# Patient Record
Sex: MALE | Race: BLACK OR AFRICAN AMERICAN | Employment: OTHER | URBAN - METROPOLITAN AREA
[De-identification: names, ages, dates, MRNs, and addresses within clinical notes are randomized per-mention and may not be internally consistent; named-entity substitution may affect disease eponyms.]

---

## 2017-01-05 ENCOUNTER — HOSPITAL ENCOUNTER (EMERGENCY)
Facility: CLINIC | Age: 72
Discharge: HOME | End: 2017-01-05
Attending: EMERGENCY MEDICINE | Admitting: EMERGENCY MEDICINE
Payer: MEDICARE

## 2017-01-05 ENCOUNTER — TELEPHONE (OUTPATIENT)
Dept: FAMILY MEDICINE | Facility: CLINIC | Age: 72
End: 2017-01-05

## 2017-01-05 ENCOUNTER — APPOINTMENT (OUTPATIENT)
Dept: GENERAL RADIOLOGY | Facility: CLINIC | Age: 72
End: 2017-01-05
Attending: EMERGENCY MEDICINE
Payer: MEDICARE

## 2017-01-05 VITALS
DIASTOLIC BLOOD PRESSURE: 94 MMHG | BODY MASS INDEX: 22.72 KG/M2 | WEIGHT: 162.8 LBS | TEMPERATURE: 98 F | OXYGEN SATURATION: 98 % | SYSTOLIC BLOOD PRESSURE: 133 MMHG | RESPIRATION RATE: 16 BRPM

## 2017-01-05 DIAGNOSIS — R07.89 ATYPICAL CHEST PAIN: ICD-10-CM

## 2017-01-05 LAB
ALBUMIN SERPL-MCNC: 3.8 G/DL (ref 3.4–5)
ALP SERPL-CCNC: 140 U/L (ref 40–150)
ALT SERPL W P-5'-P-CCNC: 17 U/L (ref 0–70)
ANION GAP SERPL CALCULATED.3IONS-SCNC: 4 MMOL/L (ref 3–14)
AST SERPL W P-5'-P-CCNC: 22 U/L (ref 0–45)
BASOPHILS # BLD AUTO: 0 10E9/L (ref 0–0.2)
BASOPHILS NFR BLD AUTO: 0.6 %
BILIRUB SERPL-MCNC: 0.4 MG/DL (ref 0.2–1.3)
BUN SERPL-MCNC: 12 MG/DL (ref 7–30)
CALCIUM SERPL-MCNC: 8.8 MG/DL (ref 8.5–10.1)
CHLORIDE SERPL-SCNC: 108 MMOL/L (ref 94–109)
CO2 SERPL-SCNC: 30 MMOL/L (ref 20–32)
CREAT SERPL-MCNC: 1.15 MG/DL (ref 0.66–1.25)
DIFFERENTIAL METHOD BLD: ABNORMAL
EOSINOPHIL # BLD AUTO: 0.1 10E9/L (ref 0–0.7)
EOSINOPHIL NFR BLD AUTO: 1.5 %
ERYTHROCYTE [DISTWIDTH] IN BLOOD BY AUTOMATED COUNT: 12.1 % (ref 10–15)
GFR SERPL CREATININE-BSD FRML MDRD: 63 ML/MIN/1.7M2
GLUCOSE SERPL-MCNC: 92 MG/DL (ref 70–99)
HCT VFR BLD AUTO: 46.6 % (ref 40–53)
HGB BLD-MCNC: 16 G/DL (ref 13.3–17.7)
IMM GRANULOCYTES # BLD: 0 10E9/L (ref 0–0.4)
IMM GRANULOCYTES NFR BLD: 0 %
LIPASE SERPL-CCNC: 156 U/L (ref 73–393)
LYMPHOCYTES # BLD AUTO: 1.3 10E9/L (ref 0.8–5.3)
LYMPHOCYTES NFR BLD AUTO: 38.7 %
MCH RBC QN AUTO: 33.9 PG (ref 26.5–33)
MCHC RBC AUTO-ENTMCNC: 34.3 G/DL (ref 31.5–36.5)
MCV RBC AUTO: 99 FL (ref 78–100)
MONOCYTES # BLD AUTO: 0.4 10E9/L (ref 0–1.3)
MONOCYTES NFR BLD AUTO: 10.7 %
NEUTROPHILS # BLD AUTO: 1.6 10E9/L (ref 1.6–8.3)
NEUTROPHILS NFR BLD AUTO: 48.5 %
NRBC # BLD AUTO: 0 10*3/UL
NRBC BLD AUTO-RTO: 0 /100
PLATELET # BLD AUTO: 143 10E9/L (ref 150–450)
POTASSIUM SERPL-SCNC: 3.8 MMOL/L (ref 3.4–5.3)
PROT SERPL-MCNC: 7.4 G/DL (ref 6.8–8.8)
RBC # BLD AUTO: 4.72 10E12/L (ref 4.4–5.9)
SODIUM SERPL-SCNC: 142 MMOL/L (ref 133–144)
TROPONIN I SERPL-MCNC: NORMAL UG/L (ref 0–0.04)
WBC # BLD AUTO: 3.3 10E9/L (ref 4–11)

## 2017-01-05 PROCEDURE — 71020 XR CHEST 2 VW: CPT

## 2017-01-05 PROCEDURE — 99284 EMERGENCY DEPT VISIT MOD MDM: CPT | Mod: 25 | Performed by: EMERGENCY MEDICINE

## 2017-01-05 PROCEDURE — 80053 COMPREHEN METABOLIC PANEL: CPT | Performed by: EMERGENCY MEDICINE

## 2017-01-05 PROCEDURE — 36415 COLL VENOUS BLD VENIPUNCTURE: CPT | Performed by: EMERGENCY MEDICINE

## 2017-01-05 PROCEDURE — 84484 ASSAY OF TROPONIN QUANT: CPT | Performed by: EMERGENCY MEDICINE

## 2017-01-05 PROCEDURE — 85025 COMPLETE CBC W/AUTO DIFF WBC: CPT | Performed by: EMERGENCY MEDICINE

## 2017-01-05 PROCEDURE — 93010 ELECTROCARDIOGRAM REPORT: CPT | Mod: Z6 | Performed by: EMERGENCY MEDICINE

## 2017-01-05 PROCEDURE — 83690 ASSAY OF LIPASE: CPT | Performed by: EMERGENCY MEDICINE

## 2017-01-05 PROCEDURE — 93005 ELECTROCARDIOGRAM TRACING: CPT | Performed by: EMERGENCY MEDICINE

## 2017-01-05 PROCEDURE — 99285 EMERGENCY DEPT VISIT HI MDM: CPT | Mod: 25 | Performed by: EMERGENCY MEDICINE

## 2017-01-05 ASSESSMENT — ENCOUNTER SYMPTOMS
FEVER: 0
CHILLS: 0
NAUSEA: 0
DIFFICULTY URINATING: 0
ARTHRALGIAS: 0
ABDOMINAL PAIN: 0
DIARRHEA: 0
CONFUSION: 0
COUGH: 0
NECK STIFFNESS: 0
LIGHT-HEADEDNESS: 0
EYE REDNESS: 0
DIZZINESS: 0
VOMITING: 0
SHORTNESS OF BREATH: 0
COLOR CHANGE: 0
HEADACHES: 0

## 2017-01-05 NOTE — DISCHARGE INSTRUCTIONS
Please make an appointment to follow up with Your Primary Care Provider as soon as possible for further work-up.  If you have any worsening pain, shortness of breath, lightheadedness, or other concerns, return to the emergency department for re-evaluation.           *CHEST PAIN, UNCERTAIN CAUSE    Based on your exam today, the exact cause of your chest pain is not certain. Your condition does not seem serious at this time, and your pain does not appear to be coming from your heart. However, sometimes the signs of a serious problem take more time to appear. Therefore, watch for the warning signs listed below.  HOME CARE:  1. Rest today and avoid strenuous activity.  2. Take any prescribed medicine as directed.  FOLLOW UP with your doctor in 1-3 days.   GET PROMPT MEDICAL ATTENTION if any of the following occur:    A change in the type of pain: if it feels different, becomes more severe, lasts longer, or begins to spread into your shoulder, arm, neck, jaw or back    Shortness of breath or increased pain with breathing    Weakness, dizziness, or fainting    Cough with blood or dark colored sputum (phlegm)    Fever over 101  F (38.3  C)    Swelling, pain or redness in one leg    5636-0345 [facility], [address] [place] [address]. All rights reserved. This information is not intended as a substitute for professional medical care. Always follow your healthcare professional's instructions.

## 2017-01-05 NOTE — TELEPHONE ENCOUNTER
Pt has been having chest pain for a month or so, began as intermittent now constant. Works out six days a week: Pull-ups, 50-55 minutes of biking. Now it is worse with exertion. Describes pain as ache, left pectoral muscle radiates to left side sometimes to back.   No breathing changes  No lightheadedness, no dizziness  No sweating, not clammy  No squeezing sensation, no pressure  No nausea  No GERD symptoms    Measures BP at home, has been 135/85, 150/98    Has history of sharp pains, was diagnosed with pleurisy but this is different from that.    Smokes weed 4 nights a week  Stopped smoking 40 years ago, started smoking again in [place] four years ago now smokes 2-3 cigaretts a week    Dr. Franco spoke to patient and advised to ED to rule out angina, cardiac involvement. Pt understands and will go to ED.    TONNY PizanoN, RN  [facility]

## 2017-01-05 NOTE — ED AVS SNAPSHOT
[facility], Emergency Department    [address]    [place] [address]    Phone:  [phone]    Fax:  [phone]                                       Ivy Haynes   MRN: [id number]    Department:  [facility], Emergency Department   Date of Visit:  1/5/2017           Patient Information     Date Of Birth          1945        Your diagnoses for this visit were:     Atypical chest pain        You were seen by Carleen Pa MD.        Discharge Instructions       Please make an appointment to follow up with Your Primary Care Provider as soon as possible for further work-up.  If you have any worsening pain, shortness of breath, lightheadedness, or other concerns, return to the emergency department for re-evaluation.           *CHEST PAIN, UNCERTAIN CAUSE    Based on your exam today, the exact cause of your chest pain is not certain. Your condition does not seem serious at this time, and your pain does not appear to be coming from your heart. However, sometimes the signs of a serious problem take more time to appear. Therefore, watch for the warning signs listed below.  HOME CARE:  1. Rest today and avoid strenuous activity.  2. Take any prescribed medicine as directed.  FOLLOW UP with your doctor in 1-3 days.   GET PROMPT MEDICAL ATTENTION if any of the following occur:    A change in the type of pain: if it feels different, becomes more severe, lasts longer, or begins to spread into your shoulder, arm, neck, jaw or back    Shortness of breath or increased pain with breathing    Weakness, dizziness, or fainting    Cough with blood or dark colored sputum (phlegm)    Fever over 101  F (38.3  C)    Swelling, pain or redness in one leg    3563-4641 [address] [address]. All rights reserved. This information is not intended as a substitute for professional medical care. Always follow your healthcare professional's instructions.        24 Hour Appointment  Hotline       To make an appointment at any [facility], call [phone] ([phone]). If you don't have a family doctor or clinic, we will help you find one. [facility] clinics are conveniently located to serve the needs of you and your family.             Review of your medicines      Our records show that you are taking the medicines listed below. If these are incorrect, please call your family doctor or clinic.        Dose / Directions Last dose taken    amLODIPine 2.5 MG tablet   Commonly known as:  NORVASC   Dose:  2.5 mg   Quantity:  90 tablet        Take 1 tablet (2.5 mg) by mouth daily   Refills:  1        TAMSULOSIN HCL PO        Refills:  0                Procedures and tests performed during your visit     CBC with platelets differential    Comprehensive metabolic panel    EKG 12 lead    Lipase    POC US ECHO LIMITED    Troponin I    XR Chest 2 Views      Orders Needing Specimen Collection     None      Pending Results     No orders found from 1/4/2017 to 1/6/2017.            Pending Culture Results     No orders found from 1/4/2017 to 1/6/2017.            Thank you for choosing [facility]       Thank you for choosing [facility] for your care. Our goal is always to provide you with excellent care. Hearing back from our patients is one way we can continue to improve our services. Please take a few minutes to complete the written survey that you may receive in the mail after you visit with us. Thank you!        [employer] Information     [employer] gives you secure access to your electronic health record. If you see a primary care provider, you can also send messages to your care team and make appointments. If you have questions, please call your primary care clinic.  If you do not have a primary care provider, please call [phone] and they will assist you.        Care EveryWhere ID     This is your Care EveryWhere ID. This could be used by other organizations to access your [facility]  records  [id number]        After Visit Summary       This is your record. Keep this with you and show to your community pharmacist(s) and doctor(s) at your next visit.

## 2017-01-05 NOTE — ED AVS SNAPSHOT
[facility], Emergency Department    [address]    [facility] [address]    Phone:  [phone]    Fax:  [phone]                                       Ivy Haynes   MRN: [id number]    Department:  [facility], Emergency Department   Date of Visit:  1/5/2017           After Visit Summary Signature Page     I have received my discharge instructions, and my questions have been answered. I have discussed any challenges I see with this plan with the nurse or doctor.    ..........................................................................................................................................  Patient/Patient Representative Signature      ..........................................................................................................................................  Patient Representative Print Name and Relationship to Patient    ..................................................               ................................................  Date                                            Time    ..........................................................................................................................................  Reviewed by Signature/Title    ...................................................              ..............................................  Date                                                            Time

## 2017-01-05 NOTE — ED PROVIDER NOTES
"  History     Chief Complaint   Patient presents with     Chest Wall Pain     \"an ache\" on left side x4 weeks, was coming and going. now constant. pt states it was \"a little more intense\" the last 2 times he has worked out.      TABATHA Haynes is a 71 year old male with a history of hepatitis C, GERD, trigeminal neuralgia, and hypertension who presents for evaluation of chest pain. Patient complains of aching, left-sided chest pain that began 4-6 weeks ago. He states when he first noticed the pain it was daily, though intermittent, however, over the past two days the pain has become constant. He presented for evaluation today because he has begun feeling the chest pain while he is exercising. He is able to complete his work-out though and describes the pain as a very dull ache that doesn't interfere with what he is doing.  He denies shortness of breath or cough. No fevers or chills. No history of PE/DVT. No leg pain or swelling. Patient smokes 2-3 cigarettes per day intermittently. He denies nausea, dizziness, lightheadedness, diaphoresis associated with the pain. He has not noticed anything that triggers his chest pain, and it is not changed with positional changes. It will occur both at rest and with activity.  He denies abdominal pain, vomiting, diarrhea, or blood in his stool.     I have reviewed the Medications, Allergies, Past Medical and Surgical History, and Social History in the [facility] system.  Past Medical History   Diagnosis Date     Hepatitis C      GERD (gastroesophageal reflux disease)      Back pain      Trigeminal neuralgia      Hemorrhoid      Gastro-oesophageal reflux disease        Past Surgical History   Procedure Laterality Date     Hernia repair  12/10     Styloid process temporal bone surg  02/10     Resect tumor lower extremity  11/9/2012     Procedure: RESECT TUMOR LOWER EXTREMITY;  Excision of Tumor Left Distal Femur, Left Total Knee Replacement ;  Surgeon: Ang Arellano MD;  Location: " [facility] OR     Arthroplasty knee  11/9/2012     Procedure: ARTHROPLASTY KNEE;;  Surgeon: Ang Arellano MD;  Location: [facility] OR       Family History   Problem Relation Age of Onset     CANCER Paternal Grandfather      CANCER Son        Social History   Substance Use Topics     Smoking status: Current Every Day Smoker -- 0.25 packs/day for 22 years     Types: Cigarettes     Last Attempt to Quit: 05/24/1981     Smokeless tobacco: Never Used     Alcohol Use: Yes      Comment: 1-2 beer a week      No current facility-administered medications for this encounter.     Current Outpatient Prescriptions   Medication     amLODIPine (NORVASC) 2.5 MG tablet     TAMSULOSIN HCL PO      No Known Allergies    Review of Systems   Constitutional: Negative for fever and chills.   HENT: Negative for congestion.    Eyes: Negative for redness.   Respiratory: Negative for cough and shortness of breath.    Cardiovascular: Positive for chest pain (aching; left-sided). Negative for leg swelling.   Gastrointestinal: Negative for nausea, vomiting, abdominal pain and diarrhea.   Genitourinary: Negative for difficulty urinating.   Musculoskeletal: Negative for arthralgias and neck stiffness.   Skin: Negative for color change.   Neurological: Negative for dizziness, light-headedness and headaches.   Psychiatric/Behavioral: Negative for confusion.       Physical Exam   BP: (!) 158/100 mmHg  Heart Rate: 92  Temp: 97.5  F (36.4  C)  Resp: 16  Weight: 73.846 kg (162 lb 12.8 oz)  SpO2: 99 %  Physical Exam  General: patient is alert and oriented and in no acute distress   Head: atraumatic and normocephalic   EENT: moist mucus membranes without tonsillar erythema or exudates, pupils round and reactive   Neck: supple   Cardiovascular: regular rate and rhythm, extremities warm and well perfused, no lower extremity edema  Pulmonary: lungs clear to auscultation bilaterally, no chest wall TTP  Abdomen: soft, non-tender   Musculoskeletal: normal range of motion    Neurological: alert and oriented, moving all extremities symmetrically, gait normal   Skin: warm, dry     ED Course   Procedures       10:11 AM  The patient was seen and examined by Dr. Pa in Room 19.          EKG Interpretation:      Interpreted by Carleen Pa  Time reviewed: 1000  Symptoms at time of EKG: chest pain   Rhythm: normal sinus   Rate: normal  Axis: normal  Ectopy: none  Conduction: normal  ST Segments/ T Waves: No ST-T wave changes  Q Waves: none  Comparison to prior: Unchanged    Clinical Impression: normal EKG          Critical Care time:  none                  Labs Ordered and Resulted from Time of ED Arrival Up to the Time of Departure from the ED - No data to display    Assessments & Plan (with Medical Decision Making)   Mr. Haynes is a 71 year old male with a history of hepatitis C, GERD, trigeminal neuralgia, and hypertension who presents for evaluation of chest pain.   Given the duration of his symptoms if his chest pain were ischemic in nature I would expect to see some elevation in troponin however this is negative.  His ECG shows no ischemic changes.  At this point my suspicion for ACS is low.  Bedside cardiac ultrasound does not show any evidence of a pericardial effusion or obvious changes in global function.  Chest x-ray is negative for infiltrate, pleural effusion or lytic lesion.  His LFTs and lipase are all within normal limits.  Hemoglobin is within normal limits.  I do not suspect PE or aortic dissection based on his history and exam.  He is pain-free at this time without any intervention.  I do feel he is safe to continue his workup in an outpatient setting.  He does have a primary care provider and close follow-up and is referred to his primary care provider for further workup and stress testing.  He was given close return instructions and voiced understanding.      I have reviewed the nursing notes.    I have reviewed the findings, diagnosis, plan and need for follow  up with the patient.    Discharge Medication List as of 1/5/2017 11:37 AM          Final diagnoses:   Atypical chest pain   I, Arlette Peñaloza, am serving as a trained medical scribe to document services personally performed by Carleen Pa MD, based on the provider's statements to me.   ICarleen MD, was physically present and have reviewed and verified the accuracy of this note documented by Arlette Peñaloza.      1/5/2017   [facility], [place], EMERGENCY DEPARTMENT      Carleen Pa MD  01/05/17 1208

## 2017-01-06 LAB — INTERPRETATION ECG - MUSE: NORMAL

## 2017-01-10 ENCOUNTER — OFFICE VISIT (OUTPATIENT)
Dept: FAMILY MEDICINE | Facility: CLINIC | Age: 72
End: 2017-01-10
Payer: COMMERCIAL

## 2017-01-10 VITALS
HEART RATE: 97 BPM | HEIGHT: 71 IN | OXYGEN SATURATION: 99 % | DIASTOLIC BLOOD PRESSURE: 85 MMHG | SYSTOLIC BLOOD PRESSURE: 134 MMHG | BODY MASS INDEX: 22.79 KG/M2 | WEIGHT: 162.8 LBS

## 2017-01-10 DIAGNOSIS — I10 HYPERTENSION GOAL BP (BLOOD PRESSURE) < 140/90: ICD-10-CM

## 2017-01-10 DIAGNOSIS — R07.89 CHEST WALL PAIN: Primary | ICD-10-CM

## 2017-01-10 PROCEDURE — 99214 OFFICE O/P EST MOD 30 MIN: CPT | Performed by: FAMILY MEDICINE

## 2017-01-10 NOTE — MR AVS SNAPSHOT
"              After Visit Summary   1/10/2017    Ivy Haynes    MRN: [id number]           Patient Information     Date Of Birth          1945        Visit Information        Provider Department      1/10/2017 3:00 PM Lamine Franco MD [facility]        Today's Diagnoses     Chest wall pain    -  1        Follow-ups after your visit        Future tests that were ordered for you today     Open Future Orders        Priority Expected Expires Ordered    Exercise Stress test Routine  2/24/2017 1/10/2017            Who to contact     If you have questions or need follow up information about today's clinic visit or your schedule please contact [facility] directly at [phone].  Normal or non-critical lab and imaging results will be communicated to you by MyChart, letter or phone within 4 business days after the clinic has received the results. If you do not hear from us within 7 days, please contact the clinic through [employer] or phone. If you have a critical or abnormal lab result, we will notify you by phone as soon as possible.  Submit refill requests through [employer] or call your pharmacy and they will forward the refill request to us. Please allow 3 business days for your refill to be completed.          Additional Information About Your Visit        MyChart Information     [employer] gives you secure access to your electronic health record. If you see a primary care provider, you can also send messages to your care team and make appointments. If you have questions, please call your primary care clinic.  If you do not have a primary care provider, please call [phone] and they will assist you.        Care EveryWhere ID     This is your Care EveryWhere ID. This could be used by other organizations to access your [place] medical records  [id number]        Your Vitals Were     Pulse Height BMI (Body Mass Index) Pulse Oximetry          97 5' 11\" (1.803 m) 22.72 kg/m2 99%  "        Blood Pressure from Last 3 Encounters:   01/10/17 134/85   01/05/17 133/94   06/08/16 120/82    Weight from Last 3 Encounters:   01/10/17 162 lb 12.8 oz (73.846 kg)   01/05/17 162 lb 12.8 oz (73.846 kg)   06/08/16 156 lb 6.4 oz (70.943 kg)               Primary Care Provider Office Phone # Fax #    Lamine Jr Franco [phone]       [facility] [address] [facility] [address]  [facility] [address]        Thank you!     Thank you for choosing [facility]  [place]. Our goal is always to provide you with excellent care. Hearing back from our patients is one way we can continue to improve our services. Please take a few minutes to complete the written survey that you may receive in the mail after your visit with us. Thank you!             Your Updated Medication List - Protect others around you: Learn how to safely use, store and throw away your medicines at www.disposemymeds.org.          This list is accurate as of: 1/10/17  3:11 PM.  Always use your most recent med list.                   Brand Name Dispense Instructions for use    amLODIPine 2.5 MG tablet    NORVASC    90 tablet    Take 1 tablet (2.5 mg) by mouth daily       TAMSULOSIN HCL PO

## 2017-01-10 NOTE — PROGRESS NOTES
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  Additional history: as documented    Problem list, Medication list, Allergies, and Medical/Social/Surgical histories reviewed in [facility] and updated as appropriate.    ROS:  Constitutional, HEENT, cardiovascular, pulmonary, gi and gu systems are negative, except as otherwise noted.    OBJECTIVE:                                                    /85 mmHg  Pulse 97  Ht 5' 11\" (1.803 m)  Wt 162 lb 12.8 oz (73.846 kg)  BMI 22.72 kg/m2  SpO2 99%  Body mass index is 22.72 kg/(m^2).  GENERAL: healthy, alert and no distress  HENT: ear canals and TM's normal, nose and mouth without ulcers or lesions  NECK: no adenopathy, no asymmetry, masses, or scars and thyroid normal to palpation  RESP: lungs clear to auscultation - no rales, rhonchi or wheezes  CV: regular rate and rhythm, normal S1 S2, no S3 or S4, no murmur, click or rub, no peripheral edema and peripheral pulses strong  MS: tenderness to palpation slight to left lateral chest wall  NEURO: Normal strength and tone, mentation intact and speech normal  BACK: no CVA tenderness, no paralumbar tenderness    Diagnostic Test Results:  Results for orders placed or performed during the hospital encounter of 01/05/17   XR Chest 2 Views    Narrative    XR CHEST 2 VW 1/5/2017 11:00 AM    COMPARISON: 2/11/2016    HISTORY: Left-sided chest pain      Impression    IMPRESSION: Cardiac silhouette and pulmonary vasculature are within  normal limits. No focal airspace disease, " pleural effusion or  pneumothorax.    ZAIRA COLON   POC US ECHO LIMITED    Impression    Limited Bedside Cardiac Ultrasound, performed and interpreted by  me.   Indication: Chest Pain.  Parasternal long axis, parasternal short axis, apical 4 chamber  and subcostal views were acquired.   Image quality was satisfactory.    Findings:    Global left ventricular function appears intact.  Chambers do not appear dilated.  There is no evidence of free fluid within the pericardium.    IMPRESSION: Grossly normal left ventricular function and chamber  size.  No pericardial effusion..     CBC with platelets differential   Result Value Ref Range    WBC 3.3 (L) 4.0 - 11.0 10e9/L    RBC Count 4.72 4.4 - 5.9 10e12/L    Hemoglobin 16.0 13.3 - 17.7 g/dL    Hematocrit 46.6 40.0 - 53.0 %    MCV 99 78 - 100 fl    MCH 33.9 (H) 26.5 - 33.0 pg    MCHC 34.3 31.5 - 36.5 g/dL    RDW 12.1 10.0 - 15.0 %    Platelet Count 143 (L) 150 - 450 10e9/L    Diff Method Automated Method     % Neutrophils 48.5 %    % Lymphocytes 38.7 %    % Monocytes 10.7 %    % Eosinophils 1.5 %    % Basophils 0.6 %    % Immature Granulocytes 0.0 %    Nucleated RBCs 0 0 /100    Absolute Neutrophil 1.6 1.6 - 8.3 10e9/L    Absolute Lymphocytes 1.3 0.8 - 5.3 10e9/L    Absolute Monocytes 0.4 0.0 - 1.3 10e9/L    Absolute Eosinophils 0.1 0.0 - 0.7 10e9/L    Absolute Basophils 0.0 0.0 - 0.2 10e9/L    Abs Immature Granulocytes 0.0 0 - 0.4 10e9/L    Absolute Nucleated RBC 0.0    Comprehensive metabolic panel   Result Value Ref Range    Sodium 142 133 - 144 mmol/L    Potassium 3.8 3.4 - 5.3 mmol/L    Chloride 108 94 - 109 mmol/L    Carbon Dioxide 30 20 - 32 mmol/L    Anion Gap 4 3 - 14 mmol/L    Glucose 92 70 - 99 mg/dL    Urea Nitrogen 12 7 - 30 mg/dL    Creatinine 1.15 0.66 - 1.25 mg/dL    GFR Estimate 63 >60 mL/min/1.7m2    GFR Estimate If Black 76 >60 mL/min/1.7m2    Calcium 8.8 8.5 - 10.1 mg/dL    Bilirubin Total 0.4 0.2 - 1.3 mg/dL    Albumin 3.8 3.4 - 5.0 g/dL    Protein  Total 7.4 6.8 - 8.8 g/dL    Alkaline Phosphatase 140 40 - 150 U/L    ALT 17 0 - 70 U/L    AST 22 0 - 45 U/L   Lipase   Result Value Ref Range    Lipase 156 73 - 393 U/L   Troponin I   Result Value Ref Range    Troponin I ES  0.000 - 0.045 ug/L     <0.015  The 99th percentile for upper reference range is 0.045 ug/L.  Troponin values in   the range of 0.045 - 0.120 ug/L may be associated with risks of adverse   clinical events.     EKG 12 lead   Result Value Ref Range    Interpretation ECG Click View Image link to view waveform and result         ASSESSMENT/PLAN:                                                            1. Chest wall pain  Likely MS in nature but need to rule out angina  - Exercise Stress test; Future    2. Hypertension goal BP (blood pressure) < 140/90  Continue medications/ diet/ exercise      Regular exercise  See Patient Instructions    Lamine Franco MD  [facility]

## 2017-01-12 DIAGNOSIS — R07.89 CHEST WALL PAIN: ICD-10-CM

## 2017-01-23 ENCOUNTER — MYC REFILL (OUTPATIENT)
Dept: FAMILY MEDICINE | Facility: CLINIC | Age: 72
End: 2017-01-23

## 2017-01-23 DIAGNOSIS — I10 HYPERTENSION GOAL BP (BLOOD PRESSURE) < 140/90: ICD-10-CM

## 2017-01-23 RX ORDER — AMLODIPINE BESYLATE 2.5 MG/1
2.5 TABLET ORAL DAILY
Qty: 90 TABLET | Refills: 1 | Status: SHIPPED | OUTPATIENT
Start: 2017-01-23 | End: 2017-02-15 | Stop reason: DRUGHIGH

## 2017-01-23 NOTE — TELEPHONE ENCOUNTER
Message from MyChart:  Original authorizing provider: Lamine Franco MD    Rayraygunnerbenita Haynes would like a refill of the following medications:  amLODIPine (NORVASC) 2.5 MG tablet [Lamine Franco MD]    Preferred pharmacy: [facility] [address] [place] [address] [address]    Comment:

## 2017-01-24 ENCOUNTER — TELEPHONE (OUTPATIENT)
Dept: FAMILY MEDICINE | Facility: CLINIC | Age: 72
End: 2017-01-24

## 2017-01-24 NOTE — TELEPHONE ENCOUNTER
Telephone call returned to patient, explained MD message regarding normal stress test results. Patient requests a copy of results be mailed to him.  He denies any questions or concerns. Normal lab letter sent today.     Donya Freedman RN  [facility]

## 2017-01-24 NOTE — TELEPHONE ENCOUNTER
Reason for Call:  Request for results:    Name of test or procedure: Had a stress test taken and had not heard the results yet    Date of test of procedure: 1-12-17 at the [facility]    Location of the test or procedure:     OK to leave the result message on voice mail or with a family member?     Phone number Patient can be reached at:  Home number on file [phone] (home)    Additional comments:     Call taken on 1/24/2017 at 11:43 AM by Eva Mendoza

## 2017-01-24 NOTE — Clinical Note
[address] [address] [phone]        2017      Leyda Haynes  [address]  [facility] [address]          Dear Mr. Haynes,    The results of your recent lab tests were within normal limits. Enclosed is a copy of these results.  If you have any further questions or problems, please contact our office.    Sincerely,        Lamnie Franco MD        Results for orders placed or performed in visit on 17   Exercise Stress test    Narrative    Interpretation Summary                    [facility]  Diagnostic and Treamtent-3rd Floor  [address] [place] [address]  Name: LEYDA HAYNES  MRN: [id number]  : 1945  Study Date: 2017 11:19 AM  Age: 71 yrs  Gender: Male  Patient Location: [facility]  Reason For Study: Chest discomfort/pressure  Ordering Physician: Lamine Franco MD  Referring Physician: 1-[facility]  Performed By: Vannessa Damon     BSA: 1.9 m2  Height: 71 in  Weight: 162 lb  HR: 93  BP: 150/94 mmHg  ______________________________________________________________________________     ______________________________________________________________________________     Interpretation Summary     No ECG evidence of ischemia. Normal functional capacity. The patient did not  exhibit any symptoms during exercise.  Normal blood pressure response with stress.  Normal heart rate response to exercise.  ______________________________________________________________________________        Stress  This was a normal stress EKG with no evidence of stress-induced ischemia.  Arrhythmia induced during stress: occasional PVC's.  The patient did not exhibit any symptoms during exercise.  Normal blood pressure response with stress.  Normal heart rate response to exercise.  Stress Results      Protocol:  Vic Ramp Protocol         Maximum Predicted HR:  151 bpm      Target HR:       128  bpm% Maximum               Predicted HR:  115 %      +------------------+--------+----------+------+--------------------------+   :                  :Duration:Heart Rate:      :                          :   :       Stage      : (mm:ss):   (bpm)  :  BPCom             ment         :   +------------------+--------+----------+------+--------------------------+   : Resting (Supine) :  0:00 93          :150/94:                          :   +------------------+--------+----------+------+--------------------------+   :Resting (Standing):  0:00 96          :150/90:                          :   +------------------+--------+----------+------+--------------------------+   :     2 Minutes    :  2:00 12      2   :185/87:                          :   +------------------+--------+----------+------+--------------------------+   :     4 Minutes    :  4:00 13      6   :190/85:                          :   +------------------+--------+----------+------+--------------------------+   :     6 Minutes    :  6:00 16      2   :200/80:                          :   +------------------+--------+----------+------+--------------------------+   :     8 Minutes    :  8:00 16      6   :210/90:                          :   +------------------+--------+----------+------+--------------------------+   :    10 Minutes    :  10:00 1     70   :209/85:                          :   +------------------+--------+----------+------+--------------------------+   :       Peak       :  11:35 1     73   :   / No   symptoms with exercise.:   +------------------+--------+----------+------+--------------------------+                    Stress Duration:  11:35 mm:ss *                Maximum Stress HR:   173 bpm *ME          TS: 12     ______________________________________________________________________________  MMode/2D Measurements & Calculations           Time Measurements           Doppler Measurements & Calculations        QLAB 2DQ/CMQ               ______________________________________________________________________________        Report approved by: Anali Stafford 01/12/2017 11:41 AM

## 2017-02-14 ENCOUNTER — TELEPHONE (OUTPATIENT)
Dept: FAMILY MEDICINE | Facility: CLINIC | Age: 72
End: 2017-02-14

## 2017-02-14 NOTE — TELEPHONE ENCOUNTER
Spoke with pt regarding symptoms, he has had more nasal symptoms the past couple days, runny nose, having to blow nose a lot, also stated he was more light headed, denies fever, has increased fluids and is getting adequate nutrition. Denies SOB, denies cough. Wife has been very ill and is also being seen tomorrow for possible influenza. He has not had his flu vaccine this season    Pt requested an appt for tomorrow to be seen, per Eva pt was added to schedule for Dr. Rocha.    Joanne Rojas RN

## 2017-02-14 NOTE — TELEPHONE ENCOUNTER
Reason for call: Other   Patient called regarding (reason for call): appointment  Additional comments: The patient's wife, Catherine Haynes, is seeing Shannen on 2/15 at 1130 for possible flu, and he thinks he is coming down with the same thing she has. He was wondering if he can be seen by Shannen at the same time her wife is seen (They usually see Dr. Franco but he will be out of the clinic for the next 2 days). Informed that nothing could be double booked without the provider's approval, and would get a message in to her to see if that would be ok.     Phone Number Pt can be reached at: Home number on file [phone] (home)  Best Time: Any  Can we leave a detailed message on this number? YES

## 2017-02-15 ENCOUNTER — OFFICE VISIT (OUTPATIENT)
Dept: FAMILY MEDICINE | Facility: CLINIC | Age: 72
End: 2017-02-15
Payer: COMMERCIAL

## 2017-02-15 DIAGNOSIS — R07.89 ATYPICAL CHEST PAIN: ICD-10-CM

## 2017-02-15 DIAGNOSIS — J06.9 VIRAL UPPER RESPIRATORY TRACT INFECTION: Primary | ICD-10-CM

## 2017-02-15 DIAGNOSIS — I10 HYPERTENSION GOAL BP (BLOOD PRESSURE) < 140/90: ICD-10-CM

## 2017-02-15 PROCEDURE — 99214 OFFICE O/P EST MOD 30 MIN: CPT | Performed by: FAMILY MEDICINE

## 2017-02-15 RX ORDER — AMLODIPINE BESYLATE 5 MG/1
5 TABLET ORAL DAILY
Qty: 90 TABLET | Refills: 3 | Status: SHIPPED | OUTPATIENT
Start: 2017-02-15 | End: 2017-07-11

## 2017-02-15 NOTE — NURSING NOTE
"Chief Complaint   Patient presents with     URI       Initial There were no vitals taken for this visit. Estimated body mass index is 22.71 kg/(m^2) as calculated from the following:    Height as of 1/10/17: 5' 11\" (1.803 m).    Weight as of 1/10/17: 162 lb 12.8 oz (73.8 kg).  Medication Reconciliation: complete    "

## 2017-02-15 NOTE — PATIENT INSTRUCTIONS
-Please try drinking a lot of fluid and rest to see if that helps prevent getting sick.  -Start on 5 mg of amlodipine daily, and please MyChart me some of your blood pressures. Please let me know if you have any problems with this change.

## 2017-02-15 NOTE — MR AVS SNAPSHOT
After Visit Summary   2/15/2017    Ivy Haynes    MRN: [id number]           Patient Information     Date Of Birth          1945        Visit Information        Provider Department      2/15/2017 11:45 AM Ruben Rocha MD [facility]        Today's Diagnoses     Viral upper respiratory tract infection    -  1    Hypertension goal BP (blood pressure) < 140/90        Atypical chest pain          Care Instructions    -Please try drinking a lot of fluid and rest to see if that helps prevent getting sick.  -Start on 5 mg of amlodipine daily, and please MyChart me some of your blood pressures. Please let me know if you have any problems with this change.        Follow-ups after your visit        Follow-up notes from your care team     Return in about 7 months (around 9/15/2017) for flu shot.      Who to contact     If you have questions or need follow up information about today's clinic visit or your schedule please contact [facility] directly at [phone].  Normal or non-critical lab and imaging results will be communicated to you by Basyshart, letter or phone within 4 business days after the clinic has received the results. If you do not hear from us within 7 days, please contact the clinic through [facility] or phone. If you have a critical or abnormal lab result, we will notify you by phone as soon as possible.  Submit refill requests through [facility] or call your pharmacy and they will forward the refill request to us. Please allow 3 business days for your refill to be completed.          Additional Information About Your Visit        MyChart Information     [facility] gives you secure access to your electronic health record. If you see a primary care provider, you can also send messages to your care team and make appointments. If you have questions, please call your primary care clinic.  If you do not have a primary care provider, please call [phone] and they  will assist you.        Care EveryWhere ID     This is your Care EveryWhere ID. This could be used by other organizations to access your [facility] medical records  [id number]         Blood Pressure from Last 3 Encounters:   01/10/17 134/85   01/05/17 (!) 133/94   06/08/16 120/82    Weight from Last 3 Encounters:   01/10/17 162 lb 12.8 oz (73.8 kg)   01/05/17 162 lb 12.8 oz (73.8 kg)   06/08/16 156 lb 6.4 oz (70.9 kg)              Today, you had the following     No orders found for display         Today's Medication Changes          These changes are accurate as of: 2/15/17 11:59 PM.  If you have any questions, ask your nurse or doctor.               These medicines have changed or have updated prescriptions.        Dose/Directions    amLODIPine 5 MG tablet   Commonly known as:  NORVASC   This may have changed:    - medication strength  - how much to take   Used for:  Hypertension goal BP (blood pressure) < 140/90   Changed by:  Ruben Rocha MD        Dose:  5 mg   Take 1 tablet (5 mg) by mouth daily   Quantity:  90 tablet   Refills:  3            Where to get your medicines      These medications were sent to [facility] [address] [place] [address] AT [address] [address]    Hours:  24-hours Phone:  [phone]     amLODIPine 5 MG tablet                Primary Care Provider Office Phone # Fax #    Lamine Franco [phone]       [facility] [address] [facility] [address]  [facility] [address]        Thank you!     Thank you for choosing [facility]  for your care. Our goal is always to provide you with excellent care. Hearing back from our patients is one way we can continue to improve our services. Please take a few minutes to complete the written survey that you may receive in the mail after your visit with us. Thank you!             Your Updated Medication List - Protect others  around you: Learn how to safely use, store and throw away your medicines at www.disposemymeds.org.          This list is accurate as of: 2/15/17 11:59 PM.  Always use your most recent med list.                   Brand Name Dispense Instructions for use    amLODIPine 5 MG tablet    NORVASC    90 tablet    Take 1 tablet (5 mg) by mouth daily       TAMSULOSIN HCL PO

## 2017-03-05 ENCOUNTER — MYC MEDICAL ADVICE (OUTPATIENT)
Dept: FAMILY MEDICINE | Facility: CLINIC | Age: 72
End: 2017-03-05

## 2017-03-05 DIAGNOSIS — R07.89 ATYPICAL CHEST PAIN: Primary | ICD-10-CM

## 2017-03-06 ENCOUNTER — PRE VISIT (OUTPATIENT)
Dept: CARDIOLOGY | Facility: CLINIC | Age: 72
End: 2017-03-06

## 2017-03-06 NOTE — TELEPHONE ENCOUNTER
Dr. Franco -- pt requesting referral to cardiology for chest pain. Please review/complete and sign if you agree with the plan. Thank you.    Cass Mclean, TONNYN, RN  [facility]

## 2017-03-08 ENCOUNTER — TELEPHONE (OUTPATIENT)
Dept: CARDIOLOGY | Facility: CLINIC | Age: 72
End: 2017-03-08

## 2017-03-08 ENCOUNTER — OFFICE VISIT (OUTPATIENT)
Dept: CARDIOLOGY | Facility: CLINIC | Age: 72
End: 2017-03-08
Attending: FAMILY MEDICINE
Payer: COMMERCIAL

## 2017-03-08 VITALS
WEIGHT: 160 LBS | SYSTOLIC BLOOD PRESSURE: 140 MMHG | BODY MASS INDEX: 22.4 KG/M2 | DIASTOLIC BLOOD PRESSURE: 88 MMHG | HEIGHT: 71 IN | HEART RATE: 74 BPM

## 2017-03-08 DIAGNOSIS — R07.2 PRECORDIAL PAIN: ICD-10-CM

## 2017-03-08 DIAGNOSIS — I10 HYPERTENSION GOAL BP (BLOOD PRESSURE) < 140/90: Primary | ICD-10-CM

## 2017-03-08 DIAGNOSIS — R07.9 CHEST PAIN: Primary | ICD-10-CM

## 2017-03-08 DIAGNOSIS — I10 HYPERTENSION GOAL BP (BLOOD PRESSURE) < 140/90: ICD-10-CM

## 2017-03-08 LAB
ALT SERPL W P-5'-P-CCNC: <5 U/L (ref 5–30)
CHOLEST SERPL-MCNC: 147 MG/DL
HDLC SERPL-MCNC: 52 MG/DL
LDLC SERPL CALC-MCNC: 81 MG/DL
NONHDLC SERPL-MCNC: 95 MG/DL
TRIGL SERPL-MCNC: 70 MG/DL

## 2017-03-08 PROCEDURE — 36415 COLL VENOUS BLD VENIPUNCTURE: CPT | Performed by: INTERNAL MEDICINE

## 2017-03-08 PROCEDURE — 99204 OFFICE O/P NEW MOD 45 MIN: CPT | Performed by: INTERNAL MEDICINE

## 2017-03-08 PROCEDURE — 80061 LIPID PANEL: CPT | Performed by: INTERNAL MEDICINE

## 2017-03-08 PROCEDURE — 84460 ALANINE AMINO (ALT) (SGPT): CPT | Performed by: INTERNAL MEDICINE

## 2017-03-08 RX ORDER — SODIUM CHLORIDE 9 MG/ML
INJECTION, SOLUTION INTRAVENOUS CONTINUOUS
Status: CANCELLED | OUTPATIENT
Start: 2017-03-08

## 2017-03-08 RX ORDER — POTASSIUM CHLORIDE 1500 MG/1
20 TABLET, EXTENDED RELEASE ORAL
Status: CANCELLED | OUTPATIENT
Start: 2017-03-08

## 2017-03-08 RX ORDER — ASPIRIN 81 MG/1
81 TABLET, CHEWABLE ORAL DAILY
Qty: 36 TABLET | Status: ON HOLD | COMMUNITY
Start: 2017-03-08 | End: 2017-03-10

## 2017-03-08 RX ORDER — LIDOCAINE 40 MG/G
CREAM TOPICAL
Status: CANCELLED | OUTPATIENT
Start: 2017-03-08

## 2017-03-08 RX ORDER — ASPIRIN 81 MG/1
81 TABLET ORAL DAILY
Status: CANCELLED | OUTPATIENT
Start: 2017-03-08

## 2017-03-08 NOTE — TELEPHONE ENCOUNTER
----- Message from Dusty Sauceda MD sent at 3/8/2017 11:08 AM CST -----  Regarding: lipid profile  Hi Team    This very nice man is scheduled for an angiogram in the near future. Although his lipid profile is not bad, the ACC risk calculator puts a risk of 18.9% of heart attack or stroke over 10 years and present guidelines would advise in addition to a Mediterranean diet, avoiding tobacco and regular exercise,  beginning a moderate intensity statin drug ( eg atorvastatin 20 mg daily or simvastatin 40 mg daily). He may be reluctant to start the medication and I can discuss with him if needed at the time or after his angiogram or we can start it now if he prefers. Please call him with results today.      Thanks

## 2017-03-08 NOTE — PROGRESS NOTES
HISTORY OF PRESENT ILLNESS:  Ivy Haynes, a 71-year-old man, was evaluated in consultation at your request for chest pain.      Mr. Haynse indicates that since age 12-13 he has been troubled by a left-sided sharp chest pain.  This chest pain has occurred intermittently all throughout his life and is worse during times of respiratory infection.  He was told at one point by a physician that they felt he might have pleurisy.  The patient has never had a CT scan but has lived with this particular left-sided chest pain for over 50 years. There have been no fevers, rash or joint pain in conjunction with episodes of this chronic chest pain.     Since last summer, he has been troubled by a different type of chest discomfort.  This second type of chest discomfort occurs during activity, especially when he first starts to exercise.  Although the symptoms have not awakened him from sleep, they have occurred in a more frequent fashion over the last several months.      The patient presented to the [facility] Emergency Department on 01/05/2017 with chest pain.  At that time he underwent normal  ECG, limited transthoracic echocardiogram,  troponin level, and chest x-ray screening tests.  The patient was discharged and then seen by Dr. Franco on 01/10/2017, who  ordered a stress echocardiogram performed on 01/12/2017. The stress echocardiogram was normal.     Despite the patient's negative stress test, however, he continues to be troubled by chest discomfort during activity.  He is concerned about the possibility of coronary artery disease as a cause for his symptoms.      PAST MEDICAL HISTORY:   1.  Cigarette smoking about 3 cigarettes per day since his mid 60s.   2.  Hypertension, currently on amlodipine.   3.  Previous history of hepatitis C.   4.  Gastroesophageal reflux disease.   5.  History of left knee replacement.      There is no history of previous myocardial infarction, diabetes mellitus or family history of  premature coronary disease.      SOCIAL HISTORY:  The patient is .  He has 3 children.  He is a retired .  He likes to play the [employer].  He is very physically active and exercises about an hour a day.  He enjoys doing pull-ups.      ALLERGIES:  None known.      HABITS:  The patient uses alcohol very rarely.  He rarely uses marijuana.      PHYSICAL EXAMINATION:   GENERAL:  Exam today demonstrates a very pleasant, cooperative and intelligent, lean 71-year-old man who appears younger than stated age.   VITAL SIGNS:  His blood pressure is 140/88, heart rate 74.  His height is 1.8 meters, his weight is 72.6 kg.   LUNGS:  Clear to percussion and auscultation.   CARDIOVASCULAR:  Normal S1 with a normal S2, no S3.  There is no murmur, rub or click.  His pulses are full and symmetrical in the carotid, radial, brachial, femoral, popliteal, dorsalis pedis and post-tibials.   ABDOMEN:  Bowel sounds are present in all 4 quadrants.  Liver percusses to 7 cm, was not palpable.  Aorta is not tender or enlarged.   EXTREMITIES:  There is no swelling, cyanosis or clubbing.   NEUROLOGIC:  Cranial nerves II through XII are intact.  Strength equal and symmetrical.  He displays normal insight and judgment.      LABORATORY STUDIES:  I reviewed the patient's stress echo and agree that shows no evidence of ischemia with good exercise tolerance.  His ECG on 01/05/2017 shows a normal sinus rhythm with normal axis, normal intervals and was within normal limits. Chest x-ray shows normal heart with clear lung fields.      ASSESSMENT:  This 71-year-old man with history of hypertension and tobacco use has a history of exercise-associated chest discomfort, new since last summer.  The patient finds the discomfort now occurs during his usual  exercise programs and is concerned about the possibility of coronary artery disease.  His exercise stress test shows good exercise tolerance with no objective signs of ischemia.       The options  at this point would be continued observation ofnmedical therapy versus proceeding with diagnostic left heart catheterization, left ventriculogram, coronary angiography and possible revascularization.  After discussion of the alternatives, the patient wishes to proceed to invasive evaluation.  I explained to the patient I am uncertain as to the cause of his initial chest pain syndrome which has been present for over 50 years, however, this particular discomfort is unlikely to benefit from further testing or intervention.     RECOMMENDATIONS:   1.  Immediate cessation of tobacco use.   2.  Mediterranean style diet.   3.  Continue exercise program.   4.  Will check a lipid profile today.   5.  Diagnostic left heart catheterization, left ventriculogram, coronary angiography and possible revascularization.      I reviewed the risks of death, myocardial infarction, stroke, hematoma, possible need for urgent bypass surgery in event of failed intervention ,use of a fractional flow reserve in clinical testing, the need for long-term dual antiplatelet therapy should a stent be placed, the possibility of peripheral vascular complications.  He voiced understanding and wishes to proceed.  We will plan to use a radial approach.      cc:   [place] [address]         HANNAH ARTEAGA MD             D: 2017 09:08   T: 2017 13:04   MT: michel      Name:     LEYDA CRABTREE   MRN:      [id number]        Account:      [id number]   :      1945           Service Date: 2017      Document: [id number]

## 2017-03-08 NOTE — PROGRESS NOTES
HPI and Plan:   See dictation    No orders of the defined types were placed in this encounter.      Orders Placed This Encounter   Medications     aspirin 81 MG chewable tablet     Sig: Take 1 tablet (81 mg) by mouth daily     Dispense:  36 tablet       There are no discontinued medications.      Encounter Diagnoses   Name Primary?     Hypertension goal BP (blood pressure) < 140/90 Yes     Precordial pain        CURRENT MEDICATIONS:  Current Outpatient Prescriptions   Medication Sig Dispense Refill     aspirin 81 MG chewable tablet Take 1 tablet (81 mg) by mouth daily 36 tablet      amLODIPine (NORVASC) 5 MG tablet Take 1 tablet (5 mg) by mouth daily 90 tablet 3     TAMSULOSIN HCL PO Take 0.4 mg by mouth daily          ALLERGIES   No Known Allergies    PAST MEDICAL HISTORY:  Past Medical History   Diagnosis Date     Back pain      Chest pain      Gastro-oesophageal reflux disease      GERD (gastroesophageal reflux disease)      Hemorrhoid      Hepatitis C      Trigeminal neuralgia        PAST SURGICAL HISTORY:  Past Surgical History   Procedure Laterality Date     Hernia repair  12/10     Styloid process temporal bone surg  02/10     Resect tumor lower extremity  11/9/2012     Procedure: RESECT TUMOR LOWER EXTREMITY;  Excision of Tumor Left Distal Femur, Left Total Knee Replacement ;  Surgeon: Ang Arellano MD;  Location: [facility] OR     Arthroplasty knee  11/9/2012     Procedure: ARTHROPLASTY KNEE;;  Surgeon: Ang Arellano MD;  Location: [facility] OR       FAMILY HISTORY:  Family History   Problem Relation Age of Onset     Hypertension Mother      CEREBROVASCULAR DISEASE Father 69     CANCER Paternal Grandfather      CANCER Son        SOCIAL HISTORY:  Social History     Social History     Marital status:      Spouse name: N/A     Number of children: N/A     Years of education: N/A     Social History Main Topics     Smoking status: Current Every Day Smoker     Packs/day: 0.25     Types: Cigarettes     Last attempt  "to quit: 5/24/1981     Smokeless tobacco: Never Used      Comment: quit 1979 until 2013. started after trip to [place]     Alcohol use Yes      Comment: 1-2 beer a week      Drug use: Yes      Comment: Every other month-\"just a puff of marijuana\"     Sexual activity: Yes     Partners: Female     Other Topics Concern     Parent/Sibling W/ Cabg, Mi Or Angioplasty Before 65f 55m? No     Social History Narrative       Review of Systems:  Skin:  Negative       Eyes:  Positive for glasses    ENT:  Negative      Respiratory:  Negative       Cardiovascular:    Positive for;chest pain;lightheadedness (left side off and on for months)    Gastroenterology: Positive for reflux (under control per patient)    Genitourinary:  not assessed      Musculoskeletal:  Negative      Neurologic:  Positive for headaches (improved with increase of Amlodipine)    Psychiatric:  Negative      Heme/Lymph/Imm:  Negative      Endocrine:  Negative        Physical Exam:  Vitals: /88  Pulse 74  Ht 1.803 m (5' 11\")  Wt 72.6 kg (160 lb)  BMI 22.32 kg/m2    Constitutional:  cooperative, alert and oriented, well developed, well nourished, in no acute distress        Skin:  warm and dry to the touch, no apparent skin lesions or masses noted        Head:  normocephalic, no masses or lesions        Eyes:  pupils equal and round, conjunctivae and lids unremarkable, sclera white, no xanthalasma, EOMS intact, no nystagmus        ENT:  no pallor or cyanosis, dentition good        Neck:  carotid pulses are full and equal bilaterally, JVP normal, no carotid bruit, no thyromegaly        Chest:  normal breath sounds, clear to auscultation, normal A-P diameter, normal symmetry, normal respiratory excursion, no use of accessory muscles          Cardiac: regular rhythm, normal S1/S2, no S3 or S4, apical impulse not displaced, no murmurs, gallops or rubs                  Abdomen:  abdomen soft, non-tender, BS normoactive, no mass, no HSM, no bruits        Vascular: " pulses full and equal, no bruits auscultated                                        Extremities and Back:  no deformities, clubbing, cyanosis, erythema observed              Neurological:  affect appropriate, oriented to time, person and place              CC  Ruben Rocha MD  [facility]  [address] [facility] [address]  [place] [address]

## 2017-03-08 NOTE — TELEPHONE ENCOUNTER
Orders placed for left heart cath on 3/10. Pt scheduled for 11:00 and is to check in at 9:00. I called pt and reviewed angiogram prep and flp results with him from today, as well as 's recommendation that he start a moderate intensity statin drug. Pt instructed not to eat or drink anything after midnight, except for his am medications and aspirin (pt will take 325 mg as he was not previously on aspirin). He has no known allergy to dye. He has ride to drive him and stay with him for 24 hours post procedure. Pt said he is feeling reluctant to starting a statin and would prefer to wait to see what his cath results show and discuss with PAM at f/u OV on 3/17. Sergo SCHMIDT

## 2017-03-08 NOTE — MR AVS SNAPSHOT
After Visit Summary   3/8/2017    Ivy Haynes    MRN: [id number]           Patient Information     Date Of Birth          1945        Visit Information        Provider Department      3/8/2017 8:30 AM Dusty Sauceda MD [facility] HEART [facility]        Today's Diagnoses     Hypertension goal BP (blood pressure) < 140/90    -  1    Precordial pain           Follow-ups after your visit        Future tests that were ordered for you today     Open Future Orders        Priority Expected Expires Ordered    Outpatient Coronary Angiography Adult Order Routine  5/27/2017 3/8/2017            Who to contact     If you have questions or need follow up information about today's clinic visit or your schedule please contact [facility] directly at [phone].  Normal or non-critical lab and imaging results will be communicated to you by MyChart, letter or phone within 4 business days after the clinic has received the results. If you do not hear from us within 7 days, please contact the clinic through [employer] or phone. If you have a critical or abnormal lab result, we will notify you by phone as soon as possible.  Submit refill requests through [employer] or call your pharmacy and they will forward the refill request to us. Please allow 3 business days for your refill to be completed.          Additional Information About Your Visit        MyChart Information     [employer] gives you secure access to your electronic health record. If you see a primary care provider, you can also send messages to your care team and make appointments. If you have questions, please call your primary care clinic.  If you do not have a primary care provider, please call [phone] and they will assist you.        Care EveryWhere ID     This is your Care EveryWhere ID. This could be used by other organizations to access your [facility] medical records  [id number]    "     Your Vitals Were     Pulse Height BMI (Body Mass Index)             74 1.803 m (5' 11\") 22.32 kg/m2          Blood Pressure from Last 3 Encounters:   03/08/17 140/88   01/10/17 134/85   01/05/17 (!) 133/94    Weight from Last 3 Encounters:   03/08/17 72.6 kg (160 lb)   01/10/17 73.8 kg (162 lb 12.8 oz)   01/05/17 73.8 kg (162 lb 12.8 oz)                 Today's Medication Changes          These changes are accurate as of: 3/8/17  8:51 AM.  If you have any questions, ask your nurse or doctor.               Start taking these medicines.        Dose/Directions    aspirin 81 MG chewable tablet   Used for:  Precordial pain, Hypertension goal BP (blood pressure) < 140/90   Started by:  Dusty Sauceda MD        Dose:  81 mg   Take 1 tablet (81 mg) by mouth daily   Quantity:  36 tablet   Refills:  0            Where to get your medicines      Some of these will need a paper prescription and others can be bought over the counter.  Ask your nurse if you have questions.     You don't need a prescription for these medications     aspirin 81 MG chewable tablet                Primary Care Provider Office Phone # Fax #    Lamine Jr Franco [phone]       [facility] [address] [address]        Thank you!     Thank you for choosing [facility] AT [place]  for your care. Our goal is always to provide you with excellent care. Hearing back from our patients is one way we can continue to improve our services. Please take a few minutes to complete the written survey that you may receive in the mail after your visit with us. Thank you!             Your Updated Medication List - Protect others around you: Learn how to safely use, store and throw away your medicines at www.disposemymeds.org.          This list is accurate as of: 3/8/17  8:51 AM.  Always use your most recent med list.                   Brand Name Dispense Instructions for " use    amLODIPine 5 MG tablet    NORVASC    90 tablet    Take 1 tablet (5 mg) by mouth daily       aspirin 81 MG chewable tablet     36 tablet    Take 1 tablet (81 mg) by mouth daily       TAMSULOSIN HCL PO      Take 0.4 mg by mouth daily

## 2017-03-08 NOTE — LETTER
3/8/2017    Ruben Rocha MD  [facility]  [address]  [place] [address]    RE: Ivy Haynes       Dear Colleague,    I had the pleasure of seeing Ivy Haynes in the [facility].    Ivy Haynes, a 71-year-old man, was evaluated in consultation at your request for chest pain.      Mr. Haynes indicates that since age 12-13 he has been troubled by a left-sided sharp chest pain.  This chest pain has occurred intermittently all throughout his life and is worse during times of respiratory infection.  He was told at one point by a physician that they felt he might have pleurisy.  The patient has never had a CT scan but has lived with this particular left-sided chest pain for over 50 years. There have been no fevers, rash or joint pain in conjunction with episodes of this chronic chest pain.     Since last summer, he has been troubled by a different type of chest discomfort.  This second type of chest discomfort occurs during activity, especially when he first starts to exercise.  Although the symptoms have not awakened him from sleep, they have occurred in a more frequent fashion over the last several months.      The patient presented to the [facility] Emergency Department on 01/05/2017 with chest pain.  At that time he underwent normal  ECG, limited transthoracic echocardiogram,  troponin level, and chest x-ray screening tests.  The patient was discharged and then seen by Dr. Franco on 01/10/2017, who  ordered a stress echocardiogram performed on 01/12/2017. The stress echocardiogram was normal.     Despite the patient's negative stress test, however, he continues to be troubled by chest discomfort during activity.  He is concerned about the possibility of coronary artery disease as a cause for his symptoms.      PAST MEDICAL HISTORY:   1.  Cigarette smoking about 3 cigarettes per day since his mid 60s.   2.  Hypertension, currently on amlodipine.   3.   Previous history of hepatitis C.   4.  Gastroesophageal reflux disease.   5.  History of left knee replacement.      There is no history of previous myocardial infarction, diabetes mellitus or family history of premature coronary disease.      SOCIAL HISTORY:  The patient is .  He has 3 children.  He is a retired .  He likes to play the [employer].  He is very physically active and exercises about an hour a day.  He enjoys doing pull-ups.      ALLERGIES:  None known.      HABITS:  The patient uses alcohol very rarely.  He rarely uses marijuana.      PHYSICAL EXAMINATION:   GENERAL:  Exam today demonstrates a very pleasant, cooperative and intelligent, lean 71-year-old man who appears younger than stated age.   VITAL SIGNS:  His blood pressure is 140/88, heart rate 74.  His height is 1.8 meters, his weight is 72.6 kg.   LUNGS:  Clear to percussion and auscultation.   CARDIOVASCULAR:  Normal S1 with a normal S2, no S3.  There is no murmur, rub or click.  His pulses are full and symmetrical in the carotid, radial, brachial, femoral, popliteal, dorsalis pedis and post-tibials.   ABDOMEN:  Bowel sounds are present in all 4 quadrants.  Liver percusses to 7 cm, was not palpable.  Aorta is not tender or enlarged.   EXTREMITIES:  There is no swelling, cyanosis or clubbing.   NEUROLOGIC:  Cranial nerves II through XII are intact.  Strength equal and symmetrical.  He displays normal insight and judgment.      LABORATORY STUDIES:  I reviewed the patient's stress echo and agree that shows no evidence of ischemia with good exercise tolerance.  His ECG on 01/05/2017 shows a normal sinus rhythm with normal axis, normal intervals and was within normal limits. Chest x-ray shows normal heart with clear lung fields.      Outpatient Encounter Prescriptions as of 3/8/2017   Medication Sig Dispense Refill     aspirin 81 MG chewable tablet Take 1 tablet (81 mg) by mouth daily 36 tablet      amLODIPine (NORVASC) 5 MG tablet  Take 1 tablet (5 mg) by mouth daily 90 tablet 3     TAMSULOSIN HCL PO Take 0.4 mg by mouth daily        No facility-administered encounter medications on file as of 3/8/2017.      ASSESSMENT:  This 71-year-old man with history of hypertension and tobacco use has a history of exercise-associated chest discomfort, new since last summer.  The patient finds the discomfort now occurs during his usual  exercise programs and is concerned about the possibility of coronary artery disease.  His exercise stress test shows good exercise tolerance with no objective signs of ischemia.       The options at this point would be continued observation ofnmedical therapy versus proceeding with diagnostic left heart catheterization, left ventriculogram, coronary angiography and possible revascularization.  After discussion of the alternatives, the patient wishes to proceed to invasive evaluation.  I explained to the patient I am uncertain as to the cause of his initial chest pain syndrome which has been present for over 50 years, however, this particular discomfort is unlikely to benefit from further testing or intervention.       RECOMMENDATIONS:   1.  Immediate cessation of tobacco use.   2.  Mediterranean style diet.   3.  Continue exercise program.   4.  Will check a lipid profile today.   5.  Diagnostic left heart catheterization, left ventriculogram, coronary angiography and possible revascularization.      I reviewed the risks of death, myocardial infarction, stroke, hematoma, possible need for urgent bypass surgery in event of failed intervention ,use of a fractional flow reserve in clinical testing, the need for long-term dual antiplatelet therapy should a stent be placed, the possibility of peripheral vascular complications.  He voiced understanding and wishes to proceed.  We will plan to use a radial approach.      Again, thank you for allowing me to participate in the care of your patient.      Sincerely,    Dusty Aviles  MD Sohail     [facility]    cc:   Lamine Franco MD  [facility]   [address]  [facility] [address]

## 2017-03-10 ENCOUNTER — APPOINTMENT (OUTPATIENT)
Dept: CARDIOLOGY | Facility: CLINIC | Age: 72
End: 2017-03-10
Attending: INTERNAL MEDICINE
Payer: MEDICARE

## 2017-03-10 ENCOUNTER — HOSPITAL ENCOUNTER (OUTPATIENT)
Facility: CLINIC | Age: 72
Discharge: HOME | End: 2017-03-10
Attending: INTERNAL MEDICINE | Admitting: INTERNAL MEDICINE
Payer: MEDICARE

## 2017-03-10 VITALS
TEMPERATURE: 98.2 F | HEART RATE: 80 BPM | OXYGEN SATURATION: 96 % | HEIGHT: 71 IN | BODY MASS INDEX: 22.2 KG/M2 | SYSTOLIC BLOOD PRESSURE: 114 MMHG | WEIGHT: 158.6 LBS | DIASTOLIC BLOOD PRESSURE: 79 MMHG | RESPIRATION RATE: 18 BRPM

## 2017-03-10 DIAGNOSIS — I10 HYPERTENSION GOAL BP (BLOOD PRESSURE) < 140/90: ICD-10-CM

## 2017-03-10 DIAGNOSIS — R07.2 PRECORDIAL PAIN: ICD-10-CM

## 2017-03-10 LAB
ANION GAP SERPL CALCULATED.3IONS-SCNC: 7 MMOL/L (ref 3–14)
APTT PPP: 29 SEC (ref 22–37)
BUN SERPL-MCNC: 14 MG/DL (ref 7–30)
CALCIUM SERPL-MCNC: 8.8 MG/DL (ref 8.5–10.1)
CHLORIDE SERPL-SCNC: 107 MMOL/L (ref 94–109)
CO2 SERPL-SCNC: 29 MMOL/L (ref 20–32)
CREAT SERPL-MCNC: 1.1 MG/DL (ref 0.66–1.25)
ERYTHROCYTE [DISTWIDTH] IN BLOOD BY AUTOMATED COUNT: 12.3 % (ref 10–15)
GFR SERPL CREATININE-BSD FRML MDRD: 66 ML/MIN/1.7M2
GLUCOSE SERPL-MCNC: 86 MG/DL (ref 70–99)
HCT VFR BLD AUTO: 45.5 % (ref 40–53)
HGB BLD-MCNC: 15.9 G/DL (ref 13.3–17.7)
INR PPP: 1.04 (ref 0.86–1.14)
MCH RBC QN AUTO: 34.1 PG (ref 26.5–33)
MCHC RBC AUTO-ENTMCNC: 34.9 G/DL (ref 31.5–36.5)
MCV RBC AUTO: 98 FL (ref 78–100)
PLATELET # BLD AUTO: 152 10E9/L (ref 150–450)
POTASSIUM SERPL-SCNC: 3.8 MMOL/L (ref 3.4–5.3)
RBC # BLD AUTO: 4.66 10E12/L (ref 4.4–5.9)
SODIUM SERPL-SCNC: 143 MMOL/L (ref 133–144)
WBC # BLD AUTO: 3.7 10E9/L (ref 4–11)

## 2017-03-10 PROCEDURE — B2111ZZ FLUOROSCOPY OF MULTIPLE CORONARY ARTERIES USING LOW OSMOLAR CONTRAST: ICD-10-PCS | Performed by: INTERNAL MEDICINE

## 2017-03-10 PROCEDURE — 93458 L HRT ARTERY/VENTRICLE ANGIO: CPT

## 2017-03-10 PROCEDURE — 25000128 H RX IP 250 OP 636: Performed by: INTERNAL MEDICINE

## 2017-03-10 PROCEDURE — 27211089 ZZH KIT ACIST INJECTOR CR3

## 2017-03-10 PROCEDURE — 27210787 ZZH MANIFOLD CR2

## 2017-03-10 PROCEDURE — 85610 PROTHROMBIN TIME: CPT | Performed by: INTERNAL MEDICINE

## 2017-03-10 PROCEDURE — 27210795 ZZH PAD DEFIB QUICK CR4

## 2017-03-10 PROCEDURE — 99153 MOD SED SAME PHYS/QHP EA: CPT | Mod: GC | Performed by: INTERNAL MEDICINE

## 2017-03-10 PROCEDURE — 4A023N7 MEASUREMENT OF CARDIAC SAMPLING AND PRESSURE, LEFT HEART, PERCUTANEOUS APPROACH: ICD-10-PCS | Performed by: INTERNAL MEDICINE

## 2017-03-10 PROCEDURE — 80048 BASIC METABOLIC PNL TOTAL CA: CPT | Performed by: INTERNAL MEDICINE

## 2017-03-10 PROCEDURE — C1887 CATHETER, GUIDING: HCPCS

## 2017-03-10 PROCEDURE — 85730 THROMBOPLASTIN TIME PARTIAL: CPT | Performed by: INTERNAL MEDICINE

## 2017-03-10 PROCEDURE — 27210946 ZZH KIT HC TOTES DISP CR8

## 2017-03-10 PROCEDURE — 27210856 ZZH ACCESS HEART CATH CR2

## 2017-03-10 PROCEDURE — 99153 MOD SED SAME PHYS/QHP EA: CPT

## 2017-03-10 PROCEDURE — 27210914 ZZH SHEATH CR8

## 2017-03-10 PROCEDURE — 93005 ELECTROCARDIOGRAM TRACING: CPT

## 2017-03-10 PROCEDURE — 40000852 ZZH STATISTIC HEART CATH LAB OR EP LAB

## 2017-03-10 PROCEDURE — 99152 MOD SED SAME PHYS/QHP 5/>YRS: CPT | Mod: GC | Performed by: INTERNAL MEDICINE

## 2017-03-10 PROCEDURE — B2151ZZ FLUOROSCOPY OF LEFT HEART USING LOW OSMOLAR CONTRAST: ICD-10-PCS | Performed by: INTERNAL MEDICINE

## 2017-03-10 PROCEDURE — 99152 MOD SED SAME PHYS/QHP 5/>YRS: CPT

## 2017-03-10 PROCEDURE — 40000065 ZZH STATISTIC EKG NON-CHARGEABLE

## 2017-03-10 PROCEDURE — 93458 L HRT ARTERY/VENTRICLE ANGIO: CPT | Mod: 26 | Performed by: INTERNAL MEDICINE

## 2017-03-10 PROCEDURE — 25000125 ZZHC RX 250: Performed by: INTERNAL MEDICINE

## 2017-03-10 PROCEDURE — 85027 COMPLETE CBC AUTOMATED: CPT | Performed by: INTERNAL MEDICINE

## 2017-03-10 PROCEDURE — 27210759 ZZH DEVICE INFLATION CR6

## 2017-03-10 PROCEDURE — 93010 ELECTROCARDIOGRAM REPORT: CPT | Performed by: INTERNAL MEDICINE

## 2017-03-10 RX ORDER — POTASSIUM CHLORIDE 29.8 MG/ML
20 INJECTION INTRAVENOUS
Status: DISCONTINUED | OUTPATIENT
Start: 2017-03-10 | End: 2017-03-10 | Stop reason: HOSPADM

## 2017-03-10 RX ORDER — ASPIRIN 81 MG/1
81 TABLET ORAL DAILY
Status: DISCONTINUED | OUTPATIENT
Start: 2017-03-10 | End: 2017-03-10 | Stop reason: HOSPADM

## 2017-03-10 RX ORDER — SODIUM CHLORIDE 9 MG/ML
INJECTION, SOLUTION INTRAVENOUS CONTINUOUS
Status: DISCONTINUED | OUTPATIENT
Start: 2017-03-10 | End: 2017-03-10 | Stop reason: HOSPADM

## 2017-03-10 RX ORDER — PHENYLEPHRINE HCL IN 0.9% NACL 1 MG/10 ML
20-100 SYRINGE (ML) INTRAVENOUS
Status: DISCONTINUED | OUTPATIENT
Start: 2017-03-10 | End: 2017-03-10 | Stop reason: HOSPADM

## 2017-03-10 RX ORDER — VERAPAMIL HYDROCHLORIDE 2.5 MG/ML
1-2.5 INJECTION, SOLUTION INTRAVENOUS
Status: COMPLETED | OUTPATIENT
Start: 2017-03-10 | End: 2017-03-10

## 2017-03-10 RX ORDER — ENALAPRILAT 1.25 MG/ML
1.25-2.5 INJECTION INTRAVENOUS
Status: DISCONTINUED | OUTPATIENT
Start: 2017-03-10 | End: 2017-03-10 | Stop reason: HOSPADM

## 2017-03-10 RX ORDER — ADENOSINE 3 MG/ML
12-12000 INJECTION, SOLUTION INTRAVENOUS
Status: DISCONTINUED | OUTPATIENT
Start: 2017-03-10 | End: 2017-03-10 | Stop reason: HOSPADM

## 2017-03-10 RX ORDER — METHYLPREDNISOLONE SODIUM SUCCINATE 125 MG/2ML
125 INJECTION, POWDER, LYOPHILIZED, FOR SOLUTION INTRAMUSCULAR; INTRAVENOUS
Status: DISCONTINUED | OUTPATIENT
Start: 2017-03-10 | End: 2017-03-10 | Stop reason: HOSPADM

## 2017-03-10 RX ORDER — ACETAMINOPHEN 325 MG/1
325-650 TABLET ORAL EVERY 4 HOURS PRN
Status: DISCONTINUED | OUTPATIENT
Start: 2017-03-10 | End: 2017-03-10 | Stop reason: HOSPADM

## 2017-03-10 RX ORDER — POTASSIUM CHLORIDE 7.45 MG/ML
10 INJECTION INTRAVENOUS
Status: DISCONTINUED | OUTPATIENT
Start: 2017-03-10 | End: 2017-03-10 | Stop reason: HOSPADM

## 2017-03-10 RX ORDER — EPTIFIBATIDE 2 MG/ML
180 INJECTION, SOLUTION INTRAVENOUS EVERY 10 MIN PRN
Status: DISCONTINUED | OUTPATIENT
Start: 2017-03-10 | End: 2017-03-10 | Stop reason: HOSPADM

## 2017-03-10 RX ORDER — NITROGLYCERIN 5 MG/ML
100-200 VIAL (ML) INTRAVENOUS
Status: DISCONTINUED | OUTPATIENT
Start: 2017-03-10 | End: 2017-03-10 | Stop reason: HOSPADM

## 2017-03-10 RX ORDER — NITROGLYCERIN 20 MG/100ML
.07-2 INJECTION INTRAVENOUS CONTINUOUS PRN
Status: DISCONTINUED | OUTPATIENT
Start: 2017-03-10 | End: 2017-03-10 | Stop reason: HOSPADM

## 2017-03-10 RX ORDER — FENTANYL CITRATE 50 UG/ML
25-50 INJECTION, SOLUTION INTRAMUSCULAR; INTRAVENOUS
Status: DISCONTINUED | OUTPATIENT
Start: 2017-03-10 | End: 2017-03-10 | Stop reason: HOSPADM

## 2017-03-10 RX ORDER — IOPAMIDOL 755 MG/ML
87 INJECTION, SOLUTION INTRAVASCULAR ONCE
Status: COMPLETED | OUTPATIENT
Start: 2017-03-10 | End: 2017-03-10

## 2017-03-10 RX ORDER — NITROGLYCERIN 5 MG/ML
100-500 VIAL (ML) INTRAVENOUS
Status: COMPLETED | OUTPATIENT
Start: 2017-03-10 | End: 2017-03-10

## 2017-03-10 RX ORDER — LORAZEPAM 2 MG/ML
.5-2 INJECTION INTRAMUSCULAR EVERY 4 HOURS PRN
Status: DISCONTINUED | OUTPATIENT
Start: 2017-03-10 | End: 2017-03-10 | Stop reason: HOSPADM

## 2017-03-10 RX ORDER — FLUMAZENIL 0.1 MG/ML
0.2 INJECTION, SOLUTION INTRAVENOUS
Status: DISCONTINUED | OUTPATIENT
Start: 2017-03-10 | End: 2017-03-10 | Stop reason: HOSPADM

## 2017-03-10 RX ORDER — HYDRALAZINE HYDROCHLORIDE 20 MG/ML
10-20 INJECTION INTRAMUSCULAR; INTRAVENOUS
Status: DISCONTINUED | OUTPATIENT
Start: 2017-03-10 | End: 2017-03-10 | Stop reason: HOSPADM

## 2017-03-10 RX ORDER — FUROSEMIDE 10 MG/ML
20-100 INJECTION INTRAMUSCULAR; INTRAVENOUS
Status: DISCONTINUED | OUTPATIENT
Start: 2017-03-10 | End: 2017-03-10 | Stop reason: HOSPADM

## 2017-03-10 RX ORDER — BUPIVACAINE HYDROCHLORIDE 2.5 MG/ML
1-10 INJECTION, SOLUTION EPIDURAL; INFILTRATION; INTRACAUDAL
Status: DISCONTINUED | OUTPATIENT
Start: 2017-03-10 | End: 2017-03-10 | Stop reason: HOSPADM

## 2017-03-10 RX ORDER — PROTAMINE SULFATE 10 MG/ML
1-5 INJECTION, SOLUTION INTRAVENOUS
Status: DISCONTINUED | OUTPATIENT
Start: 2017-03-10 | End: 2017-03-10 | Stop reason: HOSPADM

## 2017-03-10 RX ORDER — HYDROCODONE BITARTRATE AND ACETAMINOPHEN 5; 325 MG/1; MG/1
1-2 TABLET ORAL EVERY 4 HOURS PRN
Status: DISCONTINUED | OUTPATIENT
Start: 2017-03-10 | End: 2017-03-10 | Stop reason: HOSPADM

## 2017-03-10 RX ORDER — LIDOCAINE 40 MG/G
CREAM TOPICAL
Status: DISCONTINUED | OUTPATIENT
Start: 2017-03-10 | End: 2017-03-10 | Stop reason: HOSPADM

## 2017-03-10 RX ORDER — ONDANSETRON 2 MG/ML
4 INJECTION INTRAMUSCULAR; INTRAVENOUS EVERY 4 HOURS PRN
Status: DISCONTINUED | OUTPATIENT
Start: 2017-03-10 | End: 2017-03-10 | Stop reason: HOSPADM

## 2017-03-10 RX ORDER — ASPIRIN 81 MG/1
81-324 TABLET, CHEWABLE ORAL
Status: DISCONTINUED | OUTPATIENT
Start: 2017-03-10 | End: 2017-03-10 | Stop reason: HOSPADM

## 2017-03-10 RX ORDER — NALOXONE HYDROCHLORIDE 0.4 MG/ML
0.4 INJECTION, SOLUTION INTRAMUSCULAR; INTRAVENOUS; SUBCUTANEOUS EVERY 5 MIN PRN
Status: DISCONTINUED | OUTPATIENT
Start: 2017-03-10 | End: 2017-03-10 | Stop reason: HOSPADM

## 2017-03-10 RX ORDER — CLOPIDOGREL BISULFATE 75 MG/1
300-600 TABLET ORAL
Status: DISCONTINUED | OUTPATIENT
Start: 2017-03-10 | End: 2017-03-10 | Stop reason: HOSPADM

## 2017-03-10 RX ORDER — NALOXONE HYDROCHLORIDE 0.4 MG/ML
.1-.4 INJECTION, SOLUTION INTRAMUSCULAR; INTRAVENOUS; SUBCUTANEOUS
Status: DISCONTINUED | OUTPATIENT
Start: 2017-03-10 | End: 2017-03-10 | Stop reason: HOSPADM

## 2017-03-10 RX ORDER — SODIUM NITROPRUSSIDE 25 MG/ML
100-200 INJECTION INTRAVENOUS
Status: DISCONTINUED | OUTPATIENT
Start: 2017-03-10 | End: 2017-03-10 | Stop reason: HOSPADM

## 2017-03-10 RX ORDER — MORPHINE SULFATE 2 MG/ML
1-2 INJECTION, SOLUTION INTRAMUSCULAR; INTRAVENOUS EVERY 5 MIN PRN
Status: DISCONTINUED | OUTPATIENT
Start: 2017-03-10 | End: 2017-03-10 | Stop reason: HOSPADM

## 2017-03-10 RX ORDER — DOPAMINE HYDROCHLORIDE 160 MG/100ML
2-20 INJECTION, SOLUTION INTRAVENOUS CONTINUOUS PRN
Status: DISCONTINUED | OUTPATIENT
Start: 2017-03-10 | End: 2017-03-10 | Stop reason: HOSPADM

## 2017-03-10 RX ORDER — DOBUTAMINE HYDROCHLORIDE 200 MG/100ML
2-20 INJECTION INTRAVENOUS CONTINUOUS PRN
Status: DISCONTINUED | OUTPATIENT
Start: 2017-03-10 | End: 2017-03-10 | Stop reason: HOSPADM

## 2017-03-10 RX ORDER — CLOPIDOGREL BISULFATE 75 MG/1
75 TABLET ORAL
Status: DISCONTINUED | OUTPATIENT
Start: 2017-03-10 | End: 2017-03-10 | Stop reason: HOSPADM

## 2017-03-10 RX ORDER — PROTAMINE SULFATE 10 MG/ML
25-100 INJECTION, SOLUTION INTRAVENOUS EVERY 5 MIN PRN
Status: DISCONTINUED | OUTPATIENT
Start: 2017-03-10 | End: 2017-03-10 | Stop reason: HOSPADM

## 2017-03-10 RX ORDER — HEPARIN SODIUM 1000 [USP'U]/ML
1000-10000 INJECTION, SOLUTION INTRAVENOUS; SUBCUTANEOUS EVERY 5 MIN PRN
Status: DISCONTINUED | OUTPATIENT
Start: 2017-03-10 | End: 2017-03-10 | Stop reason: HOSPADM

## 2017-03-10 RX ORDER — POTASSIUM CHLORIDE 1500 MG/1
20 TABLET, EXTENDED RELEASE ORAL
Status: DISCONTINUED | OUTPATIENT
Start: 2017-03-10 | End: 2017-03-10 | Stop reason: HOSPADM

## 2017-03-10 RX ORDER — NIFEDIPINE 10 MG/1
10 CAPSULE ORAL
Status: DISCONTINUED | OUTPATIENT
Start: 2017-03-10 | End: 2017-03-10 | Stop reason: HOSPADM

## 2017-03-10 RX ORDER — PRASUGREL 10 MG/1
10-60 TABLET, FILM COATED ORAL
Status: DISCONTINUED | OUTPATIENT
Start: 2017-03-10 | End: 2017-03-10 | Stop reason: HOSPADM

## 2017-03-10 RX ORDER — EPTIFIBATIDE 2 MG/ML
2 INJECTION, SOLUTION INTRAVENOUS CONTINUOUS PRN
Status: DISCONTINUED | OUTPATIENT
Start: 2017-03-10 | End: 2017-03-10 | Stop reason: HOSPADM

## 2017-03-10 RX ORDER — NALOXONE HYDROCHLORIDE 0.4 MG/ML
.2-.4 INJECTION, SOLUTION INTRAMUSCULAR; INTRAVENOUS; SUBCUTANEOUS
Status: DISCONTINUED | OUTPATIENT
Start: 2017-03-10 | End: 2017-03-10 | Stop reason: HOSPADM

## 2017-03-10 RX ORDER — LIDOCAINE HYDROCHLORIDE 10 MG/ML
1-10 INJECTION, SOLUTION EPIDURAL; INFILTRATION; INTRACAUDAL; PERINEURAL
Status: COMPLETED | OUTPATIENT
Start: 2017-03-10 | End: 2017-03-10

## 2017-03-10 RX ORDER — LIDOCAINE HYDROCHLORIDE 10 MG/ML
30 INJECTION, SOLUTION EPIDURAL; INFILTRATION; INTRACAUDAL; PERINEURAL
Status: DISCONTINUED | OUTPATIENT
Start: 2017-03-10 | End: 2017-03-10 | Stop reason: HOSPADM

## 2017-03-10 RX ORDER — NICARDIPINE HYDROCHLORIDE 2.5 MG/ML
100 INJECTION INTRAVENOUS
Status: DISCONTINUED | OUTPATIENT
Start: 2017-03-10 | End: 2017-03-10 | Stop reason: HOSPADM

## 2017-03-10 RX ORDER — PROMETHAZINE HYDROCHLORIDE 25 MG/ML
6.25-25 INJECTION, SOLUTION INTRAMUSCULAR; INTRAVENOUS EVERY 4 HOURS PRN
Status: DISCONTINUED | OUTPATIENT
Start: 2017-03-10 | End: 2017-03-10 | Stop reason: HOSPADM

## 2017-03-10 RX ORDER — DIPHENHYDRAMINE HYDROCHLORIDE 50 MG/ML
25-50 INJECTION INTRAMUSCULAR; INTRAVENOUS
Status: DISCONTINUED | OUTPATIENT
Start: 2017-03-10 | End: 2017-03-10 | Stop reason: HOSPADM

## 2017-03-10 RX ORDER — DEXTROSE MONOHYDRATE 25 G/50ML
12.5-5 INJECTION, SOLUTION INTRAVENOUS EVERY 30 MIN PRN
Status: DISCONTINUED | OUTPATIENT
Start: 2017-03-10 | End: 2017-03-10 | Stop reason: HOSPADM

## 2017-03-10 RX ORDER — NITROGLYCERIN 0.4 MG/1
0.4 TABLET SUBLINGUAL EVERY 5 MIN PRN
Status: DISCONTINUED | OUTPATIENT
Start: 2017-03-10 | End: 2017-03-10 | Stop reason: HOSPADM

## 2017-03-10 RX ORDER — ASPIRIN 325 MG
325 TABLET ORAL
Status: DISCONTINUED | OUTPATIENT
Start: 2017-03-10 | End: 2017-03-10 | Stop reason: HOSPADM

## 2017-03-10 RX ADMIN — IOPAMIDOL 87 ML: 755 INJECTION, SOLUTION INTRAVASCULAR at 15:30

## 2017-03-10 RX ADMIN — VERAPAMIL HYDROCHLORIDE 2.5 MG: 2.5 INJECTION, SOLUTION INTRAVENOUS at 15:03

## 2017-03-10 RX ADMIN — FENTANYL CITRATE 50 MCG: 50 INJECTION, SOLUTION INTRAMUSCULAR; INTRAVENOUS at 14:59

## 2017-03-10 RX ADMIN — NITROGLYCERIN 200 MCG: 5 INJECTION, SOLUTION INTRAVENOUS at 15:03

## 2017-03-10 RX ADMIN — MIDAZOLAM HYDROCHLORIDE 1 MG: 1 INJECTION, SOLUTION INTRAMUSCULAR; INTRAVENOUS at 15:05

## 2017-03-10 RX ADMIN — LIDOCAINE HYDROCHLORIDE 20 MG: 10 INJECTION, SOLUTION EPIDURAL; INFILTRATION; INTRACAUDAL; PERINEURAL at 15:00

## 2017-03-10 RX ADMIN — FENTANYL CITRATE 50 MCG: 50 INJECTION, SOLUTION INTRAMUSCULAR; INTRAVENOUS at 14:50

## 2017-03-10 RX ADMIN — MIDAZOLAM HYDROCHLORIDE 1 MG: 1 INJECTION, SOLUTION INTRAMUSCULAR; INTRAVENOUS at 14:50

## 2017-03-10 RX ADMIN — Medication 5000 UNITS: at 15:03

## 2017-03-10 RX ADMIN — SODIUM CHLORIDE: 9 INJECTION, SOLUTION INTRAVENOUS at 10:11

## 2017-03-10 RX ADMIN — MIDAZOLAM HYDROCHLORIDE 1 MG: 1 INJECTION, SOLUTION INTRAMUSCULAR; INTRAVENOUS at 15:00

## 2017-03-10 NOTE — DISCHARGE INSTRUCTIONS
Cardiac Angiogram Discharge Instructions - Radial    After you go home:      Have an adult stay with you until tomorrow.    Drink extra fluids for 2 days.    You may resume your normal diet.    No smoking       For 24 hours - due to the sedation you received:    Relax and take it easy.    Do NOT make any important or legal decisions.    Do NOT drive or operate machines at home or at work.    Do NOT drink alcohol.    Care of Wrist Puncture Site:      For the first 24 hrs - check the puncture site every 1-2 hours while awake.    It is normal to have soreness at the puncture site and mild tingling in your hand for up to 3 days.    Remove the bandaid after 24 hours. If there is minor oozing, apply another bandaid and remove it after 12 hours.    You may shower tomorrow.  Do NOT take a bath, or use a hot tub or pool for at least 3 days. Do NOT scrub the site. Do not use lotion or powder near the puncture site.           Activity:        For 2 days:     do not use your hand or arm to support your weight (such as rising from a chair)     do not bend your wrist (such as lifting a garage door).    do not lift more than 5 pounds or exercise your arm (such as tennis, golf or bowling).    Do NOT do any heavy activity such as exercise, lifting, or straining.     Bleeding:      If you start bleeding from the site in your wrist, sit down and press firmly on/above the site for 10 minutes.     Once bleeding stops, keep arm still for 2 hours.     Call [facility] as soon as you can.       Call 911 right away if you have heavy bleeding or bleeding that does not stop.      Medicines:      If you are taking antiplatelet medications such as Plavix, Brilinta or Effient, do not stop taking it until you talk to your cardiologist.        If you are on Metformin (Glucophage), do not restart it until you have blood tests (within 2 to 3 days after discharge).  After you have your blood drawn, you may restart the Metformin.    Take your  medications, including blood thinners, unless your provider tells you not to.  If you take Coumadin (Warfarin), have your INR checked by your provider in  3-5 days. Call your clinic to schedule this.    If you have stopped any other medicines, check with your provider about when to restart them.    Follow Up Appointments:      Follow up with [facility] Nurse Practitioner at [facility] of patient preference in 7-10 days.    Call the clinic if:      You have a large or growing hard lump around the site.    The site is red, swollen, hot or tender.    Blood or fluid is draining from the site.    You have chills or a fever greater than 101 F (38 C).    Your arm turns feels numb, cool or changes color.    You have hives, a rash or unusual itching.    Any questions or concerns.          [facility] [place]:    [phone] [facility] (7 days a week)

## 2017-03-10 NOTE — IP AVS SNAPSHOT
MRN:[id number]                      After Visit Summary   3/10/2017    Ivy Haynes    MRN: [id number]           Visit Information        Department      3/10/2017  9:12 AM [facility]          Review of your medicines      UNREVIEWED medicines. Ask your doctor about these medicines        Dose / Directions    amLODIPine 5 MG tablet   Commonly known as:  NORVASC   Used for:  Hypertension goal BP (blood pressure) < 140/90        Dose:  5 mg   Take 1 tablet (5 mg) by mouth daily   Quantity:  90 tablet   Refills:  3       ASPIRIN PO        Dose:  325 mg   Take 325 mg by mouth once   Refills:  0       TAMSULOSIN HCL PO        Dose:  0.4 mg   Take 0.4 mg by mouth daily   Refills:  0                Protect others around you: Learn how to safely use, store and throw away your medicines at www.disposemymeds.org.         Follow-ups after your visit        Your next 10 appointments already scheduled     Mar 17, 2017 10:10 AM CDT   Return Visit with TAMERA Alvarenga CNP   [facility]YS[facility] HEART [facility] ([facility])    [address] [place] [id number] [phone]               Care Instructions        After Care Instructions     Discharge Instructions - IF on Metformin (Glucophage or Glucovance) or Metformin containing medications       IF on Metformin (Glucophage or Glucovance) or Metformin containing medications , schedule a Basic Metabolic Panel at [facility] Heart or Primary Clinic in 48 - 72 hours post procedure and PRIOR TO resuming the Metformin or Metformin containing medications.  Hold Metformin (Glucophage or Glucovance) or Metformin containing medications until after the Basic Metabolic Panel on the 2nd or 3rd day following the procedure.  May resume after blood draw is complete.                  Further instructions from your care team       Cardiac Angiogram Discharge Instructions - Radial    After you go home:      Have an adult  stay with you until tomorrow.    Drink extra fluids for 2 days.    You may resume your normal diet.    No smoking       For 24 hours - due to the sedation you received:    Relax and take it easy.    Do NOT make any important or legal decisions.    Do NOT drive or operate machines at home or at work.    Do NOT drink alcohol.    Care of Wrist Puncture Site:      For the first 24 hrs - check the puncture site every 1-2 hours while awake.    It is normal to have soreness at the puncture site and mild tingling in your hand for up to 3 days.    Remove the bandaid after 24 hours. If there is minor oozing, apply another bandaid and remove it after 12 hours.    You may shower tomorrow.  Do NOT take a bath, or use a hot tub or pool for at least 3 days. Do NOT scrub the site. Do not use lotion or powder near the puncture site.           Activity:        For 2 days:     do not use your hand or arm to support your weight (such as rising from a chair)     do not bend your wrist (such as lifting a garage door).    do not lift more than 5 pounds or exercise your arm (such as tennis, golf or bowling).    Do NOT do any heavy activity such as exercise, lifting, or straining.     Bleeding:      If you start bleeding from the site in your wrist, sit down and press firmly on/above the site for 10 minutes.     Once bleeding stops, keep arm still for 2 hours.     Call [facility] as soon as you can.       Call 911 right away if you have heavy bleeding or bleeding that does not stop.      Medicines:      If you are taking antiplatelet medications such as Plavix, Brilinta or Effient, do not stop taking it until you talk to your cardiologist.        If you are on Metformin (Glucophage), do not restart it until you have blood tests (within 2 to 3 days after discharge).  After you have your blood drawn, you may restart the Metformin.    Take your medications, including blood thinners, unless your provider tells you not to.  If you take Coumadin  "(Warfarin), have your INR checked by your provider in  3-5 days. Call your clinic to schedule this.    If you have stopped any other medicines, check with your provider about when to restart them.    Follow Up Appointments:      Follow up with [facility] Nurse Practitioner at [facility] Clinic of patient preference in 7-10 days.    Call the clinic if:      You have a large or growing hard lump around the site.    The site is red, swollen, hot or tender.    Blood or fluid is draining from the site.    You have chills or a fever greater than 101 F (38 C).    Your arm turns feels numb, cool or changes color.    You have hives, a rash or unusual itching.    Any questions or concerns.          [facility] at [place]:    [phone] [facility] (7 days a week)             Additional Information About Your Visit        MyChart Information     [email or web address] gives you secure access to your electronic health record. If you see a primary care provider, you can also send messages to your care team and make appointments. If you have questions, please call your primary care clinic.  If you do not have a primary care provider, please call [phone] and they will assist you.        Care EveryWhere ID     This is your Care EveryWhere ID. This could be used by other organizations to access your [facility] medical records  [id number]        Your Vitals Were     Blood Pressure Pulse Temperature Respirations Height Weight    112/81 80 98.2  F (36.8  C) (Oral) 18 1.803 m (5' 11\") 71.9 kg (158 lb 9.6 oz)    Pulse Oximetry BMI (Body Mass Index)                97% 22.12 kg/m2           Primary Care Provider Office Phone # Fax #    Lamine Franco [phone]      Thank you!     Thank you for choosing [facility] for your care. Our goal is always to provide you with excellent care. Hearing back from our patients is one way we can continue to improve our services. Please take a few minutes to complete " the written survey that you may receive in the mail after you visit with us. Thank you!             Medication List: This is a list of all your medications and when to take them. Check marks below indicate your daily home schedule. Keep this list as a reference.      Medications           Morning Afternoon Evening Bedtime As Needed    amLODIPine 5 MG tablet   Commonly known as:  NORVASC   Take 1 tablet (5 mg) by mouth daily                                ASPIRIN PO   Take 325 mg by mouth once                                TAMSULOSIN HCL PO   Take 0.4 mg by mouth daily

## 2017-03-10 NOTE — PROGRESS NOTES
Care Suites Arrival    Reason for Visit: coronary angiogram  Arrival interventions:none    Pt resting in bed/recyliner, denies additional needs at this time, call light in reach.  Waiting for procedure.

## 2017-03-10 NOTE — IP AVS SNAPSHOT
[address] [place] [address]    Phone:  [phone]                                       After Visit Summary   3/10/2017    Ivy Haynes    MRN: [id number]           After Visit Summary Signature Page     I have received my discharge instructions, and my questions have been answered. I have discussed any challenges I see with this plan with the nurse or doctor.    ..........................................................................................................................................  Patient/Patient Representative Signature      ..........................................................................................................................................  Patient Representative Print Name and Relationship to Patient    ..................................................               ................................................  Date                                            Time    ..........................................................................................................................................  Reviewed by Signature/Title    ...................................................              ..............................................  Date                                                            Time

## 2017-03-10 NOTE — PROGRESS NOTES
Pre procedure plan of care reviewed with pt and family prior to sedation.  All questions answered and pt appear to accept and understand.  Awaiting MD visit.

## 2017-03-10 NOTE — PROGRESS NOTES
Returned from cath lab ~1525, post angiogram non intervention. TR band on left radial, cms good no pain. VSS, taking po. Dr. Bridges in to talk to pt and family.   1730- VSS, denies pain, very slight bleed from TR band, waited 15 min then continue to release air. No further leaking. CMS good. Pt reviewed d/c instructions, states understanding, no questions.  1810- Left radial site CDI, no bruising or bleeding, Declined sling. Feels he will remember to not use his left wrist. Will d/c ~1820.

## 2017-03-11 LAB — INTERPRETATION ECG - MUSE: NORMAL

## 2017-03-17 ENCOUNTER — OFFICE VISIT (OUTPATIENT)
Dept: CARDIOLOGY | Facility: CLINIC | Age: 72
End: 2017-03-17
Payer: COMMERCIAL

## 2017-03-17 VITALS
HEIGHT: 71 IN | HEART RATE: 88 BPM | BODY MASS INDEX: 22.26 KG/M2 | WEIGHT: 159 LBS | DIASTOLIC BLOOD PRESSURE: 74 MMHG | SYSTOLIC BLOOD PRESSURE: 124 MMHG

## 2017-03-17 DIAGNOSIS — I10 HYPERTENSION GOAL BP (BLOOD PRESSURE) < 140/90: Primary | ICD-10-CM

## 2017-03-17 DIAGNOSIS — R07.9 CHEST PAIN, UNSPECIFIED TYPE: ICD-10-CM

## 2017-03-17 PROCEDURE — 99213 OFFICE O/P EST LOW 20 MIN: CPT | Performed by: NURSE PRACTITIONER

## 2017-03-17 NOTE — MR AVS SNAPSHOT
After Visit Summary   3/17/2017    Ivy Haynes    MRN: [id number]           Patient Information     Date Of Birth          1945        Visit Information        Provider Department      3/17/2017 10:10 AM Nayely Adams APRN CNP [facility]        Today's Diagnoses     Hypertension goal BP (blood pressure) < 140/90    -  1    Chest pain, unspecified type          Care Instructions    Follow up in 1 year with Dr. Sauceda        Follow-ups after your visit        Additional Services     Follow-Up with Cardiologist                 Future tests that were ordered for you today     Open Future Orders        Priority Expected Expires Ordered    Follow-Up with Cardiologist Routine 3/17/2018 7/30/2018 3/17/2017            Who to contact     If you have questions or need follow up information about today's clinic visit or your schedule please contact [facility] directly at [phone].  Normal or non-critical lab and imaging results will be communicated to you by [employer], letter or phone within 4 business days after the clinic has received the results. If you do not hear from us within 7 days, please contact the clinic through [employer] or phone. If you have a critical or abnormal lab result, we will notify you by phone as soon as possible.  Submit refill requests through "[employer] or call your pharmacy and they will forward the refill request to us. Please allow 3 business days for your refill to be completed.          Additional Information About Your Visit        MyChart Information     "[employer] gives you secure access to your electronic health record. If you see a primary care provider, you can also send messages to your care team and make appointments. If you have questions, please call your primary care clinic.  If you do not have a primary care provider, please call [phone] and they will assist you.        Care  "EveryWhere ID     This is your Care EveryWhere ID. This could be used by other organizations to access your [facility] medical records  [id number]        Your Vitals Were     Pulse Height BMI (Body Mass Index)             88 1.803 m (5' 11\") 22.18 kg/m2          Blood Pressure from Last 3 Encounters:   03/17/17 124/74   03/10/17 114/79   03/08/17 140/88    Weight from Last 3 Encounters:   03/17/17 72.1 kg (159 lb)   03/10/17 71.9 kg (158 lb 9.6 oz)   03/08/17 72.6 kg (160 lb)                 Today's Medication Changes          These changes are accurate as of: 3/17/17 10:18 AM.  If you have any questions, ask your nurse or doctor.               Stop taking these medicines if you haven't already. Please contact your care team if you have questions.     ASPIRIN PO                    Primary Care Provider Office Phone # Fax #    Lamine Jr Franco [phone]       [place] [address] [address] [address]        Thank you!     Thank you for choosing [facility]  for your care. Our goal is always to provide you with excellent care. Hearing back from our patients is one way we can continue to improve our services. Please take a few minutes to complete the written survey that you may receive in the mail after your visit with us. Thank you!             Your Updated Medication List - Protect others around you: Learn how to safely use, store and throw away your medicines at www.disposemymeds.org.          This list is accurate as of: 3/17/17 10:18 AM.  Always use your most recent med list.                   Brand Name Dispense Instructions for use    amLODIPine 5 MG tablet    NORVASC    90 tablet    Take 1 tablet (5 mg) by mouth daily       aspirin 81 MG tablet      Take by mouth daily       TAMSULOSIN HCL PO      Take 0.4 mg by mouth daily         "

## 2017-03-17 NOTE — PROGRESS NOTES
Cardiology Clinic Progress Note  Ivy Haynes MRN# [id number]   YOB: 1945 Age: 71 year old     Reason for visit: Follow up coronary angiogram           Assessment and Plan:     1. Chest pain, noncardiac    Continue regular exercise and hypertension management    Smoking cessation    Recommend moderate intensity statin due to ACC risk calculator of risk of MI or CVA of 18.9%    Follow up in 1 year or prn         History of Presenting Illness:    Ivy Haynes is a very pleasant 71 year old patient of Dr. Sauceda who presents today post a coronary angiogram.  He was seen by Dr. Sauceda consultation for chest pain at the beginning of March.  He has a past medical history significant for GERD, history of hepatitis C, hypertension and tobacco use.    The patient has been troubled by sharp left-sided chest pain since the age of 14 or 15. The chest pain occurred intermittently off of his life and would worsen during times of respiratory infection.  He was referred to cardiology as he was having a different type of chest discomfort starting last summer.  The pain would come on when he first started exercising.  In January 2017 he presented to the emergency department with chest pain.  He underwent a normal EKG and transthoracic echocardiogram.  His chest x-ray and his troponin level were normal.  He underwent an outpatient stress echocardiogram that was normal.      Given his cardiac risk factors of cigarette smoking and hypertension and change in quality of his chest pain, a coronary angiogram was completed.  There was no angiographic evidence of obstructive coronary disease.  His LVEDP was 16 mmHg and his LVEF was estimated at 60%.  There is no evidence of mitral regurg and no evidence of aortic stenosis.    Dr. Sauceda recommend Mediterranean diet, avoiding tobacco and regular exercise.  He also recommended a moderate intensity statin drug given ACC risk calculator of 18.9% of MI or CVA. At this time, he  "declines.           This note was completed in part using Dragon voice recognition software. Although reviewed after completion, some word and grammatical errors may occur       Review of Systems:   Review of Systems:  Skin:  Negative     Eyes:  Positive for glasses  ENT:  Negative    Respiratory:  Negative    Cardiovascular:    Positive for;chest pain;lightheadedness (left side off and on for months)  Gastroenterology: Positive for reflux (under control per patient)  Genitourinary:  not assessed    Musculoskeletal:  Negative    Neurologic:  Positive for headaches (improved with increase of Amlodipine)  Psychiatric:  Negative    Heme/Lymph/Imm:  Negative    Endocrine:  Negative                Physical Exam:     Vitals: /74  Pulse 88  Ht 1.803 m (5' 11\")  Wt 72.1 kg (159 lb)  BMI 22.18 kg/m2  Constitutional:  cooperative, alert and oriented, well developed, well nourished, in no acute distress        Skin:  warm and dry to the touch, no apparent skin lesions or masses noted        Head:  normocephalic, no masses or lesions        Eyes:  pupils equal and round        ENT:  no pallor or cyanosis, dentition good        Neck:           Chest:  clear to auscultation;normal symmetry        Cardiac: regular rhythm, normal S1/S2, no S3 or S4, apical impulse not displaced, no murmurs, gallops or rubs                  Abdomen:  abdomen soft        Vascular:                                   Left radial access site clean, dry and intact without bruit or hematoma    Extremities and Back:  no edema        Neurological:  affect appropriate, oriented to time, person and place                 Medications:     Current Outpatient Prescriptions   Medication Sig Dispense Refill     aspirin 81 MG tablet Take by mouth daily       amLODIPine (NORVASC) 5 MG tablet Take 1 tablet (5 mg) by mouth daily 90 tablet 3     TAMSULOSIN HCL PO Take 0.4 mg by mouth daily              Family History   Problem Relation Age of Onset     Hypertension " "Mother      CEREBROVASCULAR DISEASE Father 69     CANCER Paternal Grandfather      CANCER Son        Social History     Social History     Marital status:      Spouse name: N/A     Number of children: N/A     Years of education: N/A     Occupational History     Not on file.     Social History Main Topics     Smoking status: Current Every Day Smoker     Packs/day: 0.25     Types: Cigarettes     Last attempt to quit: 5/24/1981     Smokeless tobacco: Never Used      Comment: quit 1979 until 2013. started after trip to [place]     Alcohol use Yes      Comment: 1-2 beer a week      Drug use: Yes      Comment: Every other month-\"just a puff of marijuana\"     Sexual activity: Yes     Partners: Female     Other Topics Concern     Parent/Sibling W/ Cabg, Mi Or Angioplasty Before 65f 55m? No     Social History Narrative            Past Medical History:     Past Medical History   Diagnosis Date     Back pain      Chest pain      Gastro-oesophageal reflux disease      GERD (gastroesophageal reflux disease)      Hemorrhoid      Hepatitis C      Trigeminal neuralgia               Past Surgical History:     Past Surgical History   Procedure Laterality Date     Hernia repair  12/10     Styloid process temporal bone surg  02/10     Resect tumor lower extremity  11/9/2012     Procedure: RESECT TUMOR LOWER EXTREMITY;  Excision of Tumor Left Distal Femur, Left Total Knee Replacement ;  Surgeon: Ang Arellano MD;  Location: [facility]     Arthroplasty knee  11/9/2012     Procedure: ARTHROPLASTY KNEE;;  Surgeon: Ang Arellano MD;  Location: [facility] OR              Allergies:   Review of patient's allergies indicates no known allergies.       Data:   All laboratory data reviewed:    LAST CHOLESTEROL:  Lab Results   Component Value Date    CHOL 147 03/08/2017     Lab Results   Component Value Date    HDL 52 03/08/2017     Lab Results   Component Value Date    LDL 81 03/08/2017     Lab Results   Component Value Date    TRIG 70 03/08/2017 "     Lab Results   Component Value Date    CHOLHDLRATIO 2.4 11/10/2014       LAST BMP:  Lab Results   Component Value Date     03/10/2017      Lab Results   Component Value Date    POTASSIUM 3.8 03/10/2017     Lab Results   Component Value Date    CHLORIDE 107 03/10/2017     Lab Results   Component Value Date    LORRI 8.8 03/10/2017     Lab Results   Component Value Date    CO2 29 03/10/2017     Lab Results   Component Value Date    BUN 14 03/10/2017     Lab Results   Component Value Date    CR 1.10 03/10/2017     Lab Results   Component Value Date    GLC 86 03/10/2017       LAST CBC:  Lab Results   Component Value Date    WBC 3.7 03/10/2017     Lab Results   Component Value Date    RBC 4.66 03/10/2017     Lab Results   Component Value Date    HGB 15.9 03/10/2017     Lab Results   Component Value Date    HCT 45.5 03/10/2017     Lab Results   Component Value Date    MCV 98 03/10/2017     Lab Results   Component Value Date    MCH 34.1 03/10/2017     Lab Results   Component Value Date    MCHC 34.9 03/10/2017     Lab Results   Component Value Date    RDW 12.3 03/10/2017     Lab Results   Component Value Date     03/10/2017         CALIN BLANCHARD, TAMERA, CNP

## 2017-03-17 NOTE — LETTER
3/17/2017    Lamine Franco MD  [facility]   [address]  [facility] [address]    RE: Ivy Haynes       Dear Colleague,    I had the pleasure of seeing Ivy Haynes in the [facility].    Cardiology Clinic Progress Note  Ivy Haynes MRN# [id number]   YOB: 1945 Age: 71 year old     Reason for visit: Follow up coronary angiogram           Assessment and Plan:     1. Chest pain, noncardiac    Continue regular exercise and hypertension management    Smoking cessation    Recommend moderate intensity statin due to ACC risk calculator of risk of MI or CVA of 18.9%    Follow up in 1 year or prn         History of Presenting Illness:    Ivy Haynes is a very pleasant 71 year old patient of Dr. Sauceda who presents today post a coronary angiogram.  He was seen by Dr. Sauceda consultation for chest pain at the beginning of March.  He has a past medical history significant for GERD, history of hepatitis C, hypertension and tobacco use.    The patient has been troubled by sharp left-sided chest pain since the age of 14 or 15. The chest pain occurred intermittently off of his life and would worsen during times of respiratory infection.  He was referred to cardiology as he was having a different type of chest discomfort starting last summer.  The pain would come on when he first started exercising.  In January 2017 he presented to the emergency department with chest pain.  He underwent a normal EKG and transthoracic echocardiogram.  His chest x-ray and his troponin level were normal.  He underwent an outpatient stress echocardiogram that was normal.      Given his cardiac risk factors of cigarette smoking and hypertension and change in quality of his chest pain, a coronary angiogram was completed.  There was no angiographic evidence of obstructive coronary disease.  His LVEDP was 16 mmHg and his LVEF was estimated at 60%.  There is no evidence of mitral  "regurg and no evidence of aortic stenosis.    Dr. Sauceda recommend Mediterranean diet, avoiding tobacco and regular exercise.  He also recommended a moderate intensity statin drug given ACC risk calculator of 18.9% of MI or CVA. At this time, he declines.           This note was completed in part using Dragon voice recognition software. Although reviewed after completion, some word and grammatical errors may occur       Review of Systems:   Review of Systems:  Skin:  Negative     Eyes:  Positive for glasses  ENT:  Negative    Respiratory:  Negative    Cardiovascular:    Positive for;chest pain;lightheadedness (left side off and on for months)  Gastroenterology: Positive for reflux (under control per patient)  Genitourinary:  not assessed    Musculoskeletal:  Negative    Neurologic:  Positive for headaches (improved with increase of Amlodipine)  Psychiatric:  Negative    Heme/Lymph/Imm:  Negative    Endocrine:  Negative                Physical Exam:     Vitals: /74  Pulse 88  Ht 1.803 m (5' 11\")  Wt 72.1 kg (159 lb)  BMI 22.18 kg/m2  Constitutional:  cooperative, alert and oriented, well developed, well nourished, in no acute distress        Skin:  warm and dry to the touch, no apparent skin lesions or masses noted        Head:  normocephalic, no masses or lesions        Eyes:  pupils equal and round        ENT:  no pallor or cyanosis, dentition good        Neck:           Chest:  clear to auscultation;normal symmetry        Cardiac: regular rhythm, normal S1/S2, no S3 or S4, apical impulse not displaced, no murmurs, gallops or rubs                  Abdomen:  abdomen soft        Vascular:                                   Left radial access site clean, dry and intact without bruit or hematoma    Extremities and Back:  no edema        Neurological:  affect appropriate, oriented to time, person and place                 Medications:     Current Outpatient Prescriptions   Medication Sig Dispense Refill     " "aspirin 81 MG tablet Take by mouth daily       amLODIPine (NORVASC) 5 MG tablet Take 1 tablet (5 mg) by mouth daily 90 tablet 3     TAMSULOSIN HCL PO Take 0.4 mg by mouth daily              Family History   Problem Relation Age of Onset     Hypertension Mother      CEREBROVASCULAR DISEASE Father 69     CANCER Paternal Grandfather      CANCER Son        Social History     Social History     Marital status:      Spouse name: N/A     Number of children: N/A     Years of education: N/A     Occupational History     Not on file.     Social History Main Topics     Smoking status: Current Every Day Smoker     Packs/day: 0.25     Types: Cigarettes     Last attempt to quit: 5/24/1981     Smokeless tobacco: Never Used      Comment: quit 1979 until 2013. started after trip to [place]     Alcohol use Yes      Comment: 1-2 beer a week      Drug use: Yes      Comment: Every other month-\"just a puff of marijuana\"     Sexual activity: Yes     Partners: Female     Other Topics Concern     Parent/Sibling W/ Cabg, Mi Or Angioplasty Before 65f 55m? No     Social History Narrative            Past Medical History:     Past Medical History   Diagnosis Date     Back pain      Chest pain      Gastro-oesophageal reflux disease      GERD (gastroesophageal reflux disease)      Hemorrhoid      Hepatitis C      Trigeminal neuralgia               Past Surgical History:     Past Surgical History   Procedure Laterality Date     Hernia repair  12/10     Styloid process temporal bone surg  02/10     Resect tumor lower extremity  11/9/2012     Procedure: RESECT TUMOR LOWER EXTREMITY;  Excision of Tumor Left Distal Femur, Left Total Knee Replacement ;  Surgeon: Ang Arellano MD;  [facility]     Arthroplasty knee  11/9/2012     Procedure: ARTHROPLASTY KNEE;;  Surgeon: Ang Arellano MD;  [facility]              Allergies:   Review of patient's allergies indicates no known allergies.       Data:   All laboratory data reviewed:    LAST " CHOLESTEROL:  Lab Results   Component Value Date    CHOL 147 03/08/2017     Lab Results   Component Value Date    HDL 52 03/08/2017     Lab Results   Component Value Date    LDL 81 03/08/2017     Lab Results   Component Value Date    TRIG 70 03/08/2017     Lab Results   Component Value Date    CHOLHDLRATIO 2.4 11/10/2014       LAST BMP:  Lab Results   Component Value Date     03/10/2017      Lab Results   Component Value Date    POTASSIUM 3.8 03/10/2017     Lab Results   Component Value Date    CHLORIDE 107 03/10/2017     Lab Results   Component Value Date    LORRI 8.8 03/10/2017     Lab Results   Component Value Date    CO2 29 03/10/2017     Lab Results   Component Value Date    BUN 14 03/10/2017     Lab Results   Component Value Date    CR 1.10 03/10/2017     Lab Results   Component Value Date    GLC 86 03/10/2017       LAST CBC:  Lab Results   Component Value Date    WBC 3.7 03/10/2017     Lab Results   Component Value Date    RBC 4.66 03/10/2017     Lab Results   Component Value Date    HGB 15.9 03/10/2017     Lab Results   Component Value Date    HCT 45.5 03/10/2017     Lab Results   Component Value Date    MCV 98 03/10/2017     Lab Results   Component Value Date    MCH 34.1 03/10/2017     Lab Results   Component Value Date    MCHC 34.9 03/10/2017     Lab Results   Component Value Date    RDW 12.3 03/10/2017     Lab Results   Component Value Date     03/10/2017     Thank you for allowing me to participate in the care of your patient.    Sincerely,     TAMERA PHILLIPS [facility]

## 2017-07-11 ENCOUNTER — OFFICE VISIT (OUTPATIENT)
Dept: FAMILY MEDICINE | Facility: CLINIC | Age: 72
End: 2017-07-11
Payer: COMMERCIAL

## 2017-07-11 VITALS
HEIGHT: 71 IN | DIASTOLIC BLOOD PRESSURE: 74 MMHG | SYSTOLIC BLOOD PRESSURE: 122 MMHG | HEART RATE: 105 BPM | WEIGHT: 153 LBS | BODY MASS INDEX: 21.42 KG/M2 | OXYGEN SATURATION: 98 % | TEMPERATURE: 98.4 F

## 2017-07-11 DIAGNOSIS — R35.0 URINARY FREQUENCY: ICD-10-CM

## 2017-07-11 DIAGNOSIS — H53.9 VISUAL DISTURBANCE: ICD-10-CM

## 2017-07-11 DIAGNOSIS — I10 HYPERTENSION GOAL BP (BLOOD PRESSURE) < 140/90: Primary | ICD-10-CM

## 2017-07-11 PROCEDURE — 99214 OFFICE O/P EST MOD 30 MIN: CPT | Performed by: FAMILY MEDICINE

## 2017-07-11 RX ORDER — AMLODIPINE BESYLATE 2.5 MG/1
2.5 TABLET ORAL DAILY
Qty: 90 TABLET | Refills: 3 | Status: SHIPPED | OUTPATIENT
Start: 2017-07-11 | End: 2018-09-17

## 2017-07-11 NOTE — PROGRESS NOTES
"  SUBJECTIVE:                                                    Ivy Haynes is a 72 year old male who presents to clinic today for the following health issues:      Hypertension Follow-up      Outpatient blood pressures are being checked at home.  Results are 120/78as of this morning, but an average of 125/80.    Low Salt Diet: no added salt      Amount of exercise or physical activity: 6-7 days/week for an average of 45-60 minutes    Problems taking medications regularly: YES taking the amlodipine differently     Medication side effects: dizziness and light headedness possibly from the tamsulosin and amlodipine      He cut baCK norvasc to 2.5 mg and has felt better since then         Diet: gluten-free/reduced      Encounter Diagnoses   Name Primary?     Hypertension goal BP (blood pressure) < 140/90 Yes     Visual disturbance, concernded about cataracts/ is some literature showing a higher rate of cataracts with flomax      Urinary frequency better with flomax but wants to stop it         Problem list and histories reviewed & adjusted, as indicated.  Additional history: as documented    Labs reviewed in EPIC    Reviewed and updated as needed this visit by clinical staff       Reviewed and updated as needed this visit by Provider         ROS:  Constitutional, HEENT, cardiovascular, pulmonary, gi and gu systems are negative, except as otherwise noted.    OBJECTIVE:     /74  Pulse 105  Temp 98.4  F (36.9  C) (Oral)  Ht 5' 11\" (1.803 m)  Wt 153 lb (69.4 kg)  SpO2 98%  BMI 21.34 kg/m2  Body mass index is 21.34 kg/(m^2).  GENERAL: healthy, alert and no distress  NECK: no adenopathy, no asymmetry, masses, or scars and thyroid normal to palpation  RESP: lungs clear to auscultation - no rales, rhonchi or wheezes  CV: regular rate and rhythm, normal S1 S2, no S3 or S4, no murmur, click or rub, no peripheral edema and peripheral pulses strong  ABDOMEN: soft, nontender, no hepatosplenomegaly, no masses and bowel " sounds normal  NEURO: Normal strength and tone, mentation intact and speech normal  PSYCH: mentation appears normal, affect normal/bright        ASSESSMENT/PLAN:             1. Hypertension goal BP (blood pressure) < 140/90  Well controlled on low dose  - amLODIPine (NORVASC) 2.5 MG tablet; Take 1 tablet (2.5 mg) by mouth daily  Dispense: 90 tablet; Refill: 3  continue  2. Visual disturbance  Follow up with consultant as planned tomorrow ( Opth).     3. Urinary frequency  Stop flomax and call urology in 2-3 weeks      Regular exercise  See Patient Instructions    Lamine Franco MD  [facility] [place]

## 2017-07-11 NOTE — MR AVS SNAPSHOT
"              After Visit Summary   7/11/2017    Ivy Haynes    MRN: [id number]           Patient Information     Date Of Birth          1945        Visit Information        Provider Department      7/11/2017 11:30 AM Lamine Franco MD [facility]        Today's Diagnoses     Hypertension goal BP (blood pressure) < 140/90    -  1    Visual disturbance        Urinary frequency           Follow-ups after your visit        Who to contact     If you have questions or need follow up information about today's clinic visit or your schedule please contact [facility] directly at [phone].  Normal or non-critical lab and imaging results will be communicated to you by MyChart, letter or phone within 4 business days after the clinic has received the results. If you do not hear from us within 7 days, please contact the clinic through EngTechNowt or phone. If you have a critical or abnormal lab result, we will notify you by phone as soon as possible.  Submit refill requests through Flare Code or call your pharmacy and they will forward the refill request to us. Please allow 3 business days for your refill to be completed.          Additional Information About Your Visit        MyChart Information     Flare Code gives you secure access to your electronic health record. If you see a primary care provider, you can also send messages to your care team and make appointments. If you have questions, please call your primary care clinic.  If you do not have a primary care provider, please call [phone] and they will assist you.        Care EveryWhere ID     This is your Care EveryWhere ID. This could be used by other organizations to access your [place] medical records  [id number]        Your Vitals Were     Pulse Temperature Height Pulse Oximetry BMI (Body Mass Index)       105 98.4  F (36.9  C) (Oral) 5' 11\" (1.803 m) 98% 21.34 kg/m2        Blood Pressure from Last 3 Encounters:   07/11/17 " 122/74   03/17/17 124/74   03/10/17 114/79    Weight from Last 3 Encounters:   07/11/17 153 lb (69.4 kg)   03/17/17 159 lb (72.1 kg)   03/10/17 158 lb 9.6 oz (71.9 kg)              Today, you had the following     No orders found for display         Today's Medication Changes          These changes are accurate as of: 7/11/17  1:10 PM.  If you have any questions, ask your nurse or doctor.               These medicines have changed or have updated prescriptions.        Dose/Directions    amLODIPine 2.5 MG tablet   Commonly known as:  NORVASC   This may have changed:    - medication strength  - how much to take   Used for:  Hypertension goal BP (blood pressure) < 140/90   Changed by:  Lamine Franco MD        Dose:  2.5 mg   Take 1 tablet (2.5 mg) by mouth daily   Quantity:  90 tablet   Refills:  3            Where to get your medicines      These medications were sent to [facility] [address] [place] [id number]    Hours:  24-hours Phone:  [phone]     amLODIPine 2.5 MG tablet                Primary Care Provider Office Phone # Fax #    Lamine Jr Franco [phone]       [facility] [address]  [facility] [address]        Equal Access to Services     REGINALDO PALACIOS AH: Hadii aad ku hadasho Soomaali, waaxda luqadaha, qaybta kaalmada adeegyada, areli drake haytyson costa ah. So [facility] [phone].    ATENCIÓN: Si habla español, tiene a camargo disposición servicios gratuitos de asistencia lingüística. Llame al [phone].    We comply with applicable federal civil rights laws and [place] laws. We do not discriminate on the basis of race, color, national origin, age, disability sex, sexual orientation or gender identity.            Thank you!     Thank you for choosing [facility]  for your care. Our goal is always to provide you with  excellent care. Hearing back from our patients is one way we can continue to improve our services. Please take a few minutes to complete the written survey that you may receive in the mail after your visit with us. Thank you!             Your Updated Medication List - Protect others around you: Learn how to safely use, store and throw away your medicines at www.disposemymeds.org.          This list is accurate as of: 7/11/17  1:10 PM.  Always use your most recent med list.                   Brand Name Dispense Instructions for use Diagnosis    amLODIPine 2.5 MG tablet    NORVASC    90 tablet    Take 1 tablet (2.5 mg) by mouth daily    Hypertension goal BP (blood pressure) < 140/90       aspirin 81 MG tablet      Take by mouth daily        TAMSULOSIN HCL PO      Take 0.4 mg by mouth daily

## 2017-07-29 ENCOUNTER — OFFICE VISIT (OUTPATIENT)
Dept: URGENT CARE | Facility: URGENT CARE | Age: 72
End: 2017-07-29
Payer: COMMERCIAL

## 2017-07-29 VITALS
HEART RATE: 73 BPM | OXYGEN SATURATION: 99 % | HEIGHT: 72 IN | DIASTOLIC BLOOD PRESSURE: 80 MMHG | BODY MASS INDEX: 20.45 KG/M2 | WEIGHT: 151 LBS | SYSTOLIC BLOOD PRESSURE: 146 MMHG | TEMPERATURE: 99.1 F

## 2017-07-29 DIAGNOSIS — J20.9 ACUTE BRONCHITIS WITH SYMPTOMS GREATER THAN 10 DAYS: Primary | ICD-10-CM

## 2017-07-29 PROCEDURE — 99213 OFFICE O/P EST LOW 20 MIN: CPT | Performed by: FAMILY MEDICINE

## 2017-07-29 RX ORDER — AZITHROMYCIN 250 MG/1
TABLET, FILM COATED ORAL
Qty: 6 TABLET | Refills: 0 | Status: SHIPPED | OUTPATIENT
Start: 2017-07-29 | End: 2018-05-29

## 2017-07-29 NOTE — MR AVS SNAPSHOT
"              After Visit Summary   7/29/2017    Ivy Haynes    MRN: [id number]           Patient Information     Date Of Birth          1945        Visit Information        Provider Department      7/29/2017 8:30 PM Barbara Foster MD [facility]        Today's Diagnoses     Acute bronchitis with symptoms greater than 10 days    -  1       Follow-ups after your visit        Who to contact     If you have questions or need follow up information about today's clinic visit or your schedule please contact [facility] directly at [phone].  Normal or non-critical lab and imaging results will be communicated to you by MyChart, letter or phone within 4 business days after the clinic has received the results. If you do not hear from us within 7 days, please contact the clinic through AppHerohart or phone. If you have a critical or abnormal lab result, we will notify you by phone as soon as possible.  Submit refill requests through GTI or call your pharmacy and they will forward the refill request to us. Please allow 3 business days for your refill to be completed.          Additional Information About Your Visit        MyChart Information     GTI gives you secure access to your electronic health record. If you see a primary care provider, you can also send messages to your care team and make appointments. If you have questions, please call your primary care clinic.  If you do not have a primary care provider, please call [phone] and they will assist you.        Care EveryWhere ID     This is your Care EveryWhere ID. This could be used by other organizations to access your [place] medical records  [id number]        Your Vitals Were     Pulse Temperature Height Pulse Oximetry BMI (Body Mass Index)       73 99.1  F (37.3  C) (Oral) 5' 11.5\" (1.816 m) 99% 20.77 kg/m2        Blood Pressure from Last 3 Encounters:   07/29/17 146/80   07/11/17 " 122/74   03/17/17 124/74    Weight from Last 3 Encounters:   07/29/17 151 lb (68.5 kg)   07/11/17 153 lb (69.4 kg)   03/17/17 159 lb (72.1 kg)              Today, you had the following     No orders found for display         Today's Medication Changes          These changes are accurate as of: 7/29/17  8:52 PM.  If you have any questions, ask your nurse or doctor.               Start taking these medicines.        Dose/Directions    azithromycin 250 MG tablet   Commonly known as:  ZITHROMAX   Used for:  Acute bronchitis with symptoms greater than 10 days   Started by:  Barbara Foster MD        Two tablets first day, then one tablet daily for four days.   Quantity:  6 tablet   Refills:  0            Where to get your medicines      These medications were sent to [facility] [address] [place] [address] AT [facility] & [address] [place] [phone]    Hours:  24-hours Phone:  [phone]     azithromycin 250 MG tablet                Primary Care Provider Office Phone # Fax #    Lamine Jr Franco [phone]       [facility] [address] [facility] [address]  [facility] [address]        Equal Access to Services     REGINALDO PALACIOS AH: Hadevan millero Sobetty, waaxda luqadaha, qaybta kaalmada adeegyada, areli rose. So [facility] [phone].    ATENCIÓN: Si habla español, tiene a camargo disposición servicios gratuitos de asistencia lingüística. Llame al [phone].    We comply with applicable federal civil rights laws and [place] laws. We do not discriminate on the basis of race, color, national origin, age, disability sex, sexual orientation or gender identity.            Thank you!     Thank you for choosing [facility]  for your care. Our goal is always to provide you with excellent care. Hearing back from our patients is one way we can continue to improve our  services. Please take a few minutes to complete the written survey that you may receive in the mail after your visit with us. Thank you!             Your Updated Medication List - Protect others around you: Learn how to safely use, store and throw away your medicines at www.disposemymeds.org.          This list is accurate as of: 7/29/17  8:52 PM.  Always use your most recent med list.                   Brand Name Dispense Instructions for use Diagnosis    amLODIPine 2.5 MG tablet    NORVASC    90 tablet    Take 1 tablet (2.5 mg) by mouth daily    Hypertension goal BP (blood pressure) < 140/90       aspirin 81 MG tablet      Take by mouth daily        azithromycin 250 MG tablet    ZITHROMAX    6 tablet    Two tablets first day, then one tablet daily for four days.    Acute bronchitis with symptoms greater than 10 days       TAMSULOSIN HCL PO      Take 0.4 mg by mouth daily

## 2017-07-30 NOTE — NURSING NOTE
"Chief Complaint   Patient presents with     Urgent Care     URI     2 weeks ago started to have dry cough; a week ago started to have clear sputum and nasal sniffles; started to feel really tired and light headed.  Did have pneumonia a couple years ago, feels current symptoms are quite similar.  Voice more hoarse.  Wife did have strep throat 2 1/2 weeks ago.       Initial /80  Pulse 73  Temp 99.1  F (37.3  C) (Oral)  Ht 5' 11.5\" (1.816 m)  Wt 151 lb (68.5 kg)  SpO2 99%  BMI 20.77 kg/m2 Estimated body mass index is 20.77 kg/(m^2) as calculated from the following:    Height as of this encounter: 5' 11.5\" (1.816 m).    Weight as of this encounter: 151 lb (68.5 kg)..  BP completed using cuff size: demetri Melgoza RN  "

## 2017-07-30 NOTE — PROGRESS NOTES
"History of Present Illness:  Patient is a 72 year old male who presents with increased cough and cold symptoms over past 2 weeks.  Started as a dry cough two weeks ago- then became more productive and felt as if it was resolving.  Now- cough continues.  Increased fatigue.  No fever.        ROS:  General: Denies any fever or chills  HEENT: Denies eye pain, ear pain, throat pain or runny nose  NECK: Denies neck pain  LUNGS: Denies SOB  CV: Denies chest pain  Abdomen: Denies abdominal pain, denies change in stool  SKIN: Denies rash.  NEURO: Denies dizziness  MUSCULOSKELETAL: Denies any ROM issues, no mylagias, no pain  PSYCH: Denies mood change    Objective:  /80  Ht 5' 11.5\" (1.816 m)  Wt 151 lb (68.5 kg)  BMI 20.77 kg/m2    General:  Patient is alert.  In NAD.  HEENT: NC/AT, PERRL, EOMI, TMs clear, external canals patent without cerumen, nasal mucosa moist, no rhinorrhea, no sinus tenderness on palpation, oropharynx moist without exudate or erythema  NECK: supple, no LAD  LUNGS: CTA bilaterally, no rhonchi, wheezes or rales, harsh congested cough  CV: RRR, no murmurs, rubs or gallops  ABDOMEN: Soft, NT/ND, +BS  BACK: No flank tenderness  SKIN: No rashes  NEURO: CN II-XII grossly intact  MUSCULOSKELETAL: No deficits noted.  Normal strength and ROM in BUEs and BLEs,  PSYCH: Normal mood and affect    Assessment:  Acute bronchitis with symptoms > 10 days    Plan:  Prescribed Zpak.  Increase fluids.  FU if no change or worsening symptoms prn.    All questions were answered.  Patient voices understanding of the plan at time of discharge.  "

## 2017-08-16 ENCOUNTER — TRANSFERRED RECORDS (OUTPATIENT)
Dept: HEALTH INFORMATION MANAGEMENT | Facility: CLINIC | Age: 72
End: 2017-08-16

## 2018-02-01 ENCOUNTER — OFFICE VISIT (OUTPATIENT)
Dept: FAMILY MEDICINE | Facility: CLINIC | Age: 73
End: 2018-02-01
Payer: COMMERCIAL

## 2018-02-01 VITALS
BODY MASS INDEX: 21.04 KG/M2 | OXYGEN SATURATION: 98 % | SYSTOLIC BLOOD PRESSURE: 130 MMHG | DIASTOLIC BLOOD PRESSURE: 82 MMHG | WEIGHT: 153 LBS | TEMPERATURE: 97.6 F | HEART RATE: 92 BPM

## 2018-02-01 DIAGNOSIS — R20.2 NUMBNESS AND TINGLING OF RIGHT LOWER EXTREMITY: Primary | ICD-10-CM

## 2018-02-01 DIAGNOSIS — R20.0 NUMBNESS AND TINGLING OF RIGHT LOWER EXTREMITY: Primary | ICD-10-CM

## 2018-02-01 DIAGNOSIS — Z78.9 VEGETARIAN DIET: ICD-10-CM

## 2018-02-01 LAB
FERRITIN SERPL-MCNC: 66 NG/ML (ref 26–388)
HGB BLD-MCNC: 15.7 G/DL (ref 13.3–17.7)
VIT B12 SERPL-MCNC: 391 PG/ML (ref 193–986)

## 2018-02-01 PROCEDURE — 36415 COLL VENOUS BLD VENIPUNCTURE: CPT | Performed by: PHYSICIAN ASSISTANT

## 2018-02-01 PROCEDURE — 99214 OFFICE O/P EST MOD 30 MIN: CPT | Performed by: PHYSICIAN ASSISTANT

## 2018-02-01 PROCEDURE — 82728 ASSAY OF FERRITIN: CPT | Performed by: PHYSICIAN ASSISTANT

## 2018-02-01 PROCEDURE — 85018 HEMOGLOBIN: CPT | Performed by: PHYSICIAN ASSISTANT

## 2018-02-01 PROCEDURE — 82607 VITAMIN B-12: CPT | Performed by: PHYSICIAN ASSISTANT

## 2018-02-01 NOTE — MR AVS SNAPSHOT
After Visit Summary   2/1/2018    Ivy Haynes    MRN: [id number]           Patient Information     Date Of Birth          1945        Visit Information        Provider Department      2/1/2018 8:40 AM Rayna Oliver PA-C [facility]        Today's Diagnoses     Numbness and tingling of right lower extremity    -  1    Vegetarian diet          Care Instructions    Get labs done  If all normal and symptoms don't go away in a week or 2, follow up with neurology  Return to clinic for any new or worsening symptoms or go to ER Urgent care in off hours            Follow-ups after your visit        Additional Services     NEUROLOGY ADULT REFERRAL       Your provider has referred you for the following:   Consult at [facility] [facility] [phone]   [email or web address]  [facility] [phone]   [email or web address]  [facility]: [facility] [phone]   [email or web address]  General Neurology    Please be aware that coverage of these services is subject to the terms and limitations of your health insurance plan.  Call member services at your health plan with any benefit or coverage questions.      Please bring the following with you to your appointment:    (1) Any X-Rays, CTs or MRIs which have been performed.  Contact the facility where they were done to arrange for  prior to your scheduled appointment.    (2) List of current medications  (3) This referral request   (4) Any documents/labs given to you for this referral                  Who to contact     If you have questions or need follow up information about today's clinic visit or your schedule please contact [facility] directly at [phone].  Normal or non-critical lab and imaging results will be communicated to you by  MyChart, letter or phone within 4 business days after the clinic has received the results. If you do not hear from us within 7 days, please contact the clinic through Enswerst or phone. If you have a critical or abnormal lab result, we will notify you by phone as soon as possible.  Submit refill requests through [email or web address] or call your pharmacy and they will forward the refill request to us. Please allow 3 business days for your refill to be completed.          Additional Information About Your Visit        [employer] Information     [email or web address] gives you secure access to your electronic health record. If you see a primary care provider, you can also send messages to your care team and make appointments. If you have questions, please call your primary care clinic.  If you do not have a primary care provider, please call [phone] and they will assist you.        Care EveryWhere ID     This is your Care EveryWhere ID. This could be used by other organizations to access your [place] medical records  [id number]        Your Vitals Were     Pulse Temperature Pulse Oximetry BMI (Body Mass Index)          92 97.6  F (36.4  C) (Oral) 98% 21.04 kg/m2         Blood Pressure from Last 3 Encounters:   02/01/18 130/82   07/29/17 146/80   07/11/17 122/74    Weight from Last 3 Encounters:   02/01/18 153 lb (69.4 kg)   07/29/17 151 lb (68.5 kg)   07/11/17 153 lb (69.4 kg)              We Performed the Following     Ferritin     Hemoglobin     NEUROLOGY ADULT REFERRAL     Vitamin B12        Primary Care Provider Office Phone # Fax #    Lamine Jr Franco [phone]       [address] [address]        Equal Access to Services     [facility]: Hadii allie Keenan, benito darling, areli garza. So [facility] [phone].    ATENCIÓN: Si habla español, tiene a camargo disposición servicios gratuitos de asistencia lingüística. Llame al  [phone].    We comply with applicable federal civil rights laws and [place] laws. We do not discriminate on the basis of race, color, national origin, age, disability, sex, sexual orientation, or gender identity.            Thank you!     Thank you for choosing [facility]  for your care. Our goal is always to provide you with excellent care. Hearing back from our patients is one way we can continue to improve our services. Please take a few minutes to complete the written survey that you may receive in the mail after your visit with us. Thank you!             Your Updated Medication List - Protect others around you: Learn how to safely use, store and throw away your medicines at www.disposemymeds.org.          This list is accurate as of 2/1/18  9:04 AM.  Always use your most recent med list.                   Brand Name Dispense Instructions for use Diagnosis    amLODIPine 2.5 MG tablet    NORVASC    90 tablet    Take 1 tablet (2.5 mg) by mouth daily    Hypertension goal BP (blood pressure) < 140/90       aspirin 81 MG tablet      Take by mouth daily        azithromycin 250 MG tablet    ZITHROMAX    6 tablet    Two tablets first day, then one tablet daily for four days.    Acute bronchitis with symptoms greater than 10 days       TAMSULOSIN HCL PO      Take 0.4 mg by mouth daily

## 2018-02-01 NOTE — PATIENT INSTRUCTIONS
Get labs done  If all normal and symptoms don't go away in a week or 2, follow up with neurology  Return to clinic for any new or worsening symptoms or go to ER Urgent care in off hours

## 2018-02-01 NOTE — PROGRESS NOTES
SUBJECTIVE:   Ivy Haynes is a 72 year old male who presents to clinic today for the following health issues:    Joint Pain    Onset: a few days. Started 2 Tuesday morning    Description:   Location: left leg  Character: Felt like a cold breeze was hitting the side of his right leg. Same with his foot    Intensity:     Progression of Symptoms: same    Accompanying Signs & Symptoms:  Other symptoms: none    History:   Previous similar pain: no       Precipitating factors:   Trauma or overuse: unknown    Alleviating factors:  Improved by: nothing    Therapies Tried and outcome:     Works out every day, rides stationary bike.   Energy level is where it has always been     Experiences a cool sensation on right lateral lower leg  He's very active, works out on exercise bicycle.   No calf swelling or redness  He says the lateral part of the leg may be a little sore to the touch  No new routines  No knee or hip pain  No weakness      He works as a gold smith right now        Problem list and histories reviewed & adjusted, as indicated.  Additional history: as documented    ROS:  C: NEGATIVE for fever, chills, change in weight  I: NEGATIVE for worrisome rashes, moles or lesions  E: NEGATIVE for vision changes or irritation  E/M: NEGATIVE for ear, mouth and throat problems  R: NEGATIVE for significant cough or SOB  CV: NEGATIVE for chest pain/chest pressure, claudication, dyspnea on exertion, irregular heart beat, lower extremity edema, orthopnea, palpitations, paroxysmal nocturnal dyspnea, phlebitis, syncope or near-syncope, tachycardia and varicose veins  GI: NEGATIVE for nausea, abdominal pain, heartburn, or change in bowel habits  : NEGATIVE for frequency, dysuria, or hematuria  MUSCULOSKELETAL:NEGATIVE for joint stiffness , joint swelling , joint warmth  and muscle cramps,nocturnal  NEURO: NEGATIVE for behavior changes, dysesthesias, dysphagia, involuntary movements, gait disturbance, loss of consciousness, memory  problems, paralysis , radicular pain  and tremor   E: NEGATIVE for temperature intolerance, skin/hair changes  H: NEGATIVE for bleeding problems  P: NEGATIVE for changes in mood or affect        Patient Active Problem List   Diagnosis     Hepatitis C     GERD (gastroesophageal reflux disease)     CARDIOVASCULAR SCREENING; LDL GOAL LESS THAN 160     Enchondroma of femur     Left knee DJD     Osteoarthritis, knee     S/P total knee replacement     Advanced directives, counseling/discussion     Hypertension goal BP (blood pressure) < 140/90     Chest pain     Past Surgical History:   Procedure Laterality Date     ARTHROPLASTY KNEE  11/9/2012    Procedure: ARTHROPLASTY KNEE;;  Surgeon: Ang Arellano MD;  Location: [facility] OR     HERNIA REPAIR  12/10     RESECT TUMOR LOWER EXTREMITY  11/9/2012    Procedure: RESECT TUMOR LOWER EXTREMITY;  Excision of Tumor Left Distal Femur, Left Total Knee Replacement ;  Surgeon: Ang Arellano MD;  Location: [facility] OR     styloid process temporal bone surg  02/10       Social History   Substance Use Topics     Smoking status: Current Every Day Smoker     Packs/day: 0.10     Types: Cigarettes     Last attempt to quit: 5/24/1981     Smokeless tobacco: Never Used      Comment: quit 1979 until 2013. started after trip to [place]     Alcohol use Yes      Comment: 1-2 beer a week      Family History   Problem Relation Age of Onset     Hypertension Mother      CEREBROVASCULAR DISEASE Father 69     CANCER Paternal Grandfather      CANCER Son            Labs reviewed in EPIC  BP Readings from Last 3 Encounters:   02/01/18 130/82   07/29/17 146/80   07/11/17 122/74    Wt Readings from Last 3 Encounters:   02/01/18 153 lb (69.4 kg)   07/29/17 151 lb (68.5 kg)   07/11/17 153 lb (69.4 kg)                    OBJECTIVE:                                                    /82  Pulse 92  Temp 97.6  F (36.4  C) (Oral)  Wt 153 lb (69.4 kg)  SpO2 98%  BMI 21.04 kg/m2 Body mass index is 21.04 kg/(m^2).  "  GENERAL: alert and oriented, in mild distress  EYES: PERRLA, no injection  HEENT: atraumatic, normocephalic. Nostrils patent, oropharynx hydrated without posterior exudates, edema or erythema.  NECK: normal ROM without spinal tap tenderness  HEART: Normal sinus rhythm, no murmurs rubs or gallops  LUNGS:CTAB  ABDOMEN: Soft non-tender non-distended with normoactive bowel sounds. Negative McBurney's Negative Hopson's  EXTREMITIES: Warm well perfused with good ROM and strength at hip, knee, ankles and toes. Normal pulses; deep tendon reflexes are normal. negative straight leg raise on both. No edema or calf tenderness. 2+ pulses bilaterally  BACK: symmetric, no curvature. No spinal or CVA tenderness or paralumbar tenderness. Full pelvic ROM and strength.   Neuro: Gait normal. Reflexes normal and symmetric. Sensation grossly normal  SKIN: Warm and dry without lesions       ASSESSMENT/PLAN:                                                        ICD-10-CM    1. Numbness and tingling of right lower extremity R20.0 NEUROLOGY ADULT REFERRAL    R20.2 Vitamin B12     Ferritin     Hemoglobin   2. Vegetarian diet Z78.9 Vitamin B12     Ferritin     Hemoglobin     Unclear etiology of abnormal sensation. He is vegetarian so we'll check iron and B12. See below. Return to clinic for any new or worsening symptoms or go to ER Urgent care in off hours    Patient Instructions   Get labs done  If all normal and symptoms don't go away in a week or 2, follow up with neurology  Return to clinic for any new or worsening symptoms or go to ER Urgent care in off hours          Estimated body mass index is 21.04 kg/(m^2) as calculated from the following:    Height as of 7/29/17: 5' 11.5\" (1.816 m).    Weight as of this encounter: 153 lb (69.4 kg).   Weight management plan: See PCP    Rayna Oliver  [facility]      "

## 2018-03-19 NOTE — PROGRESS NOTES
"Chief Complaint   Patient presents with     Hypertension     medication reaction       Initial /74  Pulse 105  Temp 98.4  F (36.9  C) (Oral)  Ht 5' 11\" (1.803 m)  Wt 153 lb (69.4 kg)  SpO2 98%  BMI 21.34 kg/m2 Estimated body mass index is 21.34 kg/(m^2) as calculated from the following:    Height as of this encounter: 5' 11\" (1.803 m).    Weight as of this encounter: 153 lb (69.4 kg).  Medication Reconciliation: complete     Radha Weiss MA      " Detail Level: Zone Detail Level: Generalized

## 2018-04-10 ENCOUNTER — HOSPITAL ENCOUNTER (OUTPATIENT)
Dept: GENERAL RADIOLOGY | Facility: CLINIC | Age: 73
Discharge: HOME | End: 2018-04-10
Attending: NURSE PRACTITIONER | Admitting: NURSE PRACTITIONER
Payer: MEDICARE

## 2018-04-10 ENCOUNTER — OFFICE VISIT (OUTPATIENT)
Dept: FAMILY MEDICINE | Facility: CLINIC | Age: 73
End: 2018-04-10
Payer: COMMERCIAL

## 2018-04-10 VITALS
TEMPERATURE: 98.7 F | SYSTOLIC BLOOD PRESSURE: 136 MMHG | BODY MASS INDEX: 21.21 KG/M2 | DIASTOLIC BLOOD PRESSURE: 86 MMHG | WEIGHT: 154.2 LBS | HEART RATE: 114 BPM | OXYGEN SATURATION: 99 %

## 2018-04-10 DIAGNOSIS — J06.9 VIRAL URI WITH COUGH: ICD-10-CM

## 2018-04-10 DIAGNOSIS — Z72.0 TOBACCO ABUSE: ICD-10-CM

## 2018-04-10 DIAGNOSIS — R06.2 WHEEZING: ICD-10-CM

## 2018-04-10 DIAGNOSIS — R68.83 CHILLS: ICD-10-CM

## 2018-04-10 DIAGNOSIS — R06.2 WHEEZING: Primary | ICD-10-CM

## 2018-04-10 PROCEDURE — 71046 X-RAY EXAM CHEST 2 VIEWS: CPT

## 2018-04-10 PROCEDURE — 99213 OFFICE O/P EST LOW 20 MIN: CPT | Performed by: NURSE PRACTITIONER

## 2018-04-10 NOTE — NURSING NOTE
"Chief Complaint   Patient presents with     Cough       Initial /86  Pulse 114  Temp 98.7  F (37.1  C) (Oral)  Wt 154 lb 3.2 oz (69.9 kg)  SpO2 99%  BMI 21.21 kg/m2 Estimated body mass index is 21.21 kg/(m^2) as calculated from the following:    Height as of 7/29/17: 5' 11.5\" (1.816 m).    Weight as of this encounter: 154 lb 3.2 oz (69.9 kg).  Medication Reconciliation: complete     Prince Bueno MA      "

## 2018-04-10 NOTE — MR AVS SNAPSHOT
After Visit Summary   4/10/2018    Ivy Haynes    MRN: [id number]           Patient Information     Date Of Birth          1945        Visit Information        Provider Department      4/10/2018 1:40 PM Fabiana Robertson APRN CNP [facility]        Today's Diagnoses     Wheezing    -  1    Chills        Tobacco abuse        Viral URI with cough           Follow-ups after your visit        Who to contact     If you have questions or need follow up information about today's clinic visit or your schedule please contact [facility] [place] at [phone].  Normal or non-critical lab and imaging results will be communicated to you by Neterohart, letter or phone within 4 business days after the clinic has received the results. If you do not hear from us within 7 days, please contact the clinic through Neterohart or phone. If you have a critical or abnormal lab result, we will notify you by phone as soon as possible.  Submit refill requests through TonZof or call your pharmacy and they will forward the refill request to us. Please allow 3 business days for your refill to be completed.          Additional Information About Your Visit        MyChart Information     TonZof gives you secure access to your electronic health record. If you see a primary care provider, you can also send messages to your care team and make appointments. If you have questions, please call your primary care clinic.  If you do not have a primary care provider, please call [phone] and they will assist you.        Care EveryWhere ID     This is your Care EveryWhere ID. This could be used by other organizations to access your [place] medical records  [id number]        Your Vitals Were     Pulse Temperature Pulse Oximetry BMI (Body Mass Index)          114 98.7  F (37.1  C) (Oral) 99% 21.21 kg/m2         Blood Pressure from Last 3 Encounters:   04/10/18 136/86   02/01/18 130/82   07/29/17 146/80     Weight from Last 3 Encounters:   04/10/18 154 lb 3.2 oz (69.9 kg)   02/01/18 153 lb (69.4 kg)   07/29/17 151 lb (68.5 kg)               Primary Care Provider Office Phone # Fax #    Lamine Jr Franco [phone]       [address] [facility] [address]  [facility] [address]        Equal Access to Services     REGINALDO PALACIOS : Hadii aad ku hadasho Soomaali, waaxda luqadaha, qaybta kaalmada adeegyada, waxay idiin hayaan adeeg kharash la'tyson . So [facility] [place] [phone].    ATENCIÓN: Si habla espdavid, tiene a camargo disposición servicios gratuitos de asistencia lingüística. Cristobal al [phone].    We comply with applicable federal civil rights laws and [place] laws. We do not discriminate on the basis of race, color, national origin, age, disability, sex, sexual orientation, or gender identity.            Thank you!     Thank you for [place] [facility] [place]. Our goal is always to provide you with excellent care. Hearing back from our patients is one way we can continue to improve our services. Please take a few minutes to complete the written survey that you may receive in the mail after your visit with us. Thank you!             Your Updated Medication List - Protect others around you: Learn how to safely use, store and throw away your medicines at www.disposemymeds.org.          This list is accurate as of 4/10/18 11:59 PM.  Always use your most recent med list.                   Brand Name Dispense Instructions for use Diagnosis    amLODIPine 2.5 MG tablet    NORVASC    90 tablet    Take 1 tablet (2.5 mg) by mouth daily    Hypertension goal BP (blood pressure) < 140/90       aspirin 81 MG tablet      Take by mouth daily        azithromycin 250 MG tablet    ZITHROMAX    6 tablet    Two tablets first day, then one tablet daily for four days.    Acute bronchitis with symptoms greater than 10 days       TAMSULOSIN HCL PO      Take 0.4 mg by mouth daily

## 2018-04-10 NOTE — PROGRESS NOTES
SUBJECTIVE:   CC: Ivy Haynes is a 73 year old male with a history of HTN, chest pain and Hepatitis C who presents to clinic today for the following health issues: Respiratory illness.      HPI:  Had 10 days of upper respiratory illness.  Felt was getting better on Sunday, then got worse again.  Has had sweats.  Cough, clear mucous.  Has had congestion, wheezing, myalgia, and headache.  Has taken ibuprofen.  No weight loss, palpitations, No hemoptysis, chest pain, palpitations or edema.  Smokes three cigarettes/day.  No known sick contacts.    Acute Illness   Acute illness concerns: pneumonia   Onset: about 8 days     Fever: no     Chills/Sweats: no     Headache (location?): no    Sinus Pressure:YES    Conjunctivitis:  no    Ear Pain: no    Rhinorrhea: YES    Congestion: YES    Sore Throat: no      Cough: YES    Wheeze: YES    Decreased Appetite: no    Nausea: no    Vomiting: no    Diarrhea:  no    Dysuria/Freq.: no    Fatigue/Achiness: YES    Sick/Strep Exposure: no     Therapies Tried and outcome: ibuprofen and not going away.         Problem list and histories reviewed & adjusted, as indicated.  Additional history: as documented    Patient Active Problem List   Diagnosis     Hepatitis C     GERD (gastroesophageal reflux disease)     CARDIOVASCULAR SCREENING; LDL GOAL LESS THAN 160     Enchondroma of femur     Left knee DJD     Osteoarthritis, knee     S/P total knee replacement     Advanced directives, counseling/discussion     Hypertension goal BP (blood pressure) < 140/90     Chest pain     Past Surgical History:   Procedure Laterality Date     ARTHROPLASTY KNEE  11/9/2012    Procedure: ARTHROPLASTY KNEE;;  Surgeon: Ang Arellano MD;  Location: [facility] OR     HERNIA REPAIR  12/10     RESECT TUMOR LOWER EXTREMITY  11/9/2012    Procedure: RESECT TUMOR LOWER EXTREMITY;  Excision of Tumor Left Distal Femur, Left Total Knee Replacement ;  Surgeon: Ang Arellano MD;  Location: [facility] OR     styloid process temporal  bone surg  02/10       Social History   Substance Use Topics     Smoking status: Current Every Day Smoker     Packs/day: 0.10     Types: Cigarettes     Last attempt to quit: 5/24/1981     Smokeless tobacco: Never Used      Comment: quit 1979 until 2013. started after trip to [place]     Alcohol use Yes      Comment: 1-2 beer a week      Family History   Problem Relation Age of Onset     Hypertension Mother      CEREBROVASCULAR DISEASE Father 69     CANCER Paternal Grandfather      CANCER Son          Current Outpatient Prescriptions   Medication Sig Dispense Refill     amLODIPine (NORVASC) 2.5 MG tablet Take 1 tablet (2.5 mg) by mouth daily 90 tablet 3     aspirin 81 MG tablet Take by mouth daily       azithromycin (ZITHROMAX) 250 MG tablet Two tablets first day, then one tablet daily for four days. (Patient not taking: Reported on 4/10/2018) 6 tablet 0     TAMSULOSIN HCL PO Take 0.4 mg by mouth daily        Allergies   Allergen Reactions     Sulfa Drugs      BP Readings from Last 3 Encounters:   04/10/18 136/86   02/01/18 130/82   07/29/17 146/80    Wt Readings from Last 3 Encounters:   04/10/18 154 lb 3.2 oz (69.9 kg)   02/01/18 153 lb (69.4 kg)   07/29/17 151 lb (68.5 kg)                    Reviewed and updated as needed this visit by clinical staff       Reviewed and updated as needed this visit by Provider         ROS:  Constitutional: See HPI  HEENT: See HPI  Cardiovascular: See HPI  Respiratory: See HPI    OBJECTIVE:     /86  Pulse 114  Temp 98.7  F (37.1  C) (Oral)  Wt 154 lb 3.2 oz (69.9 kg)  SpO2 99%  BMI 21.21 kg/m2  Body mass index is 21.21 kg/(m^2).  GENERAL: healthy, alert and no distress  HENT: ear canals and R TM sclerotic, nose and mouth without ulcers or lesions  NECK: [place]oddy submandibular nodes, no asymmetry, masses, or scars and thyroid normal to palpation  RESP: Normal respiratory effort.  Bilateral wheezes in lower lobes.    CV: regular rate and rhythm, normal S1 S2, no S3 or S4, no  murmur, click or rub, no peripheral edema.  ABDOMEN: soft, nontender, no hepatosplenomegaly, no masses and bowel sounds normal  PSYCH: mentation appears normal, affect normal/bright    Diagnostic Test Results:  CXR - Exam: XR CHEST 2 VW, 4/10/2018 2:43 PM     Indication: ; Wheezing; Chills; Tobacco abuse     Comparison: Chest radiograph dated 1/5/2017.     Findings:   PA and lateral views of the chest. Lungs are hyperinflated  bilaterally. No focal airspace opacities. The costophrenic angles are  clear. No pneumothorax. Visualized portion of the upper abdomen is  unremarkable. No acute osseous pathology.         Impression:   1. No acute airspace disease.  2. Bilateral pulmonary hyperinflation suggestive of chronic lung  disease such as COPD.     I have personally reviewed the examination and initial interpretation  and I agree with the findings.     SHADI MANRIQUEZ MD    ASSESSMENT/PLAN:       ICD-10-CM    1. Wheezing R06.2 XR Chest 2 Views   2. Chills R68.83 XR Chest 2 Views   3. Tobacco abuse Z72.0 XR Chest 2 Views   4. Viral URI with cough J06.9     B97.89    Discussed viral versus bacterial illnesses along with typical course of progression, and no signs or symptoms of bacterial infection at this point.   Symptom management: gargle salt water, honey in tea or warm water, plenty of rest and extra fluids. Reviewed signs of dehydration and signs of worsening illness.  Instructed patient to return in 1 week if not improving, or sooner if worsening.      Strongly recommended smoking cessation    Kathie Cortez, Student NP on 4/17/2018 at 8:43 AM    I was present with the NP Student during the history and exam.  I discussed the case with the NP Student and agree with the findings as documented in the assessment and plan.     TAMERA Cortez [facility]

## 2018-04-13 ENCOUNTER — TELEPHONE (OUTPATIENT)
Dept: FAMILY MEDICINE | Facility: CLINIC | Age: 73
End: 2018-04-13

## 2018-04-13 DIAGNOSIS — J20.9 ACUTE BRONCHITIS, UNSPECIFIED ORGANISM: Primary | ICD-10-CM

## 2018-04-13 RX ORDER — ALBUTEROL SULFATE 90 UG/1
2 AEROSOL, METERED RESPIRATORY (INHALATION) EVERY 6 HOURS PRN
Qty: 1 INHALER | Refills: 0 | Status: SHIPPED | OUTPATIENT
Start: 2018-04-13 | End: 2018-05-07

## 2018-04-13 NOTE — TELEPHONE ENCOUNTER
Called patient, informed of provider message and patient states his understanding. Offered to assist in scheduling an appointment for next week, patient declines at this time. He will call back on Monday to schedule if symptoms do not improve or become worse.     TONNY LoveN RN  [facility]

## 2018-04-13 NOTE — TELEPHONE ENCOUNTER
Watch the fever and if gets any higher or lasts longer than 3 days he should come back for a recheck as that can be a sign of illness turning bacterial.     Albuterol ordered, q 4 hours prn and continue all the other OTC medications we discussed in clinic, saline nasal spray or washes, steam, push fluids, rest etc.     Thanks  Fabiana PHILIP CNP

## 2018-04-13 NOTE — TELEPHONE ENCOUNTER
Reason for call:  Other   Patient called regarding (reason for call): call back and prescription  Additional comments: Pt was seen on 4/10 and was diagnosed with a cold/URI. He stated that he is getting much worse, the cough is a lot worse and he has a lot of congestion/mucus. He wants to know if something was supposed to have been prescribed and was just forgotten, or if something can be prescribed now. Pharmacy is queued.    Phone number to reach patient:  Home number on file [phone] (home)    Best Time:  Any    Can we leave a detailed message on this number?  YES

## 2018-04-25 ENCOUNTER — TELEPHONE (OUTPATIENT)
Dept: OTOLARYNGOLOGY | Facility: CLINIC | Age: 73
End: 2018-04-25

## 2018-05-07 DIAGNOSIS — J20.9 ACUTE BRONCHITIS, UNSPECIFIED ORGANISM: ICD-10-CM

## 2018-05-07 RX ORDER — ALBUTEROL SULFATE 90 UG/1
2 AEROSOL, METERED RESPIRATORY (INHALATION) EVERY 6 HOURS PRN
Qty: 1 INHALER | Refills: 0 | Status: SHIPPED | OUTPATIENT
Start: 2018-05-07 | End: 2018-05-29

## 2018-05-07 NOTE — TELEPHONE ENCOUNTER
"Requested Prescriptions   Pending Prescriptions Disp Refills     albuterol (PROAIR HFA/PROVENTIL HFA/VENTOLIN HFA) 108 (90 Base) MCG/ACT Inhaler 1 Inhaler 0    Last Written Prescription Date:  04/13/2018  Last Fill Quantity: 1,  # refills: 0   Last office visit: 4/10/2018 with prescribing provider:  04/10/2018   Future Office Visit:   Sig: Inhale 2 puffs into the lungs every 6 hours as needed for shortness of breath / dyspnea or wheezing    Asthma Maintenance Inhalers - Anticholinergics Passed    5/7/2018 10:19 AM       Passed - Patient is age 12 years or older       Passed - Recent (12 mo) or future (30 days) visit within the authorizing provider's specialty    Patient had office visit in the last 12 months or has a visit in the next 30 days with authorizing provider or within the authorizing provider's specialty.  See \"Patient Info\" tab in inbasket, or \"Choose Columns\" in Meds & Orders section of the refill encounter.            "

## 2018-05-07 NOTE — TELEPHONE ENCOUNTER
Routing refill request to provider for review/approval because:  Diagnosis not on the [facility] refill protocol  Acute bronchitis, unspecified organism [J20.9]  - Primary

## 2018-05-22 ENCOUNTER — PRE VISIT (OUTPATIENT)
Dept: OTOLARYNGOLOGY | Facility: CLINIC | Age: 73
End: 2018-05-22

## 2018-05-22 ENCOUNTER — RADIANT APPOINTMENT (OUTPATIENT)
Dept: CT IMAGING | Facility: CLINIC | Age: 73
End: 2018-05-22
Attending: OTOLARYNGOLOGY
Payer: COMMERCIAL

## 2018-05-22 ENCOUNTER — OFFICE VISIT (OUTPATIENT)
Dept: OTOLARYNGOLOGY | Facility: CLINIC | Age: 73
End: 2018-05-22
Payer: COMMERCIAL

## 2018-05-22 VITALS — HEIGHT: 71 IN | WEIGHT: 156.2 LBS | BODY MASS INDEX: 21.87 KG/M2

## 2018-05-22 DIAGNOSIS — M79.645 THUMB PAIN, LEFT: Primary | ICD-10-CM

## 2018-05-22 DIAGNOSIS — M54.2 NECK PAIN: Primary | ICD-10-CM

## 2018-05-22 RX ORDER — AMLODIPINE BESYLATE 5 MG/1
TABLET ORAL
Refills: 3 | COMMUNITY
Start: 2017-08-18 | End: 2018-05-29

## 2018-05-22 ASSESSMENT — PAIN SCALES - GENERAL: PAINLEVEL: MODERATE PAIN (4)

## 2018-05-22 NOTE — PATIENT INSTRUCTIONS
Please have CT scan and then follow up in clinic with Dr Torres to review the results.  Goyo Bailey RN  [phone]

## 2018-05-22 NOTE — LETTER
5/22/2018       RE: Ivy Haynes  [address]  [facility] [address]     Dear Colleague,    Thank you for referring your patient, Ivy Haynes, to the [facility] at [facility]. Please see a copy of my visit note below.    Dear Dr. Dueñas [facility]:    I had the pleasure of meeting Ivy Haynes in consultation today at the [facility] at your request.     History of Present Illness:               MEDICATIONS:     Current Outpatient Prescriptions   Medication Sig Dispense Refill     albuterol (PROAIR HFA/PROVENTIL HFA/VENTOLIN HFA) 108 (90 Base) MCG/ACT Inhaler Inhale 2 puffs into the lungs every 6 hours as needed for shortness of breath / dyspnea or wheezing 1 Inhaler 0     amLODIPine (NORVASC) 2.5 MG tablet Take 1 tablet (2.5 mg) by mouth daily 90 tablet 3     amLODIPine (NORVASC) 5 MG tablet   3     aspirin 81 MG tablet Take by mouth daily       azithromycin (ZITHROMAX) 250 MG tablet Two tablets first day, then one tablet daily for four days. (Patient not taking: Reported on 4/10/2018) 6 tablet 0     TAMSULOSIN HCL PO Take 0.4 mg by mouth daily          ALLERGIES:    Allergies   Allergen Reactions     Sulfa Drugs        HABITS/SOCIAL HISTORY: no smoking minimal etoh     PAST MEDICAL HISTORY:   Past Medical History:   Diagnosis Date     Back pain      Chest pain      Gastro-oesophageal reflux disease      GERD (gastroesophageal reflux disease)      Hemorrhoid      Hepatitis C      Hypertension      Trigeminal neuralgia         FAMILY HISTORY:    Family History   Problem Relation Age of Onset     Hypertension Mother      CEREBROVASCULAR DISEASE Father 69     CANCER Paternal Grandfather      CANCER Son        REVIEW OF SYSTEMS:  12 point ROS was negative other than the symptoms noted above in the HPI.    PHYSICAL EXAMINATION:       PHYSICAL EXAMINATION:    Constitutional:  The patient was unaccompanied, well-groomed,  and in no acute distress.    Skin:  Warm and pink.    Neurologic:  Alert and oriented x 3.  CN's III-XII within normal limits.  Voice normal.   Psychiatric:  The patient's affect was calm, cooperative, and appropriate.    Respiratory:  Breathing comfortably without stridor or exertion of accessory muscles.    Eyes: Pupils were equal and reactive.  Extraocular movement intact.    Head:  Normocephalic and atraumatic.  No lesions or scars.    Ears:  Pinnae and tragus non-tender.  EAC's and TM's were clear.     Nose:  Sinuses were non-tender.  Anterior rhinoscopy revealed midline septum and absence of purulence or polyps.    OC/OP:  Normal tongue, floor of mouth, buccal mucosa, and palate.  No lesions or masses on inspection or palpation.  No abnormal lymph tissue in the oropharynx.  The pterygoid region is non-tender.    HP/LX:  Mirror exam of the larynx reveals a sharp epiglottis and mobile arytenoids.  No pooling seen.  The cords themselves appear clear and mobile.   Neck:  Supple with normal laryngeal and tracheal landmarks.  The parotid beds were without masses.  No palpable thyroid.  Lymphatic:  There is no palpable lymphadenopathy in the neck. HISTORY OF PRESENT ILLNESS:  Ivy is here for a first visit to our clinic today.  Many years ago, he had right-sided neck pain that was ameliorated because of a styloid process resection by Dr. Orion Mccormack.  He has similar pain in his right neck.  He is not a smoker.  He is a little bit worried about this being something similar to the pain that he had before. This was 10 or 12 years ago that he had previous pain.  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  He is concerned about this chronic pain in his neck because usually he is in pretty good health.  He has no other current complaints at the present time today.      PROCEDURE:  After verbal consent from the patient today, I went ahead and did a right-sided nasal endoscopy on him and examined his throat  after topical anesthesia.  The nasal cavity, nasopharynx, oral cavity, oropharynx, hypopharynx are all clear.  There are no masses, lesions or any other abnormalities noted.  He has got slight generosity of the lingual tonsil on the right side, but no other abnormalities are noted.      ASSESSMENT:  Patient with a history of some right neck pain.      PLAN:   I think it would be reasonable to evaluate this with a maxillofacial CT with contrast to see if there is anything else in the neck on that side that would be causing problems.  He certainly does not have any pain or anything else of this nature in the submandibular gland or anything like that.  He can reproduce the pain when he presses externally, almost as if he has a little bit of a trigger point on the outside of his neck.  We will see what the evaluation is and bring him back to clinic after the CT scan is performed.      FO/ms          IMPRESSION AND PLAN:     Thank you very much for the opportunity to participate in the care of your patient.        Again, thank you for allowing me to participate in the care of your patient.      Sincerely,    Connor Torres MD

## 2018-05-22 NOTE — PROGRESS NOTES
Dear Dr. Dueñas [facility]:    I had the pleasure of meeting Rayrayuq Rayrayuq in consultation today at the [facility] at your request.     History of Present Illness:               MEDICATIONS:     Current Outpatient Prescriptions   Medication Sig Dispense Refill     albuterol (PROAIR HFA/PROVENTIL HFA/VENTOLIN HFA) 108 (90 Base) MCG/ACT Inhaler Inhale 2 puffs into the lungs every 6 hours as needed for shortness of breath / dyspnea or wheezing 1 Inhaler 0     amLODIPine (NORVASC) 2.5 MG tablet Take 1 tablet (2.5 mg) by mouth daily 90 tablet 3     amLODIPine (NORVASC) 5 MG tablet   3     aspirin 81 MG tablet Take by mouth daily       azithromycin (ZITHROMAX) 250 MG tablet Two tablets first day, then one tablet daily for four days. (Patient not taking: Reported on 4/10/2018) 6 tablet 0     TAMSULOSIN HCL PO Take 0.4 mg by mouth daily          ALLERGIES:    Allergies   Allergen Reactions     Sulfa Drugs        HABITS/SOCIAL HISTORY: no smoking minimal etoh     PAST MEDICAL HISTORY:   Past Medical History:   Diagnosis Date     Back pain      Chest pain      Gastro-oesophageal reflux disease      GERD (gastroesophageal reflux disease)      Hemorrhoid      Hepatitis C      Hypertension      Trigeminal neuralgia         FAMILY HISTORY:    Family History   Problem Relation Age of Onset     Hypertension Mother      CEREBROVASCULAR DISEASE Father 69     CANCER Paternal Grandfather      CANCER Son        REVIEW OF SYSTEMS:  12 point ROS was negative other than the symptoms noted above in the HPI.    PHYSICAL EXAMINATION:       PHYSICAL EXAMINATION:    Constitutional:  The patient was unaccompanied, well-groomed, and in no acute distress.    Skin:  Warm and pink.    Neurologic:  Alert and oriented x 3.  CN's III-XII within normal limits.  Voice normal.   Psychiatric:  The patient's affect was calm, cooperative, and appropriate.    Respiratory:  Breathing comfortably without stridor or  exertion of accessory muscles.    Eyes: Pupils were equal and reactive.  Extraocular movement intact.    Head:  Normocephalic and atraumatic.  No lesions or scars.    Ears:  Pinnae and tragus non-tender.  EAC's and TM's were clear.     Nose:  Sinuses were non-tender.  Anterior rhinoscopy revealed midline septum and absence of purulence or polyps.    OC/OP:  Normal tongue, floor of mouth, buccal mucosa, and palate.  No lesions or masses on inspection or palpation.  No abnormal lymph tissue in the oropharynx.  The pterygoid region is non-tender.    HP/LX:  Mirror exam of the larynx reveals a sharp epiglottis and mobile arytenoids.  No pooling seen.  The cords themselves appear clear and mobile.   Neck:  Supple with normal laryngeal and tracheal landmarks.  The parotid beds were without masses.  No palpable thyroid.  Lymphatic:  There is no palpable lymphadenopathy in the neck. HISTORY OF PRESENT ILLNESS:  Ivy is here for a first visit to our clinic today.  Many years ago, he had right-sided neck pain that was ameliorated because of a styloid process resection by Dr. Orion Mccormack.  He has similar pain in his right neck.  He is not a smoker.  He is a little bit worried about this being something similar to the pain that he had before. This was 10 or 12 years ago that he had previous pain.  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  He is concerned about this chronic pain in his neck because usually he is in pretty good health.  He has no other current complaints at the present time today.      PROCEDURE:  After verbal consent from the patient today, I went ahead and did a right-sided nasal endoscopy on him and examined his throat after topical anesthesia.  The nasal cavity, nasopharynx, oral cavity, oropharynx, hypopharynx are all clear.  There are no masses, lesions or any other abnormalities noted.  He has got slight generosity of the lingual tonsil on the right side, but no other abnormalities are  noted.      ASSESSMENT:  Patient with a history of some right neck pain.      PLAN:   I think it would be reasonable to evaluate this with a maxillofacial CT with contrast to see if there is anything else in the neck on that side that would be causing problems.  He certainly does not have any pain or anything else of this nature in the submandibular gland or anything like that.  He can reproduce the pain when he presses externally, almost as if he has a little bit of a trigger point on the outside of his neck.  We will see what the evaluation is and bring him back to clinic after the CT scan is performed.      FO/ms          IMPRESSION AND PLAN:     Thank you very much for the opportunity to participate in the care of your patient.      Connor Torres M.D.  Otolaryngology- Head & Neck Surgery  [phone]

## 2018-05-22 NOTE — MR AVS SNAPSHOT
After Visit Summary   5/22/2018    Ivy Haynes    MRN: [id number]           Patient Information     Date Of Birth          1945        Visit Information        Provider Department      5/22/2018 2:45 PM Connor Torres MD [facility]        Today's Diagnoses     Neck pain    -  1      Care Instructions    Please have CT scan and then follow up in clinic with Dr Torres to review the results.  Goyo Bailey ,RN  [phone]              Follow-ups after your visit        Your next 10 appointments already scheduled     May 22, 2018  8:20 PM CDT   CT MAXILLOFACIAL W/O CONTRAST with UCCT1   [facility] ([facility])    [address]  1st Floor  [facility] [place] [address] [phone]           Please bring any scans or X-rays taken at other hospitals, if similar tests were done. Also bring a list of your medicines, including vitamins, minerals and over-the-counter drugs. It is safest to leave personal items at home.  Be sure to tell your doctor:   If you have any allergies.   If there s any chance you are pregnant.   If you are breastfeeding.  You do not need to do anything special to prepare for this exam.  Please wear loose clothing, such as a sweat suit or jogging clothes. Avoid snaps, zippers and other metal. We may ask you to undress and put on a hospital gown.            May 24, 2018  1:45 PM CDT   XR HAND LEFT G/E 3 VIEWS with [id number]   [facility] ([facility])    [address]  4th Floor  [place] [address] [phone]           Please bring a list of your current medicines to your exam. (Include vitamins, minerals and over-thecounter medicines.) Leave your valuables at home.  Tell your doctor if there is a chance you may be pregnant.  You do not need to do anything special for this exam.            May 24, 2018  2:00 PM CDT   (Arrive by 1:45 PM)   New Patient  "Visit with Regine Ochoa MD   [facility] ([facility])    [address]  5th [facility] [address] [phone]              Future tests that were ordered for you today     Open Future Orders        Priority Expected Expires Ordered    X-ray lt Hand G/E 3 vws* Routine 5/22/2018 5/22/2019 5/22/2018            Who to contact     Please call your clinic at [phone] to:    Ask questions about your health    Make or cancel appointments    Discuss your medicines    Learn about your test results    Speak to your doctor            Additional Information About Your Visit        [employer] Information     [employer] gives you secure access to your electronic health record. If you see a primary care provider, you can also send messages to your care team and make appointments. If you have questions, please call your primary care clinic.  If you do not have a primary care provider, please call [phone] and they will assist you.      [employer] is an electronic gateway that provides easy, online access to your medical records. With [employer], you can request a clinic appointment, read your test results, renew a prescription or communicate with your care team.     To access your existing account, please contact your [facility] or call [phone] for assistance.        Care EveryWhere ID     This is your Care EveryWhere ID. This could be used by other organizations to access your [facility] medical records  [id number]        Your Vitals Were     Height BMI (Body Mass Index)                1.803 m (5' 11\") 21.79 kg/m2           Blood Pressure from Last 3 Encounters:   04/10/18 136/86   02/01/18 130/82   07/29/17 146/80    Weight from Last 3 Encounters:   05/22/18 70.9 kg (156 lb 3.2 oz)   04/10/18 69.9 kg (154 lb 3.2 oz)   02/01/18 69.4 kg (153 lb)              We Performed the Following     CT Maxillofacial w/o contrast        " Primary Care Provider Office Phone # Fax #    Lamine Jr Franco [phone]       [address]  [facility] [address]        Equal Access to Services     REGINALDO PALACIOS : Hadevan allie zamudio angelao Soshelleyali, waaxda luqadaha, qaybta kaalmada ademohsenda, areli leigh laTiantyson rose. So [facility] [phone].    ATENCIÓN: Si habla español, tiene a camargo disposición servicios gratuitos de asistencia lingüística. Llame al [phone].    We comply with applicable federal civil rights laws and [place] laws. We do not discriminate on the basis of race, color, national origin, age, disability, sex, sexual orientation, or gender identity.            Thank you!     Thank you for choosing [facility]  for your care. Our goal is always to provide you with excellent care. Hearing back from our patients is one way we can continue to improve our services. Please take a few minutes to complete the written survey that you may receive in the mail after your visit with us. Thank you!             Your Updated Medication List - Protect others around you: Learn how to safely use, store and throw away your medicines at www.disposemymeds.org.          This list is accurate as of 5/22/18  4:24 PM.  Always use your most recent med list.                   Brand Name Dispense Instructions for use Diagnosis    albuterol 108 (90 Base) MCG/ACT Inhaler    PROAIR HFA/PROVENTIL HFA/VENTOLIN HFA    1 Inhaler    Inhale 2 puffs into the lungs every 6 hours as needed for shortness of breath / dyspnea or wheezing    Acute bronchitis, unspecified organism       * amLODIPine 2.5 MG tablet    NORVASC    90 tablet    Take 1 tablet (2.5 mg) by mouth daily    Hypertension goal BP (blood pressure) < 140/90       * amLODIPine 5 MG tablet    NORVASC          aspirin 81 MG tablet      Take by mouth daily        azithromycin 250 MG tablet    ZITHROMAX    6 tablet    Two tablets first day, then one tablet daily for  four days.    Acute bronchitis with symptoms greater than 10 days       TAMSULOSIN HCL PO      Take 0.4 mg by mouth daily        * Notice:  This list has 2 medication(s) that are the same as other medications prescribed for you. Read the directions carefully, and ask your doctor or other care provider to review them with you.

## 2018-05-22 NOTE — NURSING NOTE
Chief Complaint   Patient presents with     Consult     Styloid process issue, facial pain     Clovis Mccollum, EMT

## 2018-05-24 ENCOUNTER — PRE VISIT (OUTPATIENT)
Dept: ORTHOPEDICS | Facility: CLINIC | Age: 73
End: 2018-05-24

## 2018-05-24 ENCOUNTER — RADIANT APPOINTMENT (OUTPATIENT)
Dept: GENERAL RADIOLOGY | Facility: CLINIC | Age: 73
End: 2018-05-24
Payer: COMMERCIAL

## 2018-05-24 ENCOUNTER — OFFICE VISIT (OUTPATIENT)
Dept: ORTHOPEDICS | Facility: CLINIC | Age: 73
End: 2018-05-24
Payer: COMMERCIAL

## 2018-05-24 VITALS — WEIGHT: 156 LBS | RESPIRATION RATE: 16 BRPM | HEIGHT: 71 IN | BODY MASS INDEX: 21.84 KG/M2

## 2018-05-24 DIAGNOSIS — M18.0 ARTHRITIS OF CARPOMETACARPAL (CMC) JOINTS OF BOTH THUMBS: Primary | ICD-10-CM

## 2018-05-24 DIAGNOSIS — M79.641 RIGHT HAND PAIN: ICD-10-CM

## 2018-05-24 DIAGNOSIS — M79.645 THUMB PAIN, LEFT: ICD-10-CM

## 2018-05-24 NOTE — PROGRESS NOTES
Subjective:   Ivy Haynes is a RHD 73 year old male who is c/o bilateral thumb pain. The pt reports pain for over a year that is progressively worsening. The left thumb is worse.   Opening up jars  [place] , classical guitar  30 pullups non-stop, but the thumb doesn't help the , only hanging by 4 fingers.  Some neck pain, stinger on right posterior neck.  No problems sleeping.  No numbness in the morning  Reaching out and the left thumb brings him to the knees, holds thumb out to alleviate the pain.    Seen by hand berta about 7-8 years ago.    Background:   Date of injury: None  Duration of symptoms: 1 year  Mechanism of Injury: Chronic; Unknown   Intensity:  10/10 at worst; fleeting pain  Aggravating factors: Overhead movement of shoulder, grabbing objects  Relieving Factors: Nothing. No treatment tried.   Prior Evaluation: None    PAST MEDICAL, SOCIAL, SURGICAL AND FAMILY HISTORY: He  has a past medical history of Back pain; Chest pain; Gastro-oesophageal reflux disease; GERD (gastroesophageal reflux disease); Hemorrhoid; Hepatitis C; Hypertension; and Trigeminal neuralgia. He also has no past medical history of PONV (postoperative nausea and vomiting).  He  has a past surgical history that includes hernia repair (12/10); styloid process temporal bone surg (02/10); Resect tumor lower extremity (11/9/2012); Arthroplasty knee (11/9/2012); Tonsillectomy (1954); and ENT surgery (2009).  His family history includes CANCER in his paternal grandfather and son; CEREBROVASCULAR DISEASE (age of onset: 69) in his father; Hypertension in his mother.  He reports that he quit smoking about 37 years ago. His smoking use included Cigarettes. He started smoking about 7 years ago. He has a 0.40 pack-year smoking history. He has never used smokeless tobacco. He reports that he drinks alcohol. He reports that he uses illicit drugs.    ALLERGIES: He is allergic to sulfa drugs.    CURRENT MEDICATIONS: He has a  "current medication list which includes the following prescription(s): albuterol, amlodipine, amlodipine, aspirin, tamsulosin hcl, and azithromycin.     REVIEW OF SYSTEMS: 10 point review of systems is negative except as noted above.     Exam:   Resp 16  Ht 5' 11\" (1.803 m)  Wt 156 lb (70.8 kg)  BMI 21.76 kg/m2           CONSTITUTIONAL: healthy, alert, no distress and cooperative  HEAD: Normocephalic. No masses, lesions, tenderness or abnormalities  SKIN: no suspicious lesions or rashes  GAIT: normal  NEUROLOGIC: Non-focal  PSYCHIATRIC: affect normal/bright and mentation appears normal.    MUSCULOSKELETAL: bilateral thumb pain  Inspection:Thumb:  normal  Tender: Thumb:   CMC  Non-tender: no significant pain  Range of Motion Thumb:   opposition   Decreased- painful as going to 5th finger, flexion MCP decreased, extension MCP   full  Strength: full strength  Special tests: radial n, median n, ulnar n intact             Assessment/Plan:   Pt is a 72 yo AA male with PMHx of hep C, GERD presenting with bilateral thumb pain  1. 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 Correlate clinically for possible inflammatory  arthritis including psoriasis as well as reactive arthritis.  X-Ray of the Left Hand/Fingers: 3-view,   As above.  "

## 2018-05-24 NOTE — MR AVS SNAPSHOT
After Visit Summary   5/24/2018    Ivy Haynes    MRN: [id number]           Patient Information     Date Of Birth          1945        Visit Information        Provider Department      5/24/2018 2:00 PM Regine Ochoa MD [facility]        Today's Diagnoses     Arthritis of carpometacarpal (CMC) joints of both thumbs    -  1       Follow-ups after your visit        Additional Services     HAND THERAPY       Hand Therapy Referral bialteral painful thumbs, CMC                  Your next 10 appointments already scheduled     Jun 01, 2018  4:00 PM CDT   (Arrive by 3:45 PM)   RICHARD Hand with Dorinda Dahl OT    [facility] ([facility])    [address] [id number] [phone]            Jun 12, 2018  3:00 PM CDT   RICHARD Hand with Dorinda Dahl OT    [facility] ([facility])    [address] [id number] [phone]            Jun 21, 2018  3:00 PM CDT   RICHARD Hand with Dorinda Dahl OT    [facility] ([facility])    [address] [place] [id number] [phone]              Who to contact     Please call your clinic at [phone] to:    Ask questions about your health    Make or cancel appointments    Discuss your medicines    Learn about your test results    Speak to your doctor            Additional Information About Your Visit        [employer] Information     [employer] gives you secure access to your electronic health record. If you see a primary care provider, you can also send messages to your care team and make appointments. If you have questions, please call your primary care clinic.  If you do not have a primary care provider, please call [phone] and they will assist you.      [employer] is an electronic gateway that provides easy, online access to your medical  "records. With [employer], you can request a clinic appointment, read your test results, renew a prescription or communicate with your care team.     To access your existing account, please contact your [facility] or call [phone] for assistance.        Care EveryWhere ID     This is your Care EveryWhere ID. This could be used by other organizations to access your [facility] medical records  [id number]        Your Vitals Were     Respirations Height BMI (Body Mass Index)             16 1.803 m (5' 11\") 21.76 kg/m2          Blood Pressure from Last 3 Encounters:   04/10/18 136/86   02/01/18 130/82   07/29/17 146/80    Weight from Last 3 Encounters:   05/24/18 70.8 kg (156 lb)   05/22/18 70.9 kg (156 lb 3.2 oz)   04/10/18 69.9 kg (154 lb 3.2 oz)              We Performed the Following     HAND THERAPY        Primary Care Provider Office Phone # Fax #    Lamine Jr Franco [phone]       [address] [address]        Equal Access to Services     [facility]: Hadii allie ku hadasho Sobetty, waaxda luqadaha, qaybta kaalmada adeemiyajanak, areli costa . So [facility] [phone].    ATENCIÓN: Si habla español, tiene a camargo disposición servicios gratuitos de asistencia lingüística. [facility] [phone].    We comply with applicable federal civil rights laws and [place] laws. We do not discriminate on the basis of race, color, national origin, age, disability, sex, sexual orientation, or gender identity.            Thank you!     Thank you for choosing [facility]  for your care. Our goal is always to provide you with excellent care. Hearing back from our patients is one way we can continue to improve our services. Please take a few minutes to complete the written survey that you may receive in the mail after your visit with us. Thank you!             Your Updated Medication List - Protect others around you: " Learn how to safely use, store and throw away your medicines at www.disposemymeds.org.          This list is accurate as of 5/24/18  3:21 PM.  Always use your most recent med list.                   Brand Name Dispense Instructions for use Diagnosis    albuterol 108 (90 Base) MCG/ACT Inhaler    PROAIR HFA/PROVENTIL HFA/VENTOLIN HFA    1 Inhaler    Inhale 2 puffs into the lungs every 6 hours as needed for shortness of breath / dyspnea or wheezing    Acute bronchitis, unspecified organism       * amLODIPine 2.5 MG tablet    NORVASC    90 tablet    Take 1 tablet (2.5 mg) by mouth daily    Hypertension goal BP (blood pressure) < 140/90       * amLODIPine 5 MG tablet    NORVASC          aspirin 81 MG tablet      Take by mouth daily        azithromycin 250 MG tablet    ZITHROMAX    6 tablet    Two tablets first day, then one tablet daily for four days.    Acute bronchitis with symptoms greater than 10 days       TAMSULOSIN HCL PO      Take 0.4 mg by mouth daily        * Notice:  This list has 2 medication(s) that are the same as other medications prescribed for you. Read the directions carefully, and ask your doctor or other care provider to review them with you.

## 2018-05-24 NOTE — LETTER
5/24/2018      RE: Ivy Haynes  [address]  [facility] [address]        Subjective:   Ivy Haynes is a RHD 73 year old male who is c/o bilateral thumb pain. The pt reports pain for over a year that is progressively worsening. The left thumb is worse.   Opening up jars  [place] , classical guitar  30 pullups non-stop, but the thumb doesn't help the , only hanging by 4 fingers.  Some neck pain, stinger on right posterior neck.  No problems sleeping.  No numbness in the morning  Reaching out and the left thumb brings him to the knees, holds thumb out to alleviate the pain.    Seen by hand berta about 7-8 years ago.    Background:   Date of injury: None  Duration of symptoms: 1 year  Mechanism of Injury: Chronic; Unknown   Intensity:  10/10 at worst; fleeting pain  Aggravating factors: Overhead movement of shoulder, grabbing objects  Relieving Factors: Nothing. No treatment tried.   Prior Evaluation: None    PAST MEDICAL, SOCIAL, SURGICAL AND FAMILY HISTORY: He  has a past medical history of Back pain; Chest pain; Gastro-oesophageal reflux disease; GERD (gastroesophageal reflux disease); Hemorrhoid; Hepatitis C; Hypertension; and Trigeminal neuralgia. He also has no past medical history of PONV (postoperative nausea and vomiting).  He  has a past surgical history that includes hernia repair (12/10); styloid process temporal bone surg (02/10); Resect tumor lower extremity (11/9/2012); Arthroplasty knee (11/9/2012); Tonsillectomy (1954); and ENT surgery (2009).  His family history includes CANCER in his paternal grandfather and son; CEREBROVASCULAR DISEASE (age of onset: 69) in his father; Hypertension in his mother.  He reports that he quit smoking about 37 years ago. His smoking use included Cigarettes. He started smoking about 7 years ago. He has a 0.40 pack-year smoking history. He has never used smokeless tobacco. He reports that he drinks alcohol. He reports that he uses illicit  "drugs.    ALLERGIES: He is allergic to sulfa drugs.    CURRENT MEDICATIONS: He has a current medication list which includes the following prescription(s): albuterol, amlodipine, amlodipine, aspirin, tamsulosin hcl, and azithromycin.     REVIEW OF SYSTEMS: 10 point review of systems is negative except as noted above.     Exam:   Resp 16  Ht 5' 11\" (1.803 m)  Wt 156 lb (70.8 kg)  BMI 21.76 kg/m2           CONSTITUTIONAL: healthy, alert, no distress and cooperative  HEAD: Normocephalic. No masses, lesions, tenderness or abnormalities  SKIN: no suspicious lesions or rashes  GAIT: normal  NEUROLOGIC: Non-focal  PSYCHIATRIC: affect normal/bright and mentation appears normal.    MUSCULOSKELETAL: bilateral thumb pain  Inspection:Thumb:  normal  Tender: Thumb:   CMC  Non-tender: no significant pain  Range of Motion Thumb:   opposition   Decreased- painful as going to 5th finger, flexion MCP decreased, extension MCP   full  Strength: full strength  Special tests: radial n, median n, ulnar n intact             Assessment/Plan:   Pt is a 72 yo AA male with PMHx of hep C, GERD presenting with bilateral thumb pain  1. 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 Correlate clinically for possible inflammatory  arthritis including psoriasis as well as reactive arthritis.  X-Ray of the Left Hand/Fingers: 3-view,   As above.    Regine Ochoa MD    "

## 2018-05-29 ENCOUNTER — OFFICE VISIT (OUTPATIENT)
Dept: FAMILY MEDICINE | Facility: CLINIC | Age: 73
End: 2018-05-29
Payer: COMMERCIAL

## 2018-05-29 VITALS
SYSTOLIC BLOOD PRESSURE: 124 MMHG | HEART RATE: 110 BPM | BODY MASS INDEX: 21.48 KG/M2 | OXYGEN SATURATION: 98 % | DIASTOLIC BLOOD PRESSURE: 78 MMHG | TEMPERATURE: 98.2 F | WEIGHT: 154 LBS

## 2018-05-29 DIAGNOSIS — L73.9 FOLLICULITIS: Primary | ICD-10-CM

## 2018-05-29 PROCEDURE — 87077 CULTURE AEROBIC IDENTIFY: CPT | Performed by: FAMILY MEDICINE

## 2018-05-29 PROCEDURE — 99213 OFFICE O/P EST LOW 20 MIN: CPT | Performed by: FAMILY MEDICINE

## 2018-05-29 PROCEDURE — 87070 CULTURE OTHR SPECIMN AEROBIC: CPT | Performed by: FAMILY MEDICINE

## 2018-05-29 RX ORDER — DOXYCYCLINE 100 MG/1
100 CAPSULE ORAL 2 TIMES DAILY
Qty: 28 CAPSULE | Refills: 1 | Status: SHIPPED | OUTPATIENT
Start: 2018-05-29 | End: 2018-11-09

## 2018-05-29 NOTE — MR AVS SNAPSHOT
After Visit Summary   5/29/2018    Ivy Haynes    MRN: [id number]           Patient Information     Date Of Birth          1945        Visit Information        Provider Department      5/29/2018 10:15 AM Lamine Franco MD [facility] [place]        Today's Diagnoses     Folliculitis    -  1       Follow-ups after your visit        Your next 10 appointments already scheduled     Jun 01, 2018  4:00 PM CDT   (Arrive by 3:45 PM)   RICHARD Hand with Dorinda Dahl OT    [facility] ([facility])    [address] [place] [id number] [phone]            Jun 12, 2018  3:00 PM CDT   RICHARD Hand with Dorinda Dahl OT    [facility] ([facility])    [address] [place] [id number] [phone]            Jun 21, 2018  3:00 PM CDT   RICHARD Hand with Dorinda Dahl OT    [facility] ([facility])    [address] [place] [id number] [phone]              Who to contact     If you have questions or need follow up information about today's clinic visit or your schedule please contact [facility] directly at [phone].  Normal or non-critical lab and imaging results will be communicated to you by MyChart, letter or phone within 4 business days after the clinic has received the results. If you do not hear from us within 7 days, please contact the clinic through MyChart or phone. If you have a critical or abnormal lab result, we will notify you by phone as soon as possible.  Submit refill requests through [employer] or call your pharmacy and they will forward the refill request to us. Please allow 3 business days for your refill to be completed.          Additional Information About Your Visit        CMEhart Information     [employer] gives you secure access to your electronic health record. If  you see a primary care provider, you can also send messages to your care team and make appointments. If you have questions, please call your primary care clinic.  If you do not have a primary care provider, please call [phone] and they will assist you.        Care EveryWhere ID     This is your Care EveryWhere ID. This could be used by other organizations to access your [place] medical records  [id number]        Your Vitals Were     Pulse Temperature Pulse Oximetry BMI (Body Mass Index)          110 98.2  F (36.8  C) (Oral) 98% 21.48 kg/m2         Blood Pressure from Last 3 Encounters:   05/29/18 124/78   04/10/18 136/86   02/01/18 130/82    Weight from Last 3 Encounters:   05/29/18 154 lb (69.9 kg)   05/24/18 156 lb (70.8 kg)   05/22/18 156 lb 3.2 oz (70.9 kg)              We Performed the Following     Wound Culture Aerobic Bacterial          Today's Medication Changes          These changes are accurate as of 5/29/18 10:53 AM.  If you have any questions, ask your nurse or doctor.               Start taking these medicines.        Dose/Directions    doxycycline 100 MG capsule   Commonly known as:  VIBRAMYCIN   Used for:  Folliculitis   Started by:  Lamine Franco MD        Dose:  100 mg   Take 1 capsule (100 mg) by mouth 2 times daily   Quantity:  28 capsule   Refills:  1            Where to get your medicines      These medications were sent to [facility] [address] [place] [address] AT [facility]  [address] [place] [id number]    Hours:  24-hours Phone:  [phone]     doxycycline 100 MG capsule                Primary Care Provider Office Phone # Fax #    Lamine Franco [phone] [address] [address]        Equal Access to Services     REGINALDO PALACIOS : Beto Keenan, wacee luqalethea, qaybta kaalmejia ha, areli rose. So [facility]  [phone].    ATENCIÓN: Si amarjitla ching, tiene a camargo disposición servicios gratuitos de asistencia lingüística. Cristobal torres [phone].    We comply with applicable federal civil rights laws and [place] laws. We do not discriminate on the basis of race, color, national origin, age, disability, sex, sexual orientation, or gender identity.            Thank you!     Thank you for choosing [facility]  for your [place]. Our goal is always to provide you with excellent care. Hearing back from our patients is one way we can continue to improve our services. Please take a few minutes to complete the written survey that you may receive in the mail after your visit with us. Thank you!             Your Updated Medication List - Protect others around you: Learn how to safely use, store and throw away your medicines at www.disposemymeds.org.          This list is accurate as of 5/29/18 10:53 AM.  Always use your most recent med list.                   Brand Name Dispense Instructions for use Diagnosis    amLODIPine 2.5 MG tablet    NORVASC    90 tablet    Take 1 tablet (2.5 mg) by mouth daily    Hypertension goal BP (blood pressure) < 140/90       doxycycline 100 MG capsule    VIBRAMYCIN    28 capsule    Take 1 capsule (100 mg) by mouth 2 times daily    Folliculitis

## 2018-05-29 NOTE — PROGRESS NOTES
SUBJECTIVE:   Ivy Haynes is a 73 year old male who presents to clinic today for the following health issues:    Concern - Scalp Infection  Onset: about 1 month now    Description:   Made an appointment with a dermatology but appt is way out. Having zits in scalp and would have about 2-3 at a time. Painful and would have spouse pop them out. About 2-3 years ago and had the same issue with zits on nose.    Intensity: severe, when scratching head and hit one of the zits.    Progression of Symptoms:  worsening and same    Accompanying Signs & Symptoms:  none    Previous history of similar problem:   Yes, had it on nose before and saw a dermatologist. Was prescribed minocycline and every once in a while will feel a zit coming on the nose.     Precipitating factors:   Worsened by: by scratching it or popping it.    Alleviating factors:  Improved by: nothing   No trauma or traction on hair  Therapies Tried and outcome: none        Problem list and histories reviewed & adjusted, as indicated.  Additional history: as documented    Labs reviewed in EPIC    Reviewed and updated as needed this visit by clinical staff       Reviewed and updated as needed this visit by Provider         ROS:  Constitutional, HEENT, cardiovascular, pulmonary, gi and gu systems are negative, except as otherwise noted.    OBJECTIVE:     /78  Pulse 110  Temp 98.2  F (36.8  C) (Oral)  Wt 154 lb (69.9 kg)  SpO2 98%  BMI 21.48 kg/m2  Body mass index is 21.48 kg/(m^2).  GENERAL: healthy, alert and no distress  NECK: bilateral posterior cervical adenopathy, no asymmetry, masses, or scars and thyroid normal to palpation  SKIN: scattered pustule - scalp  wounf culture sent  Diagnostic Test Results:  none     ASSESSMENT/PLAN:             1. Folliculitis  Will use  - doxycycline (VIBRAMYCIN) 100 MG capsule; Take 1 capsule (100 mg) by mouth 2 times daily  Dispense: 28 capsule; Refill: 1  - Wound Culture Aerobic Bacterial    Follow up by phone in  10 days if not improving.     Lamine Franco MD  [facility]

## 2018-05-31 LAB
BACTERIA SPEC CULT: ABNORMAL
BACTERIA SPEC CULT: ABNORMAL
Lab: ABNORMAL
SPECIMEN SOURCE: ABNORMAL

## 2018-06-01 ENCOUNTER — THERAPY VISIT (OUTPATIENT)
Dept: OCCUPATIONAL THERAPY | Facility: CLINIC | Age: 73
End: 2018-06-01
Payer: MEDICARE

## 2018-06-01 DIAGNOSIS — M79.645 BILATERAL THUMB PAIN: Primary | ICD-10-CM

## 2018-06-01 DIAGNOSIS — M79.644 BILATERAL THUMB PAIN: Primary | ICD-10-CM

## 2018-06-01 DIAGNOSIS — M18.0 PRIMARY OSTEOARTHRITIS OF BOTH FIRST CARPOMETACARPAL JOINTS: ICD-10-CM

## 2018-06-01 PROCEDURE — 97110 THERAPEUTIC EXERCISES: CPT | Mod: GO | Performed by: OCCUPATIONAL THERAPIST

## 2018-06-01 PROCEDURE — 97760 ORTHOTIC MGMT&TRAING 1ST ENC: CPT | Mod: GO | Performed by: OCCUPATIONAL THERAPIST

## 2018-06-01 PROCEDURE — 97165 OT EVAL LOW COMPLEX 30 MIN: CPT | Mod: GO | Performed by: OCCUPATIONAL THERAPIST

## 2018-06-01 PROCEDURE — G8985 CARRY GOAL STATUS: HCPCS | Mod: GO | Performed by: OCCUPATIONAL THERAPIST

## 2018-06-01 PROCEDURE — G8984 CARRY CURRENT STATUS: HCPCS | Mod: GO | Performed by: OCCUPATIONAL THERAPIST

## 2018-06-01 NOTE — PROGRESS NOTES
Hand Therapy Initial Evaluation    Current Date:  6/1/2018    Diagnosis: B thumb pain  DOI: 05/24/18 (MD orders, has been going on for 3 years)   Procedure:  none    Precautions: NA    Subjective:  Ivy Haynes is a 73 year old right hand dominant male.    Patient reports symptoms of pain of the bilateral thumbs L>R which occurred due to not sure, getting worse over time. Since onset symptoms are Gradually getting worse.  Special tests:  x-ray.  Previous treatment: none.    General health as reported by patient is excellent.  Pertinent medical history includes:Hepatitis, High Blood Pressure, Osteoarthritis, Smoking  Medical allergies:sulfa drugs.  Surgical history: orthopedic: L TKA.  Medication history: High Blood Pressure.    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[employer]  Prior functional level:  no limitations  Barriers include:none  Mobility: No difficulty  Transportation: drives  Leisure activities/hobbies: working out    Functional Outcome Measure:  Upper Extremity Functional Index Score:  SCORE:   Column Totals: /80: 71   (A lower score indicates greater disability.)    Objective:  Pain Level Report  VAS(0-10) 6/1/2018   At Rest: 0/10   With Use: R: 3/10  L:  10/10     Report of Pain:  Location:  B thumb; thenar eminences, CMC  Pain Quality:  Sharp  Frequency: intermittent    Pain is worst:  daytime  Exacerbated by:  reaching  Relieved by:  rest  Progression:  Getting worse  ROM:  Pain Report:  - none    + mild    ++ moderate    +++ severe   Thumb 6/1/2018   AROM(PROM) right   MP -13/54+    IP  0/71+   RAbd  52   PAbd  45     ROM  Pain Report:  - none    + mild    ++ moderate    +++ severe   Thumb   6/1/2018   AROM  (PROM) left   MP -10/62   IP +/64   RAB 44   PABD 43     Thumb Observation/Appearance:  Key: + = present/ - = not observed    6/1/2018   Shoulder deformity present over CMC R: +  L: +   Volar  subluxation present R: +  L: +   Edema over the CMC joint R: +  L: +   Noted collapse of MP into hyperextension during pinch R: -  L: -   Tenderness at CMC R: +  L: +       Provocative Tests:  Pain Report:  - none    + mild    ++ moderate    +++ severe     6/1/2018   CMC Grind test R: +  L: +   CMC Joint line/pain R: +  L: +   CMC AP joint laxity R: +  L:  +   Finkelstein's R: -  L: 4/10   Crepitus present R: +  L: +      Strength:   (Measured in pounds)  Pain Report:  - none    + mild    ++ moderate    +++ severe    6/1/2018    Trials right left   1  2  3 80++  63++  83++ 52+++  32+++  33+++   Average: 75 39     Lat Pinch 6/1/2018    Trials right left   1  2  3 19++ 20++   Average: 19 20     3 Pt Pinch 6/1/2018    Trials right left   1  2  3 12+   14+++   Average: 12 14     Assessment:  Patient presents with symptoms consistent with diagnosis of CMC thumb arthritis, with conservative intervention.    Patient s limitations or Problem List includes: Pain, Decreased ROM/motion, weakness, decreased stability of the CMC joint, decreased  and pinch strength of the thumb which interferes with patients ability to perform  Self Care Tasks (dressing, eating, bathing, hygiene/toileting), Work Tasks, Sleep Patterns, Recreational Activities, Household Chores and Driving  as compared to previous level of function.    Rehab Potential: Good- Return to full activity, some limitations.    Patient will benefit from skilled Occupational Therapy to increased ROM, flexibility, pinch strength, and stability of the thumb and decrease pain to return to previous activity level and resume normal daily tasks and to reach their rehab potential.    Barriers to Learning:  No barrier    Communication Issues: Patient appears to be able to clearly communicate and understand verbal and written communication and follow directions correctly.    Chart Review: Simple history review with patient    Identified Performance Deficits:  bathing/showering, toileting, dressing, feeding, hygiene and grooming, driving and community mobility, health management and maintenance, home establishment and management, meal preparation and cleanup, work and leisure activities    Assessment of Occupational Performance:  5 or more Performance Deficits    Clinical Decision Making (Complexity): Low complexity    Treatment Explanations:  The following has been discussed with the patient,  Rx ordered/plan of care  Anticipated outcomes  Possible risks and side effects    Plan:  Frequency:  1 X week, once daily  Duration:  for 4 weeks    Treatment Plan:  Modalities:  Paraffin  Therapeutic Exercise: AROM, Isometrics, and Stabilization exercises of the Thumb CMC, including active and resisted abduction, 1st DI strengthening  Manual Techniques: Joint Mobilization or reseating of the trapezium, self MFR to thumb adductor with clip  Orthosis fabrication:  Hand based Thumb Spica, Custom neoprene support   Education: Anatomy of CMC, joint protection principles, adaptive equipment as needed    Discharge Plan:  Achieve all LTG  [place] in home treatment program.  Reach maximal therapeutic benefit.    Home Program:  Warmth  1st web release with clip  Thumb Stabilization Program with hard  C  and index abd  Hand based Thumb Spica orthosis night/sleeping and per symptoms during the day   Incorporate joint protection into daily functional activities  Adaptive equipment as needed.    Next Visit:  CMC stability  MFR  Jt. Mobs  Orthosis modifications

## 2018-06-01 NOTE — MR AVS SNAPSHOT
"              After Visit Summary   6/1/2018    Ivy Haynes    MRN: [id number]           Patient Information     Date Of Birth          1945        Visit Information        Provider Department      6/1/2018 4:00 PM Dorinda Dahl OT [facility]        Today's Diagnoses     Bilateral thumb pain    -  1    Primary osteoarthritis of both first carpometacarpal joints           Follow-ups after your visit        Your next 10 appointments already scheduled     Jun 12, 2018  3:00 PM CDT   RICHARD Hand with CORIE [facility] ([facility])    [address] [place] [id number] [phone]            Jun 21, 2018  3:00 PM CDT   RICHARD Hand with CORIE Young [facility] ([facility])    [address] [place] [id number] [phone]              Who to contact     If you have questions or need follow up information about today's clinic visit or your schedule please contact [facility] directly at [phone].  Normal or non-critical lab and imaging results will be communicated to you by MyChart, letter or phone within 4 business days after the clinic has received the results. If you do not hear from us within 7 days, please contact the clinic through [employer] or phone. If you have a critical or abnormal lab result, we will notify you by phone as soon as possible.  Submit refill requests through [employer] or call your pharmacy and they will forward the refill request to us. Please allow 3 business days for your refill to be completed.          Additional Information About Your Visit        MyChart Information     [employer] lets you send messages to your doctor, view your test results, renew your prescriptions, schedule appointments and more. To sign up, go to [email or web address] . Click on \"Log in\" on the left side of the screen, which will take you to " "the Welcome page. Then click on \"Sign up Now\" on the right side of the page.     You will be asked to enter the access code listed below, as well as some personal information. Please follow the directions to create your username and password.     Your access code is: [email or web address]  Expires: 2018  4:55 PM     Your access code will  in 90 days. If you need help or a new code, please call your [place] clinic or [phone].        Care EveryWhere ID     This is your Care EveryWhere ID. This could be used by other organizations to access your [place] medical records  [id number]         Blood Pressure from Last 3 Encounters:   18 124/78   04/10/18 136/86   18 130/82    Weight from Last 3 Encounters:   18 69.9 kg (154 lb)   18 70.8 kg (156 lb)   18 70.9 kg (156 lb 3.2 oz)              We Performed the Following     HC OT EVAL, LOW COMPLEXITY     ORTHOTIC MGMT AND TRAINING, EACH 15 MIN     THERAPEUTIC EXERCISES        Primary Care Provider Office Phone # Fax #    Lamine Jr Franco [phone]       [address] [place] [address]        Equal Access to Services     REGINALDO PALACIOS AH: Hadii allie zamudio hadasho Soomaali, waaxda luqadaha, qaybta kaalmada adeegyada, areli drake haytyson costa . So [facility] [phone].    ATENCIÓN: Si habla español, tiene a camargo disposición servicios gratuitos de asistencia lingüística. Llame al [phone].    We comply with applicable federal civil rights laws and [place] laws. We do not discriminate on the basis of race, color, national origin, age, disability, sex, sexual orientation, or gender identity.            Thank you!     Thank you for choosing [facility]  for your care. Our goal is always to provide you with excellent care. Hearing back from our patients is one way we can continue to improve our services. Please take a few minutes to complete the written survey that you may receive in " the mail after your visit with us. Thank you!             Your Updated Medication List - Protect others around you: Learn how to safely use, store and throw away your medicines at www.disposemymeds.org.          This list is accurate as of 6/1/18  4:55 PM.  Always use your most recent med list.                   Brand Name Dispense Instructions for use Diagnosis    amLODIPine 2.5 MG tablet    NORVASC    90 tablet    Take 1 tablet (2.5 mg) by mouth daily    Hypertension goal BP (blood pressure) < 140/90       doxycycline 100 MG capsule    VIBRAMYCIN    28 capsule    Take 1 capsule (100 mg) by mouth 2 times daily    Folliculitis

## 2018-06-01 NOTE — LETTER
DEPARTMENT OF HEALTH AND HUMAN SERVICES  CENTERS FOR MEDICARE & MEDICAID SERVICES    PLAN/UPDATED PLAN OF PROGRESS FOR OUTPATIENT REHABILITATION    PATIENTS NAME:  Ivy Haynes   : 1945  PROVIDER NUMBER:    [id number]  [id number]  PROVIDER NAME: [facility]  MEDICAL RECORD NUMBER: [id number]   START OF CARE DATE:  SOC Date: 18   TYPE:  PT  PRIMARY/TREATMENT DIAGNOSIS: (Pertinent Medical Diagnosis)  Bilateral thumb pain  Primary osteoarthritis of both first carpometacarpal joints  VISITS FROM START OF CARE:  Rxs Used: 1     Hand Therapy Initial Evaluation  Current Date:  2018  Diagnosis: B thumb pain  DOI: 18 (MD orders, has been going on for 3 years)   Procedure:  none  Precautions: NA    Subjective:  Ivy Haynes is a 73 year old right hand dominant male.  Patient reports symptoms of pain of the bilateral thumbs L>R which occurred due to not sure, getting worse over time. Since onset symptoms are Gradually getting worse.  Special tests:  x-ray.  Previous treatment: none.    General health as reported by patient is excellent.  Pertinent medical history includes:Hepatitis, High Blood Pressure, Osteoarthritis, Smoking  Medical allergies:sulfa drugs.  Surgical history: orthopedic: L TKA.  Medication history: High Blood Pressure.    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[employer]  Prior functional level:  no limitations  Barriers include:none  Mobility: No difficulty  Transportation: drives  Leisure activities/hobbies: working out    Functional Outcome Measure:  Upper Extremity Functional Index Score:  SCORE:   Column Totals: /80: 71   (A lower score indicates greater disability.)            Rayrayuq, Faruq   Objective:  Pain Level Report  VAS(0-10) 2018   At Rest: 0/10   With Use: R: 3/10  L:  10/10     Report of Pain:  Location:  B thumb; thenar eminences, CMC  Pain Quality:   Sharp  Frequency: intermittent    Pain is worst:  daytime  Exacerbated by:  reaching  Relieved by:  rest  Progression:  Getting worse  ROM:  Pain Report:  - none    + mild    ++ moderate    +++ severe   Thumb 6/1/2018   AROM(PROM) right   MP -13/54+    IP  0/71+   RAbd  52   PAbd  45     ROM  Pain Report:  - none    + mild    ++ moderate    +++ severe   Thumb   6/1/2018   AROM  (PROM) left   MP -10/62   IP +/64   RAB 44   PABD 43     Thumb Observation/Appearance:  Key: + = present/ - = not observed    6/1/2018   Shoulder deformity present over CMC R: +  L: +   Volar subluxation present R: +  L: +   Edema over the CMC joint R: +  L: +   Noted collapse of MP into hyperextension during pinch R: -  L: -   Tenderness at CMC R: +  L: +     Faruq, Faruq     Provocative Tests:  Pain Report:  - none    + mild    ++ moderate    +++ severe     6/1/2018   CMC Grind test R: +  L: +   CMC Joint line/pain R: +  L: +   CMC AP joint laxity R: +  L:  +   Finkelstein's R: -  L: 4/10   Crepitus present R: +  L: +      Strength:   (Measured in pounds)  Pain Report:  - none    + mild    ++ moderate    +++ severe    6/1/2018    Trials right left   1  2  3 80++  63++  83++ 52+++  32+++  33+++   Average: 75 39     Lat Pinch 6/1/2018    Trials right left   1  2  3 19++ 20++   Average: 19 20     3 Pt Pinch 6/1/2018    Trials right left   1  2  3 12+   14+++   Average: 12 14     Assessment:  Patient presents with symptoms consistent with diagnosis of CMC thumb arthritis, with conservative intervention.  Patient s limitations or Problem List includes: Pain, Decreased ROM/motion, weakness, decreased stability of the CMC joint, decreased  and pinch strength of the thumb which interferes with patients ability to perform  Self Care Tasks (dressing, eating, bathing, hygiene/toileting), Work Tasks, Sleep Patterns, Recreational Activities, Household Chores and Driving  as compared to previous level of function.    Rehab Potential: Good- Return  to full activity, some limitations.        Faruq, Faruq     Patient will benefit from skilled Occupational Therapy to increased ROM, flexibility, pinch strength, and stability of the thumb and decrease pain to return to previous activity level and resume normal daily tasks and to reach their rehab potential.    Barriers to Learning:  No barrier    Communication Issues: Patient appears to be able to clearly communicate and understand verbal and written communication and follow directions correctly.  Chart Review: Simple history review with patient  Identified Performance Deficits: bathing/showering, toileting, dressing, feeding, hygiene and grooming, driving and community mobility, health management and maintenance, home establishment and management, meal preparation and cleanup, work and leisure activities    Assessment of Occupational Performance:  5 or more Performance Deficits  Clinical Decision Making (Complexity): Low complexity    Treatment Explanations:  The following has been discussed with the patient,  Rx ordered/plan of care  Anticipated outcomes  Possible risks and side effects    Plan:  Frequency:  1 X week, once daily  Duration:  for 4 weeks    Treatment Plan:  Modalities:  Paraffin  Therapeutic Exercise: AROM, Isometrics, and Stabilization exercises of the Thumb CMC, including active and resisted abduction, 1st DI strengthening  Manual Techniques: Joint Mobilization or reseating of the trapezium, self MFR to thumb adductor with clip  Orthosis fabrication:  Hand based Thumb Spica, Custom neoprene support   Education: Anatomy of CMC, joint protection principles, adaptive equipment as needed    Discharge Plan:  Achieve all LTG  [place] in home treatment program.  Reach maximal therapeutic benefit.    Home Program:  W[employer]  1st web release with clip  Thumb Stabilization Program with hard  C  and index abd  Hand based Thumb Spica orthosis night/sleeping and per symptoms during the day   Incorporate  "joint protection into daily functional activities  Adaptive equipment as needed.    Next Visit:  [facility] stability  MFR  Jt. Mobs  Orthosis modifications    Faruq, Faruq     Caregiver Signature/Credentials _____________________________ Date ________          Dorinda Dahl, OTD, OTR/L CHT      I have reviewed and certified the need for these services and plan of treatment while under my care.        PHYSICIAN'S SIGNATURE:   _____________________________________ Date___________     Regine Ochoa MD    Certification period:  Beginning of Cert date period: 06/01/18 to  End of Cert period date: 08/29/18     Functional Level Progress Report: Please see attached \"Goal Flow sheet for Functional level.\"    ____X____ Continue Services or       ________ DC Services                Service dates: From  SOC Date: 06/01/18 date to present                         "

## 2018-06-12 ENCOUNTER — THERAPY VISIT (OUTPATIENT)
Dept: OCCUPATIONAL THERAPY | Facility: CLINIC | Age: 73
End: 2018-06-12
Payer: MEDICARE

## 2018-06-12 DIAGNOSIS — M18.0 PRIMARY OSTEOARTHRITIS OF BOTH FIRST CARPOMETACARPAL JOINTS: ICD-10-CM

## 2018-06-12 DIAGNOSIS — M79.645 BILATERAL THUMB PAIN: ICD-10-CM

## 2018-06-12 DIAGNOSIS — M79.644 BILATERAL THUMB PAIN: ICD-10-CM

## 2018-06-12 PROCEDURE — 97110 THERAPEUTIC EXERCISES: CPT | Mod: GO | Performed by: OCCUPATIONAL THERAPIST

## 2018-06-12 PROCEDURE — 97140 MANUAL THERAPY 1/> REGIONS: CPT | Mod: GO | Performed by: OCCUPATIONAL THERAPIST

## 2018-06-25 ENCOUNTER — MYC MEDICAL ADVICE (OUTPATIENT)
Dept: OTOLARYNGOLOGY | Facility: CLINIC | Age: 73
End: 2018-06-25

## 2018-06-26 ENCOUNTER — OFFICE VISIT (OUTPATIENT)
Dept: FAMILY MEDICINE | Facility: CLINIC | Age: 73
End: 2018-06-26
Payer: COMMERCIAL

## 2018-06-26 VITALS
OXYGEN SATURATION: 98 % | SYSTOLIC BLOOD PRESSURE: 132 MMHG | HEART RATE: 102 BPM | DIASTOLIC BLOOD PRESSURE: 88 MMHG | BODY MASS INDEX: 21.48 KG/M2 | TEMPERATURE: 98.1 F | WEIGHT: 154 LBS

## 2018-06-26 DIAGNOSIS — L73.9 FOLLICULITIS: ICD-10-CM

## 2018-06-26 DIAGNOSIS — M54.2 CERVICAL PAIN: Primary | ICD-10-CM

## 2018-06-26 PROCEDURE — 99214 OFFICE O/P EST MOD 30 MIN: CPT | Performed by: FAMILY MEDICINE

## 2018-06-26 NOTE — MR AVS SNAPSHOT
After Visit Summary   6/26/2018    Ivy Haynes    MRN: [id number]           Patient Information     Date Of Birth          1945        Visit Information        Provider Department      6/26/2018 1:30 PM Lamine Franco MD [facility] [place]        Today's Diagnoses     Cervical pain    -  1       Follow-ups after your visit        Additional Services     NEUROSURGERY REFERRAL       Your provider has referred you to: [facility]: Neurosurgery Clinic [facility] [phone]   [email or web address]    Please be aware that coverage of these services is subject to the terms and limitations of your health insurance plan.  Call member services at your health plan with any benefit or coverage questions.      Please bring the following with you to your appointment:    (1) Any X-Rays, CTs or MRIs which have been performed.  Contact the facility where they were done to arrange for  prior to your scheduled appointment.   (2) List of current medications  (3) This referral request   (4) Any documents/labs given to you for this referral                  Your next 10 appointments already scheduled     Jul 02, 2018  2:00 PM CDT   RICHARD Hand with Dorinda Dahl OT   [facility] ([facility])    [address] [place] [id number] [phone]              Future tests that were ordered for you today     Open Future Orders        Priority Expected Expires Ordered    MR Cervical Spine w/o & w Contrast Routine  6/26/2019 6/26/2018            Who to contact     If you have questions or need follow up information about today's clinic visit or your schedule please contact [facility] [place] directly at [phone].  Normal or non-critical lab and imaging results will be communicated to you by MyChart, letter or phone within 4 business days after the clinic has received the results. If you do not  hear from us within 7 days, please contact the clinic through [employer]ient or phone. If you have a critical or abnormal lab result, we will notify you by phone as soon as possible.  Submit refill requests through [employer]ient or call your pharmacy and they will forward the refill request to us. Please allow 3 business days for your refill to be completed.          Additional Information About Your Visit        ClasesDhart Information     [employer]ient gives you secure access to your electronic health record. If you see a primary care provider, you can also send messages to your care team and make appointments. If you have questions, please call your primary care clinic.  If you do not have a primary care provider, please call [phone] and they will assist you.        Care EveryWhere ID     This is your Care EveryWhere ID. This could be used by other organizations to access your [facility] medical records  [id number]        Your Vitals Were     Pulse Temperature Pulse Oximetry BMI (Body Mass Index)          102 98.1  F (36.7  C) (Oral) 98% 21.48 kg/m2         Blood Pressure from Last 3 Encounters:   06/26/18 132/88   05/29/18 124/78   04/10/18 136/86    Weight from Last 3 Encounters:   06/26/18 154 lb (69.9 kg)   05/29/18 154 lb (69.9 kg)   05/24/18 156 lb (70.8 kg)              We Performed the Following     NEUROSURGERY REFERRAL        Primary Care Provider Office Phone # Fax #    Lamine Jr Franco [phone]       [address] [facility] [address]  [facility] [address]        Equal Access to Services     REGINALDO PALACIOS AH: Hadii aad ku hadasho Soomaali, waaxda luqadaha, qaybta kaalmada adeegyada, areli rose. So [facility] [phone].    ATENCIÓN: Si habla español, tiene a camargo disposición servicios gratuitos de asistencia lingüística. Llame al [phone].    We comply with applicable federal civil rights laws and [place] laws. We do not discriminate on the basis of race, color, national  origin, age, disability, sex, sexual orientation, or gender identity.            Thank you!     Thank you for choosing [facility] [place]  for your care. Our goal is always to provide you with excellent care. Hearing back from our patients is one way we can continue to improve our services. Please take a few minutes to complete the written survey that you may receive in the mail after your visit with us. Thank you!             Your Updated Medication List - Protect others around you: Learn how to safely use, store and throw away your medicines at www.disposemymeds.org.          This list is accurate as of 6/26/18  1:30 PM.  Always use your most recent med list.                   Brand Name Dispense Instructions for use Diagnosis    amLODIPine 2.5 MG tablet    NORVASC    90 tablet    Take 1 tablet (2.5 mg) by mouth daily    Hypertension goal BP (blood pressure) < 140/90       doxycycline 100 MG capsule    VIBRAMYCIN    28 capsule    Take 1 capsule (100 mg) by mouth 2 times daily    Folliculitis

## 2018-06-26 NOTE — PROGRESS NOTES
SUBJECTIVE:   Ivy Haynes is a 73 year old male who presents to clinic today for the following health issues:    Neck Pain  Onset: Years     Description:   Location: back of neck both sides. General soreness, headaches, stinging/burning pain from movement   Radiation: none    Intensity: severe    Progression of Symptoms:  worsening    Accompanying Signs & Symptoms:  Burning, prickly sensation (paresthesias) in arm(s): no   Numbness in arm(s): no   Weakness in arm(s):  no   Fever: no   Headache: YES  Nausea and/or vomiting: no     History:   Trauma: no   Previous neck pain: YES- many years but never this bad   Previous surgery or injections: YES- on styloid process  Previous Imaging (MRI,X ray): YES    Precipitating factors:   Does movement increase the pain:  YES    Alleviating factors:  None    Therapies Tried and outcome:  Ibuprofen     Having ongoing scalp folliculitis, not better with abx    Problem list and histories reviewed & adjusted, as indicated.  Additional history: as documented    Labs reviewed in EPIC    Reviewed and updated as needed this visit by clinical staff       Reviewed and updated as needed this visit by Provider         ROS:  Constitutional, HEENT, cardiovascular, pulmonary, gi and gu systems are negative, except as otherwise noted.    OBJECTIVE:     /88 (BP Location: Left arm, Patient Position: Sitting, Cuff Size: Adult Regular)  Pulse 102  Temp 98.1  F (36.7  C) (Oral)  Wt 154 lb (69.9 kg)  SpO2 98%  BMI 21.48 kg/m2  Body mass index is 21.48 kg/(m^2).  GENERAL: alert, fit and fatigued  NECK: no adenopathy, no asymmetry, masses, or scars and thyroid normal to palpation  Neck exam: tenderness over trapezial muscles, reduced painful C-spine range of motion, normal neurological exam of arms; normal DTR's, motor, sensory exam.  RESP: lungs clear to auscultation - no rales, rhonchi or wheezes  CV: regular rate and rhythm, normal S1 S2, no S3 or S4, no murmur, click or rub, no  peripheral edema and peripheral pulses strong  ABDOMEN: soft, nontender, no hepatosplenomegaly, no masses and bowel sounds normal  SKIN: hair qualitybrittle and scatteresd pustules    Diagnostic Test Results:  Results for orders placed or performed in visit on 05/29/18   Wound Culture Aerobic Bacterial   Result Value Ref Range    Specimen Description Scalp Wound     Special Requests Specimen collected in eSwab transport (white cap)     Culture Micro (A)      Moderate growth  Coagulase negative Staphylococcus  Susceptibility testing not routinely done      Culture Micro Plus  Light growth  Normal skin pauly          ASSESSMENT/PLAN:             1. Cervical pain    Cervical spondylosis with moderate spinal canal stenosis at C3-4  and neural foraminal stenosis, severe on the left and moderate on the  right. Consider correlation with cervical spine MRI.  - MR Cervical Spine w/o & w Contrast; Future  - NEUROSURGERY REFERRAL    2. Folliculitis  Not resolving  Follow up with consultant(derm) as planned.       See Patient Instructions    Lamine Franco MD  [facility] [place]

## 2018-06-29 ASSESSMENT — ENCOUNTER SYMPTOMS: NECK PAIN: 1

## 2018-07-02 ENCOUNTER — THERAPY VISIT (OUTPATIENT)
Dept: OCCUPATIONAL THERAPY | Facility: CLINIC | Age: 73
End: 2018-07-02
Payer: MEDICARE

## 2018-07-02 DIAGNOSIS — M79.645 BILATERAL THUMB PAIN: ICD-10-CM

## 2018-07-02 DIAGNOSIS — M79.644 BILATERAL THUMB PAIN: ICD-10-CM

## 2018-07-02 DIAGNOSIS — M18.0 PRIMARY OSTEOARTHRITIS OF BOTH FIRST CARPOMETACARPAL JOINTS: ICD-10-CM

## 2018-07-02 PROCEDURE — 97140 MANUAL THERAPY 1/> REGIONS: CPT | Mod: GO | Performed by: OCCUPATIONAL THERAPIST

## 2018-07-02 PROCEDURE — 97110 THERAPEUTIC EXERCISES: CPT | Mod: GO | Performed by: OCCUPATIONAL THERAPIST

## 2018-07-02 NOTE — MR AVS SNAPSHOT
After Visit Summary   7/2/2018    Ivy Haynes    MRN: [id number]           Patient Information     Date Of Birth          1945        Visit Information        Provider Department      7/2/2018 2:00 PM Dorinda Dahl OT [facility] Hand Therapy        Today's Diagnoses     Bilateral thumb pain        Primary osteoarthritis of both first carpometacarpal joints           Follow-ups after your visit        Your next 10 appointments already scheduled     Jul 03, 2018 12:15 PM CDT   MR CERVICAL SPINE W/O & W CONTRAST with [facility] ([facility])    [address] [place] [id number] [phone]           Take your medicines as usual, unless your doctor tells you not to. Bring a list of your current medicines to your exam (including vitamins, minerals and over-the-counter drugs).  You may or may not receive intravenous (IV) contrast for this exam pending the discretion of the Radiologist.  You do not need to do anything special to prepare.  The MRI machine uses a strong magnet. Please wear clothes without metal (snaps, zippers). A sweatsuit works well, or we may give you a hospital gown.  Please remove any body piercings and hair extensions before you arrive. You will also remove watches, jewelry, hairpins, wallets, dentures, partial dental plates and hearing aids. You may wear contact lenses, and you may be able to wear your rings. We have a safe place to keep your personal items, but it is safer to leave them at home.  **IMPORTANT** THE INSTRUCTIONS BELOW ARE ONLY FOR THOSE PATIENTS WHO HAVE BEEN PRESCRIBED SEDATION OR GENERAL ANESTHESIA DURING THEIR MRI PROCEDURE:  IF YOUR DOCTOR PRESCRIBED ORAL SEDATION (take medicine to help you relax during your exam):   You must get the medicine from your doctor (oral medication) before you arrive. Bring the medicine to the exam. Do not take it at home. You ll be told when to take it  upon arriving for your exam.   Arrive one hour early. Bring someone who can take you home after the test. Your medicine will make you sleepy. After the exam, you may not drive, take a bus or take a taxi by yourself.  IF YOUR DOCTOR PRESCRIBED IV SEDATION:   Arrive one hour early. Bring someone who can take you home after the test. Your medicine will make you sleepy. After the exam, you may not drive, take a bus or take a taxi by yourself.   No eating 6 hours before your exam. You may have clear liquids up until 4 hours before your exam. (Clear liquids include water, clear tea, black coffee and fruit juice without pulp.)  IF YOUR DOCTOR PRESCRIBED ANESTHESIA (be asleep for your exam):   Arrive 1 1/2 hours early. Bring someone who can take you home after the test. You may not drive, take a bus or take a taxi by yourself.   No eating 8 hours before your exam. You may have clear liquids up until 4 hours before your exam. (Clear liquids include water, clear tea, black coffee and fruit juice without pulp.)   You will spend four to five hours in the recovery room.  Please call the Imaging Department at your exam site with any questions.            Jul 10, 2018 11:00 AM CDT   (Arrive by 10:45 AM)   New Patient Visit with Norma Siddiqi PA-C   [facility] Neurosurgery ([facility])    [address] [place] [address] [phone]              Who to contact     If you have questions or need follow up information about today's clinic visit or your schedule please contact [facility] directly at [phone].  Normal or non-critical lab and imaging results will be communicated to you by MyChart, letter or phone within 4 business days after the clinic has received the results. If you do not hear from us within 7 days, please contact the clinic through MyChart or phone. If you have a critical or abnormal lab result, we will notify you by phone as soon as  possible.  Submit refill requests through [employer] or call your pharmacy and they will forward the refill request to us. Please allow 3 business days for your refill to be completed.          Additional Information About Your Visit        Direct Sittershart Information     The [employer] gives you secure access to your electronic health record. If you see a primary care provider, you can also send messages to your care team and make appointments. If you have questions, please call your primary care clinic.  If you do not have a primary care provider, please call [phone] and they will assist you.        Care EveryWhere ID     This is your Care EveryWhere ID. This could be used by other organizations to access your [place] medical records  [id number]         Blood Pressure from Last 3 Encounters:   06/26/18 132/88   05/29/18 124/78   04/10/18 136/86    Weight from Last 3 Encounters:   06/26/18 69.9 kg (154 lb)   05/29/18 69.9 kg (154 lb)   05/24/18 70.8 kg (156 lb)              We Performed the Following     MANUAL THER TECH,1+REGIONS,EA 15 MIN     THERAPEUTIC EXERCISES        Primary Care Provider Office Phone # Fax #    Lamine Jr Franco [phone]       [address] [facility] [address]  [facility] [address]        Equal Access to Services     REGINALDO PALACIOS : Hadii aad ku hadasho Soomaali, waaxda luqadaha, qaybta kaalmada adeegyada, waxay idiin hayciron isai leigh laelsa rose. So [facility] [phone].    ATENCIÓN: Si habla español, tiene a camargo disposición servicios gratMouth Partyos de asistencia lingüística. Llame al [phone].    We comply with applicable federal civil rights laws and [place] laws. We do not discriminate on the basis of race, color, national origin, age, disability, sex, sexual orientation, or gender identity.            Thank you!     Thank you for choosing  [facility]  for your care. Our goal is always to provide you with excellent care. Hearing back from our patients is one way we can  continue to improve our services. Please take a few minutes to complete the written survey that you may receive in the mail after your visit with us. Thank you!             Your Updated Medication List - Protect others around you: Learn how to safely use, store and throw away your medicines at www.disposemymeds.org.          This list is accurate as of 7/2/18  2:58 PM.  Always use your most recent med list.                   Brand Name Dispense Instructions for use Diagnosis    amLODIPine 2.5 MG tablet    NORVASC    90 tablet    Take 1 tablet (2.5 mg) by mouth daily    Hypertension goal BP (blood pressure) < 140/90       doxycycline 100 MG capsule    VIBRAMYCIN    28 capsule    Take 1 capsule (100 mg) by mouth 2 times daily    Folliculitis

## 2018-07-02 NOTE — PROGRESS NOTES
"Hand Therapy Progress Note    Current Date:  7/2/2018    Diagnosis: B thumb pain  DOI: 05/24/18 (MD orders, has been going on for 3 years)   Procedure:  none    Precautions: NA    Reporting period is 6/1/18 to 7/2/2018    Subjective:   Subjective changes noted by patient:  \"They are doing better!\"  Functional changes noted by patient:  Improvement in Self Care Tasks (dressing, eating, bathing, hygiene/toileting), Work Tasks, Sleep Patterns, Recreational Activities, Household Chores and Driving   Patient has noted adverse reaction to:  None    Functional Outcome Measure:  Upper Extremity Functional Index Score:  SCORE:   Column Totals: /80: 73   (A lower score indicates greater disability.)    Objective:  Pain Level Report  VAS(0-10) 6/1/2018 7/2/18   At Rest: 0/10    With Use: R: 3/10  L:  10/10   7/10     Report of Pain:  Location:  B thumb; thenar eminences, CMC  Pain Quality:  Sharp  Frequency: intermittent    Pain is worst:  daytime  Exacerbated by:  reaching  Relieved by:  rest  Progression:  improving  ROM:  Pain Report:  - none    + mild    ++ moderate    +++ severe   Thumb 6/1/2018 7/2/18   AROM(PROM) right    MP -13/54+  -13/56   IP  0/71+ +/80   RAbd  52 55   PAbd  45 55     ROM  Pain Report:  - none    + mild    ++ moderate    +++ severe   Thumb   6/1/2018 7/2/18   AROM  (PROM) left    MP -10/62 -5/66   IP +/64 +/75   RAB 44 56   PABD 43 51     Thumb Observation/Appearance:  Key: + = present/ - = not observed    6/1/2018   Shoulder deformity present over CMC R: +  L: +   Volar subluxation present R: +  L: +   Edema over the CMC joint R: +  L: +   Noted collapse of MP into hyperextension during pinch R: -  L: -   Tenderness at CMC R: +  L: +       Provocative Tests:  Pain Report:  - none    + mild    ++ moderate    +++ severe     6/1/2018 7/2/18   CMC Grind test R: +  L: +    CMC Joint line/pain R: +  L: +    CMC AP joint laxity R: +  L:  +    Finkelstein's R: -  L: 4/10   -   Crepitus present R: +  L: +  "      Strength:   (Measured in pounds)  Pain Report:  - none    + mild    ++ moderate    +++ severe    6/1/2018 7/2/18    Trials right left R L   1  2  3 80++  63++  83++ 52+++  32+++  33+++ 56++  79++  72++ 53++  44++  46++   Average: 75 39 69 48     Lat Pinch 6/1/2018 7/2/18    Trials right left R L   1  2  3 19++ 20++ 21++ 21+   Average: 19 20 21 21     3 Pt Pinch 6/1/2018 7/2/18    Trials right left R L   1  2  3 12+   14+++ 12 15   Average: 12 14 12 15     Please refer to the daily flowsheet for treatment provided today.     Assessment:  Response to therapy has been improvement to:  ROM of Thumb:  All Planes  Pain:  frequency is less    Overall Assessment:  Patient's symptoms are resolving.  STG/LTG:  STGoals have been reviewed and progress or achievement has occurred;  see goal sheet for details and updates.    Plan:  Frequency/Duration:  DC to I HEP.  Appropriateness of Rx I have re-evaluated this patient and find that the nature, scope, duration and intensity of the therapy is appropriate for the medical condition of the patient.  Recommendations for Continued Therapy  DC to I HEP.    Home Program:  Warmth  1st web release with clip  Thumb Stabilization Program with hard  C  and index abd  Hand based Thumb Spica orthosis night/sleeping and per symptoms during the day   Incorporate joint protection into daily functional activities  Adaptive equipment as needed.

## 2018-07-03 ENCOUNTER — RADIANT APPOINTMENT (OUTPATIENT)
Dept: MRI IMAGING | Facility: CLINIC | Age: 73
End: 2018-07-03
Attending: FAMILY MEDICINE
Payer: COMMERCIAL

## 2018-07-03 DIAGNOSIS — M54.2 CERVICAL PAIN: ICD-10-CM

## 2018-07-09 PROBLEM — M18.0 PRIMARY OSTEOARTHRITIS OF BOTH FIRST CARPOMETACARPAL JOINTS: Status: RESOLVED | Noted: 2018-06-01 | Resolved: 2018-07-09

## 2018-07-09 PROBLEM — M79.645 BILATERAL THUMB PAIN: Status: RESOLVED | Noted: 2018-06-01 | Resolved: 2018-07-09

## 2018-07-09 PROBLEM — M79.644 BILATERAL THUMB PAIN: Status: RESOLVED | Noted: 2018-06-01 | Resolved: 2018-07-09

## 2018-07-10 ENCOUNTER — TELEPHONE (OUTPATIENT)
Dept: ANESTHESIOLOGY | Facility: CLINIC | Age: 73
End: 2018-07-10

## 2018-07-10 ENCOUNTER — OFFICE VISIT (OUTPATIENT)
Dept: NEUROSURGERY | Facility: CLINIC | Age: 73
End: 2018-07-10
Payer: COMMERCIAL

## 2018-07-10 VITALS
HEIGHT: 71 IN | HEART RATE: 78 BPM | DIASTOLIC BLOOD PRESSURE: 85 MMHG | SYSTOLIC BLOOD PRESSURE: 137 MMHG | BODY MASS INDEX: 21.77 KG/M2 | WEIGHT: 155.5 LBS

## 2018-07-10 DIAGNOSIS — M54.2 CERVICALGIA: Primary | ICD-10-CM

## 2018-07-10 DIAGNOSIS — M47.22 CERVICAL RADICULOPATHY DUE TO DEGENERATIVE JOINT DISEASE OF SPINE: ICD-10-CM

## 2018-07-10 RX ORDER — KETOCONAZOLE 20 MG/ML
SHAMPOO TOPICAL DAILY
COMMUNITY
Start: 2018-06-28 | End: 2019-07-02

## 2018-07-10 RX ORDER — DOXYCYCLINE 100 MG/1
100 CAPSULE ORAL 2 TIMES DAILY
COMMUNITY
Start: 2018-06-28 | End: 2018-07-10

## 2018-07-10 RX ORDER — CLINDAMYCIN PHOSPHATE 10 MG/G
GEL TOPICAL 2 TIMES DAILY
COMMUNITY
Start: 2018-06-28 | End: 2020-09-25

## 2018-07-10 ASSESSMENT — PAIN SCALES - GENERAL: PAINLEVEL: MODERATE PAIN (4)

## 2018-07-10 NOTE — PROGRESS NOTES
"  Neurosurgery Clinic Consult  Date of Visit: 7/10/2018    Referring Provider: Lamine Franco      Dear Dr. Franco,    We were happy to see Ivy Haynes, a pleasant 73 year old year old male for left greater than right neck pain, left posterior headache pain.    HPI:   This nice gentleman has had some neck pain intermittently for years, and about 10 years ago had some cervical spine films done revealing some cervical spondylosis.  He had some treatment done at that time, things improved and he did well.  Then about 2 months ago he started noticing that occasionally if he turned his head to the left in a very particular manner he would get a \"shot\"of pain into the left back of his head radiating up into the posterior skull over his ear (approximate distribution of the occipital nerve) this has been persistent for the past 2 months, and staying about the same.  It has become frequent enough and concerning enough that he mentioned it to you.  He also notes that he has a tight achiness in the posterior right occipital-cervical junction.  All of this is associated with generalized headaches.  He denies numbness or tingling anywhere no weakness in the body no bowel or bladder urgency and no other symptoms no loss of coordination balance no change in vision hearing sent or speech.  You obtained an MRI of the cervical spine found a fair amount of spondylosis and sent him for a surgical opinion.    A treatment history and current symptom list is included below.  Treatment history:  SURGERIES: No  PT: No  TRACTION: No  ORAL STEROIDS: No  CHIROPRACTIC: No   INJECTIONS: No  PERTINENT MEDS:     Ibuprofen is helpful  NICOTINE: smoker 3 cigarettes per day  PAIN CONTRACT: No    Current Symptom List:   [facility] -  Patient Entered Questionnaire/Answers 6/29/2018   What number best describes your pain right now:  0 = No pain  to  10 = Worst pain imaginable 3   How would you describe the pain? burning, sharp   Which of the following " improve or reduce your pain?  relaxation   What number best describes your average pain for the past week:  0 = No pain  to  10 = Worst pain imaginable 5   What number best describes your LOWEST pain in past 24 hours:  0 = No pain  to  10 = Worst pain imaginable 0   What number best describes your WORST pain in past 24 hours:  0 = No pain  to  10 = Worst pain imaginable 8   When is your pain worst? AM   What non-medicine treatments have you already had for your pain? none   Have you tried treating your pain with medication?  No   Are you currently taking medications for your pain? No     *  PAIN: Location: Upper left neck, radiating sharply to the left back of the head above the ear.  Appears sometimes 3-4 times per day and associated with turning his head to the left.  Some days has no pain at all.  Also has pain occipitocervical junction on the right, not associated with headache pain, more dull and achy and constant.    *  NUMBNESS: Location: No.    *  WEAKNESS: No  *  BOWEL/BLADDER FUNCTION:  Intact.    *  OTHER SYMPTOMS: No difficulties with coordination, balance, vision, hearing, scent, or speech.      Current Outpatient Prescriptions:      amLODIPine (NORVASC) 2.5 MG tablet, Take 1 tablet (2.5 mg) by mouth daily, Disp: 90 tablet, Rfl: 3     clindamycin (CLINDAMAX) 1 % topical gel, 2 times daily, Disp: , Rfl:      doxycycline (VIBRAMYCIN) 100 MG capsule, Take 1 capsule (100 mg) by mouth 2 times daily, Disp: 28 capsule, Rfl: 1     ketoconazole (NIZORAL) 2 % shampoo, daily, Disp: , Rfl:     Allergies   Allergen Reactions     Sulfa Drugs        Past Medical History:   Diagnosis Date     Back pain      Chest pain      Gastro-oesophageal reflux disease      GERD (gastroesophageal reflux disease)      Hemorrhoid      Hepatitis C      Hypertension      Trigeminal neuralgia        Past Surgical History:   Procedure Laterality Date     ARTHROPLASTY KNEE  11/9/2012    Procedure: ARTHROPLASTY KNEE;;  Surgeon: Adan  "Ang STORY MD;  Location: [facility] OR     ENT SURGERY  2009    Styloid process broken off     HERNIA REPAIR  12/10     RESECT TUMOR LOWER EXTREMITY  11/9/2012    Procedure: RESECT TUMOR LOWER EXTREMITY;  Excision of Tumor Left Distal Femur, Left Total Knee Replacement ;  Surgeon: Ang Arellano MD;  Location: [facility] OR     styloid process temporal bone surg  02/10     TONSILLECTOMY  1954       Family History   Problem Relation Age of Onset     Hypertension Mother      Cerebrovascular Disease Father 69     Cancer Paternal Grandfather      Cancer Son        Social History     Social History     Marital status:      Spouse name: N/A     Number of children: N/A     Years of education: N/A     Occupational History     Not on file.     Social History Main Topics     Smoking status: Former Smoker     Packs/day: 0.10     Years: 4.00     Types: Cigarettes     Start date: 1/1/2011     Quit date: 5/24/1981     Smokeless tobacco: Never Used      Comment: quit 1979 until 2013. started after trip to [place]     Alcohol use Yes      Comment: 1-2 beer a week      Drug use: Yes      Comment: Every other month-\"just a puff of marijuana\"     Sexual activity: Yes     Partners: Female     Birth control/ protection: None     Other Topics Concern     Parent/Sibling W/ Cabg, Mi Or Angioplasty Before 65f 55m? No     Social History Narrative     Problem list and 13 point review of systems: is reviewed in Epic and is negative with the exception of those symptoms associated with HPI and PMH.      OBJECTIVE:   /85  Pulse 78  Ht 1.791 m (5' 10.5\")  Wt 70.5 kg (155 lb 8 oz)  BMI 22 kg/m2    Imaging:  These are the pertinent radiologist's findings from:  MRI cervical W/WO 7/3/18 @ [facility].   The cervical vertebrae are normally aligned. Multilevel disc  desiccation and loss of intervertebral disc height, most pronounced at  C3-4 and C4-5. Degenerative changes in the opposing endplates of C4-5  including a mild amount of edema. No abnormal signal " in the cervical  spinal cord.  The findings on a level by level basis are as follows:     C2-3: Bilateral facet hypertrophy. No spinal canal or neural foraminal  stenosis.     C3-4:  Disc osteophyte complex with bilateral uncinate hypertrophy.  Bilateral facet hypertrophy. Moderate right and severe left neural  foraminal stenosis. Moderate spinal canal stenosis with cord  compression but no cord signal abnormality.     C4-5:  Circumferential disc osteophyte complex with right greater than  left uncinate hypertrophy. Bilateral facet hypertrophy. Moderate right  and mild left neural foraminal narrowing. Mild spinal canal stenosis.     C5-6:  Circumferential disc osteophyte complex and bilateral uncinate  hypertrophy. Bilateral facet hypertrophy. Mild bilateral neural  foraminal narrowing. Mild spinal canal narrowing.     C6-7:  Circumferential disc osteophyte complex with bilateral uncinate  hypertrophy. Moderate right and mild left neural foraminal narrowing.  Mild spinal canal narrowing.     C7-T1:  Posterior disc bulge. Left greater than right facet  hypertrophy. No spinal canal or neural foraminal stenosis  See the full report in EPIC/Media tab.  I personally reviewed the images with the patient.      Exam:  Pertinent positives:  Intact  *   Well developed, well nourished male found seated comfortably in exam room chair.  Is able to sit and rise independently.     *  Mental Status  A&O X3.  Bright, alert, affable, interactive. Language fluid, fund of knowledge intact. Good historian.  Mood and affect congruent and WNL.  *  Cranial Nerves  II: Able to read printed forms, VF full to gross confrontation.  III: IV, VI:  PERRLA, EOMI, No nystagmus, no ptosis.  V: Sensation intact in bilateral V1, V2, and V3. Jaw clench symmetric.    VII:  Intact to voice bilaterally.  IX:  Pushes tongue against bilateral cheeks.  X:  Palate elevates, uvula midline, phonation intact.  XI: Elevates shoulders, head turn intact.  XII:  Tongue midline. No fasciculations.  *  Cervical Spine:  DTR's at Biceps, Triceps, and Brachioradialis 2/4 and symmetric.  Sensation intact.    No Hoover's. No Phalen's.  No Tinel's. No fasciculations.   Muscle bulk and tone WNL.  Upper Extremity Strength.              RIGHT                LEFT     Deltoid              5/5                   5/5       Biceps              5/5                   5/5        Triceps              5/5                   5/5       Wrist Extensor              5/5                   5/5                     5/5                    5/5       Interossei              5/5                   5/5       EPL              5/5                    5/5       Pinch              5/5                  5/5          *  Lumbar Spine:    DTR's Patellar and Achilles 1/4 and symmetric.   No fasciculations.  Muscle bulk and tone WNL.  No Clonus.  Sensation intact.    Lower Extremity Strength                   RIGHT                   LEFT     Iliopsoas                    5/5                      5/5       Quad                    5/5                        5/5       Hamstring                      5/5                        5/5         Gastrocs                    5/5                        5/5       Tib. Anterior                     5/5                        5/5       EHL                      5/5                        5/5       Babinski                 Down                                      Down                   *  Structural Exam  Inspection of the spine reveals no scoliosis, exaggerated kyphosis or lordosis. Head is balanced over the pelvis in the coronal and sagittal plane.    Cervical spine ROM full. No spasming. No tenderness to palpation.  No occipital tenderness.  No Spurling's or L'Hermitte's.   Lumbar ROM full.  Narrow gauge gait.      ASSESSMENT/PLAN:  1. Cervicalgia      Multiple levels of cervical spondylosis, however I believe his left sided upper neck pain and posterior head pain is probably from the  left C3-4 level.  It is in an occipital nerve distribution, but I cannot reproduce pain with palpation of the occipital nerve today, and his pain appears with motion, essentially Spurling sign.   So I believe it is the left C3-4 level causing his trouble.  I am ordering a left C3-4 transforaminal DENI for both diagnosis and treatment.  I will see him after that is completed to evaluate the results.  Physical therapy and cervical traction unit may be useful for him as well.  But I had like to get the injection done first.    I answered all of his questions, which indicated good understanding of the situation.  He is willing to move forward with the recommendations. I supplied him with business cards and telephone numbers for ease of contact.   It's been a pleasure participating in the care of this nice patient. We thank you for your confidence in the [facility], Department of Neurosurgery.      Best Regards,    Norma Siddiqi PA-C  [facility]  Department of Neurosurgery  Phone: [phone]  Fax: [phone]        Total time: 60 minutes with more than 30 minutes spent in direct face to face contact reviewing films, providing education, counseling, non-operative therapies,and indications for surgery as well as further follow up.    This note was generated using voice recognition software. While edited for content some inaccurate phrasing may be found.

## 2018-07-10 NOTE — MR AVS SNAPSHOT
After Visit Summary   7/10/2018    Ivy Haynes    MRN: [id number]           Patient Information     Date Of Birth          1945        Visit Information        Provider Department      7/10/2018 11:00 AM Norma Siddiqi PA-C [facility] Neurosurgery        Today's Diagnoses     Cervicalgia    -  1    Cervical radiculopathy due to degenerative joint disease of spine           Follow-ups after your visit        Additional Services     [facility]       Your provider has referred you to: [facility] for Comprehensive Pain Management, located on the 4th Floor of the [facility]. Please call [phone] to make an appointment.     Clinic is located at:   [facility] [place] [address]      Please complete the following questions:    Procedure/Referral: Procedure Only -  Procedure or injection only - please call the [facility] at [phone] to schedule: Epidural Steroid (transforaminal approach): Cervical  Left C3/4    What is your diagnosis for the patient's pain?  Left C3/4 cervicalgia and headache     What are your specific questions for the pain specialist? NA    Are there any red flags that may impact the assessment or management of the patient? no  REGARDING OPIOID MEDICATIONS:  The discussion of opioids management, appropriateness of therapy, and dosing will be discussed in patients being seen for evaluation.  The pain management clinics are not long-term prescribing clinics, with transition of prescribing of medications ultimately going back to the referring provider/PCP.  If prescribing is taken over at the pain clinic, it is in actively involved patients whom are appropriate for opioids, urine drug screening is completed, and long-term prescribing plan has been determined.  Therefore, we will not be automatically taking over prescribing at the patient's first visit.  Is this agreeable to you? sure  agrees.    Please be aware that coverage of these services is subject to the terms and limitations of your health insurance plan.  Call member services at your health plan with any benefit or coverage questions.      Please bring the following with you to your appointment or have sent to the [facility]:    (1) Any X-Rays, CTs or MRIs which have been performed that are not in Epic.  Contact the facility where they were done to arrange for  prior to your scheduled appointment.  Any new CT, MRI or other procedures ordered by your specialist must be performed at a [facility] facility or coordinated by your clinic's referral office.    (2) List of current medications   (3) This referral request   (4) Any documents/labs given to you for this referral                  Who to contact     Please call your clinic at [phone] to:    Ask questions about your health    Make or cancel appointments    Discuss your medicines    Learn about your test results    Speak to your doctor            Additional Information About Your Visit        [employer] With Information     [employer] gives you secure access to your electronic health record. If you see a primary care provider, you can also send messages to your care team and make appointments. If you have questions, please call your primary care clinic.  If you do not have a primary care provider, please call [phone] and they will assist you.      [employer] is an electronic gateway that provides easy, online access to your medical records. With [employer], you can request a clinic appointment, read your test results, renew a prescription or communicate with your care team.     To access your existing account, please contact your [facility] or call [phone] for assistance.        Care EveryWhere ID     This is your Care EveryWhere ID. This could be used by other organizations to access your [facility] medical records  [id number]        Your Vitals Were   "   Pulse Height BMI (Body Mass Index)             78 1.791 m (5' 10.5\") 22 kg/m2          Blood Pressure from Last 3 Encounters:   07/10/18 137/85   06/26/18 132/88   05/29/18 124/78    Weight from Last 3 Encounters:   07/10/18 70.5 kg (155 lb 8 oz)   06/26/18 69.9 kg (154 lb)   05/29/18 69.9 kg (154 lb)              We Performed the Following     [facility]        Primary Care Provider Office Phone # Fax #    Lamine rJ Franco [phone]       [address]  [facility] [address]        Equal Access to Services     REGINALDO PALACIOS : Hadii allie millero Ree, wabrooklynda luqadaha, qaybta kaalmada adeemiyada, areli costa . So [facility] [phone].    ATENCIÓN: Si habla español, tiene a camargo disposición servicios gratuitos de asistencia lingüística. LlKing[facility] [phone].    We comply with applicable federal civil rights laws and [place] laws. We do not discriminate on the basis of race, color, national origin, age, disability, sex, sexual orientation, or gender identity.            Thank you!     Thank you for choosing [facility]  for your care. Our goal is always to provide you with excellent care. Hearing back from our patients is one way we can continue to improve our services. Please take a few minutes to complete the written survey that you may receive in the mail after your visit with us. Thank you!             Your Updated Medication List - Protect others around you: Learn how to safely use, store and throw away your medicines at www.disposemymeds.org.          This list is accurate as of 7/10/18  1:06 PM.  Always use your most recent med list.                   Brand Name Dispense Instructions for use Diagnosis    amLODIPine 2.5 MG tablet    NORVASC    90 tablet    Take 1 tablet (2.5 mg) by mouth daily    Hypertension goal BP (blood pressure) < 140/90       clindamycin 1 % topical gel    CLINDAMAX     2 times daily "        doxycycline 100 MG capsule    VIBRAMYCIN    28 capsule    Take 1 capsule (100 mg) by mouth 2 times daily    Folliculitis       ketoconazole 2 % shampoo    NIZORAL     daily

## 2018-07-10 NOTE — TELEPHONE ENCOUNTER
Received voicemail message from Rosalina in Neurosurgery clinic requesting a call be placed to [facility] to schedule a procedure. He has been referred by Dr. Siddiqi.

## 2018-07-10 NOTE — LETTER
"7/10/2018       RE: Ivy Haynes  [address]  [facility] [address]     Dear Colleague,    Thank you for referring your patient, Ivy Haynes, to the [facility] NEUROSURGERY at [facility]. Please see a copy of my visit note below.      Neurosurgery Clinic Consult  Date of Visit: 7/10/2018    Referring Provider: Lamine Franco      Dear Dr. Franco,    We were happy to see Ivy Haynes, a pleasant 73 year old year old male for left greater than right neck pain, left posterior headache pain.    HPI:   This nice gentleman has had some neck pain intermittently for years, and about 10 years ago had some cervical spine films done revealing some cervical spondylosis.  He had some treatment done at that time, things improved and he did well.  Then about 2 months ago he started noticing that occasionally if he turned his head to the left in a very particular manner he would get a \"shot\"of pain into the left back of his head radiating up into the posterior skull over his ear (approximate distribution of the occipital nerve) this has been persistent for the past 2 months, and staying about the same.  It has become frequent enough and concerning enough that he mentioned it to you.  He also notes that he has a tight achiness in the posterior right occipital-cervical junction.  All of this is associated with generalized headaches.  He denies numbness or tingling anywhere no weakness in the body no bowel or bladder urgency and no other symptoms no loss of coordination balance no change in vision hearing sent or speech.  You obtained an MRI of the cervical spine found a fair amount of spondylosis and sent him for a surgical opinion.    A treatment history and current symptom list is included below.  Treatment history:  SURGERIES: No  PT: No  TRACTION: No  ORAL STEROIDS: No  CHIROPRACTIC: No   INJECTIONS: No  PERTINENT MEDS:     Ibuprofen is helpful  NICOTINE: smoker 3 cigarettes per " day  PAIN CONTRACT: No    Current Symptom List:   [facility] Pain -  Patient Entered Questionnaire/Answers 6/29/2018   What number best describes your pain right now:  0 = No pain  to  10 = Worst pain imaginable 3   How would you describe the pain? burning, sharp   Which of the following improve or reduce your pain?  relaxation   What number best describes your average pain for the past week:  0 = No pain  to  10 = Worst pain imaginable 5   What number best describes your LOWEST pain in past 24 hours:  0 = No pain  to  10 = Worst pain imaginable 0   What number best describes your WORST pain in past 24 hours:  0 = No pain  to  10 = Worst pain imaginable 8   When is your pain worst? AM   What non-medicine treatments have you already had for your pain? none   Have you tried treating your pain with medication?  No   Are you currently taking medications for your pain? No     *  PAIN: Location: Upper left neck, radiating sharply to the left back of the head above the ear.  Appears sometimes 3-4 times per day and associated with turning his head to the left.  Some days has no pain at all.  Also has pain occipitocervical junction on the right, not associated with headache pain, more dull and achy and constant.    *  NUMBNESS: Location: No.    *  WEAKNESS: No  *  BOWEL/BLADDER FUNCTION:  Intact.    *  OTHER SYMPTOMS: No difficulties with coordination, balance, vision, hearing, scent, or speech.      Current Outpatient Prescriptions:      amLODIPine (NORVASC) 2.5 MG tablet, Take 1 tablet (2.5 mg) by mouth daily, Disp: 90 tablet, Rfl: 3     clindamycin (CLINDAMAX) 1 % topical gel, 2 times daily, Disp: , Rfl:      doxycycline (VIBRAMYCIN) 100 MG capsule, Take 1 capsule (100 mg) by mouth 2 times daily, Disp: 28 capsule, Rfl: 1     ketoconazole (NIZORAL) 2 % shampoo, daily, Disp: , Rfl:     Allergies   Allergen Reactions     Sulfa Drugs        Past Medical History:   Diagnosis Date     Back pain      Chest pain      Gastro-oesophageal  "reflux disease      GERD (gastroesophageal reflux disease)      Hemorrhoid      Hepatitis C      Hypertension      Trigeminal neuralgia        Past Surgical History:   Procedure Laterality Date     ARTHROPLASTY KNEE  11/9/2012    Procedure: ARTHROPLASTY KNEE;;  Surgeon: Ang Arellano MD;  Location: [facility] OR     ENT SURGERY  2009    Styloid process broken off     HERNIA REPAIR  12/10     RESECT TUMOR LOWER EXTREMITY  11/9/2012    Procedure: RESECT TUMOR LOWER EXTREMITY;  Excision of Tumor Left Distal Femur, Left Total Knee Replacement ;  Surgeon: Ang Arellano MD;  Location: [facility] OR     styloid process temporal bone surg  02/10     TONSILLECTOMY  1954       Family History   Problem Relation Age of Onset     Hypertension Mother      Cerebrovascular Disease Father 69     Cancer Paternal Grandfather      Cancer Son        Social History     Social History     Marital status:      Spouse name: N/A     Number of children: N/A     Years of education: N/A     Occupational History     Not on file.     Social History Main Topics     Smoking status: Former Smoker     Packs/day: 0.10     Years: 4.00     Types: Cigarettes     Start date: 1/1/2011     Quit date: 5/24/1981     Smokeless tobacco: Never Used      Comment: quit 1979 until 2013. started after trip to [place]     Alcohol use Yes      Comment: 1-2 beer a week      Drug use: Yes      Comment: Every other month-\"just a puff of marijuana\"     Sexual activity: Yes     Partners: Female     Birth control/ protection: None     Other Topics Concern     Parent/Sibling W/ Cabg, Mi Or Angioplasty Before 65f 55m? No     Social History Narrative     Problem list and 13 point review of systems: is reviewed in Epic and is negative with the exception of those symptoms associated with HPI and PMH.      OBJECTIVE:   /85  Pulse 78  Ht 1.791 m (5' 10.5\")  Wt 70.5 kg (155 lb 8 oz)  BMI 22 kg/m2    Imaging:  These are the pertinent radiologist's findings from:  MRI cervical " W/WO 7/3/18 @ [facility].   The cervical vertebrae are normally aligned. Multilevel disc  desiccation and loss of intervertebral disc height, most pronounced at  C3-4 and C4-5. Degenerative changes in the opposing endplates of C4-5  including a mild amount of edema. No abnormal signal in the cervical  spinal cord.  The findings on a level by level basis are as follows:     C2-3: Bilateral facet hypertrophy. No spinal canal or neural foraminal  stenosis.     C3-4:  Disc osteophyte complex with bilateral uncinate hypertrophy.  Bilateral facet hypertrophy. Moderate right and severe left neural  foraminal stenosis. Moderate spinal canal stenosis with cord  compression but no cord signal abnormality.     C4-5:  Circumferential disc osteophyte complex with right greater than  left uncinate hypertrophy. Bilateral facet hypertrophy. Moderate right  and mild left neural foraminal narrowing. Mild spinal canal stenosis.     C5-6:  Circumferential disc osteophyte complex and bilateral uncinate  hypertrophy. Bilateral facet hypertrophy. Mild bilateral neural  foraminal narrowing. Mild spinal canal narrowing.     C6-7:  Circumferential disc osteophyte complex with bilateral uncinate  hypertrophy. Moderate right and mild left neural foraminal narrowing.  Mild spinal canal narrowing.     C7-T1:  Posterior disc bulge. Left greater than right facet  hypertrophy. No spinal canal or neural foraminal stenosis  See the full report in EPIC/Media tab.  I personally reviewed the images with the patient.      Exam:  Pertinent positives:  Intact  *   Well developed, well nourished male found seated comfortably in exam room chair.  Is able to sit and rise independently.     *  Mental Status  A&O X3.  Bright, alert, affable, interactive. Language fluid, fund of knowledge intact. Good historian.  Mood and affect congruent and WNL.  *  Cranial Nerves  II: Able to read printed forms, VF full to gross confrontation.  III: IV, VI:  PERRLA, EOMI, No  nystagmus, no ptosis.  V: Sensation intact in bilateral V1, V2, and V3. Jaw clench symmetric.    VII:  Intact to voice bilaterally.  IX:  Pushes tongue against bilateral cheeks.  X:  Palate elevates, uvula midline, phonation intact.  XI: Elevates shoulders, head turn intact.  XII: Tongue midline. No fasciculations.  *  Cervical Spine:  DTR's at Biceps, Triceps, and Brachioradialis 2/4 and symmetric.  Sensation intact.    No Hoover's. No Phalen's.  No Tinel's. No fasciculations.   Muscle bulk and tone WNL.  Upper Extremity Strength.              RIGHT                LEFT     Deltoid              5/5                   5/5       Biceps              5/5                   5/5        Triceps              5/5                   5/5       Wrist Extensor              5/5                   5/5                     5/5                    5/5       Interossei              5/5                   5/5       EPL              5/5                    5/5       Pinch              5/5                  5/5          *  Lumbar Spine:    DTR's Patellar and Achilles 1/4 and symmetric.   No fasciculations.  Muscle bulk and tone WNL.  No Clonus.  Sensation intact.    Lower Extremity Strength                   RIGHT                   LEFT     Iliopsoas                    5/5                      5/5       Quad                    5/5                        5/5       Hamstring                      5/5                        5/5         Gastrocs                    5/5                        5/5       Tib. Anterior                     5/5                        5/5       EHL                      5/5                        5/5       Babinski                 Down                                      Down                   *  Structural Exam  Inspection of the spine reveals no scoliosis, exaggerated kyphosis or lordosis. Head is balanced over the pelvis in the coronal and sagittal plane.    Cervical spine ROM full. No spasming. No tenderness to  palpation.  No occipital tenderness.  No Spurling's or L'Hermitte's.   Lumbar ROM full.  Narrow gauge gait.      ASSESSMENT/PLAN:  1. Cervicalgia      Multiple levels of cervical spondylosis, however I believe his left sided upper neck pain and posterior head pain is probably from the left C3-4 level.  It is in an occipital nerve distribution, but I cannot reproduce pain with palpation of the occipital nerve today, and his pain appears with motion, essentially Spurling sign.   So I believe it is the left C3-4 level causing his trouble.  I am ordering a left C3-4 transforaminal DENI for both diagnosis and treatment.  I will see him after that is completed to evaluate the results.  Physical therapy and cervical traction unit may be useful for him as well.  But I had like to get the injection done first.    I answered all of his questions, which indicated good understanding of the situation.  He is willing to move forward with the recommendations. I supplied him with business cards and telephone numbers for ease of contact.   It's been a pleasure participating in the care of this nice patient. We thank you for your confidence in the [facility], Department of Neurosurgery.      Best Regards,    oNrma Siddiqi PA-C  [facility]  Department of Neurosurgery  Phone: [phone]  Fax: [phone]        Total time: 60 minutes with more than 30 minutes spent in direct face to face contact reviewing films, providing education, counseling, non-operative therapies,and indications for surgery as well as further follow up.    This note was generated using voice recognition software. While edited for content some inaccurate phrasing may be found.

## 2018-07-12 NOTE — TELEPHONE ENCOUNTER
Ivy has been scheduled for 7/20/2018.     He is aware that he needs to have a  and that he will receive a call from one of our nurses to explain the procedure in more detail.

## 2018-07-13 ENCOUNTER — CARE COORDINATION (OUTPATIENT)
Dept: ANESTHESIOLOGY | Facility: CLINIC | Age: 73
End: 2018-07-13

## 2018-07-13 ENCOUNTER — HOSPITAL ENCOUNTER (OUTPATIENT)
Facility: AMBULATORY SURGERY CENTER | Age: 73
End: 2018-07-13
Attending: ANESTHESIOLOGY
Payer: COMMERCIAL

## 2018-07-13 DIAGNOSIS — M47.22 CERVICAL RADICULOPATHY DUE TO DEGENERATIVE JOINT DISEASE OF SPINE: Primary | ICD-10-CM

## 2018-07-20 NOTE — TELEPHONE ENCOUNTER
Pt advised to check my chart for message with pre-procedure instructions.  Pt verbalized understanding.     Georgie Craven, RN, BSN

## 2018-07-23 ENCOUNTER — TELEPHONE (OUTPATIENT)
Dept: ANESTHESIOLOGY | Facility: CLINIC | Age: 73
End: 2018-07-23

## 2018-07-23 NOTE — TELEPHONE ENCOUNTER
Patient called to cancel his procedure with Dr Thibodeaux, just doesn't feel comfortable with doing the procedure, feels that it isn't that bad to have done.

## 2018-09-17 DIAGNOSIS — I10 HYPERTENSION GOAL BP (BLOOD PRESSURE) < 140/90: ICD-10-CM

## 2018-09-17 NOTE — TELEPHONE ENCOUNTER
"Requested Prescriptions   Pending Prescriptions Disp Refills     amLODIPine (NORVASC) 2.5 MG tablet [Pharmacy Med Name: AMLODIPINE BESYLATE 2.5MG TABLETS] 90 tablet 0    Last Written Prescription Date:  07/11/2017  Last Fill Quantity: 90,  # refills: 3   Last office visit: 6/26/2018 with prescribing provider:  06/26/2018   Future Office Visit:   Sig: TAKE 1 TABLET(2.5 MG) BY MOUTH DAILY    Calcium Channel Blockers Protocol  Failed    9/17/2018 12:56 PM       Failed - Normal serum creatinine on file in past 12 months    Recent Labs   Lab Test  03/10/17   0948   CR  1.10            Passed - Blood pressure under 140/90 in past 12 months    BP Readings from Last 3 Encounters:   07/10/18 137/85   06/26/18 132/88   05/29/18 124/78                Passed - Recent (12 mo) or future (30 days) visit within the authorizing provider's specialty    Patient had office visit in the last 12 months or has a visit in the next 30 days with authorizing provider or within the authorizing provider's specialty.  See \"Patient Info\" tab in inbasket, or \"Choose Columns\" in Meds & Orders section of the refill encounter.           Passed - Patient is age 18 or older        "

## 2018-09-18 RX ORDER — AMLODIPINE BESYLATE 2.5 MG/1
TABLET ORAL
Qty: 90 TABLET | Refills: 0 | Status: SHIPPED | OUTPATIENT
Start: 2018-09-18 | End: 2018-12-16

## 2018-09-18 NOTE — TELEPHONE ENCOUNTER
OK   Orders Placed This Encounter     **Comprehensive metabolic panel FUTURE anytime     Standing Status:   Future     Standing Expiration Date:   9/18/2019     amLODIPine (NORVASC) 2.5 MG tablet     Sig: TAKE 1 TABLET(2.5 MG) BY MOUTH DAILY     Dispense:  90 tablet     Refill:  0

## 2018-09-18 NOTE — TELEPHONE ENCOUNTER
Spoke with pt, he scheduled lab only appointment    Joanne Rojas RN   [facility]

## 2018-09-18 NOTE — TELEPHONE ENCOUNTER
Routing refill request to provider for review/approval because:  Labs not current:  CR    Lab cued     Joanne Rojas RN   [facility]

## 2018-09-19 DIAGNOSIS — I10 HYPERTENSION GOAL BP (BLOOD PRESSURE) < 140/90: ICD-10-CM

## 2018-09-19 PROCEDURE — 36415 COLL VENOUS BLD VENIPUNCTURE: CPT | Performed by: FAMILY MEDICINE

## 2018-09-19 PROCEDURE — 80053 COMPREHEN METABOLIC PANEL: CPT | Performed by: FAMILY MEDICINE

## 2018-09-20 LAB
ALBUMIN SERPL-MCNC: 3.9 G/DL (ref 3.4–5)
ALP SERPL-CCNC: 137 U/L (ref 40–150)
ALT SERPL W P-5'-P-CCNC: 20 U/L (ref 0–70)
ANION GAP SERPL CALCULATED.3IONS-SCNC: 8 MMOL/L (ref 3–14)
AST SERPL W P-5'-P-CCNC: 22 U/L (ref 0–45)
BILIRUB SERPL-MCNC: 0.7 MG/DL (ref 0.2–1.3)
BUN SERPL-MCNC: 8 MG/DL (ref 7–30)
CALCIUM SERPL-MCNC: 9.1 MG/DL (ref 8.5–10.1)
CHLORIDE SERPL-SCNC: 107 MMOL/L (ref 94–109)
CO2 SERPL-SCNC: 25 MMOL/L (ref 20–32)
CREAT SERPL-MCNC: 1.08 MG/DL (ref 0.66–1.25)
GFR SERPL CREATININE-BSD FRML MDRD: 67 ML/MIN/1.7M2
GLUCOSE SERPL-MCNC: 108 MG/DL (ref 70–99)
POTASSIUM SERPL-SCNC: 4 MMOL/L (ref 3.4–5.3)
PROT SERPL-MCNC: 7.2 G/DL (ref 6.8–8.8)
SODIUM SERPL-SCNC: 140 MMOL/L (ref 133–144)

## 2018-10-31 ENCOUNTER — TELEPHONE (OUTPATIENT)
Dept: CARDIOLOGY | Facility: CLINIC | Age: 73
End: 2018-10-31

## 2018-10-31 NOTE — TELEPHONE ENCOUNTER
Received VM from pt stating he has been seen by Dr. Sauceda for chest pain in the past and has had similar pain over the past couple months. Pt stated he is wondering if he should schedule an appointment. Called back and spoke with pt. Pt reports he has had pain on the left side of his chest, wrapping around his side to his pectoral muscle and nipple area, which has been ongoing for at least 2 years. Pt stated the pain is similar to what he experienced when he saw Dr. Sauceda in March 2017. Pt stated in past 6 weeks pain has been occurring more frequently, sometimes with exertion but not necessarily. Pt stated he exercises 6x/week, getting his HR to the 140s and 150s. Pt stated sometimes he feels the pain if he takes a deep breath, but he is not sure if the pain is in his lung or in his muscle. Pt had an angiogram in March 2017 that showed no evidence of coronary artery disease. Advised pt that when he was last seen it was felt his pain was non-cardiac in nature and there was no CAD seen on his angiogram, which is reassuring. Pt stated when he was a child he was told he had pleurisy and he had a CXR in April that said he had hyperinflated lungs, and he is worried that his pain may be related to this. Advised pt that he should make an appointment with his PCP to discuss possible causes of pain and to review if his PCP would recommend any follow up for the CXR results. Offered to make an appointment with Dr. Sauceda if pt desired, and pt stated he will call his PCP and contact our office if his PCP thinks he should be seen by cardiology again.

## 2018-11-08 NOTE — PROGRESS NOTES
SUBJECTIVE:   Ivy Haynes is a 73 year old male who presents to clinic today for the following health issues:    CHEST PAIN     Onset: a couple years     Description:   Location:  left side  Character: achey- worse with exercise   Radiation: Around left side of chest   Duration: Can last all day      Intensity: mild    Progression of Symptoms:  worsening    Accompanying Signs & Symptoms:  Shortness of breath: no  Sweating: no  Nausea/vomiting: no  Lightheadedness: no  Palpitations: no  Fever/Chills: no  Cough: no  Heartburn: YES    History:   Family history of heart disease no  Tobacco use: no    Precipitating factors:   Worse with exertion: YES  Worse with deep breaths :  no  Related to food: no    Alleviating factors:  Nothing   Has history of left chest wall trauma/ infection     Therapies Tried and outcome: Nothing tried       Hypertension Follow-up      Outpatient blood pressures are being checked at home.  Results are borderline high     garcia snot want more medications    Had a normal angiogram last year.    Low Salt Diet: no added salt      Problem list and histories reviewed & adjusted, as indicated.  Additional history: as documented    Labs reviewed in EPIC    Reviewed and updated as needed this visit by clinical staff       Reviewed and updated as needed this visit by Provider         ROS:  Constitutional, HEENT, cardiovascular, pulmonary, gi and gu systems are negative, except as otherwise noted.    OBJECTIVE:     /89  Pulse 100  Temp 98.3  F (36.8  C) (Oral)  Wt 163 lb 11.2 oz (74.3 kg)  SpO2 99%  BMI 23.16 kg/m2  Body mass index is 23.16 kg/(m^2).  GENERAL: healthy, alert and no distress  HENT: ear canals and TM's normal, nose and mouth without ulcers or lesions  NECK: no adenopathy, no asymmetry, masses, or scars and thyroid normal to palpation  RESP: lungs clear to auscultation - no rales, rhonchi or wheezes  CV: regular rate and rhythm, normal S1 S2, no S3 or S4, no murmur, click or rub,  no peripheral edema and peripheral pulses strong  ABDOMEN: soft, nontender, no hepatosplenomegaly, no masses and bowel sounds normal  MS: no gross musculoskeletal defects noted, no edema  SKIN: no suspicious lesions or rashes  NEURO: Normal strength and tone, mentation intact and speech normal    Diagnostic Test Results:  EKG - negative    ASSESSMENT/PLAN:             1. Chest wall pain  Likely pleuritic  Try advil prn  - EKG 12-lead complete w/read - Clinics  - Troponin I  - XR Chest 2 Views; Future    2. Need for prophylactic vaccination against Streptococcus pneumoniae (pneumococcus)  done    3. Need for pneumococcal vaccination  Done   Follow up in 1 year.   - Pneumococcal vaccine 13 valent PCV13 IM (Prevnar) [06605]  - ADMIN MEDICARE: Pneumococcal Vaccine ()    Regular exercise  See Patient Instructions    Lamine Franco MD  [facility]

## 2018-11-09 ENCOUNTER — HOSPITAL ENCOUNTER (OUTPATIENT)
Dept: GENERAL RADIOLOGY | Facility: CLINIC | Age: 73
Discharge: HOME | End: 2018-11-09
Attending: FAMILY MEDICINE | Admitting: FAMILY MEDICINE
Payer: MEDICARE

## 2018-11-09 ENCOUNTER — OFFICE VISIT (OUTPATIENT)
Dept: FAMILY MEDICINE | Facility: CLINIC | Age: 73
End: 2018-11-09
Payer: COMMERCIAL

## 2018-11-09 VITALS
WEIGHT: 163.7 LBS | TEMPERATURE: 98.3 F | HEART RATE: 100 BPM | SYSTOLIC BLOOD PRESSURE: 138 MMHG | DIASTOLIC BLOOD PRESSURE: 89 MMHG | BODY MASS INDEX: 23.16 KG/M2 | OXYGEN SATURATION: 99 %

## 2018-11-09 DIAGNOSIS — Z23 NEED FOR PNEUMOCOCCAL VACCINATION: ICD-10-CM

## 2018-11-09 DIAGNOSIS — R07.89 CHEST WALL PAIN: Primary | ICD-10-CM

## 2018-11-09 DIAGNOSIS — R07.89 CHEST WALL PAIN: ICD-10-CM

## 2018-11-09 DIAGNOSIS — Z23 NEED FOR PROPHYLACTIC VACCINATION AGAINST STREPTOCOCCUS PNEUMONIAE (PNEUMOCOCCUS): ICD-10-CM

## 2018-11-09 LAB — TROPONIN I SERPL-MCNC: <0.015 UG/L (ref 0–0.04)

## 2018-11-09 PROCEDURE — 90670 PCV13 VACCINE IM: CPT | Performed by: FAMILY MEDICINE

## 2018-11-09 PROCEDURE — 36415 COLL VENOUS BLD VENIPUNCTURE: CPT | Performed by: FAMILY MEDICINE

## 2018-11-09 PROCEDURE — 99214 OFFICE O/P EST MOD 30 MIN: CPT | Mod: 25 | Performed by: FAMILY MEDICINE

## 2018-11-09 PROCEDURE — G0009 ADMIN PNEUMOCOCCAL VACCINE: HCPCS | Performed by: FAMILY MEDICINE

## 2018-11-09 PROCEDURE — 84484 ASSAY OF TROPONIN QUANT: CPT | Performed by: FAMILY MEDICINE

## 2018-11-09 PROCEDURE — 71046 X-RAY EXAM CHEST 2 VIEWS: CPT

## 2018-11-09 PROCEDURE — 93000 ELECTROCARDIOGRAM COMPLETE: CPT | Performed by: FAMILY MEDICINE

## 2018-11-09 NOTE — MR AVS SNAPSHOT
After Visit Summary   11/9/2018    Ivy Haynes    MRN: [id number]           Patient Information     Date Of Birth          1945        Visit Information        Provider Department      11/9/2018 1:30 PM Lamine Franco MD [place]        Today's Diagnoses     Chest wall pain    -  1    Need for prophylactic vaccination against Streptococcus pneumoniae (pneumococcus)        Need for pneumococcal vaccination           Follow-ups after your visit        Follow-up notes from your care team     Return in about 3 months (around 2/9/2019) for BP Recheck, Lab Work, Routine Visit.      Your next 10 appointments already scheduled     Nov 13, 2018  7:30 AM CST   (Arrive by 7:15 AM)   Return Visit with Connor Torres MD   [facility] Ear Nose and Throat ([facility] and [facility] [place])    [address] [place] [id number] [phone]              Future tests that were ordered for you today     Open Future Orders        Priority Expected Expires Ordered    XR Chest 2 Views Routine 11/9/2018 11/9/2019 11/9/2018            Who to contact     If you have questions or need follow up information about today's clinic visit or your schedule please contact [facility] directly at [phone].  Normal or non-critical lab and imaging results will be communicated to you by MyChart, letter or phone within 4 business days after the clinic has received the results. If you do not hear from us within 7 days, please contact the clinic through MyChart or phone. If you have a critical or abnormal lab result, we will notify you by phone as soon as possible.  Submit refill requests through [employer] or call your pharmacy and they will forward the refill request to us. Please allow 3 business days for your refill to be completed.          Additional Information About Your Visit        [employer] [facility] [employer] Information     [employer] gives you secure access to your  electronic health record. If you see a primary care provider, you can also send messages to your care team and make appointments. If you have questions, please call your primary care clinic.  If you do not have a primary care provider, please call [phone] and they will assist you.        Care EveryWhere ID     This is your Care EveryWhere ID. This could be used by other organizations to access your [facility] medical records  [id number]        Your Vitals Were     Pulse Temperature Pulse Oximetry BMI (Body Mass Index)          100 98.3  F (36.8  C) (Oral) 99% 23.16 kg/m2         Blood Pressure from Last 3 Encounters:   11/09/18 138/89   07/10/18 137/85   06/26/18 132/88    Weight from Last 3 Encounters:   11/09/18 163 lb 11.2 oz (74.3 kg)   07/10/18 155 lb 8 oz (70.5 kg)   06/26/18 154 lb (69.9 kg)              We Performed the Following     ADMIN MEDICARE: Pneumococcal Vaccine ()     EKG 12-lead complete w/read - Clinics     Pneumococcal vaccine 13 valent PCV13 IM (Prevnar) [28440]     Troponin I          Today's Medication Changes          These changes are accurate as of 11/9/18  2:22 PM.  If you have any questions, ask your nurse or doctor.               Stop taking these medicines if you haven't already. Please contact your care team if you have questions.     doxycycline 100 MG capsule   Commonly known as:  VIBRAMYCIN   Stopped by:  Lamine Franco MD                    Primary Care Provider Office Phone # Fax #    Lamine Franco [phone]       [address] [facility] [[email or web address]] [address]  [facility] [address]        Equal Access to Services     [facility] Hospital[facility]AYLEY : Hadii allie Keenan, waaxjanak darling, qaareli villegas. So [facility] [phone].    ATENCIÓN: Si habla español, tiene a camargo disposición servicios gratuitos de asistencia lingüística. Llame al [phone].    We comply with applicable federal civil rights laws and  [place] laws. We do not discriminate on the basis of race, color, national origin, age, disability, sex, sexual orientation, or gender identity.            Thank you!     Thank you for choosing [facility]  for your care. Our goal is always to provide you with excellent care. Hearing back from our patients is one way we can continue to improve our services. Please take a few minutes to complete the written survey that you may receive in the mail after your visit with us. Thank you!             Your Updated Medication List - Protect others around you: Learn how to safely use, store and throw away your medicines at www.disposemymeds.org.          This list is accurate as of 11/9/18  2:22 PM.  Always use your most recent med list.                   Brand Name Dispense Instructions for use Diagnosis    amLODIPine 2.5 MG tablet    NORVASC    90 tablet    TAKE 1 TABLET(2.5 MG) BY MOUTH DAILY    Hypertension goal BP (blood pressure) < 140/90       clindamycin 1 % topical gel    CLINDAMAX     2 times daily        ketoconazole 2 % shampoo    NIZORAL     daily

## 2018-12-28 ENCOUNTER — OFFICE VISIT (OUTPATIENT)
Dept: OTOLARYNGOLOGY | Facility: CLINIC | Age: 73
End: 2018-12-28
Payer: COMMERCIAL

## 2018-12-28 VITALS
HEIGHT: 71 IN | BODY MASS INDEX: 22.96 KG/M2 | HEART RATE: 87 BPM | WEIGHT: 164 LBS | DIASTOLIC BLOOD PRESSURE: 94 MMHG | SYSTOLIC BLOOD PRESSURE: 141 MMHG

## 2018-12-28 DIAGNOSIS — M54.2 CERVICALGIA: Primary | ICD-10-CM

## 2018-12-28 RX ORDER — SALSALATE 500 MG/1
500 TABLET, FILM COATED ORAL 2 TIMES DAILY
Qty: 28 TABLET | Refills: 0 | Status: SHIPPED | OUTPATIENT
Start: 2018-12-28 | End: 2019-01-19

## 2018-12-28 RX ORDER — OXYCODONE AND ACETAMINOPHEN 5; 325 MG/1; MG/1
1 TABLET ORAL 2 TIMES DAILY PRN
Qty: 12 TABLET | Refills: 0 | Status: SHIPPED | OUTPATIENT
Start: 2018-12-28 | End: 2019-07-02

## 2018-12-28 ASSESSMENT — MIFFLIN-ST. JEOR: SCORE: 1525.15

## 2018-12-28 ASSESSMENT — PAIN SCALES - GENERAL: PAINLEVEL: MODERATE PAIN (5)

## 2018-12-28 NOTE — PATIENT INSTRUCTIONS
Thank you for choosing  Physicians.  Please follow up with Dr. Torres in approximately 4 months.    [phone] appointment scheduling option 1 and nurse advice option 3.

## 2018-12-28 NOTE — LETTER
12/28/2018       RE: Ivy Haynes  [address]  [facility] [address]     Dear Colleague,    Thank you for referring your patient, Ivy Haynes, to the [facility] EAR NOSE AND THROAT at [facility]. Please see a copy of my visit note below.    HISTORY OF PRESENT ILLNESS:  Ivy Haynes is 73 years of age, a very nice gentleman.  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  Instead of getting shots of steroids or things into his spine, he went ahead and he has been doing yoga-type neck exercises and things of this nature, and it has helped his spine.  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 Orion Mccormack many years ago.  He has his calcified styloid process on the left side and he has got a 3.1 cm styloid process on the right side, but what he will get is he will get some pain in and around his right side of his neck.  It does not seem to be in the carotid artery.  It can be shooting pain.  It can last and radiate for up to 3 days.  It does not seem to necessarily be related to his spine, but it is definitely present and it definitely bothers him quite a bit.  He has no other new complaints at the present time today.  We scoped him last time and that was negative as well.  This happens once or twice a month when it happens.      PHYSICAL EXAMINATION:  The patient is alert and oriented x3 and pleasant.  Skin of the face, lips, and neck on him is otherwise quite normal.  At the superior aspect of his hyoid bone on the right side I can elicit potentially the area where this is causing pain.  It is definitely not on his carotid, but it is on the absolute upper aspect of the hyoid bone on the right side.  There are no other masses or lesions otherwise seen.  Palpating the area, his oral cavity is otherwise clear.  Full palpation of the throat reveals no trigger points on his throat on the inside on the other side.  There is no such area  on the left side of his neck.      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  It is sort of a rare description that has been written about.  Sometimes it responds to steroids.  He does not want to have steroids.  It can also potentially respond to nonsteroidals.  I had a conversation with this with one of our former graduates who treats this with Kenalog injections down at [facility] in [place], and that seems to give good relief it is felt, but the patient would not want to have something like this.  What I will do is I have given him for the breakthrough pain 12 Percocet pills, as this can last up to 3 days.  I have also given him Disalcid with refill, and I will leave an appointment open for about 3 or 4 months.             Again, thank you for allowing me to participate in the care of your patient.      Sincerely,    Connor Torres MD

## 2018-12-28 NOTE — PROGRESS NOTES
HISTORY OF PRESENT ILLNESS:  Ivy Haynes is 73 years of age, a very nice gentleman.  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  Instead of getting shots of steroids or things into his spine, he went ahead and he has been doing yoga-type neck exercises and things of this nature, and it has helped his spine.  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 Orion Mccormack many years ago.  He has his calcified styloid process on the left side and he has got a 3.1 cm styloid process on the right side, but what he will get is he will get some pain in and around his right side of his neck.  It does not seem to be in the carotid artery.  It can be shooting pain.  It can last and radiate for up to 3 days.  It does not seem to necessarily be related to his spine, but it is definitely present and it definitely bothers him quite a bit.  He has no other new complaints at the present time today.  We scoped him last time and that was negative as well.  This happens once or twice a month when it happens.      PHYSICAL EXAMINATION:  The patient is alert and oriented x3 and pleasant.  Skin of the face, lips, and neck on him is otherwise quite normal.  At the superior aspect of his hyoid bone on the right side I can elicit potentially the area where this is causing pain.  It is definitely not on his carotid, but it is on the absolute upper aspect of the hyoid bone on the right side.  There are no other masses or lesions otherwise seen.  Palpating the area, his oral cavity is otherwise clear.  Full palpation of the throat reveals no trigger points on his throat on the inside on the other side.  There is no such area on the left side of his neck.      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  It is sort of a rare description that has been written about.  Sometimes it responds to steroids.  He does not want to have steroids.  It can also potentially respond to nonsteroidals.  I  had a conversation with this with one of our former graduates who treats this with Kenalog injections down at [facility] in [place], and that seems to give good relief it is felt, but the patient would not want to have something like this.  What I will do is I have given him for the breakthrough pain 12 Percocet pills, as this can last up to 3 days.  I have also given him Disalcid with refill, and I will leave an appointment open for about 3 or 4 months.

## 2018-12-28 NOTE — NURSING NOTE
"Chief Complaint   Patient presents with     RECHECK     follow up -eagle's syndrome    \Blood pressure (!) 150/97, pulse 87, height 1.81 m (5' 11.26\"), weight 74.4 kg (164 lb).   Loc Rodriguez LPN   "

## 2019-01-08 DIAGNOSIS — M54.2 CERVICALGIA: ICD-10-CM

## 2019-01-10 NOTE — TELEPHONE ENCOUNTER
salsalate (DISALCID) 500 MG    Last Written Prescription Date:  12/28/18  Last Fill Quantity: 28,   # refills: 0  Last Office Visit : 12/28/18  Future Office visit:  4/30/19    Routing refill request to provider for review/approval because:  90 DAY RF PENDING   OVERDUE CBC  FAILED PROTOCOL   BP   AGE

## 2019-01-19 DIAGNOSIS — M54.2 CERVICALGIA: ICD-10-CM

## 2019-01-20 NOTE — TELEPHONE ENCOUNTER
salsalate (DISALCID) 500 MG tablet  Last Written Prescription Date:  12/28/18  Last Fill Quantity: 28,   # refills:0  Last Office Visit : 12/28/18  Future Office visit: 4/30/18    Routing refill request to provider for review/approval because:  bp > 140/90   med end date  1/11/19  Labs  rf?

## 2019-01-22 RX ORDER — SALSALATE 500 MG/1
TABLET, FILM COATED ORAL
Qty: 56 TABLET | Refills: 1 | Status: SHIPPED | OUTPATIENT
Start: 2019-01-22 | End: 2019-07-02

## 2019-01-22 RX ORDER — SALSALATE 500 MG/1
TABLET, FILM COATED ORAL
Qty: 28 TABLET | Refills: 0 | OUTPATIENT
Start: 2019-01-22

## 2019-03-13 DIAGNOSIS — I10 HYPERTENSION GOAL BP (BLOOD PRESSURE) < 140/90: ICD-10-CM

## 2019-03-14 RX ORDER — AMLODIPINE BESYLATE 2.5 MG/1
TABLET ORAL
Qty: 90 TABLET | Refills: 0 | Status: SHIPPED | OUTPATIENT
Start: 2019-03-14 | End: 2019-07-02

## 2019-03-14 NOTE — TELEPHONE ENCOUNTER
Dr. Franco,  Please review/sign or advise for refill request of: amLODIPine (NORVASC) 2.5 MG tablet    Routing refill request to provider for review/approval because:  BP not controlled at office visit with ENT    Thank You!  Sherita Ramos, RN  Triage Nurse

## 2019-03-14 NOTE — TELEPHONE ENCOUNTER
"Requested Prescriptions   Pending Prescriptions Disp Refills     amLODIPine (NORVASC) 2.5 MG tablet [Pharmacy Med Name: AMLODIPINE BESYLATE 2.5MG TABLETS] 90 tablet 0     Sig: TAKE 1 TABLET(2.5 MG) BY MOUTH DAILY    Calcium Channel Blockers Protocol  Failed - 3/13/2019  6:13 PM       Failed - Blood pressure under 140/90 in past 12 months    BP Readings from Last 3 Encounters:   12/28/18 (!) 141/94   11/09/18 138/89   07/10/18 137/85                Passed - Recent (12 mo) or future (30 days) visit within the authorizing provider's specialty    Patient had office visit in the last 12 months or has a visit in the next 30 days with authorizing provider or within the authorizing provider's specialty.  See \"Patient Info\" tab in inbasket, or \"Choose Columns\" in Meds & Orders section of the refill encounter.             Passed - Medication is active on med list       Passed - Patient is age 18 or older       Passed - Normal serum creatinine on file in past 12 months    Recent Labs   Lab Test 09/19/18  1242   CR 1.08                     "

## 2019-04-30 ENCOUNTER — OFFICE VISIT (OUTPATIENT)
Dept: OTOLARYNGOLOGY | Facility: CLINIC | Age: 74
End: 2019-04-30
Payer: COMMERCIAL

## 2019-04-30 VITALS
OXYGEN SATURATION: 98 % | BODY MASS INDEX: 22.98 KG/M2 | DIASTOLIC BLOOD PRESSURE: 81 MMHG | WEIGHT: 166 LBS | SYSTOLIC BLOOD PRESSURE: 158 MMHG | HEART RATE: 74 BPM

## 2019-04-30 DIAGNOSIS — M54.2 CERVICALGIA: Primary | ICD-10-CM

## 2019-04-30 DIAGNOSIS — J03.90 TONSILLITIS: ICD-10-CM

## 2019-04-30 RX ORDER — OXYCODONE AND ACETAMINOPHEN 5; 325 MG/1; MG/1
1 TABLET ORAL EVERY 6 HOURS PRN
Qty: 15 TABLET | Refills: 0 | Status: SHIPPED | OUTPATIENT
Start: 2019-04-30 | End: 2019-07-02

## 2019-04-30 RX ORDER — OXYCODONE AND ACETAMINOPHEN 5; 325 MG/1; MG/1
1 TABLET ORAL EVERY 6 HOURS PRN
Qty: 20 TABLET | Refills: 0 | Status: SHIPPED | OUTPATIENT
Start: 2019-04-30 | End: 2019-07-02

## 2019-04-30 ASSESSMENT — PAIN SCALES - GENERAL: PAINLEVEL: NO PAIN (0)

## 2019-04-30 NOTE — PROGRESS NOTES
HISTORY OF PRESENT ILLNESS:  Ivy Haynes is 73 years of age, a very nice gentleman.  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  Instead of getting shots of steroids or things into his spine, he went ahead and he has been doing yoga-type neck exercises and things of this nature, and it has helped his spine.  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 Orion Mccormack many years ago.  He has his calcified styloid process on the left side and he has got a 3.1 cm styloid process on the right side, but what he will get is he will get some pain in and around his right side of his neck.  It does not seem to be in the carotid artery.  It can be shooting pain.  It can last and radiate for up to 3 days.  It does not seem to necessarily be related to his spine, but it is definitely present and it definitely bothers him quite a bit.  He has no other new complaints at the present time today.  We scoped him last time and that was negative as well.  This happens once or twice a month when it happens.      PHYSICAL EXAMINATION:  The patient is alert and oriented x3 and pleasant.  Skin of the face, lips, and neck on him is otherwise quite normal.  At the superior aspect of his hyoid bone on the right side I can elicit potentially the area where this is causing pain.  It is definitely not on his carotid, but it is on the absolute upper aspect of the hyoid bone on the right side.  There are no other masses or lesions otherwise seen.  Palpating the area, his oral cavity is otherwise clear.  Full palpation of the throat reveals no trigger points on his throat on the inside on the other side.  There is no such area on the left side of his neck.      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   He is twin improved with prn percoocet rarely.     What I will do is I have given him for the breakthrough pain 12 Percocet pills, as this can last up to 3 days.  I have also given him  Disalcid with refill, and I will leave an appointment open for about 3 or 4 months.

## 2019-04-30 NOTE — LETTER
4/30/2019       RE: Ivy Haynes  [address]  [facility] [address]     Dear Colleague,    Thank you for referring your patient, Ivy Haynes, to the [facility] EAR NOSE AND THROAT at [facility]. Please see a copy of my visit note below.    HISTORY OF PRESENT ILLNESS:  Ivy Haynes is 73 years of age, a very nice gentleman.  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  Instead of getting shots of steroids or things into his spine, he went ahead and he has been doing yoga-type neck exercises and things of this nature, and it has helped his spine.  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 Orion Mccormack many years ago.  He has his calcified styloid process on the left side and he has got a 3.1 cm styloid process on the right side, but what he will get is he will get some pain in and around his right side of his neck.  It does not seem to be in the carotid artery.  It can be shooting pain.  It can last and radiate for up to 3 days.  It does not seem to necessarily be related to his spine, but it is definitely present and it definitely bothers him quite a bit.  He has no other new complaints at the present time today.  We scoped him last time and that was negative as well.  This happens once or twice a month when it happens.      PHYSICAL EXAMINATION:  The patient is alert and oriented x3 and pleasant.  Skin of the face, lips, and neck on him is otherwise quite normal.  At the superior aspect of his hyoid bone on the right side I can elicit potentially the area where this is causing pain.  It is definitely not on his carotid, but it is on the absolute upper aspect of the hyoid bone on the right side.  There are no other masses or lesions otherwise seen.  Palpating the area, his oral cavity is otherwise clear.  Full palpation of the throat reveals no trigger points on his throat on the inside on the other side.  There is no such area on  the left side of his neck.      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   He is twin improved with prn percoocet rarely.     What I will do is I have given him for the breakthrough pain 12 Percocet pills, as this can last up to 3 days.  I have also given him Disalcid with refill, and I will leave an appointment open for about 3 or 4 months.                       Again, thank you for allowing me to participate in the care of your patient.      Sincerely,    Connor Torres MD

## 2019-04-30 NOTE — PATIENT INSTRUCTIONS
Thank you for choosing  [facility].  Please follow up with Dr. Torres in approximately 4 months.    [phone] appointment scheduling option 1 and nurse advice option 3.

## 2019-06-25 ENCOUNTER — TELEPHONE (OUTPATIENT)
Dept: FAMILY MEDICINE | Facility: CLINIC | Age: 74
End: 2019-06-25

## 2019-06-25 NOTE — LETTER
[address]  [facility] [address]  Phone: [phone]  Fax: [phone]              June 25, 2019      Ivy Haynes  [address]  [facility] [address]        Dear Ivy,    I care about your health and have reviewed your health plan. I have reviewed your medical conditions, medication list, and lab results and am making recommendations based on this review, to better manage your health.    You are in particular need of attention regarding:  -High Blood Pressure  -Wellness (Physical) Visit     I am recommending that you:  -schedule a WELLNESS (Physical) APPOINTMENT with me.         Here is a list of Health Maintenance topics that are due now or due soon:  Health Maintenance Due   Topic Date Due     Hepatitis C Screening  1945     Zoster (Shingles) Vaccine (1 of 2) 04/08/1997     Annual Wellness Visit  04/08/2012     Discuss Advance Directive Planning  05/30/2018     FALL RISK ASSESSMENT  07/10/2019       Please call us at [phone] (or use [facility]) to address the above recommendations.     Thank you for trusting [facility] and we appreciate the opportunity to serve you.  We look forward to supporting your healthcare needs in the future.    Healthy Regards,    Lamine Franco MD

## 2019-06-25 NOTE — TELEPHONE ENCOUNTER
Panel Management Review      Patient has the following on his problem list:     Hypertension   Last three blood pressure readings:  BP Readings from Last 3 Encounters:   04/30/19 158/81   12/28/18 (!) 141/94   11/09/18 138/89     Blood pressure: FAILED    HTN Guidelines:  Less than 140/90      Composite cancer screening  Chart review shows that this patient is due/due soon for the following None  Summary:    Patient is due/failing the following:   BP CHECK and PHYSICAL    Action needed:   Patient needs office visit for Hypertension.    Type of outreach:    Sent letter.    Questions for provider review:    None                                                                                                                                    Tran Thompson MA

## 2019-07-01 NOTE — PROGRESS NOTES
"  SUBJECTIVE:   Ivy Haynes is a 74 year old male who presents for Preventive Visit.  click delete button to remove this line now  click delete button to remove this line now  Are you in the first 12 months of your Medicare Part B coverage?  No    Physical Health:    In general, how would you rate your overall physical health? excellent    Outside of work, how many days during the week do you exercise? 6-7 days/week    Outside of work, approximately how many minutes a day do you exercise?greater than 60 minutes    If you drink alcohol do you typically have >3 drinks per day or >7 drinks per week? No    Do you usually eat at least 4 servings of fruit and vegetables a day, include whole grains & fiber and avoid regularly eating high fat or \"junk\" foods? Yes    Do you have any problems taking medications regularly?  No    Do you have any side effects from medications? none    Needs assistance for the following daily activities: no assistance needed    Which of the following safety concerns are present in your home?  none identified     Hearing impairment: No    In the past 6 months, have you been bothered by leaking of urine? no    Mental Health:    In general, how would you rate your overall mental or emotional health? excellent  PHQ-2 Score:      Do you feel safe in your environment? Yes    Do you have a Health Care Directive? No: Advance care planning reviewed with patient; information given to patient to review.    Additional concerns to address?  No    Fall risk:     click delete button to remove this line now  Cognitive Screenin) Repeat 3 items (Leader, Season, Table)    2) Clock draw: NORMAL  3) 3 item recall: Recalls 3 objects  Results: 3 items recalled: COGNITIVE IMPAIRMENT LESS LIKELY    Mini-CogTM Copyright BINTA Moran. Licensed by the author for use in [facility]; reprinted with permission (herve@.[facility]). All rights reserved.      Do you have sleep apnea, excessive snoring or daytime " drowsiness?: no        Hypertension Follow-up      Do you check your blood pressure regularly outside of the clinic? Yes     Are you following a low salt diet? Yes    Are your blood pressures ever more than 140 on the top number (systolic) OR more   than 90 on the bottom number (diastolic), for example 140/90? No  Sees urology for prostat e issues    Reviewed and updated as needed this visit by clinical staff  Tobacco  Allergies  Meds         Reviewed and updated as needed this visit by Provider        Social History     Tobacco Use     Smoking status: Former Smoker     Packs/day: 0.10     Years: 4.00     Pack years: 0.40     Types: Cigarettes     Start date: 2011     Last attempt to quit: 1981     Years since quittin.1     Smokeless tobacco: Never Used     Tobacco comment: quit  until . started after trip to [place]   Substance Use Topics     Alcohol use: Yes     Comment: 1-2 beer a week                            Current providers sharing in care for this patient include:   Patient Care Team:  Lamine Franco MD as PCP - General (Family Practice)  Te Soriano MD as MD (Surgery)  Lamine Franco MD as Assigned PCP  Regine Ochoa MD as MD (Family Medicine - Sports Medicine)    The following health maintenance items are reviewed in Epic and correct as of today:  Health Maintenance   Topic Date Due     HEPATITIS C SCREENING  1945     ZOSTER IMMUNIZATION (1 of 2) 1997     MEDICARE ANNUAL WELLNESS VISIT  2012     ADVANCE CARE PLANNING  2018     FALL RISK ASSESSMENT  07/10/2019     INFLUENZA VACCINE (1) 2019     DTAP/TDAP/TD IMMUNIZATION (2 - Td) 01/10/2022     LIPID  2022     COLONOSCOPY  10/10/2026     PHQ-2  Completed     AORTIC ANEURYSM SCREENING (SYSTEM ASSIGNED)  Completed     IPV IMMUNIZATION  Aged Out     MENINGITIS IMMUNIZATION  Aged Out     Lab work is in process      ROS:  Constitutional, HEENT,  "cardiovascular, pulmonary, gi and gu systems are negative, except as otherwise noted.    OBJECTIVE:   /83   Pulse 96   Temp 99  F (37.2  C) (Oral)   Ht 1.803 m (5' 11\")   Wt 72.5 kg (159 lb 14.4 oz)   SpO2 98%   BMI 22.30 kg/m   Estimated body mass index is 22.3 kg/m  as calculated from the following:    Height as of this encounter: 1.803 m (5' 11\").    Weight as of this encounter: 72.5 kg (159 lb 14.4 oz).  EXAM:   GENERAL: healthy, alert and no distress  EYES: Eyes grossly normal to inspection, PERRL and conjunctivae and sclerae normal  HENT: ear canals and TM's normal, nose and mouth without ulcers or lesions  NECK: no adenopathy, no asymmetry, masses, or scars and thyroid normal to palpation  RESP: lungs clear to auscultation - no rales, rhonchi or wheezes  CV: regular rate and rhythm, normal S1 S2, no S3 or S4, no murmur, click or rub, no peripheral edema and peripheral pulses strong  ABDOMEN: soft, nontender, no hepatosplenomegaly, no masses and bowel sounds normal   (male): normal male genitalia without lesions or urethral discharge, no hernia  MS: no gross musculoskeletal defects noted, no edema  SKIN: no suspicious lesions or rashes  NEURO: Normal strength and tone, mentation intact and speech normal  PSYCH: mentation appears normal, affect normal/bright  LYMPH: no cervical, supraclavicular, axillary, or inguinal adenopathy    Diagnostic Test Results:  Labs reviewed in Epic    ASSESSMENT / PLAN:   1. Encounter for Medicare annual wellness exam  In excellent health    2. Hypertension goal BP (blood pressure) < 140/90  Well controlled   - Comprehensive metabolic panel  - Lipid Profile  - Albumin Random Urine Quantitative with Creat Ratio  - amLODIPine (NORVASC) 2.5 MG tablet; Take 1 tablet (2.5 mg) by mouth daily  Dispense: 90 tablet; Refill: 3    3. CARDIOVASCULAR SCREENING; LDL GOAL LESS THAN 160  recheck  - Lipid Profile    End of Life Planning:  Patient currently has an advanced directive: " "Yes.  Practitioner is supportive of decision.    COUNSELING:  Reviewed preventive health counseling, as reflected in patient instructions       Regular exercise       Healthy diet/nutrition       Vision screening       Hearing screening       Dental care       Bladder control       Safe sex practices/STD prevention       Colon cancer screening       Prostate cancer screening    Estimated body mass index is 22.3 kg/m  as calculated from the following:    Height as of this encounter: 1.803 m (5' 11\").    Weight as of this encounter: 72.5 kg (159 lb 14.4 oz).         reports that he quit smoking about 38 years ago. His smoking use included cigarettes. He started smoking about 8 years ago. He has a 0.40 pack-year smoking history. He has never used smokeless tobacco.      Appropriate preventive services were discussed with this patient, including applicable screening as appropriate for cardiovascular disease, diabetes, osteopenia/osteoporosis, and glaucoma.  As appropriate for age/gender, discussed screening for colorectal cancer, prostate cancer, breast cancer, and cervical cancer. Checklist reviewing preventive services available has been given to the patient.    Reviewed patients plan of care and provided an AVS. The Basic Care Plan (routine screening as documented in Health Maintenance) for Ivy meets the Care Plan requirement. This Care Plan has been established and reviewed with the Patient.    Counseling Resources:  ATP IV Guidelines  Pooled Cohorts Equation Calculator  Breast Cancer Risk Calculator  FRAX Risk Assessment  ICSI Preventive Guidelines  Dietary Guidelines for Americans, 2010  USDA's MyPlate  ASA Prophylaxis  Lung CA Screening    Lamine Franco MD  [facility]  "

## 2019-07-01 NOTE — PATIENT INSTRUCTIONS
Patient Education   Personalized Prevention Plan  You are due for the preventive services outlined below.  Your care team is available to assist you in scheduling these services.  If you have already completed any of these items, please share that information with your care team to update in your medical record.  Health Maintenance Due   Topic Date Due     Hepatitis C Screening  1945     Zoster (Shingles) Vaccine (1 of 2) 04/08/1997     Annual Wellness Visit  04/08/2012     Discuss Advance Directive Planning  05/30/2018     FALL RISK ASSESSMENT  07/10/2019

## 2019-07-02 ENCOUNTER — OFFICE VISIT (OUTPATIENT)
Dept: FAMILY MEDICINE | Facility: CLINIC | Age: 74
End: 2019-07-02
Payer: COMMERCIAL

## 2019-07-02 VITALS
HEART RATE: 96 BPM | DIASTOLIC BLOOD PRESSURE: 83 MMHG | OXYGEN SATURATION: 98 % | SYSTOLIC BLOOD PRESSURE: 134 MMHG | HEIGHT: 71 IN | WEIGHT: 159.9 LBS | BODY MASS INDEX: 22.39 KG/M2 | TEMPERATURE: 99 F

## 2019-07-02 DIAGNOSIS — Z00.00 ENCOUNTER FOR MEDICARE ANNUAL WELLNESS EXAM: Primary | ICD-10-CM

## 2019-07-02 DIAGNOSIS — I10 HYPERTENSION GOAL BP (BLOOD PRESSURE) < 140/90: ICD-10-CM

## 2019-07-02 DIAGNOSIS — Z13.6 CARDIOVASCULAR SCREENING; LDL GOAL LESS THAN 160: ICD-10-CM

## 2019-07-02 PROCEDURE — 82043 UR ALBUMIN QUANTITATIVE: CPT | Performed by: FAMILY MEDICINE

## 2019-07-02 PROCEDURE — 80053 COMPREHEN METABOLIC PANEL: CPT | Performed by: FAMILY MEDICINE

## 2019-07-02 PROCEDURE — 80061 LIPID PANEL: CPT | Performed by: FAMILY MEDICINE

## 2019-07-02 PROCEDURE — 99397 PER PM REEVAL EST PAT 65+ YR: CPT | Performed by: FAMILY MEDICINE

## 2019-07-02 PROCEDURE — 36415 COLL VENOUS BLD VENIPUNCTURE: CPT | Performed by: FAMILY MEDICINE

## 2019-07-02 RX ORDER — AMLODIPINE BESYLATE 2.5 MG/1
2.5 TABLET ORAL DAILY
Qty: 90 TABLET | Refills: 3 | Status: SHIPPED | OUTPATIENT
Start: 2019-07-02 | End: 2020-07-08

## 2019-07-02 ASSESSMENT — MIFFLIN-ST. JEOR: SCORE: 1497.43

## 2019-07-03 LAB
ALBUMIN SERPL-MCNC: 3.9 G/DL (ref 3.4–5)
ALP SERPL-CCNC: 143 U/L (ref 40–150)
ALT SERPL W P-5'-P-CCNC: 19 U/L (ref 0–70)
ANION GAP SERPL CALCULATED.3IONS-SCNC: 9 MMOL/L (ref 3–14)
AST SERPL W P-5'-P-CCNC: 22 U/L (ref 0–45)
BILIRUB SERPL-MCNC: 0.6 MG/DL (ref 0.2–1.3)
BUN SERPL-MCNC: 10 MG/DL (ref 7–30)
CALCIUM SERPL-MCNC: 9 MG/DL (ref 8.5–10.1)
CHLORIDE SERPL-SCNC: 109 MMOL/L (ref 94–109)
CHOLEST SERPL-MCNC: 158 MG/DL
CO2 SERPL-SCNC: 25 MMOL/L (ref 20–32)
CREAT SERPL-MCNC: 1.17 MG/DL (ref 0.66–1.25)
CREAT UR-MCNC: 225 MG/DL
GFR SERPL CREATININE-BSD FRML MDRD: 62 ML/MIN/{1.73_M2}
GLUCOSE SERPL-MCNC: 86 MG/DL (ref 70–99)
HDLC SERPL-MCNC: 57 MG/DL
LDLC SERPL CALC-MCNC: 85 MG/DL
MICROALBUMIN UR-MCNC: 15 MG/L
MICROALBUMIN/CREAT UR: 6.71 MG/G CR (ref 0–17)
NONHDLC SERPL-MCNC: 101 MG/DL
POTASSIUM SERPL-SCNC: 4.1 MMOL/L (ref 3.4–5.3)
PROT SERPL-MCNC: 7.4 G/DL (ref 6.8–8.8)
SODIUM SERPL-SCNC: 143 MMOL/L (ref 133–144)
TRIGL SERPL-MCNC: 79 MG/DL

## 2019-07-23 ENCOUNTER — OFFICE VISIT (OUTPATIENT)
Dept: FAMILY MEDICINE | Facility: CLINIC | Age: 74
End: 2019-07-23
Payer: COMMERCIAL

## 2019-07-23 VITALS
HEIGHT: 71 IN | SYSTOLIC BLOOD PRESSURE: 148 MMHG | HEART RATE: 88 BPM | OXYGEN SATURATION: 98 % | BODY MASS INDEX: 22.78 KG/M2 | WEIGHT: 162.7 LBS | DIASTOLIC BLOOD PRESSURE: 80 MMHG | TEMPERATURE: 98.5 F

## 2019-07-23 DIAGNOSIS — L29.9 ITCHING: ICD-10-CM

## 2019-07-23 DIAGNOSIS — R53.83 FATIGUE, UNSPECIFIED TYPE: Primary | ICD-10-CM

## 2019-07-23 DIAGNOSIS — Z86.19 HX OF HEPATITIS C: ICD-10-CM

## 2019-07-23 LAB
ALBUMIN UR-MCNC: NEGATIVE MG/DL
APPEARANCE UR: CLEAR
BILIRUB UR QL STRIP: NEGATIVE
COLOR UR AUTO: YELLOW
ERYTHROCYTE [DISTWIDTH] IN BLOOD BY AUTOMATED COUNT: 12.6 % (ref 10–15)
GLUCOSE UR STRIP-MCNC: NEGATIVE MG/DL
HCT VFR BLD AUTO: 44.1 % (ref 40–53)
HGB BLD-MCNC: 15.7 G/DL (ref 13.3–17.7)
HGB UR QL STRIP: NEGATIVE
KETONES UR STRIP-MCNC: NEGATIVE MG/DL
LEUKOCYTE ESTERASE UR QL STRIP: NEGATIVE
LIPASE SERPL-CCNC: 124 U/L (ref 73–393)
MCH RBC QN AUTO: 34.1 PG (ref 26.5–33)
MCHC RBC AUTO-ENTMCNC: 35.6 G/DL (ref 31.5–36.5)
MCV RBC AUTO: 96 FL (ref 78–100)
NITRATE UR QL: NEGATIVE
PH UR STRIP: 5.5 PH (ref 5–7)
PLATELET # BLD AUTO: 159 10E9/L (ref 150–450)
RBC # BLD AUTO: 4.6 10E12/L (ref 4.4–5.9)
SOURCE: NORMAL
SP GR UR STRIP: <=1.005 (ref 1–1.03)
UROBILINOGEN UR STRIP-ACNC: 0.2 EU/DL (ref 0.2–1)
WBC # BLD AUTO: 2.9 10E9/L (ref 4–11)

## 2019-07-23 PROCEDURE — 99214 OFFICE O/P EST MOD 30 MIN: CPT | Performed by: FAMILY MEDICINE

## 2019-07-23 PROCEDURE — 85027 COMPLETE CBC AUTOMATED: CPT | Performed by: FAMILY MEDICINE

## 2019-07-23 PROCEDURE — 80053 COMPREHEN METABOLIC PANEL: CPT | Performed by: FAMILY MEDICINE

## 2019-07-23 PROCEDURE — 81003 URINALYSIS AUTO W/O SCOPE: CPT | Performed by: FAMILY MEDICINE

## 2019-07-23 PROCEDURE — 36415 COLL VENOUS BLD VENIPUNCTURE: CPT | Performed by: FAMILY MEDICINE

## 2019-07-23 PROCEDURE — 83690 ASSAY OF LIPASE: CPT | Performed by: FAMILY MEDICINE

## 2019-07-23 RX ORDER — OXYCODONE AND ACETAMINOPHEN 5; 325 MG/1; MG/1
TABLET ORAL
Refills: 0 | COMMUNITY
Start: 2019-04-30 | End: 2020-09-25

## 2019-07-23 ASSESSMENT — MIFFLIN-ST. JEOR: SCORE: 1510.13

## 2019-07-23 NOTE — PROGRESS NOTES
"Subjective     Ivy Haynes is a 74 year old male who presents to clinic today for the following health issues:    Reports having Hep C years ago     Reports he feels the symptoms are coming back since about last week, has had some itching, some pain in the area, general fatigue states he has been able to work out still without difficulty.     Called his GI doctor and they said to come in and get tests run before scheduling an appointment with them, has an appt scheduled for July 30th.    HPI   No feevr or chills  No bowel symptoms except more beelching at times  Hypertension Follow-up      Do you check your blood pressure regularly outside of the clinic? No     Are you following a low salt diet? Yes    Are your blood pressures ever more than 140 on the top number (systolic) OR more   than 90 on the bottom number (diastolic), for example 140/90? No    Current Outpatient Medications   Medication Sig Dispense Refill     amLODIPine (NORVASC) 2.5 MG tablet Take 1 tablet (2.5 mg) by mouth daily 90 tablet 3     clindamycin (CLINDAMAX) 1 % topical gel 2 times daily       oxyCODONE-acetaminophen (PERCOCET) 5-325 MG tablet TK 1 TABLET PO ECVERY 6  HOURS AS NEEDED FOR PAIN  0         Reviewed and updated as needed this visit by Provider         Review of Systems   ROS COMP: Constitutional, HEENT, cardiovascular, pulmonary, gi and gu systems are negative, except as otherwise noted.       Objective    /80   Pulse 88   Temp 98.5  F (36.9  C) (Temporal)   Ht 1.803 m (5' 11\")   Wt 73.8 kg (162 lb 11.2 oz)   SpO2 98%   BMI 22.69 kg/m    Body mass index is 22.69 kg/m .  Physical Exam   GENERAL: healthy, alert and no distress  EYES: Eyes grossly normal to inspection, PERRL and conjunctivae and sclerae normal  NECK: no adenopathy, no asymmetry, masses, or scars and thyroid normal to palpation  RESP: lungs clear to auscultation - no rales, rhonchi or wheezes  CV: regular rate and rhythm, normal S1 S2, no S3 or S4, no murmur, " click or rub, no peripheral edema and peripheral pulses strong  ABDOMEN: soft, nontender, no hepatosplenomegaly, no masses and bowel sounds normal  MS: no gross musculoskeletal defects noted, no edema  SKIN: no suspicious lesions or rashes  NEURO: Normal strength and tone, mentation intact and speech normal  PSYCH: mentation appears normal, affect normal/bright  LYMPH: no cervical, supraclavicular, axillary, or inguinal adenopathy    Diagnostic Test Results:  Labs reviewed in Epic  Results for orders placed or performed in visit on 07/02/19   Comprehensive metabolic panel   Result Value Ref Range    Sodium 143 133 - 144 mmol/L    Potassium 4.1 3.4 - 5.3 mmol/L    Chloride 109 94 - 109 mmol/L    Carbon Dioxide 25 20 - 32 mmol/L    Anion Gap 9 3 - 14 mmol/L    Glucose 86 70 - 99 mg/dL    Urea Nitrogen 10 7 - 30 mg/dL    Creatinine 1.17 0.66 - 1.25 mg/dL    GFR Estimate 62 >60 mL/min/[1.73_m2]    GFR Estimate If Black 72 >60 mL/min/[1.73_m2]    Calcium 9.0 8.5 - 10.1 mg/dL    Bilirubin Total 0.6 0.2 - 1.3 mg/dL    Albumin 3.9 3.4 - 5.0 g/dL    Protein Total 7.4 6.8 - 8.8 g/dL    Alkaline Phosphatase 143 40 - 150 U/L    ALT 19 0 - 70 U/L    AST 22 0 - 45 U/L   Lipid Profile   Result Value Ref Range    Cholesterol 158 <200 mg/dL    Triglycerides 79 <150 mg/dL    HDL Cholesterol 57 >39 mg/dL    LDL Cholesterol Calculated 85 <100 mg/dL    Non HDL Cholesterol 101 <130 mg/dL   Albumin Random Urine Quantitative with Creat Ratio   Result Value Ref Range    Creatinine Urine 225 mg/dL    Albumin Urine mg/L 15 mg/L    Albumin Urine mg/g Cr 6.71 0 - 17 mg/g Cr           Assessment & Plan     1. Fatigue, unspecified type  Likely viral  - Comprehensive metabolic panel  - Lipase  - CBC with platelets  - *UA reflex to Microscopic    2. Itching  Use lotion, claritn prn  - Comprehensive metabolic panel    3. Hx of hepatitis C  Was treated  Follow up with consultant as planned.          Regular exercise  See Patient Instructions    No  follow-ups on file.    Lamine Franco MD  [facility]

## 2019-07-24 LAB
ALBUMIN SERPL-MCNC: 4.1 G/DL (ref 3.4–5)
ALP SERPL-CCNC: 152 U/L (ref 40–150)
ALT SERPL W P-5'-P-CCNC: 20 U/L (ref 0–70)
ANION GAP SERPL CALCULATED.3IONS-SCNC: 5 MMOL/L (ref 3–14)
AST SERPL W P-5'-P-CCNC: 21 U/L (ref 0–45)
BILIRUB SERPL-MCNC: 0.6 MG/DL (ref 0.2–1.3)
BUN SERPL-MCNC: 6 MG/DL (ref 7–30)
CALCIUM SERPL-MCNC: 9.2 MG/DL (ref 8.5–10.1)
CHLORIDE SERPL-SCNC: 109 MMOL/L (ref 94–109)
CO2 SERPL-SCNC: 27 MMOL/L (ref 20–32)
CREAT SERPL-MCNC: 1.07 MG/DL (ref 0.66–1.25)
GFR SERPL CREATININE-BSD FRML MDRD: 69 ML/MIN/{1.73_M2}
GLUCOSE SERPL-MCNC: 93 MG/DL (ref 70–99)
POTASSIUM SERPL-SCNC: 4.6 MMOL/L (ref 3.4–5.3)
PROT SERPL-MCNC: 7.4 G/DL (ref 6.8–8.8)
SODIUM SERPL-SCNC: 141 MMOL/L (ref 133–144)

## 2019-07-29 DIAGNOSIS — I10 HYPERTENSION GOAL BP (BLOOD PRESSURE) < 140/90: ICD-10-CM

## 2019-07-29 RX ORDER — AMLODIPINE BESYLATE 2.5 MG/1
TABLET ORAL
Qty: 90 TABLET | Refills: 0 | OUTPATIENT
Start: 2019-07-29

## 2019-07-29 NOTE — TELEPHONE ENCOUNTER
Pharmacy stated they have refills available  This refill request cancelled    Joanne Rojas RN   [facility]

## 2019-07-29 NOTE — TELEPHONE ENCOUNTER
"Requested Prescriptions   Pending Prescriptions Disp Refills     amLODIPine (NORVASC) 2.5 MG tablet [Pharmacy Med Name: AMLODIPINE BESYLATE 2.5MG TABLETS] 90 tablet 0     Sig: TAKE 1 TABLET(2.5 MG) BY MOUTH DAILY  Last Written Prescription Date:  07/02/2019  Last Fill Quantity: 90,  # refills: 3   Last office visit: 7/23/2019 with prescribing provider:  07/23/2019   Future Office Visit:         Calcium Channel Blockers Protocol  Failed - 7/29/2019  9:32 AM        Failed - Blood pressure under 140/90 in past 12 months     BP Readings from Last 3 Encounters:   07/23/19 148/80   07/02/19 134/83   04/30/19 158/81                 Passed - Recent (12 mo) or future (30 days) visit within the authorizing provider's specialty     Patient had office visit in the last 12 months or has a visit in the next 30 days with authorizing provider or within the authorizing provider's specialty.  See \"Patient Info\" tab in inbasket, or \"Choose Columns\" in Meds & Orders section of the refill encounter.              Passed - Medication is active on med list        Passed - Patient is age 18 or older        Passed - Normal serum creatinine on file in past 12 months     Recent Labs   Lab Test 07/23/19  1402   CR 1.07             "

## 2019-08-08 ENCOUNTER — TELEPHONE (OUTPATIENT)
Dept: FAMILY MEDICINE | Facility: CLINIC | Age: 74
End: 2019-08-08

## 2019-08-08 NOTE — TELEPHONE ENCOUNTER
Dr. Rocha,    Pt's last colonoscopy was done on 10/10/16 at [facility]. Their recommendation was to repeat colonoscopy in 5 years if one or more polyp adenomatous, or 10 years if polyps hyperplastic. Please advise.    Kira Blair RN  [facility]

## 2019-08-08 NOTE — TELEPHONE ENCOUNTER
Patient would like to know when his next colonoscopy should be. Best number to call is [phone]. Ok to to leave detailed message on phone.

## 2019-08-12 NOTE — TELEPHONE ENCOUNTER
FYI: Pt states that did an upper endoscopy and colonoscopy. Next Friday he will be doing an MRI-- orders and procedure from gastroenterology at [place], Dr. Underwood.    Kira Blair RN  [facility]

## 2019-09-03 ENCOUNTER — OFFICE VISIT (OUTPATIENT)
Dept: OTOLARYNGOLOGY | Facility: CLINIC | Age: 74
End: 2019-09-03
Payer: COMMERCIAL

## 2019-09-03 VITALS
BODY MASS INDEX: 23.05 KG/M2 | SYSTOLIC BLOOD PRESSURE: 146 MMHG | RESPIRATION RATE: 17 BRPM | HEART RATE: 80 BPM | DIASTOLIC BLOOD PRESSURE: 80 MMHG | HEIGHT: 70 IN | WEIGHT: 161 LBS

## 2019-09-03 DIAGNOSIS — M54.2 NECK PAIN: Primary | ICD-10-CM

## 2019-09-03 RX ORDER — OXYCODONE AND ACETAMINOPHEN 5; 325 MG/1; MG/1
1 TABLET ORAL EVERY 6 HOURS PRN
Qty: 20 TABLET | Refills: 0 | Status: CANCELLED | OUTPATIENT
Start: 2019-09-03

## 2019-09-03 RX ORDER — OXYCODONE AND ACETAMINOPHEN 5; 325 MG/1; MG/1
1 TABLET ORAL EVERY 6 HOURS PRN
Qty: 20 TABLET | Refills: 0 | Status: SHIPPED | OUTPATIENT
Start: 2019-09-03 | End: 2020-09-25

## 2019-09-03 ASSESSMENT — PAIN SCALES - GENERAL: PAINLEVEL: NO PAIN (0)

## 2019-09-03 ASSESSMENT — MIFFLIN-ST. JEOR: SCORE: 1487.79

## 2019-09-03 NOTE — LETTER
"9/3/2019       RE: Ivy Haynes  [address]  [facility] [address]     Dear Colleague,    Thank you for referring your patient, Ivy Haynes, to the [facility] EAR NOSE AND THROAT at [facility]. Please see a copy of my visit note below.      Otolaryngology Clinic      Name: Iyv Haynes  MRN: [id number]  Age: 74 year old  : 2017      Chief Complaint:   Follow up      History of Present Illness:   Ivy Haynes is a 74 year old male who presents for follow up.  The patient has intermittent right-sided neck pain.  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 Orion Mccormack many years ago.  He continues to have pain a couple times a month which may last up to a couple days at a time.  He does a lot of yoga which has helped his pain in general.  On a rare basis (about once every week and a half), he takes Percocet which is helpful in alleviating his pain.     Review of Systems:   Pertinent items are noted in HPI or as in patient entered ROS below, remainder of complete ROS is negative.   [facility] ROS 9/3/2019   Neurology Headache   Ears, Nose, Throat Ear pain   Cardiopulmonary -   Gastrointestinal/Genitourinary -   Musculoskeletal Neck pain        Physical Exam:   BP (!) 146/80   Pulse 80   Resp 17   Ht 1.78 m (5' 10.08\")   Wt 73 kg (161 lb)   BMI 23.05 kg/m        PHYSICAL EXAMINATION:    Constitutional:  The patient was unaccompanied, well-groomed, and in no acute distress.    Skin:  Warm and pink.    Neurologic:  Alert and oriented x 3.  CN's III-XII within normal limits.  Voice normal.   Psychiatric:  The patient's affect was calm, cooperative, and appropriate.    Respiratory:  Breathing comfortably without stridor or exertion of accessory muscles.    Eyes: Extraocular movement intact.    Head:  Normocephalic and atraumatic.  No lesions or scars.    Ears:  Pinnae and tragus " non-tender.  Bilateral EAC's with flaky cerumen which was debrided with an alligator forceps.  TM's were clear.     OC/OP:  Normal tongue, floor of mouth, buccal mucosa, and palate.  No lesions or masses on inspection.  No abnormal lymph tissue in the oropharynx.  The pterygoid region is non-tender.    Neck:  Supple with normal laryngeal and tracheal landmarks.  The parotid beds were without masses.  No palpable thyroid.  Lymphatic:  There is no palpable lymphadenopathy in the neck.      Assessment and Plan:  Ivy Haynes is a 74 year old male with intermittent neck pain.  For breakthrough pain, I will prescribe him Percocet #20 which should last him about 6 months.     Follow-up: in about 6 months.         Scribe Disclosure:  I, Brianna Lee, am serving as a scribe to document services personally performed by Connor Torres MD at this visit, based upon the provider's statements to me. All documentation has been reviewed by the aforementioned provider prior to being entered into the official medical record.    Again, thank you for allowing me to participate in the care of your patient.      Sincerely,    Connor Torres MD

## 2019-09-03 NOTE — NURSING NOTE
"Chief Complaint   Patient presents with     RECHECK     follow up      Blood pressure (!) 146/80, pulse 80, resp. rate 17, height 1.78 m (5' 10.08\"), weight 73 kg (161 lb).    Loc Rodriguez LPN    "

## 2019-09-03 NOTE — PATIENT INSTRUCTIONS
1. You were seen in the ENT Clinic today by Dr. Torres  If you have any questions or concerns after your appointment, please call   - Option 1: ENT Clinic: [phone]  - Option 2: Filomena SCHMIDT Nurse Coordinator: [phone]    2. Plan to return to clinic in 6 months      Thank you for allowing us to be apart of your care!  Liseth Fernando LPN

## 2019-09-03 NOTE — PROGRESS NOTES
"  Otolaryngology Clinic      Name: Ivy Haynes  MRN: [id number]  Age: 74 year old  : 2017      Chief Complaint:   Follow up      History of Present Illness:   Ivy Haynes is a 74 year old male who presents for follow up.  The patient has intermittent right-sided neck pain.  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 Orion Mccormack many years ago.  He continues to have pain a couple times a month which may last up to a couple days at a time.  He does a lot of yoga which has helped his pain in general.  On a rare basis (about once every week and a half), he takes Percocet which is helpful in alleviating his pain.     Review of Systems:   Pertinent items are noted in HPI or as in patient entered ROS below, remainder of complete ROS is negative.    ENT ROS 9/3/2019   Neurology Headache   Ears, Nose, Throat Ear pain   Cardiopulmonary -   Gastrointestinal/Genitourinary -   Musculoskeletal Neck pain        Physical Exam:   BP (!) 146/80   Pulse 80   Resp 17   Ht 1.78 m (5' 10.08\")   Wt 73 kg (161 lb)   BMI 23.05 kg/m       PHYSICAL EXAMINATION:    Constitutional:  The patient was unaccompanied, well-groomed, and in no acute distress.    Skin:  Warm and pink.    Neurologic:  Alert and oriented x 3.  CN's III-XII within normal limits.  Voice normal.   Psychiatric:  The patient's affect was calm, cooperative, and appropriate.    Respiratory:  Breathing comfortably without stridor or exertion of accessory muscles.    Eyes: Extraocular movement intact.    Head:  Normocephalic and atraumatic.  No lesions or scars.    Ears:  Pinnae and tragus non-tender.  Bilateral EAC's with flaky cerumen which was debrided with an alligator forceps.  TM's were clear.     OC/OP:  Normal tongue, floor of mouth, buccal mucosa, and palate.  No lesions or masses on inspection.  No abnormal lymph tissue in the oropharynx.  The pterygoid " region is non-tender.    Neck:  Supple with normal laryngeal and tracheal landmarks.  The parotid beds were without masses.  No palpable thyroid.  Lymphatic:  There is no palpable lymphadenopathy in the neck.      Assessment and Plan:  Ivy Haynes is a 74 year old male with intermittent neck pain.  For breakthrough pain, I will prescribe him Percocet #20 which should last him about 6 months.     Follow-up: in about 6 months.         Scribe Disclosure:  I, Brianna Lee, am serving as a scribe to document services personally performed by Connor Torres MD at this visit, based upon the provider's statements to me. All documentation has been reviewed by the aforementioned provider prior to being entered into the official medical record.

## 2019-09-29 ENCOUNTER — HEALTH MAINTENANCE LETTER (OUTPATIENT)
Age: 74
End: 2019-09-29

## 2020-02-04 ENCOUNTER — OFFICE VISIT (OUTPATIENT)
Dept: FAMILY MEDICINE | Facility: CLINIC | Age: 75
End: 2020-02-04
Payer: COMMERCIAL

## 2020-02-04 VITALS
OXYGEN SATURATION: 99 % | TEMPERATURE: 97.3 F | SYSTOLIC BLOOD PRESSURE: 144 MMHG | DIASTOLIC BLOOD PRESSURE: 80 MMHG | BODY MASS INDEX: 23.44 KG/M2 | HEART RATE: 73 BPM | WEIGHT: 163.7 LBS

## 2020-02-04 DIAGNOSIS — I10 HYPERTENSION GOAL BP (BLOOD PRESSURE) < 140/90: ICD-10-CM

## 2020-02-04 DIAGNOSIS — M62.838 MUSCLE SPASM: Primary | ICD-10-CM

## 2020-02-04 PROCEDURE — 80048 BASIC METABOLIC PNL TOTAL CA: CPT | Performed by: FAMILY MEDICINE

## 2020-02-04 PROCEDURE — 36415 COLL VENOUS BLD VENIPUNCTURE: CPT | Performed by: FAMILY MEDICINE

## 2020-02-04 PROCEDURE — 99214 OFFICE O/P EST MOD 30 MIN: CPT | Performed by: FAMILY MEDICINE

## 2020-02-04 NOTE — PROGRESS NOTES
Subjective     Faruq Ivy is a 74 year old male who presents to clinic today for the following health issues:    HPI   Joint Pain    Onset: 3 weeks     Description:   Location: Patient is having spasms on left pectoral muscle  Character: none    Intensity: moderate    Progression of Symptoms: worse    Accompanying Signs & Symptoms:  Other symptoms: None. Spasms have been more present lately than before.     History:   Previous similar pain: no       Precipitating factors:   Trauma or overuse: no     Alleviating factors:  Improved by: massage    Therapies Tried and outcome: massage       Hypertension Follow-up      Do you check your blood pressure regularly outside of the clinic? Yes     Are you following a low salt diet? Yes    Are your blood pressures ever more than 140 on the top number (systolic) OR more   than 90 on the bottom number (diastolic), for example 140/90? No      Current Outpatient Medications   Medication Sig Dispense Refill     amLODIPine (NORVASC) 2.5 MG tablet Take 1 tablet (2.5 mg) by mouth daily 90 tablet 3     clindamycin (CLINDAMAX) 1 % topical gel 2 times daily       oxyCODONE-acetaminophen (PERCOCET) 5-325 MG tablet Take 1 tablet by mouth every 6 hours as needed for pain (Patient not taking: Reported on 2/4/2020) 20 tablet 0     oxyCODONE-acetaminophen (PERCOCET) 5-325 MG tablet TK 1 TABLET PO ECVERY 6  HOURS AS NEEDED FOR PAIN  0       Reviewed and updated as needed this visit by Provider         Review of Systems   ROS COMP: Constitutional, HEENT, cardiovascular, pulmonary, gi and gu systems are negative, except as otherwise noted.      Objective    BP (!) 144/80   Pulse 73   Temp 97.3  F (36.3  C) (Oral)   Wt 74.3 kg (163 lb 11.2 oz)   SpO2 99%   BMI 23.44 kg/m    Body mass index is 23.44 kg/m .  Physical Exam   GENERAL: healthy, alert and no distress  NECK: no adenopathy, no asymmetry, masses, or scars and thyroid normal to palpation  RESP: lungs clear to auscultation - no rales,  rhonchi or wheezes  CV: regular rate and rhythm, normal S1 S2, no S3 or S4, no murmur, click or rub, no peripheral edema and peripheral pulses strong  MS: no gross musculoskeletal defects noted, no edema  SKIN: no suspicious lesions or rashes  NEURO: Normal strength and tone, mentation intact and speech normal    Diagnostic Test Results:  Labs reviewed in Epic        Assessment & Plan     1. Muscle spasm  reasurance  - Basic metabolic panel    2. Hypertension goal BP (blood pressure) < 140/90  Mildly high here   check at home  Follow up in 2 months.          Regular exercise  See Patient Instructions    No follow-ups on file.    Lamine Franco MD  [facility]

## 2020-02-05 LAB
ANION GAP SERPL CALCULATED.3IONS-SCNC: 8 MMOL/L (ref 3–14)
BUN SERPL-MCNC: 14 MG/DL (ref 7–30)
CALCIUM SERPL-MCNC: 8.9 MG/DL (ref 8.5–10.1)
CHLORIDE SERPL-SCNC: 109 MMOL/L (ref 94–109)
CO2 SERPL-SCNC: 27 MMOL/L (ref 20–32)
CREAT SERPL-MCNC: 1.03 MG/DL (ref 0.66–1.25)
GFR SERPL CREATININE-BSD FRML MDRD: 72 ML/MIN/{1.73_M2}
GLUCOSE SERPL-MCNC: 89 MG/DL (ref 70–99)
POTASSIUM SERPL-SCNC: 4.2 MMOL/L (ref 3.4–5.3)
SODIUM SERPL-SCNC: 144 MMOL/L (ref 133–144)

## 2020-03-02 NOTE — PROGRESS NOTES
"  Otolaryngology Clinic      Name: Ivy Haynes  MRN: [id number]  Age: 74 year old  : 2018      Chief Complaint:   RECHECK     History of Present Illness:   Ivy Haynes is a 74 year old male with a history of intermittent right-sided neck pain, degenerative disease in his spine, and [place] syndrome s/p resection of styloid process who presents for follow up. He was last seen on 9/3/19 at which time he was still having intermittent pain and treating occasionally with Percocet.    Today he states things continue the same way. He has about 20 pain-free days each month, several days of nagging, tolerable pain, and a few days of severe pain which requires the pain medication. At this time, he has 2.5 tabs of Percocet left. The resection of his right styloid process was done about 15 years ago, he thinks.    He endorses a tickle in the back of his throat. He gets a lot of thick mucus drainage in his throat (not brown or green-colored). He has always cleared his throat somewhat often, but has been doing this more lately. These symptoms are worse in the morning. He takes Pepcid for reflux. He drinks about 60 oz of water per day.    He continues to exercise daily on a machine at home. He used to run long distance for years, but had a total knee replacement 7 years ago. 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    Review of Systems:   Pertinent items are noted in HPI or as in patient entered ROS below, remainder of complete ROS is negative.    ENT ROS 2020   Neurology -   Ears, Nose, Throat Ear pain, Sore throat   Cardiopulmonary -   Gastrointestinal/Genitourinary Heartburn/indigestion   Musculoskeletal Neck pain        Physical Exam:   Ht 1.78 m (5' 10.08\")   Wt 73.9 kg (163 lb)   BMI 23.34 kg/m       PHYSICAL EXAMINATION:    Constitutional:  The patient was unaccompanied, well-groomed, and in no acute distress.    Skin:  " Warm and pink.    Neurologic:  Alert and oriented x 3.  CN's III-XII within normal limits.  Voice normal.   Psychiatric:  The patient's affect was calm, cooperative, and appropriate.    Respiratory:  Breathing comfortably without stridor or exertion of accessory muscles.    Eyes: Extraocular movement intact.    Head:  Normocephalic and atraumatic.  No lesions or scars.       Nose:  Sinuses were non-tender.  Anterior rhinoscopy revealed midline septum and absence of purulence or polyps.    OC/OP:  Normal tongue, floor of mouth, buccal mucosa, and palate.  No lesions or masses on inspection or palpation.  No abnormal lymph tissue in the oropharynx.  The pterygoid region is non-tender.    Neck:  Supple with normal laryngeal and tracheal landmarks.  The parotid beds were without masses.  No palpable thyroid.  Lymphatic:  There is no palpable lymphadenopathy in the neck.     Assessment and Plan:  Ivy Haynes is a 74 year old male with a history of intermittent right-sided neck pain, degenerative disease in his spine, and [place] syndrome s/p resection of styloid process who presents for follow up. He continues to have the same intermittent neck pain and uses sparing Percocet, refilled today. He endorses morning symptoms of increased mucus drainage and throat tickle. Already taking Pepcid for reflux; I suggested he could try gargling with club soda in the morning. Follow-up in 6 months.    Cervicalgia  - oxyCODONE-acetaminophen (PERCOCET) 5-325 MG tablet  Dispense: 20 tablet; Refill: 0     Scribe Disclosure:  Judith WEBER, am serving as a scribe to document services personally performed by Connor Torres MD at this visit, based upon the provider's statements to me. All documentation has been reviewed by the aforementioned provider prior to being entered into the official medical record.

## 2020-03-03 ENCOUNTER — OFFICE VISIT (OUTPATIENT)
Dept: OTOLARYNGOLOGY | Facility: CLINIC | Age: 75
End: 2020-03-03
Payer: COMMERCIAL

## 2020-03-03 VITALS — HEIGHT: 70 IN | WEIGHT: 163 LBS | BODY MASS INDEX: 23.34 KG/M2

## 2020-03-03 DIAGNOSIS — M54.2 CERVICALGIA: Primary | ICD-10-CM

## 2020-03-03 RX ORDER — OXYCODONE AND ACETAMINOPHEN 5; 325 MG/1; MG/1
1 TABLET ORAL 2 TIMES DAILY PRN
Qty: 20 TABLET | Refills: 0 | Status: SHIPPED | OUTPATIENT
Start: 2020-03-03 | End: 2020-09-25

## 2020-03-03 ASSESSMENT — MIFFLIN-ST. JEOR: SCORE: 1496.86

## 2020-03-03 NOTE — NURSING NOTE
"Chief Complaint   Patient presents with     RECHECK     6 month follow-up         Height 1.78 m (5' 10.08\"), weight 73.9 kg (163 lb).    Leny Robertson, EMT    "

## 2020-03-03 NOTE — PATIENT INSTRUCTIONS
1. You were seen in the ENT Clinic today by Dr. Torres.  If you have any questions or concerns after your appointment, please call   - Option 1: ENT Clinic: [phone]  - Option 2: Filomena (Dr. Torres's Nurse): [phone]    2.   Plan to return to clinic in 6 months    Natice Schwab, RN  [facility] Otolaryngology  [phone]

## 2020-03-03 NOTE — LETTER
3/3/2020       RE: Ivy Haynes  [address]  [facility] [address]     Dear Colleague,    Thank you for referring your patient, Ivy Haynes, to the [facility] EAR NOSE AND THROAT at [facility]. Please see a copy of my visit note below.      Otolaryngology Clinic      Name: Ivy Haynes  MRN: [id number]  Age: 74 year old  : 2018      Chief Complaint:   RECHECK     History of Present Illness:   Ivy Haynes is a 74 year old male with a history of intermittent right-sided neck pain, degenerative disease in his spine, and [place] syndrome s/p resection of styloid process who presents for follow up. He was last seen on 9/3/19 at which time he was still having intermittent pain and treating occasionally with Percocet.    Today he states things continue the same way. He has about 20 pain-free days each month, several days of nagging, tolerable pain, and a few days of severe pain which requires the pain medication. At this time, he has 2.5 tabs of Percocet left. The resection of his right styloid process was done about 15 years ago, he thinks.    He endorses a tickle in the back of his throat. He gets a lot of thick mucus drainage in his throat (not brown or green-colored). He has always cleared his throat somewhat often, but has been doing this more lately. These symptoms are worse in the morning. He takes Pepcid for reflux. He drinks about 60 oz of water per day.    He continues to exercise daily on a machine at home. He used to run long distance for years, but had a total knee replacement 7 years ago. 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    Review of Systems:   Pertinent items are noted in HPI or as in patient entered ROS below, remainder of complete ROS is negative.    ENT ROS 2020   Neurology -   Ears, Nose, Throat Ear pain, Sore throat   Cardiopulmonary -  "  Gastrointestinal/Genitourinary Heartburn/indigestion   Musculoskeletal Neck pain        Physical Exam:   Ht 1.78 m (5' 10.08\")   Wt 73.9 kg (163 lb)   BMI 23.34 kg/m        PHYSICAL EXAMINATION:    Constitutional:  The patient was unaccompanied, well-groomed, and in no acute distress.    Skin:  Warm and pink.    Neurologic:  Alert and oriented x 3.  CN's III-XII within normal limits.  Voice normal.   Psychiatric:  The patient's affect was calm, cooperative, and appropriate.    Respiratory:  Breathing comfortably without stridor or exertion of accessory muscles.    Eyes: Extraocular movement intact.    Head:  Normocephalic and atraumatic.  No lesions or scars.       Nose:  Sinuses were non-tender.  Anterior rhinoscopy revealed midline septum and absence of purulence or polyps.    OC/OP:  Normal tongue, floor of mouth, buccal mucosa, and palate.  No lesions or masses on inspection or palpation.  No abnormal lymph tissue in the oropharynx.  The pterygoid region is non-tender.    Neck:  Supple with normal laryngeal and tracheal landmarks.  The parotid beds were without masses.  No palpable thyroid.  Lymphatic:  There is no palpable lymphadenopathy in the neck.     Assessment and Plan:  Ivy Haynes is a 74 year old male with a history of intermittent right-sided neck pain, degenerative disease in his spine, and [place] syndrome s/p resection of styloid process who presents for follow up. He continues to have the same intermittent neck pain and uses sparing Percocet, refilled today. He endorses morning symptoms of increased mucus drainage and throat tickle. Already taking Pepcid for reflux; I suggested he could try gargling with club soda in the morning. Follow-up in 6 months.    Cervicalgia  - oxyCODONE-acetaminophen (PERCOCET) 5-325 MG tablet  Dispense: 20 tablet; Refill: 0     Scribe Disclosure:  Judith WEBER, am serving as a scribe to document services personally performed by Connor Torres MD at this " visit, based upon the provider's statements to me. All documentation has been reviewed by the aforementioned provider prior to being entered into the official medical record.       Again, thank you for allowing me to participate in the care of your patient.      Sincerely,    Connor Torres MD

## 2020-03-09 ENCOUNTER — MYC MEDICAL ADVICE (OUTPATIENT)
Dept: FAMILY MEDICINE | Facility: CLINIC | Age: 75
End: 2020-03-09

## 2020-03-09 DIAGNOSIS — I10 HYPERTENSION GOAL BP (BLOOD PRESSURE) < 140/90: Primary | ICD-10-CM

## 2020-03-09 RX ORDER — AMLODIPINE BESYLATE 5 MG/1
5 TABLET ORAL DAILY
Qty: 90 TABLET | Refills: 1 | Status: SHIPPED | OUTPATIENT
Start: 2020-03-09 | End: 2020-09-04

## 2020-03-09 NOTE — TELEPHONE ENCOUNTER
Dr Franco    See pt MyChart message    Pharm cued     Advise    Joanne Rojas RN   [facility]

## 2020-03-09 NOTE — TELEPHONE ENCOUNTER
OK   Orders Placed This Encounter     amLODIPine (NORVASC) 5 MG tablet     Sig: Take 1 tablet (5 mg) by mouth daily     Dispense:  90 tablet     Refill:  1      recheck 1-2 months

## 2020-03-17 ENCOUNTER — VIRTUAL VISIT (OUTPATIENT)
Dept: FAMILY MEDICINE | Facility: OTHER | Age: 75
End: 2020-03-17

## 2020-03-17 NOTE — PROGRESS NOTES
"Date: 2020 16:46:38  Clinician: Kym Andersen  Clinician NPI: [id number]  Patient: Ivy Haynes  Patient : 1945  Patient Address: [address] [place] [id number]  Patient Phone: [phone]  Visit Protocol: URI  Patient Summary:  Ivy is a 74 year old ( : 1945 ) male who initiated a Visit for COVID-19 (Coronavirus) evaluation and screening. When asked the question \"Please sign me up to receive news, health information and promotions from [employer].\", Ivy responded \"No\".    Ivy states his symptoms started 1-2 days ago.   His symptoms consist of a cough and rhinitis.   Symptom details     Nasal secretions: The color of his mucus is white.    Cough: Ivy coughs a few times an hour and his cough is not more bothersome at night. Phlegm comes into his throat when he coughs. He does not believe his cough is caused by post-nasal drip. The color of the phlegm is white.      Ivy denies having wheezing, sore throat, nasal congestion, teeth pain, fever, chills, ear pain, malaise, headache, facial pain or pressure, and myalgias. He also denies taking antibiotic medication for the symptoms and having recent facial or sinus surgery in the past 60 days. He is not experiencing dyspnea.   Precipitating events  He has not recently been exposed to someone with influenza. Ivy has been in close contact with the following high risk individuals: adults 65 or older.   Pertinent COVID-19 (Coronavirus) information  Ivy has not traveled internationally or to the areas where COVID-19 (Coronavirus) is widespread in the last 14 days before the start of his symptoms.   Ivy has not had a close contact with a laboratory-confirmed COVID-19 patient within 14 days of symptom onset. He also has not had a close contact with a suspected COVID-19 patient within 14 days of symptom onset.   Ivy is not a healthcare worker and does not work in a healthcare facility.   Pertinent medical history  Ivy does not need " a return to work/school note.   Weight: 155 lbs   Ivy does not smoke or use smokeless tobacco.   Weight: 155 lbs    MEDICATIONS: amlodipine besylate (bulk), ALLERGIES: NKDA  Clinician Response:  Dear Ivy,   Based on the information you have provided, you do have symptoms that are consistent with Coronavirus (COVID-19).  The coronavirus causes mild to severe respiratory illness with the most common symptoms including fever, cough and difficulty breathing. Unfortunately, many viruses cause similar symptoms and it can be difficult to distinguish between viruses, especially in mild cases, so we are presuming that anyone with cough or fever has coronavirus at this time.  Coronavirus/COVID-19 has reached the point of community spread in [place], meaning that we are finding the virus in people with no known exposure risk for maya the virus. Given the increasing commonness of coronavirus in the community we are no longer testing patients who are not critically ill.  PLEASE NOTE BASED ON YOUR AGE AS WELL AS HISTORY OF HYPERTENSION YOU ARE A HIGH RISK INDIVIDUAL AND NEED TO CLOSELY MONITOR SYMPTOMS AND IF ANY MAJOR SERIOUS CONCERNS DEVELOP EITHER CONTACT YOU PRIMARY CARE PROVIDER OR GO TO THE ER FOR EVALUATION IF SEVERE.&nbsp;  For everyone else who has cough or fever, you should assume you are infected with coronavirus. Accordingly, you should self-quarantine for fourteen days from the first day your symptoms started. You should call if you find increasing shortness of breath, wheezing or sustained fever above 101.5. If you are significantly short of breath or experience chest pain you should call 911 or report to the nearest emergency department for urgent evaluation.    Isolate yourself at home.   Do Not allow any visitors  Do Not go to work or school  Do Not go to [facility],  centers, shopping, or other public places.  Do Not shake hands.  Avoid close contact with others (hugging, kissing).   Protect  Others:    Cover Your Mouth and Nose with a mask, disposable tissue or wash cloth to avoid spreading germs to others.  Wash your hands and face frequently with soap and water.   If you develop significant shortness of breath that prevents you from doing normal activities, please call 911 or proceed to the nearest emergency room and alert them immediately that you have been in self-isolation for possible coronavirus.   For more information about COVID19 and options for caring for yourself at home, please visit the CDC website at https://www.cdc.gov/coronavirus/2019-ncov/about/steps-when-sick.htmlFor more options for care at [facility], please visit our website at [email or web address]     Diagnosis: Cough  Diagnosis ICD: R05

## 2020-03-22 ENCOUNTER — MYC MEDICAL ADVICE (OUTPATIENT)
Dept: FAMILY MEDICINE | Facility: CLINIC | Age: 75
End: 2020-03-22

## 2020-05-13 ENCOUNTER — APPOINTMENT (OUTPATIENT)
Dept: GENERAL RADIOLOGY | Facility: CLINIC | Age: 75
End: 2020-05-13
Attending: EMERGENCY MEDICINE
Payer: COMMERCIAL

## 2020-05-13 ENCOUNTER — HOSPITAL ENCOUNTER (EMERGENCY)
Facility: CLINIC | Age: 75
Discharge: HOME | End: 2020-05-13
Attending: EMERGENCY MEDICINE | Admitting: EMERGENCY MEDICINE
Payer: COMMERCIAL

## 2020-05-13 ENCOUNTER — APPOINTMENT (OUTPATIENT)
Dept: CT IMAGING | Facility: CLINIC | Age: 75
End: 2020-05-13
Attending: EMERGENCY MEDICINE
Payer: COMMERCIAL

## 2020-05-13 VITALS
WEIGHT: 162.2 LBS | DIASTOLIC BLOOD PRESSURE: 96 MMHG | HEART RATE: 106 BPM | SYSTOLIC BLOOD PRESSURE: 169 MMHG | TEMPERATURE: 98 F | OXYGEN SATURATION: 100 % | BODY MASS INDEX: 23.22 KG/M2 | RESPIRATION RATE: 16 BRPM

## 2020-05-13 DIAGNOSIS — R07.89 ATYPICAL CHEST PAIN: ICD-10-CM

## 2020-05-13 DIAGNOSIS — R51.9 SCALP PAIN: ICD-10-CM

## 2020-05-13 LAB
ANION GAP SERPL CALCULATED.3IONS-SCNC: 5 MMOL/L (ref 3–14)
BASOPHILS # BLD AUTO: 0 10E9/L (ref 0–0.2)
BASOPHILS NFR BLD AUTO: 0.4 %
BUN SERPL-MCNC: 11 MG/DL (ref 7–30)
CALCIUM SERPL-MCNC: 9.1 MG/DL (ref 8.5–10.1)
CHLORIDE SERPL-SCNC: 106 MMOL/L (ref 94–109)
CO2 SERPL-SCNC: 28 MMOL/L (ref 20–32)
CREAT SERPL-MCNC: 0.97 MG/DL (ref 0.66–1.25)
DIFFERENTIAL METHOD BLD: ABNORMAL
EOSINOPHIL # BLD AUTO: 0 10E9/L (ref 0–0.7)
EOSINOPHIL NFR BLD AUTO: 0.7 %
ERYTHROCYTE [DISTWIDTH] IN BLOOD BY AUTOMATED COUNT: 12.2 % (ref 10–15)
ERYTHROCYTE [SEDIMENTATION RATE] IN BLOOD BY WESTERGREN METHOD: 7 MM/H (ref 0–20)
GFR SERPL CREATININE-BSD FRML MDRD: 77 ML/MIN/{1.73_M2}
GLUCOSE SERPL-MCNC: 104 MG/DL (ref 70–99)
HCT VFR BLD AUTO: 43.4 % (ref 40–53)
HGB BLD-MCNC: 15.6 G/DL (ref 13.3–17.7)
IMM GRANULOCYTES # BLD: 0 10E9/L (ref 0–0.4)
IMM GRANULOCYTES NFR BLD: 0 %
INTERPRETATION ECG - MUSE: NORMAL
LYMPHOCYTES # BLD AUTO: 1.2 10E9/L (ref 0.8–5.3)
LYMPHOCYTES NFR BLD AUTO: 41 %
MCH RBC QN AUTO: 34.1 PG (ref 26.5–33)
MCHC RBC AUTO-ENTMCNC: 35.9 G/DL (ref 31.5–36.5)
MCV RBC AUTO: 95 FL (ref 78–100)
MONOCYTES # BLD AUTO: 0.2 10E9/L (ref 0–1.3)
MONOCYTES NFR BLD AUTO: 8.1 %
NEUTROPHILS # BLD AUTO: 1.4 10E9/L (ref 1.6–8.3)
NEUTROPHILS NFR BLD AUTO: 49.8 %
NRBC # BLD AUTO: 0 10*3/UL
NRBC BLD AUTO-RTO: 0 /100
NT-PROBNP SERPL-MCNC: 21 PG/ML (ref 0–900)
PLATELET # BLD AUTO: 170 10E9/L (ref 150–450)
POTASSIUM SERPL-SCNC: 3.7 MMOL/L (ref 3.4–5.3)
RBC # BLD AUTO: 4.57 10E12/L (ref 4.4–5.9)
SODIUM SERPL-SCNC: 139 MMOL/L (ref 133–144)
TROPONIN I SERPL-MCNC: <0.015 UG/L (ref 0–0.04)
WBC # BLD AUTO: 2.8 10E9/L (ref 4–11)

## 2020-05-13 PROCEDURE — 83880 ASSAY OF NATRIURETIC PEPTIDE: CPT | Performed by: EMERGENCY MEDICINE

## 2020-05-13 PROCEDURE — 99285 EMERGENCY DEPT VISIT HI MDM: CPT | Mod: 25 | Performed by: EMERGENCY MEDICINE

## 2020-05-13 PROCEDURE — 93010 ELECTROCARDIOGRAM REPORT: CPT | Mod: Z6 | Performed by: EMERGENCY MEDICINE

## 2020-05-13 PROCEDURE — 80048 BASIC METABOLIC PNL TOTAL CA: CPT | Performed by: EMERGENCY MEDICINE

## 2020-05-13 PROCEDURE — 93005 ELECTROCARDIOGRAM TRACING: CPT | Performed by: EMERGENCY MEDICINE

## 2020-05-13 PROCEDURE — 85652 RBC SED RATE AUTOMATED: CPT | Performed by: EMERGENCY MEDICINE

## 2020-05-13 PROCEDURE — 70450 CT HEAD/BRAIN W/O DYE: CPT

## 2020-05-13 PROCEDURE — 84484 ASSAY OF TROPONIN QUANT: CPT | Performed by: EMERGENCY MEDICINE

## 2020-05-13 PROCEDURE — 85025 COMPLETE CBC W/AUTO DIFF WBC: CPT | Performed by: EMERGENCY MEDICINE

## 2020-05-13 PROCEDURE — 71046 X-RAY EXAM CHEST 2 VIEWS: CPT

## 2020-05-13 ASSESSMENT — ENCOUNTER SYMPTOMS
FACIAL ASYMMETRY: 0
SHORTNESS OF BREATH: 0
ABDOMINAL PAIN: 0
EYE REDNESS: 0
ARTHRALGIAS: 0
COLOR CHANGE: 0
CONFUSION: 0
WEAKNESS: 0
NUMBNESS: 0
TREMORS: 0
DIFFICULTY URINATING: 0
SPEECH DIFFICULTY: 0
FEVER: 0
NECK STIFFNESS: 0

## 2020-05-13 NOTE — ED AVS SNAPSHOT
[facility], [place], Emergency Department  [address]  [facility] [address]  Phone:  [phone]  Fax:  [phone]                                    Ivy Haynes   MRN: [id number]    Department:  [facility], Emergency Department   Date of Visit:  5/13/2020           After Visit Summary Signature Page    I have received my discharge instructions, and my questions have been answered. I have discussed any challenges I see with this plan with the nurse or doctor.    ..........................................................................................................................................  Patient/Patient Representative Signature      ..........................................................................................................................................  Patient Representative Print Name and Relationship to Patient    ..................................................               ................................................  Date                                   Time    ..........................................................................................................................................  Reviewed by Signature/Title    ...................................................              ..............................................  Date                                               Time          22EPIC Rev 08/18

## 2020-05-14 NOTE — ED PROVIDER NOTES
"ED Provider Note  [facility]      History     Chief Complaint   Patient presents with     Chest Pain     has been having L lateral sided CP x 1.5 months, dull, achy pain, \"comes and goes\" also c/o L temporal pain \" pulsating & tender to touch\" denies any bluury vision, denies trauma     HPI  Ivy Haynes is a 75 year old male who presents to the emergency department for evaluation of left-sided head pain as well as left-sided chest pain.  The patient states that he has had a intermittent throbbing sensation in the left lateral scalp above his left ear for the past week.  Today, it is more constant.  The patient states that his symptoms are intermittent.  He states at times they are significant to the point where it startles him briefly.  He denies any associated visual disturbance.  No diplopia.  No jaw claudication.  No speech disturbance.  No weakness or numbness.  No incoordination.  No gait disturbance.  The patient also states that he has had a sharp pain in his left anterolateral chest for the past 2 months.  It occurs intermittently as well.  He denies any shortness of breath.  No cough.  No fever.  He states he is able to exercise daily without exacerbation of the pain.  Patient denies any leg pain or swelling.  He does state that he was ill with symptoms consistent with COVID in mid-September.  He has subsequently recovered completely.    Past Medical History  Past Medical History:   Diagnosis Date     Back pain      Chest pain      Gastro-oesophageal reflux disease      GERD (gastroesophageal reflux disease)      Hemorrhoid      Hepatitis C      Hypertension      Trigeminal neuralgia      Past Surgical History:   Procedure Laterality Date     ARTHROPLASTY KNEE  11/9/2012    Procedure: ARTHROPLASTY KNEE;;  Surgeon: Ang Arellano MD;  Location: [facility] OR     ENT SURGERY  2009    Styloid process broken off     HERNIA REPAIR  12/10     RESECT TUMOR LOWER EXTREMITY  11/9/2012    " "Procedure: RESECT TUMOR LOWER EXTREMITY;  Excision of Tumor Left Distal Femur, Left Total Knee Replacement ;  Surgeon: Ang Arellano MD;  Location: [facility] OR     styloid process temporal bone surg  02/10     TONSILLECTOMY       amLODIPine (NORVASC) 2.5 MG tablet  amLODIPine (NORVASC) 5 MG tablet  clindamycin (CLINDAMAX) 1 % topical gel  oxyCODONE-acetaminophen (PERCOCET) 5-325 MG tablet  oxyCODONE-acetaminophen (PERCOCET) 5-325 MG tablet  oxyCODONE-acetaminophen (PERCOCET) 5-325 MG tablet      Allergies   Allergen Reactions     Sulfa Drugs      Past medical history, past surgical history, medications, and allergies were reviewed with the patient. Additional pertinent items: None    Family History  Family History   Problem Relation Age of Onset     Hypertension Mother      Cerebrovascular Disease Father 69     Cancer Paternal Grandfather      Cancer Son      Family history was reviewed with the patient. Additional pertinent items: None    Social History  Social History     Tobacco Use     Smoking status: Former Smoker     Packs/day: 0.10     Years: 4.00     Pack years: 0.40     Types: Cigarettes     Start date: 2011     Last attempt to quit: 1981     Years since quittin.9     Smokeless tobacco: Never Used     Tobacco comment: quit  until . started after trip to [place]   Substance Use Topics     Alcohol use: Yes     Comment: 1-2 beer a week      Drug use: Yes     Comment: Every other month-\"just a puff of marijuana\"      Social history was reviewed with the patient. Additional pertinent items: None    Review of Systems   Constitutional: Negative for fever.   HENT: Negative for congestion.    Eyes: Negative for redness.   Respiratory: Negative for shortness of breath.    Cardiovascular: Positive for chest pain.   Gastrointestinal: Negative for abdominal pain.   Genitourinary: Negative for difficulty urinating.   Musculoskeletal: Negative for arthralgias and neck stiffness.   Skin: Negative for " color change.   Neurological: Negative for tremors, facial asymmetry, speech difficulty, weakness and numbness.   Psychiatric/Behavioral: Negative for confusion.   All other systems reviewed and are negative.    A complete review of systems was performed with pertinent positives and negatives noted in the HPI, and all other systems negative.    Physical Exam   BP: (!) 164/98  Pulse: 112  Temp: 98.3  F (36.8  C)  Resp: 16  Weight: 73.6 kg (162 lb 3.2 oz)  SpO2: 98 %  Physical Exam  Vitals signs and nursing note reviewed.   Constitutional:       General: He is not in acute distress.     Appearance: He is not diaphoretic.   HENT:      Head: Atraumatic.      Comments: Left temporal artery pulsatile and nontender.  Eyes:      General: No scleral icterus.     Pupils: Pupils are equal, round, and reactive to light.   Cardiovascular:      Rate and Rhythm: Normal rate and regular rhythm.   Pulmonary:      Effort: Pulmonary effort is normal. No respiratory distress.   Abdominal:      Palpations: Abdomen is soft.      Tenderness: There is no abdominal tenderness.   Musculoskeletal: Normal range of motion.         General: No tenderness.   Skin:     General: Skin is warm and dry.      Findings: No rash.   Neurological:      Mental Status: He is oriented to person, place, and time.      Cranial Nerves: No cranial nerve deficit.      Motor: No weakness.      Gait: Gait is intact.         ED Course      Procedures             EKG Interpretation:      Interpreted by ISAÍAS ERIC MD, MD  Time reviewed: 1920  Symptoms at time of EKG: None   Rhythm: normal sinus   Rate: 94  Axis: Normal  Ectopy: none  Conduction: normal  ST Segments/ T Waves: No acute ischemic changes.  Poor R wave progression V3  Q Waves: v2  Comparison to prior: Q wave in V2 and decreased R wave in V3 compared to 3/10/2017    Clinical Impression: non-specific EKG    Results for orders placed or performed during the hospital encounter of 05/13/20   CT Head w/o  Contrast     Status: None    Narrative    CT OF THE HEAD WITHOUT CONTRAST 5/13/2020 8:12 PM     COMPARISON: Brain MRI 3/10/2012.    HISTORY: Headache    TECHNIQUE: 5 mm thick axial CT images of the head were acquired  without IV contrast material.    FINDINGS: There is mild diffuse cerebral volume loss. There are subtle  patchy areas of decreased density in the cerebral white matter  bilaterally that are consistent with sequela of chronic small vessel  ischemic disease.    The ventricles and basal cisterns are within normal limits in  configuration given the degree of cerebral volume loss.  There is no  midline shift. There are no extra-axial fluid collections.    No intracranial hemorrhage, mass or recent infarct.    The visualized paranasal sinuses are well-aerated. There is no  mastoiditis. There are no fractures of the visualized bones.      Impression    IMPRESSION: Diffuse cerebral volume loss and cerebral white matter  changes consistent with chronic small vessel ischemic disease. No  evidence for acute intracranial pathology.        Radiation dose for this scan was reduced using automated exposure  control, adjustment of the mA and/or kV according to patient size, or  iterative reconstruction technique    LISY VILLAVICENCIO MD   XR Chest 2 Views     Status: None    Narrative    CHEST TWO VIEWS  5/13/2020 7:58 PM     HISTORY:  Chest pain.    COMPARISON: 11/9/2018.      Impression    IMPRESSION: No significant interval change. Mild hyperinflation both  lungs. Chest otherwise negative. No pleural effusions. Heart size and  pulmonary vascularity are within normal limits.    HANNAH MCCONNELL MD   CBC with platelets differential     Status: Abnormal   Result Value Ref Range    WBC 2.8 (L) 4.0 - 11.0 10e9/L    RBC Count 4.57 4.4 - 5.9 10e12/L    Hemoglobin 15.6 13.3 - 17.7 g/dL    Hematocrit 43.4 40.0 - 53.0 %    MCV 95 78 - 100 fl    MCH 34.1 (H) 26.5 - 33.0 pg    MCHC 35.9 31.5 - 36.5 g/dL    RDW 12.2 10.0 - 15.0 %     Platelet Count 170 150 - 450 10e9/L    Diff Method Automated Method     % Neutrophils 49.8 %    % Lymphocytes 41.0 %    % Monocytes 8.1 %    % Eosinophils 0.7 %    % Basophils 0.4 %    % Immature Granulocytes 0.0 %    Nucleated RBCs 0 0 /100    Absolute Neutrophil 1.4 (L) 1.6 - 8.3 10e9/L    Absolute Lymphocytes 1.2 0.8 - 5.3 10e9/L    Absolute Monocytes 0.2 0.0 - 1.3 10e9/L    Absolute Eosinophils 0.0 0.0 - 0.7 10e9/L    Absolute Basophils 0.0 0.0 - 0.2 10e9/L    Abs Immature Granulocytes 0.0 0 - 0.4 10e9/L    Absolute Nucleated RBC 0.0    Basic metabolic panel     Status: Abnormal   Result Value Ref Range    Sodium 139 133 - 144 mmol/L    Potassium 3.7 3.4 - 5.3 mmol/L    Chloride 106 94 - 109 mmol/L    Carbon Dioxide 28 20 - 32 mmol/L    Anion Gap 5 3 - 14 mmol/L    Glucose 104 (H) 70 - 99 mg/dL    Urea Nitrogen 11 7 - 30 mg/dL    Creatinine 0.97 0.66 - 1.25 mg/dL    GFR Estimate 77 >60 mL/min/[1.73_m2]    GFR Estimate If Black 89 >60 mL/min/[1.73_m2]    Calcium 9.1 8.5 - 10.1 mg/dL   Erythrocyte sedimentation rate auto     Status: None   Result Value Ref Range    Sed Rate 7 0 - 20 mm/h   Troponin I     Status: None   Result Value Ref Range    Troponin I ES <0.015 0.000 - 0.045 ug/L   Nt probnp inpatient (BNP)     Status: None   Result Value Ref Range    N-Terminal Pro BNP Inpatient 21 0 - 900 pg/mL   EKG 12-lead, tracing only     Status: None (Preliminary result)   Result Value Ref Range    Interpretation ECG Click View Image link to view waveform and result             Medications - No data to display     Assessments & Plan (with Medical Decision Making)   75 year old male to the emergency department for evaluation of approximately 2-month history of left lateral chest pain.  He has had similar symptoms in the past intermittently throughout his life.  The patient does not have any respiratory symptoms.  He is not hypoxic.  He does not have any evidence for DVT.  His EKG and troponin are normal- do not  suspect ACS.  Chest radiograph does not reveal any acute abnormality.  Suspect symptoms are related to chest wall.  Additionally, patient has had approximately 1 week of intermittent throbbing sensation in his left temporal region.  His temporal artery is not tender and it is pulsatile.  His sed rate is normal so I do not suspect ACS.  Head CT does not reveal any acute abnormality.  His symptoms are confined to the scalp.  Suspect symptoms related to scalp neuralgia versus scalp muscle spasm.  Symptomatic treatment with acetaminophen recommended.  Return precautions provided.    I have reviewed the nursing notes. I have reviewed the findings, diagnosis, plan and need for follow up with the patient.    New Prescriptions    No medications on file       Final diagnoses:   Atypical chest pain   Scalp pain       --  STEVE ERIC MD, MD   Emergency Medicine   [facility], [place], EMERGENCY DEPARTMENT  5/13/2020     Steve Eric MD  05/13/20 [id number]

## 2020-05-14 NOTE — DISCHARGE INSTRUCTIONS
Take acetaminophen as needed for pain.    Return to the emergency department if difficulty breathing, fever, cough, leg swelling, weakness, difficulty with speaking or walking, or other concerns.    Follow-up with your primary care provider in 1 week as needed.

## 2020-07-08 ENCOUNTER — MYC REFILL (OUTPATIENT)
Dept: FAMILY MEDICINE | Facility: CLINIC | Age: 75
End: 2020-07-08

## 2020-07-08 DIAGNOSIS — I10 HYPERTENSION GOAL BP (BLOOD PRESSURE) < 140/90: ICD-10-CM

## 2020-07-08 NOTE — TELEPHONE ENCOUNTER
"Requested Prescriptions   Pending Prescriptions Disp Refills     amLODIPine (NORVASC) 2.5 MG tablet 90 tablet 3     Sig: Take 1 tablet (2.5 mg) by mouth daily         Last Written Prescription Date:  7/2/19  Last Fill Quantity: 90,   # refills: 3  Last Office Visit: 2/4/20  Future Office visit:       Routing refill request to provider for review/approval because:  BP not at goal      Calcium Channel Blockers Protocol  Failed - 7/8/2020 11:37 AM        Failed - Blood pressure under 140/90 in past 12 months     BP Readings from Last 3 Encounters:   05/13/20 (!) 169/96   02/04/20 (!) 144/80   09/03/19 (!) 146/80                 Passed - Recent (12 mo) or future (30 days) visit within the authorizing provider's specialty     Patient has had an office visit with the authorizing provider or a provider within the authorizing providers department within the previous 12 mos or has a future within next 30 days. See \"Patient Info\" tab in inbasket, or \"Choose Columns\" in Meds & Orders section of the refill encounter.              Passed - Medication is active on med list        Passed - Patient is age 18 or older        Passed - Normal serum creatinine on file in past 12 months     Recent Labs   Lab Test 05/13/20  1930   CR 0.97       Ok to refill medication if creatinine is low               "

## 2020-07-09 RX ORDER — AMLODIPINE BESYLATE 2.5 MG/1
2.5 TABLET ORAL DAILY
Qty: 90 TABLET | Refills: 3 | Status: SHIPPED | OUTPATIENT
Start: 2020-07-09 | End: 2021-10-04

## 2020-09-03 DIAGNOSIS — I10 HYPERTENSION GOAL BP (BLOOD PRESSURE) < 140/90: ICD-10-CM

## 2020-09-04 RX ORDER — AMLODIPINE BESYLATE 5 MG/1
TABLET ORAL
Qty: 90 TABLET | Refills: 1 | Status: SHIPPED | OUTPATIENT
Start: 2020-09-04 | End: 2020-09-25

## 2020-09-04 NOTE — TELEPHONE ENCOUNTER
"Requested Prescriptions   Pending Prescriptions Disp Refills     amLODIPine (NORVASC) 5 MG tablet [Pharmacy Med Name: AMLODIPINE BESYLATE 5MG TABLETS] 90 tablet 1     Sig: TAKE 1 TABLET(5 MG) BY MOUTH DAILY       Calcium Channel Blockers Protocol  Failed - 9/3/2020  3:39 AM        Failed - Blood pressure under 140/90 in past 12 months     BP Readings from Last 3 Encounters:   05/13/20 (!) 169/96   02/04/20 (!) 144/80   09/03/19 (!) 146/80                 Passed - Recent (12 mo) or future (30 days) visit within the authorizing provider's specialty     Patient has had an office visit with the authorizing provider or a provider within the authorizing providers department within the previous 12 mos or has a future within next 30 days. See \"Patient Info\" tab in inbasket, or \"Choose Columns\" in Meds & Orders section of the refill encounter.              Passed - Medication is active on med list        Passed - Patient is age 18 or older        Passed - Normal serum creatinine on file in past 12 months     Recent Labs   Lab Test 05/13/20  1930   CR 0.97       Ok to refill medication if creatinine is low           Routing refill request to provider for review/approval because:  BP does not meet RN refill protocol    Blanquita Murray RN   [facility]       "

## 2020-09-24 NOTE — PROGRESS NOTES
Subjective     Ivy Haynes is a 75 year old male who presents to clinic today for the following health issues:    HPI       Hypertension Follow-up      Do you check your blood pressure regularly outside of the clinic? Yes     Are you following a low salt diet? Yes    Are your blood pressures ever more than 140 on the top number (systolic) OR more   than 90 on the bottom number (diastolic), for example 140/90? No      How many servings of fruits and vegetables do you eat daily?  2-3    On average, how many sweetened beverages do you drink each day (Examples: soda, juice, sweet tea, etc.  Do NOT count diet or artificially sweetened beverages)?   1 with maple syrup     How many days per week do you exercise enough to make your heart beat faster? 7    How many minutes a day do you exercise enough to make your heart beat faster? 60 or more  30 minutes to two hours     How many days per week do you miss taking your medication? 0     Encounter Diagnoses   Name Primary?     Hypertension goal BP (blood pressure) < 140/90 Yes     Atopic neurodermatitis, itchy rash on earlobe, has this years ago         Review of Systems   Constitutional, HEENT, cardiovascular, pulmonary, gi and gu systems are negative, except as otherwise noted.      Objective    There were no vitals taken for this visit.  There is no height or weight on file to calculate BMI.  Physical Exam   GENERAL: healthy, alert and no distress  HENT: ear canals and TM's normal, nose and mouth without ulcers or lesions  NECK: no adenopathy, no asymmetry, masses, or scars and thyroid normal to palpation  RESP: lungs clear to auscultation - no rales, rhonchi or wheezes  CV: regular rate and rhythm, normal S1 S2, no S3 or S4, no murmur, click or rub, no peripheral edema and peripheral pulses strong  ABDOMEN: soft, nontender, no hepatosplenomegaly, no masses and bowel sounds normal  SKIN: excoriation dry patches - upper outer earlobes  NEURO: Normal strength and tone,  mentation intact and speech normal            Assessment & Plan     Hypertension goal BP (blood pressure) < 140/90  Well controlled   wants to do trial off medication, will check BP a nd resume if higher  - Lipid panel reflex to direct LDL Fasting  - Comprehensive metabolic panel    Atopic neurodermatitis  Use sparingly  - triamcinolone (KENALOG) 0.1 % external cream; Apply topically 2 times daily       Regular exercise  See Patient Instructions    No follow-ups on file.    Lamine Franco MD  [facility] PRIMARY CARE

## 2020-09-25 ENCOUNTER — OFFICE VISIT (OUTPATIENT)
Dept: FAMILY MEDICINE | Facility: CLINIC | Age: 75
End: 2020-09-25
Payer: COMMERCIAL

## 2020-09-25 VITALS
BODY MASS INDEX: 24.12 KG/M2 | SYSTOLIC BLOOD PRESSURE: 120 MMHG | DIASTOLIC BLOOD PRESSURE: 80 MMHG | HEIGHT: 70 IN | WEIGHT: 168.5 LBS | OXYGEN SATURATION: 97 % | HEART RATE: 84 BPM | RESPIRATION RATE: 16 BRPM | TEMPERATURE: 97.9 F

## 2020-09-25 DIAGNOSIS — L20.81 ATOPIC NEURODERMATITIS: ICD-10-CM

## 2020-09-25 DIAGNOSIS — I10 HYPERTENSION GOAL BP (BLOOD PRESSURE) < 140/90: Primary | ICD-10-CM

## 2020-09-25 LAB
ALBUMIN SERPL-MCNC: 3.7 G/DL (ref 3.4–5)
ALP SERPL-CCNC: 120 U/L (ref 40–150)
ALT SERPL W P-5'-P-CCNC: 28 U/L (ref 0–70)
ANION GAP SERPL CALCULATED.3IONS-SCNC: 4 MMOL/L (ref 3–14)
AST SERPL W P-5'-P-CCNC: 30 U/L (ref 0–45)
BILIRUB SERPL-MCNC: 0.5 MG/DL (ref 0.2–1.3)
BUN SERPL-MCNC: 8 MG/DL (ref 7–30)
CALCIUM SERPL-MCNC: 9 MG/DL (ref 8.5–10.1)
CHLORIDE SERPL-SCNC: 108 MMOL/L (ref 94–109)
CHOLEST SERPL-MCNC: 175 MG/DL
CO2 SERPL-SCNC: 25 MMOL/L (ref 20–32)
CREAT SERPL-MCNC: 1.17 MG/DL (ref 0.66–1.25)
GFR SERPL CREATININE-BSD FRML MDRD: 61 ML/MIN/{1.73_M2}
GLUCOSE SERPL-MCNC: 94 MG/DL (ref 70–99)
HDLC SERPL-MCNC: 65 MG/DL
LDLC SERPL CALC-MCNC: 94 MG/DL
NONHDLC SERPL-MCNC: 110 MG/DL
POTASSIUM SERPL-SCNC: 4.1 MMOL/L (ref 3.4–5.3)
PROT SERPL-MCNC: 7.3 G/DL (ref 6.8–8.8)
SODIUM SERPL-SCNC: 137 MMOL/L (ref 133–144)
TRIGL SERPL-MCNC: 78 MG/DL

## 2020-09-25 PROCEDURE — 80061 LIPID PANEL: CPT | Performed by: FAMILY MEDICINE

## 2020-09-25 PROCEDURE — 80053 COMPREHEN METABOLIC PANEL: CPT | Performed by: FAMILY MEDICINE

## 2020-09-25 PROCEDURE — 36415 COLL VENOUS BLD VENIPUNCTURE: CPT | Performed by: FAMILY MEDICINE

## 2020-09-25 PROCEDURE — 99214 OFFICE O/P EST MOD 30 MIN: CPT | Performed by: FAMILY MEDICINE

## 2020-09-25 RX ORDER — TRIAMCINOLONE ACETONIDE 1 MG/G
CREAM TOPICAL 2 TIMES DAILY
Qty: 15 G | Refills: 1 | Status: SHIPPED | OUTPATIENT
Start: 2020-09-25 | End: 2023-04-18

## 2020-09-25 ASSESSMENT — MIFFLIN-ST. JEOR: SCORE: 1516.83

## 2020-10-27 ENCOUNTER — OFFICE VISIT (OUTPATIENT)
Dept: OTOLARYNGOLOGY | Facility: CLINIC | Age: 75
End: 2020-10-27
Payer: COMMERCIAL

## 2020-10-27 VITALS
HEIGHT: 70 IN | TEMPERATURE: 98.6 F | WEIGHT: 162 LBS | OXYGEN SATURATION: 99 % | HEART RATE: 102 BPM | BODY MASS INDEX: 23.19 KG/M2

## 2020-10-27 DIAGNOSIS — M54.2 NECK PAIN: Primary | ICD-10-CM

## 2020-10-27 PROCEDURE — 99213 OFFICE O/P EST LOW 20 MIN: CPT | Performed by: OTOLARYNGOLOGY

## 2020-10-27 RX ORDER — OXYCODONE AND ACETAMINOPHEN 5; 325 MG/1; MG/1
1 TABLET ORAL EVERY 6 HOURS PRN
Qty: 20 TABLET | Refills: 0 | Status: SHIPPED | OUTPATIENT
Start: 2020-10-27 | End: 2020-11-19

## 2020-10-27 ASSESSMENT — PAIN SCALES - GENERAL: PAINLEVEL: NO PAIN (0)

## 2020-10-27 ASSESSMENT — MIFFLIN-ST. JEOR: SCORE: 1486.08

## 2020-10-27 NOTE — LETTER
10/27/2020       RE: Ivy Haynes  [address]  [facility] [address]     Dear Colleague,    Thank you for referring your patient, Ivy Haynes, to the [facility] EAR NOSE AND THROAT [facility]INIC [place] at [facility]. Please see a copy of my visit note below.      Otolaryngology Clinic      Name: Ivy Haynes  MRN: [id number]  Age: 74 year old  : 1945  10/27/2020      Chief Complaint:   RECHECK     History of Present Illness:   Ivy Haynes is a 74 year old male with a history of intermittent right-sided neck pain, degenerative disease in his spine, and [place] syndrome s/p resection of styloid process who presents for follow up. He was last seen on 9/3/19 at which time he was still having intermittent pain and treating occasionally with Percocet.    Today he states things continue the same way. He has about 20 pain-free days each month, several days of nagging, tolerable pain, and a few days of severe pain which requires the pain medication. At this time, he has 2.5 tabs of Percocet left. The resection of his right styloid process was done about 15 years ago, he thinks.    He endorses a tickle in the back of his throat. He gets a lot of thick mucus drainage in his throat (not brown or green-colored). He has always cleared his throat somewhat often, but has been doing this more lately. These symptoms are worse in the morning. He takes Pepcid for reflux. He drinks about 60 oz of water per day.    He continues to exercise daily on a machine at home. He used to run long distance for years, but had a total knee replacement 7 years ago. 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    Review of Systems:   Pertinent items are noted in HPI or as in patient entered ROS below, remainder of complete ROS is negative.    ENT ROS 2020   Neurology -   Ears, Nose, Throat Ear pain, Sore throat  "  Cardiopulmonary -   Gastrointestinal/Genitourinary Heartburn/indigestion   Musculoskeletal Neck pain        Physical Exam:   Pulse 102   Temp 98.6  F (37  C) (Temporal)   Ht 1.778 m (5' 10\")   Wt 73.5 kg (162 lb)   SpO2 99%   BMI 23.24 kg/m       PHYSICAL EXAMINATION:    Constitutional:  The patient was unaccompanied, well-groomed, and in no acute distress.    Skin:  Warm and pink.    Neurologic:  Alert and oriented x 3.  CN's III-XII within normal limits.  Voice normal.   Psychiatric:  The patient's affect was calm, cooperative, and appropriate.    Respiratory:  Breathing comfortably without stridor or exertion of accessory muscles.    Eyes: Extraocular movement intact.    Head:  Normocephalic and atraumatic.  No lesions or scars.       Nose:  Sinuses were non-tender.  Anterior rhinoscopy revealed midline septum and absence of purulence or polyps.    OC/OP:  Normal tongue, floor of mouth, buccal mucosa, and palate.  No lesions or masses on inspection or palpation.  No abnormal lymph tissue in the oropharynx.  The pterygoid region is non-tender.    Neck:  Supple with normal laryngeal and tracheal landmarks.  The parotid beds were without masses.  No palpable thyroid.  Lymphatic:  There is no palpable lymphadenopathy in the neck.     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 He continues to have the same intermittent neck pain and uses sparing Percocet, refilled today. He endorses morning symptoms of increased mucus drainage and throat tickle. Already taking Pepcid for reflux; I suggested he could try gargling with club soda in the morning. Follow-up in 6 months.    Cervicalgia  - oxyCODONE-acetaminophen (PERCOCET) 5-325 MG tablet  Dispense: 20 tablet; Refill: 0           Again, thank you for allowing me to participate in the care " of your patient.      Sincerely,    Connor Torres MD

## 2020-10-27 NOTE — NURSING NOTE
"Chief Complaint   Patient presents with     RECHECK     follow up       Pulse 102, temperature 98.6  F (37  C), temperature source Temporal, height 1.778 m (5' 10\"), weight 73.5 kg (162 lb), SpO2 99 %.    Deb France, EMT    "

## 2020-10-27 NOTE — PATIENT INSTRUCTIONS
1. You were seen in the ENT Clinic today by Dr. Torres.  If you have any questions or concerns after your appointment, please call   -  ENT Clinic: [phone]      2.   Plan to return to clinic in 6 months    Norma Kiser LPN  [facility]- Otolaryngology  [phone]    Or    Noemí López RN  [facility]- Otolaryngology   [phone]

## 2020-10-27 NOTE — PROGRESS NOTES
"  Otolaryngology Clinic      Name: Ivy Haynes  MRN: [id number]  Age: 74 year old  : 1945  10/27/2020      Chief Complaint:   RECHECK     History of Present Illness:   Ivy Haynes is a 74 year old male with a history of intermittent right-sided neck pain, degenerative disease in his spine, and [place] syndrome s/p resection of styloid process who presents for follow up. He was last seen on 9/3/19 at which time he was still having intermittent pain and treating occasionally with Percocet.    Today he states things continue the same way. He has about 20 pain-free days each month, several days of nagging, tolerable pain, and a few days of severe pain which requires the pain medication. At this time, he has 2.5 tabs of Percocet left. The resection of his right styloid process was done about 15 years ago, he thinks.    He endorses a tickle in the back of his throat. He gets a lot of thick mucus drainage in his throat (not brown or green-colored). He has always cleared his throat somewhat often, but has been doing this more lately. These symptoms are worse in the morning. He takes Pepcid for reflux. He drinks about 60 oz of water per day.    He continues to exercise daily on a machine at home. He used to run long distance for years, but had a total knee replacement 7 years ago. 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    Review of Systems:   Pertinent items are noted in HPI or as in patient entered ROS below, remainder of complete ROS is negative.    ENT ROS 2020   Neurology -   Ears, Nose, Throat Ear pain, Sore throat   Cardiopulmonary -   Gastrointestinal/Genitourinary Heartburn/indigestion   Musculoskeletal Neck pain        Physical Exam:   Pulse 102   Temp 98.6  F (37  C) (Temporal)   Ht 1.778 m (5' 10\")   Wt 73.5 kg (162 lb)   SpO2 99%   BMI 23.24 kg/m       PHYSICAL EXAMINATION:    Constitutional:  The patient was " unaccompanied, well-groomed, and in no acute distress.    Skin:  Warm and pink.    Neurologic:  Alert and oriented x 3.  CN's III-XII within normal limits.  Voice normal.   Psychiatric:  The patient's affect was calm, cooperative, and appropriate.    Respiratory:  Breathing comfortably without stridor or exertion of accessory muscles.    Eyes: Extraocular movement intact.    Head:  Normocephalic and atraumatic.  No lesions or scars.       Nose:  Sinuses were non-tender.  Anterior rhinoscopy revealed midline septum and absence of purulence or polyps.    OC/OP:  Normal tongue, floor of mouth, buccal mucosa, and palate.  No lesions or masses on inspection or palpation.  No abnormal lymph tissue in the oropharynx.  The pterygoid region is non-tender.    Neck:  Supple with normal laryngeal and tracheal landmarks.  The parotid beds were without masses.  No palpable thyroid.  Lymphatic:  There is no palpable lymphadenopathy in the neck.     Assessment and Plan:  Ivy Haynes is a 74 year old male with a history of intermittent right-sided neck pain, degenerative disease in his spine, and [place] syndrome s/p resection of styloid process who presents for follow up. He continues to have the same intermittent neck pain and uses sparing Percocet, refilled today. He endorses morning symptoms of increased mucus drainage and throat tickle. Already taking Pepcid for reflux; I suggested he could try gargling with club soda in the morning. Follow-up in 6 months.    Cervicalgia  - oxyCODONE-acetaminophen (PERCOCET) 5-325 MG tablet  Dispense: 20 tablet; Refill: 0

## 2020-11-12 NOTE — TELEPHONE ENCOUNTER
REFERRAL INFORMATION:    Referring Provider:  N/A    Referring Clinic:  N/A    Reason for Visit/Diagnosis: Inguinal Hernia        FUTURE VISIT INFORMATION:    Appointment Date: 11/19/2020    Appointment Time: 10 AM      NOTES RECORD STATUS  DETAILS   OFFICE NOTE from Referring Provider N/A    OFFICE NOTE from Other Specialists Internal 11/29/10 Office visit with Dr. Te Soriano (Surgery Clinic)    HOSPITAL DISCHARGE SUMMARY/ ED VISITS  N/A    OPERATIVE REPORT Internal 12/5/10 (Open mesh repair umbilical hernia, Laparoscopic right inguinal hernioplasty, total extraperitoneal approach- Dr. Soriano)   ENDOSCOPY (EGD)  N/A    PERTINENT LABS Internal    PATHOLOGY REPORTS (RELATED) N/A    IMAGING (CT, MRI, US, XR)  N/A

## 2020-11-18 ENCOUNTER — PATIENT OUTREACH (OUTPATIENT)
Dept: SURGERY | Facility: CLINIC | Age: 75
End: 2020-11-18

## 2020-11-18 NOTE — PROGRESS NOTES
Established Patient Telephone Reminder Call    Date of call:  11/18/20  Phone numbers:  Cell number on file:    Telephone Information:   Mobile [phone]       Reached patient/confirmed appointment:  No - left message:   on voicemail  Appointment with:   Dr. Te Soriano  Reason for visit:  Hernia

## 2020-11-19 ENCOUNTER — TELEPHONE (OUTPATIENT)
Dept: SURGERY | Facility: CLINIC | Age: 75
End: 2020-11-19

## 2020-11-19 ENCOUNTER — PATIENT OUTREACH (OUTPATIENT)
Dept: GASTROENTEROLOGY | Facility: CLINIC | Age: 75
End: 2020-11-19

## 2020-11-19 ENCOUNTER — OFFICE VISIT (OUTPATIENT)
Dept: SURGERY | Facility: CLINIC | Age: 75
End: 2020-11-19
Payer: COMMERCIAL

## 2020-11-19 ENCOUNTER — PRE VISIT (OUTPATIENT)
Dept: SURGERY | Facility: CLINIC | Age: 75
End: 2020-11-19

## 2020-11-19 VITALS
WEIGHT: 165.8 LBS | OXYGEN SATURATION: 98 % | SYSTOLIC BLOOD PRESSURE: 146 MMHG | DIASTOLIC BLOOD PRESSURE: 92 MMHG | BODY MASS INDEX: 23.74 KG/M2 | HEART RATE: 84 BPM | HEIGHT: 70 IN

## 2020-11-19 DIAGNOSIS — K40.90 LEFT INGUINAL HERNIA: Primary | ICD-10-CM

## 2020-11-19 DIAGNOSIS — Z11.59 ENCOUNTER FOR SCREENING FOR OTHER VIRAL DISEASES: ICD-10-CM

## 2020-11-19 DIAGNOSIS — Z13.88 SCREENING EXAMINATION FOR LEAD POISONING: Primary | ICD-10-CM

## 2020-11-19 PROCEDURE — 99203 OFFICE O/P NEW LOW 30 MIN: CPT | Performed by: SURGERY

## 2020-11-19 RX ORDER — CEFAZOLIN SODIUM 1 G/50ML
1 INJECTION, SOLUTION INTRAVENOUS SEE ADMIN INSTRUCTIONS
Status: CANCELLED | OUTPATIENT
Start: 2020-11-19

## 2020-11-19 RX ORDER — CEFAZOLIN SODIUM 2 G/50ML
2 SOLUTION INTRAVENOUS
Status: CANCELLED | OUTPATIENT
Start: 2020-11-19

## 2020-11-19 ASSESSMENT — MIFFLIN-ST. JEOR: SCORE: 1503.31

## 2020-11-19 ASSESSMENT — PAIN SCALES - GENERAL: PAINLEVEL: NO PAIN (0)

## 2020-11-19 NOTE — PROGRESS NOTES
Ivy Haynes is a 75 year old male with a 2 month history of a left inguinal mass with the following symptoms of pain.    Onset did not occur with lifting.  Obstructive symptoms:  no  Urinary difficulties:  occasionally  Chronic cough: no  Constipation:  no  Current level of activity:  High, actively retired, works out    Past medical and surgical history, medications, allergies, family history, and social history were reviewed with the patient. S/p RIH 10 years ago.    ROS: 10 point review of systems negative except noted in HPI  PHYSICAL EXAM  General appearance- healthy, alert, and in no distress.  Skin- Skin color and turgor normal.  No obvious rashes.  Neck- Neck is supple without obvious adenopathy.  Lungs- Respiratory effort unlabored.  Gait- Normal.  Abdomen - soft non distended, non tender without obvious masses.  LIH, right without.  Impression:  Inguinal hernia, left  Recommendation:  Laparoscopic left Inguinal Hernioplasty Robot assisted    A full discussion regarding the alternatives, risks, goals, and potential complications for this surgery was completed today.  The patient understood I would use non recalled mesh and  that the potential problems included but are not limited to:  Infection, bleeding, hematoma, seroma, recurrence, injury to nerve/muscle or involved testicle(if male), ischemic orchitis(if male), and chronic pain.    The patient verbally expressed understanding, was given the opportunity for questions, and gives full informed consent for the procedure.      Today the patient was instructed on the need for a preoperative H&P, NPO status prior to surgery, and the need to stop anticoagulants prior to surgery.  Additional educational material, soap, and instructions will be mailed out to the patients home.    The total time spent with this patient was 30 minutes.  Of this time, greater than 50% was spent counseling and coordinating care.

## 2020-11-19 NOTE — NURSING NOTE
Pre and Post op Patient Education/Teaching Flowsheet  Relevant Diagnosis:  Hernia  Teaching Topic:  Pre and post op teaching  Person(s) Involved in teaching:  Patient     Motivation Level:  Asks Questions:  Yes  Eager to Learn:  Yes  Cooperative:  Yes  Receptive (willing/able to accept information):  Yes  Any cultural factors/Adventist beliefs that may influence understanding or compliance?  No    Patient/caregiver/family demonstrates understanding of the following:  Reason for the appointment, diagnosis, and treatment plan:  Yes  Patient demonstrates understanding of the following:  Pre-op bowel prep:  No  Post-op pain management recommendations (medications, ice compress, binder/athletic supporter (if applicable), etc.:  Yes  Inguinal hernia patients:  Post-op urinary retention- discussed signs/symptoms and visit to ER for Haji catheter placement and to stay in place for at least 48 hours:  Yes  Restrictions:  Yes  Medications to take the day of surgery:  Per PCP  Blood thinner medications discussed and when to stop (if applicable):  Yes  Wound care:  Yes  Diabetes medication management (if applicable):  Per PCP  Which situations necessitate calling provider and whom to contact:  Discussed how to contact the hospital, nurse, and clinic scheduling staff if necessary      Date and time of surgery:  TBD  Location of surgery: [facility]  History and Physical and any other testing necessary prior to surgery:  Yes  Required time line for completion of History and Physical and any pre-op testing:  Yes  Discuss need for someone to drive patient home and stay with them for 24 hours:  Yes  Pre-op showering/scrub information with Surgical Scrub:  Yes  NPO Guidelines:  NPO per Anesthesia Guidelines  COVID-19 Testing:  Yes    Infection Prevention: Patient demonstrates understanding of the following:  Patient instructed on hand hygiene:  Yes  Surgical procedure site care will be taught and will be reviewed  at the time of discharge  Signs and symptoms of infection taught:  Yes  Wound care reviewed and will be taught at the time of discharge  Central venous catheter care will be taught at the time of discharge (if applicable)    Post-op follow-up:  Instructional materials used/given/mailed:  [place] Surgery Booklet, post op teaching sheet, Map, Soap, and arrival/location information    Surgical instructions mailed to patient

## 2020-11-19 NOTE — PATIENT INSTRUCTIONS
You met with Dr. Te Soriano.      Today's visit instructions:    Reminder:  Surgery Requirements  1. Your surgery will be at [facility]  2. You will need to arrive 1 1/2 to 2 hours early based on the location of your surgery.  3. You will need someone to drive you home (over 18 years old) and stay with you for 24 hours after the procedure  4. You will need a preop physical with your regular doctor (or PAC if requested by your surgeon) within 30 days of surgery- closer is always better  5. Stop any blood thinners, vitamins, minerals, or herbal supplements 5 days before surgery.  If you are taking a prescribed blood thinner please let us know for specific instructions  6. Fasting- a nurse from [facility] will call you 1-2 days before surgery to confirm your procedure and tell you when to stop eating and drinking  7. Wash with the soap the night before surgery and morning of surgery. See instructions in the Surgery Packet.  8. If you would like a procedure estimate please call Karen ABDALLA, Financial Counselor, [phone].    At this time, any patient that does not have COVID-19 testing within 4 days of surgery and results available to the surgeon and anesthesia team before the procedure may have their procedure postponed or canceled. We do this to keep our patients, providers, and employees safe. If you decline to test, then you will need to contact your surgeon to determine when or if your procedure will still take place.    OR    We highly encourage patients to get tested for COVID-19 at one of our designated [facility] testing sites. We process the tests in our lab, which allows us to get the results quickly. If you choose to get tested at a non-[facility] location, you will need to contact your primary care provider to make those arrangements and ensure the results are available to your surgeon before you arrive for your procedure. If we do not receive the results in time, your  procedure may be postponed or canceled. Please make sure your test is collected 3-days prior to your procedure date. The results will need to get faxed to [phone].     If you have questions please contact Jose RN or Gabriella RN during regular clinic hours, Monday through Friday 7:30 AM - 4:00 PM, or you can contact us via [employer] at anytime.       If you have urgent needs after-hours, weekends, or holidays please call the hospital at [phone] and ask to speak with our on-call General Surgery Team.    Appointment [phone], option #1   Nurse Advice (Jose or Gabriella): [phone]   Surgery Scheduler (Maureen): [phone]  Fax: [phone]    After Your Robotic Inguinal Hernia Repair         Incision care     Remove the bandage the day after surgery, but leave the medical tape (Steri-Strips) or glue in place. These will loosen and fall off on their own 5 to 7 days after surgery.     You may take a shower the day after surgery. Carefully wash your incision with soap and water. Do not submerge yourself in water (bath, whirlpool, hot tub, pool, lake) for 14 days after surgery.       Always wash your hands before touching your incisions or removing bandages.     It is not unusual to form a collection of fluid or blood under your incision that may feel firm or squishy- it can take several weeks to months for your body to reabsorb it.  At times, it may even drain.  If that should happen keep the area clean with soap, water,  and cover with a clean gauze dressing. You can change this daily or as needed.     Other medicines     Wait to start aspirin or blood thinners until the day after surgery. You can continue your regular medicines at your normal time the day after surgery.     Your pain medicine may cause constipation (hard, dry stools). To help with this, take the stool softener your doctor gave you or an over-the-counter stool softener or laxative. You can stop taking this when you are no longer  taking pain medicine and your bowel movements are back to normal.      For pain or discomfort     Take the narcotic pain medicine your doctor gave you as needed and as instructed on the bottle. If you prefer to use over-the-counter medication, use acetaminophen (Tylenol) or ibuprofen (Advil, Motrin) as instructed on the box. Do not take Tylenol if it is in your narcotic pain medication.     Use an ice pack on your groin for 20 minutes at a time as needed for the first 24 hours. Be sure to protect your skin by putting a cloth between the ice pack and your skin.     After 24 hours you can switch to heat for 20 minutes as needed. Be sure to protect your skin by putting a cloth between the heat pack and your skin.     It is normal to feel a lump in your groin after surgery. This lump may take up to 8 weeks to go away.      Activities     No driving until you feel it s safe to do so. Don t drive while taking narcotic pain medicine.     You can return to your other activities as normal, no restrictions.      Special equipment     If we gave you an athletic supporter, wear this for the first 3 days after surgery. You can wear it longer than this if you wish.      Diet     You can eat your regular meals after surgery.      When to call the doctor   Call your doctor if you have:     A fever above 101 F (38.3 C) (taken under the tongue), or a fever or chills lasting more than a day.     Redness at the incision site.     Any fluid or blood draining from the incision, especially if it smells bad.      Severe pain that doesn t improve with pain medicine.      Go to the emergency room if:   You can t urinate (pee) or feel pressure when you urinate. We will place a small, thin tube (catheter) to empty your bladder. This needs to stay in place for at least 2 days.      We will call you 2 to 4 days after surgery to review this handout, answer questions and help arrange after-surgery care. If you have questions or concerns, please  call [phone] during regular office hours. If you need to call after business hours, call [phone] and ask to page the surgeon on-call.

## 2020-11-19 NOTE — TELEPHONE ENCOUNTER
Patient is scheduled for surgery with Dr. Te Soriano      Spoke with or Left message for: Ivy Renteriaq in clinic about surgery dates - patient wanted ASAP.    Date of Surgery: 2020 at 10:30 AM with Dr. Te Soriano    Location:     [facility]    Pre-op with surgeon (if applicable): 2020    H&P: Scheduled with [facility] (P[facility]) 2020 at 8:00 AM.     Informed patient they will need an adult : Yes  Name of : wife    Special Equipment: Bench[employer] robot    COVID-19 testin2020 at 7:30 AM    Surgery packet: [employer] and Mailed on 2020     Additional comments: He also knows to call general surgery if he experiences any cold or flu symptoms. Surgery teaching and soap were given to in clinic on 2020. He was asked to call [phone] if he needs to reschedule and [phone] if he experiences any symptoms.    Fax sent to [facility]: sent in basket message to BRAYAN Garcia asking for signed orders to fax to [facility]

## 2020-11-19 NOTE — TELEPHONE ENCOUNTER
FUTURE VISIT INFORMATION      SURGERY INFORMATION:    Pre-op DOS - Bo    Consult: ov 20    RECORDS REQUESTED FROM:       Primary Care Provider: Lamine Franco MD- Config Consultantsth    Pertinent Medical History: Hypertension    Most recent EKG+ Tracin20    Most recent Cardiac Stress Test: 17

## 2020-11-19 NOTE — NURSING NOTE
"Chief Complaint   Patient presents with     Hernia     New consault for possible hernia surgery.       Vitals:    11/19/20 0950   BP: (!) 146/92   BP Location: Left arm   Patient Position: Sitting   Cuff Size: Adult Regular   Pulse: 84   SpO2: 98%   Weight: 75.2 kg (165 lb 12.8 oz)   Height: 1.778 m (5' 10\")       Body mass index is 23.79 kg/m .           Heather Peña CMA    "

## 2020-11-19 NOTE — LETTER
11/19/2020       RE: Ivy Haynes  [address]  [place] [facility] [address]     Dear Colleague,    Thank you for referring your patient, Iyv Haynes, to the [facility] GENERAL SURGERY CLINIC [place] at [facility]. Please see a copy of my visit note below.    Ivy Haynes is a 75 year old male with a 2 month history of a left inguinal mass with the following symptoms of pain.    Onset did not occur with lifting.  Obstructive symptoms:  no  Urinary difficulties:  occasionally  Chronic cough: no  Constipation:  no  Current level of activity:  High, actively retired, works out    Past medical and surgical history, medications, allergies, family history, and social history were reviewed with the patient. S/p RIH 10 years ago.    ROS: 10 point review of systems negative except noted in HPI  PHYSICAL EXAM  General appearance- healthy, alert, and in no distress.  Skin- Skin color and turgor normal.  No obvious rashes.  Neck- Neck is supple without obvious adenopathy.  Lungs- Respiratory effort unlabored.  Gait- Normal.  Abdomen - soft non distended, non tender without obvious masses.  LIH, right without.  Impression:  Inguinal hernia, left  Recommendation:  Laparoscopic left Inguinal Hernioplasty Robot assisted    A full discussion regarding the alternatives, risks, goals, and potential complications for this surgery was completed today.  The patient understood I would use non recalled mesh and  that the potential problems included but are not limited to:  Infection, bleeding, hematoma, seroma, recurrence, injury to nerve/muscle or involved testicle(if male), ischemic orchitis(if male), and chronic pain.    The patient verbally expressed understanding, was given the opportunity for questions, and gives full informed consent for the procedure.      Today the patient was instructed on the need for a preoperative H&P, NPO status prior to surgery, and the need to stop  anticoagulants prior to surgery.  Additional educational material, soap, and instructions will be mailed out to the patients home.    The total time spent with this patient was 30 minutes.  Of this time, greater than 50% was spent counseling and coordinating care.                  Again, thank you for allowing me to participate in the care of your patient.      Sincerely,    Te Soriano MD

## 2020-11-20 ENCOUNTER — PRE VISIT (OUTPATIENT)
Dept: SURGERY | Facility: CLINIC | Age: 75
End: 2020-11-20

## 2020-11-20 ENCOUNTER — OFFICE VISIT (OUTPATIENT)
Dept: SURGERY | Facility: CLINIC | Age: 75
End: 2020-11-20
Payer: COMMERCIAL

## 2020-11-20 ENCOUNTER — ANESTHESIA EVENT (OUTPATIENT)
Dept: SURGERY | Facility: CLINIC | Age: 75
End: 2020-11-20

## 2020-11-20 VITALS
RESPIRATION RATE: 16 BRPM | SYSTOLIC BLOOD PRESSURE: 182 MMHG | TEMPERATURE: 98 F | HEART RATE: 80 BPM | DIASTOLIC BLOOD PRESSURE: 98 MMHG | OXYGEN SATURATION: 96 % | WEIGHT: 166 LBS | BODY MASS INDEX: 23.77 KG/M2 | HEIGHT: 70 IN

## 2020-11-20 DIAGNOSIS — K40.90 LEFT INGUINAL HERNIA: ICD-10-CM

## 2020-11-20 DIAGNOSIS — Z11.59 ENCOUNTER FOR SCREENING FOR OTHER VIRAL DISEASES: ICD-10-CM

## 2020-11-20 DIAGNOSIS — Z01.818 PREOP EXAMINATION: ICD-10-CM

## 2020-11-20 DIAGNOSIS — Z01.818 PREOP EXAMINATION: Primary | ICD-10-CM

## 2020-11-20 DIAGNOSIS — R33.8 POSTOPERATIVE URINARY RETENTION: ICD-10-CM

## 2020-11-20 DIAGNOSIS — N99.89 POSTOPERATIVE URINARY RETENTION: ICD-10-CM

## 2020-11-20 LAB
ANION GAP SERPL CALCULATED.3IONS-SCNC: 4 MMOL/L (ref 3–14)
BUN SERPL-MCNC: 8 MG/DL (ref 7–30)
CALCIUM SERPL-MCNC: 9 MG/DL (ref 8.5–10.1)
CHLORIDE SERPL-SCNC: 109 MMOL/L (ref 94–109)
CO2 SERPL-SCNC: 28 MMOL/L (ref 20–32)
CREAT SERPL-MCNC: 1.07 MG/DL (ref 0.66–1.25)
ERYTHROCYTE [DISTWIDTH] IN BLOOD BY AUTOMATED COUNT: 12.2 % (ref 10–15)
GFR SERPL CREATININE-BSD FRML MDRD: 68 ML/MIN/{1.73_M2}
GLUCOSE SERPL-MCNC: 108 MG/DL (ref 70–99)
HCT VFR BLD AUTO: 46.4 % (ref 40–53)
HGB BLD-MCNC: 15.5 G/DL (ref 13.3–17.7)
MCH RBC QN AUTO: 33.2 PG (ref 26.5–33)
MCHC RBC AUTO-ENTMCNC: 33.4 G/DL (ref 31.5–36.5)
MCV RBC AUTO: 99 FL (ref 78–100)
PLATELET # BLD AUTO: 172 10E9/L (ref 150–450)
POTASSIUM SERPL-SCNC: 3.9 MMOL/L (ref 3.4–5.3)
RBC # BLD AUTO: 4.67 10E12/L (ref 4.4–5.9)
SODIUM SERPL-SCNC: 141 MMOL/L (ref 133–144)
WBC # BLD AUTO: 4.4 10E9/L (ref 4–11)

## 2020-11-20 PROCEDURE — 85027 COMPLETE CBC AUTOMATED: CPT | Performed by: PATHOLOGY

## 2020-11-20 PROCEDURE — 99000 SPECIMEN HANDLING OFFICE-LAB: CPT | Performed by: PATHOLOGY

## 2020-11-20 PROCEDURE — 99203 OFFICE O/P NEW LOW 30 MIN: CPT | Performed by: NURSE PRACTITIONER

## 2020-11-20 PROCEDURE — U0003 INFECTIOUS AGENT DETECTION BY NUCLEIC ACID (DNA OR RNA); SEVERE ACUTE RESPIRATORY SYNDROME CORONAVIRUS 2 (SARS-COV-2) (CORONAVIRUS DISEASE [COVID-19]), AMPLIFIED PROBE TECHNIQUE, MAKING USE OF HIGH THROUGHPUT TECHNOLOGIES AS DESCRIBED BY CMS-2020-01-R: HCPCS | Mod: 90 | Performed by: PATHOLOGY

## 2020-11-20 PROCEDURE — 36415 COLL VENOUS BLD VENIPUNCTURE: CPT | Performed by: PATHOLOGY

## 2020-11-20 PROCEDURE — 80048 BASIC METABOLIC PNL TOTAL CA: CPT | Performed by: PATHOLOGY

## 2020-11-20 RX ORDER — TAMSULOSIN HYDROCHLORIDE 0.4 MG/1
0.4 CAPSULE ORAL DAILY
Qty: 3 CAPSULE | Refills: 0 | Status: SHIPPED | OUTPATIENT
Start: 2020-11-21 | End: 2020-11-24

## 2020-11-20 RX ORDER — FAMOTIDINE 20 MG/1
20 TABLET, FILM COATED ORAL PRN
COMMUNITY
Start: 2020-11-20 | End: 2024-04-11

## 2020-11-20 SDOH — ECONOMIC STABILITY: FOOD INSECURITY: WITHIN THE PAST 12 MONTHS, THE FOOD YOU BOUGHT JUST DIDN'T LAST AND YOU DIDN'T HAVE MONEY TO GET MORE.: NOT ASKED

## 2020-11-20 SDOH — ECONOMIC STABILITY: INCOME INSECURITY: HOW HARD IS IT FOR YOU TO PAY FOR THE VERY BASICS LIKE FOOD, HOUSING, MEDICAL CARE, AND HEATING?: NOT ASKED

## 2020-11-20 SDOH — ECONOMIC STABILITY: FOOD INSECURITY: WITHIN THE PAST 12 MONTHS, YOU WORRIED THAT YOUR FOOD WOULD RUN OUT BEFORE YOU GOT MONEY TO BUY MORE.: NOT ASKED

## 2020-11-20 SDOH — ECONOMIC STABILITY: TRANSPORTATION INSECURITY
IN THE PAST 12 MONTHS, HAS THE LACK OF TRANSPORTATION KEPT YOU FROM MEDICAL APPOINTMENTS OR FROM GETTING MEDICATIONS?: NOT ASKED

## 2020-11-20 SDOH — ECONOMIC STABILITY: TRANSPORTATION INSECURITY
IN THE PAST 12 MONTHS, HAS LACK OF TRANSPORTATION KEPT YOU FROM MEETINGS, WORK, OR FROM GETTING THINGS NEEDED FOR DAILY LIVING?: NOT ASKED

## 2020-11-20 ASSESSMENT — MIFFLIN-ST. JEOR: SCORE: 1504.22

## 2020-11-20 ASSESSMENT — LIFESTYLE VARIABLES: TOBACCO_USE: 1

## 2020-11-20 NOTE — H&P
Pre-Operative H & P     CC:  Preoperative exam to assess for increased cardiopulmonary risk while undergoing surgery and anesthesia.    Date of Encounter: 11/20/2020  Primary Care Physician:  Lamine Franco  Associated diagnosis:  Left inguinal hernia    HPI  Ivy Haynes is a 75 year old male who presents for pre-operative H & P in preparation for a laparoscopic left inguinal hernia repair on 11/23/20 by Dr. Soriano at the [facility].     Ivy Haynes is a 75 year old male with hypertension, neck pain, GERD, history of hepatitis C and hepatic fibrosis that has a left inguinal hernia.  He notes that this isn't visible, but has been noticeable for the last 1-2 months.  He gets intermittent pain near the left scrotum that radiates down into the left anterior thigh.  Cause unknown.  He has consulted with Dr. Soriano and the above listed surgery has been recommended for treatment.     History is obtained from the patient and the medical record.     Past Medical History  Past Medical History:   Diagnosis Date     Back pain      Chest pain      Gastro-oesophageal reflux disease      GERD (gastroesophageal reflux disease)      Hemorrhoid      Hepatitis C      Hypertension      Trigeminal neuralgia        Past Surgical History  Past Surgical History:   Procedure Laterality Date     ARTHROPLASTY KNEE  11/9/2012    Procedure: ARTHROPLASTY KNEE;;  Surgeon: Ang Arellano MD;  [facility]     ENT SURGERY  2009    Styloid process broken off     HERNIA REPAIR  12/10     RESECT TUMOR LOWER EXTREMITY  11/9/2012    Procedure: RESECT TUMOR LOWER EXTREMITY;  Excision of Tumor Left Distal Femur, Left Total Knee Replacement ;  Surgeon: Ang Arellano MD;  [facility]: [facility] OR     styloid process temporal bone surg  02/10     TONSILLECTOMY  1954       Hx of Blood transfusions/reactions: none     Hx of abnormal bleeding or anti-platelet use: none      Steroid use in the last year: none    Personal or FH with  "difficulty with Anesthesia:  Patient has a history of post-op urinary retention, but has no family history of anesthesia complications.      Prior to Admission Medications  Current Outpatient Medications   Medication Sig Dispense Refill     amLODIPine (NORVASC) 2.5 MG tablet Take 1 tablet (2.5 mg) by mouth daily 90 tablet 3     famotidine (PEPCID) 20 MG tablet Take 1 tablet (20 mg) by mouth as needed       ibuprofen (ADVIL/MOTRIN) 100 MG tablet Take 100 mg by mouth daily       [START ON 2020] tamsulosin (FLOMAX) 0.4 MG capsule Take 1 capsule (0.4 mg) by mouth daily for 3 days 3 capsule 0     triamcinolone (KENALOG) 0.1 % external cream Apply topically 2 times daily 15 g 1       Allergies  Allergies   Allergen Reactions     Sulfa Drugs        Social History  Social History     Socioeconomic History     Marital status:      Spouse name: Not on file     Number of children: 3     Years of education: Not on file     Highest education level: Not on file   Occupational History     Occupation: retired   Social Needs     Financial resource strain: Not on file     Food insecurity     Worry: Not on file     Inability: Not on file     Transportation needs     Medical: Not on file     Non-medical: Not on file   Tobacco Use     Smoking status: Former Smoker     Packs/day: 0.10     Years: 4.00     Pack years: 0.40     Types: Cigarettes     Start date: 2011     Quit date: 1981     Years since quittin.5     Smokeless tobacco: Never Used     Tobacco comment: quit  until . started after trip to [place]   Substance and Sexual Activity     Alcohol use: Yes     Comment: 1-2 beer a week      Drug use: Yes     Types: Marijuana     Comment: Every other month-\"just a puff of marijuana\"     Sexual activity: Yes     Partners: Female     Birth control/protection: None   Lifestyle     Physical activity     Days per week: Not on file     Minutes per session: Not on file     Stress: Not on file   Relationships     " Social connections     Talks on phone: Not on file     Gets together: Not on file     Attends Temple service: Not on file     Active member of club or organization: Not on file     Attends meetings of clubs or organizations: Not on file     Relationship status: Not on file     Intimate partner violence     Fear of current or ex partner: Not on file     Emotionally abused: Not on file     Physically abused: Not on file     Forced sexual activity: Not on file   Other Topics Concern     Parent/sibling w/ CABG, MI or angioplasty before 65F 55M? No   Social History Narrative     Not on file       Family History  Family History   Problem Relation Age of Onset     Hypertension Mother      Cerebrovascular Disease Father 69     Cancer Paternal Grandfather      Cancer Son      No Known Problems Half-Sister      No Known Problems Half-Sister      No Known Problems Half-Sister      No Known Problems Half-Sister      No Known Problems Half-Brother      Kidney failure Half-Brother      Deep Vein Thrombosis No family hx of      Anesthesia Reaction No family hx of            Anesthesia Evaluation     . Pt has had prior anesthetic. Type: General and Regional    History of post-op urinary retention.          ROS/MED HX  The complete review of systems is negative other than noted in the HPI or here.   ENT/Pulmonary:     (+)CHELO risk factors hypertension, tobacco use, Past use 0.1 packs/day  , . .   (-) recent URI   Neurologic:  - neg neurologic ROS     Cardiovascular:     (+) hypertension----. : . . . :. . Previous cardiac testing date:results:date: results: date: results:Cath date: 2017 results:          METS/Exercise Tolerance:  >4 METS   Hematologic:  - neg hematologic  ROS      (-) history of blood clots and History of Transfusion   Musculoskeletal:  - neg musculoskeletal ROS       GI/Hepatic:     (+) GERD Other, hepatitis type C, liver disease, Other GI/Hepatic hepatic fibrosis      Renal/Genitourinary:  - ROS Renal section  "negative   (+) BPH,    (-) other renal    Endo:  - neg endo ROS       Psychiatric:  - neg psychiatric ROS       Infectious Disease:  - neg infectious disease ROS      (-) Recent Fever   Malignancy:      - no malignancy   Other:    (+) no H/O Chronic Pain,                       PHYSICAL EXAM:   Mental Status/Neuro: A/A/O; Age Appropriate   Airway: Facies: Feasible  Mallampati: I  Mouth/Opening: Full  TM distance: > 6 cm  Neck ROM: Full   Respiratory: Auscultation: CTAB     Resp. Rate: Normal     Resp. Effort: Normal      CV: Rhythm: Regular  Rate: Age appropriate  Heart: Normal Sounds  Edema: None   Comments: Multiple implants     Dental: Details              Temp: 98  F (36.7  C) Temp src: Oral BP: (!) 182/98, repeat 150/91, Pulse: 80   Resp: 16 SpO2: 96 %         166 lbs 0 oz  5' 10\"   Body mass index is 23.82 kg/m .       Physical Exam  Constitutional: Awake, alert, cooperative, no apparent distress, and appears stated age.  Eyes: Pupils equal, round and reactive to light, extra ocular muscles intact, sclera clear, conjunctiva normal.  HENT: Normocephalic, oral pharynx with moist mucus membranes, good dentition. No goiter appreciated.   Respiratory: Clear to auscultation bilaterally, no crackles or wheezing.  Cardiovascular: Regular rate and rhythm, normal S1 and S2, and no murmur noted.  Carotids +2, no bruits. No edema. Palpable pulses to radial  DP and PT arteries.   GI: Normal bowel sounds, soft, non-distended, non-tender, no masses palpated, no hepatosplenomegaly.   Lymph/Hematologic: No cervical lymphadenopathy and no supraclavicular lymphadenopathy.  Genitourinary:  deferred  Skin: Warm and dry.    Musculoskeletal: Full ROM of neck. There is no redness, warmth, or swelling of the exposed joints. Gross motor strength is normal.    Neurologic: Awake, alert, oriented to name, place and time. Cranial nerves II-XII are grossly intact. Gait is normal.   Neuropsychiatric: Calm, cooperative. Normal affect. "     Labs: (personally reviewed)  Component      Latest Ref Rng & Units 2020   Sodium      133 - 144 mmol/L 141   Potassium      3.4 - 5.3 mmol/L 3.9   Chloride      94 - 109 mmol/L 109   Carbon Dioxide      20 - 32 mmol/L 28   Anion Gap      3 - 14 mmol/L 4   Glucose      70 - 99 mg/dL 108 (H)   Urea Nitrogen      7 - 30 mg/dL 8   Creatinine      0.66 - 1.25 mg/dL 1.07   GFR Estimate      >60 mL/min/1.73:m2 68   GFR Estimate If Black      >60 mL/min/1.73:m2 79   Calcium      8.5 - 10.1 mg/dL 9.0   WBC      4.0 - 11.0 10e9/L 4.4   RBC Count      4.4 - 5.9 10e12/L 4.67   Hemoglobin      13.3 - 17.7 g/dL 15.5   Hematocrit      40.0 - 53.0 % 46.4   MCV      78 - 100 fl 99   MCH      26.5 - 33.0 pg 33.2 (H)   MCHC      31.5 - 36.5 g/dL 33.4   RDW      10.0 - 15.0 % 12.2   Platelet Count      150 - 450 10e9/L 172   - covid test pending.   Surgery department to follow.    EK2020  Sinus rhythm  Anteroseptal infarct , age undetermined  Abnormal ECG      Cardiac cath    SUMMARY:   --Noncardiac chest pain.  --No angiographic evidence of obstructive coronary artery disease.  --Left ventricle with LVEDP of 16 mmHg, visually estimated LVEF of  60%, no evidence of mitral regurgitation and no evidence of aortic  stenosis.          Outside records reviewed from: Care Everywhere        ASSESSMENT and PLAN  Ivy Haynes is a 75 year old male scheduled for a laparoscopic left inguinal hernia repair on 20 by Dr. Soriano.  PAC referral for risk assessment and optimization for anesthesia with comorbid conditions of: hypertension, neck pain, GERD, history of hepatitis C and hepatic fibrosis.      Pre-operative considerations:  1.  Cardiac:  Functional status- METS >4.  He works out 6 days per week doing at least 55 minutes of cardio.  He had a coronary cath in 2017 that was negative for CAD and showed an EF of 60%.  His BNP in May 2020 was WNL at 20.  Hypertension is managed with amlodipine.  He reports his blood  pressure at home this morning was 125/80 which is typical for him, but is a bit elevated in clinic today at 150/91.  Encouraged to continue monitoring at home. Intermediate risk surgery with 0.4% risk of major adverse cardiac event.   2.  Pulm:  Airway feasible.  CHELO risk: intermediate.  He quit smoking in 1984.   3.  GI:  Risk of PONV score = 2.  If > 2, anti-emetic intervention recommended.   He takes over the counter pepcid only prn for GERD.   Was following with Dr. Hill at [facility] for hepatic fibrosis and hep C, but was treated successfully and hasn't seen him since 2019.    4. Musculoskeletal:  + chronic, intermittent neck pain.  Consider cautious positioning.    5. :  He notes that he has BPH (on no meds) and struggled with post-op urinary retention multiple times in the past.  This never required catheterization, but would take several hours before he was able to urinate at home.  Have prescribed flomax daily x 3 days prior to surgery to start tomorrow with last dose the morning of surgery.      VTE risk: 1.8%    Patient is optimized and is acceptable candidate for the proposed procedure.  No further diagnostic evaluation is needed.         Lizbeth Robertson DNP, RN, APRN  Preoperative Assessment Center  [facility]  Cl[facility] and [facility]  Phone: [phone]  Fax: [phone]

## 2020-11-20 NOTE — ANESTHESIA PREPROCEDURE EVALUATION
"Anesthesia Pre-Procedure Evaluation    Patient: Ivy Haynes   MRN:     [id number] Gender:   male   Age:    75 year old :      1945        Preoperative Diagnosis: * No surgery found *        LABS:  CBC:   Lab Results   Component Value Date    WBC 2.8 (L) 2020    WBC 2.9 (L) 2019    HGB 15.6 2020    HGB 15.7 2019    HCT 43.4 2020    HCT 44.1 2019     2020     2019     BMP:   Lab Results   Component Value Date     2020     2020    POTASSIUM 4.1 2020    POTASSIUM 3.7 2020    CHLORIDE 108 2020    CHLORIDE 106 2020    CO2 25 2020    CO2 28 2020    BUN 8 2020    BUN 11 2020    CR 1.17 2020    CR 0.97 2020    GLC 94 2020     (H) 2020     COAGS:   Lab Results   Component Value Date    PTT 29 03/10/2017    INR 1.04 03/10/2017     POC: No results found for: BGM, HCG, HCGS  OTHER:   Lab Results   Component Value Date    LORRI 9.0 2020    PHOS 4.1 2007    MAG 2.2 03/10/2012    ALBUMIN 3.7 2020    PROTTOTAL 7.3 2020    ALT 28 2020    AST 30 2020    ALKPHOS 120 2020    BILITOTAL 0.5 2020    LIPASE 124 2019    TSH 3.37 11/10/2014    SED 7 2020        Preop Vitals    BP Readings from Last 3 Encounters:   20 (!) 146/92   20 120/80   20 (!) 169/96    Pulse Readings from Last 3 Encounters:   20 84   10/27/20 102   20 84      Resp Readings from Last 3 Encounters:   20 16   20 16   19 17    SpO2 Readings from Last 3 Encounters:   20 98%   10/27/20 99%   20 97%      Temp Readings from Last 1 Encounters:   10/27/20 98.6  F (37  C) (Temporal)    Ht Readings from Last 1 Encounters:   20 1.778 m (5' 10\")      Wt Readings from Last 1 Encounters:   20 75.2 kg (165 lb 12.8 oz)    Estimated body mass index is 23.79 kg/m  as calculated from the " "following:    Height as of 11/19/20: 1.778 m (5' 10\").    Weight as of 11/19/20: 75.2 kg (165 lb 12.8 oz).     LDA:  Left Radial Interventional Procedure Access (Active)   Number of days: 1351        Past Medical History:   Diagnosis Date     Back pain      Chest pain      Gastro-oesophageal reflux disease      GERD (gastroesophageal reflux disease)      Hemorrhoid      Hepatitis C      Hypertension      Trigeminal neuralgia       Past Surgical History:   Procedure Laterality Date     ARTHROPLASTY KNEE  11/9/2012    Procedure: ARTHROPLASTY KNEE;;  Surgeon: Ang Arellano MD;  [facility]     ENT SURGERY  2009    Styloid process broken off     HERNIA REPAIR  12/10     RESECT TUMOR LOWER EXTREMITY  11/9/2012    Procedure: RESECT TUMOR LOWER EXTREMITY;  Excision of Tumor Left Distal Femur, Left Total Knee Replacement ;  Surgeon: Ang Arellano MD;  [facility] OR     styloid process temporal bone surg  02/10     TONSILLECTOMY  1954      Allergies   Allergen Reactions     Sulfa Drugs         Anesthesia Evaluation     . Pt has had prior anesthetic. Type: General and Regional    No history of anesthetic complications          ROS/MED HX    ENT/Pulmonary:     (+)CHELO risk factors hypertension, tobacco use, Past use 0.1 packs/day  , . .   (-) recent URI   Neurologic:  - neg neurologic ROS     Cardiovascular:     (+) hypertension----. : . . . :. . Previous cardiac testing date:results:date: results: date: results:Cath date: 2017 results:          METS/Exercise Tolerance:  >4 METS   Hematologic:  - neg hematologic  ROS      (-) history of blood clots and History of Transfusion   Musculoskeletal:  - neg musculoskeletal ROS       GI/Hepatic:     (+) GERD Other, hepatitis type C, liver disease, Other GI/Hepatic hepatic fibrosis      Renal/Genitourinary:  - ROS Renal section negative   (+) BPH,    (-) other renal    Endo:  - neg endo ROS       Psychiatric:  - neg psychiatric ROS       Infectious Disease:  - neg infectious " disease ROS      (-) Recent Fever   Malignancy:      - no malignancy   Other:    (+) no H/O Chronic Pain,                       PHYSICAL EXAM:   Mental Status/Neuro: A/A/O; Age Appropriate   Airway: Facies: Feasible  Mallampati: I  Mouth/Opening: Full  TM distance: > 6 cm  Neck ROM: Full   Respiratory: Auscultation: CTAB     Resp. Rate: Normal     Resp. Effort: Normal      CV: Rhythm: Regular  Rate: Age appropriate  Heart: Normal Sounds  Edema: None   Comments: Multiple implants     Dental: Details                JZG FV AN PLAN NO PONV RULE       PAC Discussion and Assessment    ASA Classification: 2  Case is suitable for: ASC OK for Surgery: [facility].  Anesthetic techniques and relevant risks discussed: GA and MAC with GA as backup  Invasive monitoring and risk discussed:   Types:   Possibility and Risk of blood transfusion discussed:   NPO instructions given:   Additional anesthetic preparation and risks discussed:   Needs early admission to pre-op area:   Other:     PAC Resident/NP Anesthesia Assessment:  Ivy Haynes is a 75 year old male scheduled for a laparoscopic left inguinal hernia repair on 11/23/20 by Dr. Soriano.  PAC referral for risk assessment and optimization for anesthesia with comorbid conditions of: hypertension, neck pain, GERD, history of hepatitis C and hepatic fibrosis.      Pre-operative considerations:  1.  Cardiac:  Functional status- METS >4.  He works out 6 days per week doing at least 55 minutes of cardio.  He had a coronary cath in 2017 that was negative for CAD and showed an EF of 60%.  His BNP in May 2020 was WNL at 20.  Hypertension is managed with amlodipine.  He reports his blood pressure at home this morning was 125/80 which is typical for him, but is a bit elevated in clinic today at 150/91.  Encouraged to continue monitoring at home. Intermediate risk surgery with 0.4% risk of major adverse cardiac event.   2.  Pulm:  Airway feasible.  CHELO risk:  intermediate.  He quit smoking in 1984.   3.  GI:  Risk of PONV score = 2.  If > 2, anti-emetic intervention recommended.   He takes over the counter pepcid only prn for GERD.   Was following with Dr. Hill at [facility] for hepatic fibrosis and hep C, but was treated successfully and hasn't seen him since 2019.    4. Musculoskeletal:  + chronic, intermittent neck pain.  Consider cautious positioning.    5. :  He notes that he has BPH (on no meds) and struggled with post-op urinary retention multiple times in the past.  This never required catheterization, but would take several hours before he was able to urinate at home.  Have prescribed flomax daily x 3 days prior to surgery to start tomorrow with last dose the morning of surgery.      VTE risk: 1.8%    Patient is optimized and is acceptable candidate for the proposed procedure.  No further diagnostic evaluation is needed.     **For further details of assessment, testing, and physical exam please see H and P completed on same date.          Lizbeth Robertson DNP, RN, APRN      Reviewed and Signed by PAC Mid-Level Provider/Resident  Mid-Level Provider/Resident: Lizbeth Robertson DNP, RN, APRN  Date: 11/20/20  Time: 0843    Attending Anesthesiologist Anesthesia Assessment:        Anesthesiologist:   Date:   Time:   Pass/Fail:   Disposition:     PAC Pharmacist Assessment:        Pharmacist:   Date:   Time:    TAMERA Fisher CNP

## 2020-11-21 LAB
SARS-COV-2 RNA SPEC QL NAA+PROBE: NOT DETECTED
SPECIMEN SOURCE: NORMAL

## 2020-11-23 ENCOUNTER — TRANSFERRED RECORDS (OUTPATIENT)
Dept: HEALTH INFORMATION MANAGEMENT | Facility: CLINIC | Age: 75
End: 2020-11-23

## 2020-11-25 ENCOUNTER — PATIENT OUTREACH (OUTPATIENT)
Dept: SURGERY | Facility: CLINIC | Age: 75
End: 2020-11-25

## 2020-11-25 NOTE — PROGRESS NOTES
Ivy Haynes is a patient of Dr. Te Soriano that underwent robotic LIH ([facility]) approximately 2 days ago (11/25).  Attempted to contact patient via telephone for a status update and review post op teaching.  LM on  to call office.  Await return call.      Of note:  Pathology:  NA  Wound:  Steri-strips  Follow-up:  Routine  Restrictions:  - No activity restrictions  New medications:  Norco, Senna  Equipment/Supplies:  Athletic Supporter    ADDENDUM  11/25/20  10:59 AM  RN Post-Op/Post-Discharge Care Coordination Note    Spoke with Patient.    Support  Patient able to care for self independently     Health Status  Fevers/chills: Patient denies any fever or chills.  Nausea/Vomiting: Patient denies nausea/vomiting.  Eating/drinking: Patient is able to eat and drink without any complaints.  Bowel habits: Patient reports having a normal bowel movement.  Drains (KATERYNA): N/A  Incisions: Patient denies any signs and symptoms of infection..  Wound closure:  Steri-strips  Pain: Improved. Transitioning to Ibuprofen PRN per package instructions. Has only taken Norco 3 tabs to date.    Activity/Restrictions  No restrictions    Equipment  Athletic Supporter    Pathology reviewed with patient:  N/A    Forms/Letters  No    All of his questions were answered including reviewing restrictions and wound care.  He will call this office if he has any further questions and/or concerns.      In lieu of a post-op clinic patient that patient would like to be contacted in approximately 7-10 days via telephone.    A copy of this note was routed to the primary surgeon.      Whom and When to Call  Patient acknowledges understanding of how to manage any medication changes and   when to seek medical care.     Patient advised that if after hour medical concerns arise to please call [phone] and choose option 4 to speak to the physician on call.

## 2020-12-02 ENCOUNTER — PATIENT OUTREACH (OUTPATIENT)
Dept: SURGERY | Facility: CLINIC | Age: 75
End: 2020-12-02

## 2020-12-02 NOTE — PROGRESS NOTES
"Ivy Haynes is a patient of Dr. Te Soriano that recently underwent a surgical procedure (Robotic LIH).  The patient was contacted via telephone approximately 7-10 days ago for a status update and post-op teaching.  In lieu of a clinic visit, the patient requested to be contacted at a later date by an RN for an assessment.    Attempted to contact patient via telephone for a status update.  LM on VM to call office.      Patient returned call. He states \"I'm doing fine. Not needing to take anything for pain. I'm doing great. Working out with very little pain.\" He said he can be reached with any further questions but it isn't necessary.   "

## 2021-01-14 ENCOUNTER — HEALTH MAINTENANCE LETTER (OUTPATIENT)
Age: 76
End: 2021-01-14

## 2021-01-25 ENCOUNTER — TELEPHONE (OUTPATIENT)
Dept: FAMILY MEDICINE | Facility: CLINIC | Age: 76
End: 2021-01-25

## 2021-01-25 NOTE — TELEPHONE ENCOUNTER
RD Triage - chest pain appointment request - writer notes last office visit patient trying to stop use of blood pressure medication - has hx of chest pain since he was a child    Writer called patient left non detailed message requesting return call to clinic and ask to speak with nurse - advised call back today please - may call back 24/7 for triage

## 2021-01-27 NOTE — TELEPHONE ENCOUNTER
Writer called patient left non detailed message requesting return call to clinic and ask to speak with nurse    Requested patient call back prior to appointment - preferably today - FNA available 24/7 call back anytime please

## 2021-02-03 ENCOUNTER — NURSE TRIAGE (OUTPATIENT)
Dept: FAMILY MEDICINE | Facility: CLINIC | Age: 76
End: 2021-02-03

## 2021-02-03 NOTE — TELEPHONE ENCOUNTER
"    Answer Assessment - Initial Assessment Questions  Patient has appointment with Dr. Franco Friday 2/5. States someone left him a message to call back. I am assuming so symptoms can be triaged      1. LOCATION: \"Where does it hurt?\"        Intermittent chest pain left side of chest off and on  2. RADIATION: \"Does the pain go anywhere else?\" (e.g., into neck, jaw, arms, back)      Denies  3. ONSET: \"When did the chest pain begin?\" (Minutes, hours or days)       States has been ongoing for past 2 months  4. PATTERN \"Does the pain come and go, or has it been constant since it started?\"  \"Does it get worse with exertion?\"       States has been off and on, occurs on daily basis, exertion does not make it worse  5. DURATION: \"How long does it last\" (e.g., seconds, minutes, hours)      Not sure occurs off and on   6. SEVERITY: \"How bad is the pain?\"  (e.g., Scale 1-10; mild, moderate, or severe)     - MILD (1-3): doesn't interfere with normal activities . Rates as 1       Rides his exercise bike 6 days a week, get heart rate up to 130-150 with out difficulty. Works out a lot       7. CARDIAC RISK FACTORS: \"Do you have any history of heart problems or risk factors for heart disease?\" (e.g., prior heart attack, angina; high blood pressure, diabetes, being overweight, high cholesterol, smoking, or strong family history of heart disease)      Denies  8. PULMONARY RISK FACTORS: \"Do you have any history of lung disease?\"  (e.g., blood clots in lung, asthma, emphysema, birth control pills)      Denies  9. CAUSE: \"What do you think is causing the chest pain?\"      \"I'm not sure\"  10. OTHER SYMPTOMS: \"Do you have any other symptoms?\" (e.g., dizziness, nausea, vomiting, sweating, fever, difficulty breathing, cough)        Denies N & V, Diaphoresis, SOB. States his BP went up to 180/103 and kept rechecking.  \"Went back my normal blood pressure which is on high end of range 120-130/85         Informed patient would forward message " on to Dr. Franco, RN team.  Patient is not currently having chest pain.    Giulia Palacio RN    Protocols used: CHEST PAIN-A-[place]

## 2021-02-03 NOTE — TELEPHONE ENCOUNTER
Dr. Franco,    Just FYI, as he has appt with you Friday. See my chart message from 1/25/21 as well.    Thanks  Blanquita Murray RN   [facility]

## 2021-02-05 ENCOUNTER — OFFICE VISIT (OUTPATIENT)
Dept: FAMILY MEDICINE | Facility: CLINIC | Age: 76
End: 2021-02-05
Payer: COMMERCIAL

## 2021-02-05 VITALS
TEMPERATURE: 98.7 F | BODY MASS INDEX: 22.81 KG/M2 | DIASTOLIC BLOOD PRESSURE: 86 MMHG | HEART RATE: 86 BPM | WEIGHT: 159 LBS | OXYGEN SATURATION: 98 % | SYSTOLIC BLOOD PRESSURE: 136 MMHG

## 2021-02-05 DIAGNOSIS — R07.89 CHEST WALL PAIN: Primary | ICD-10-CM

## 2021-02-05 PROCEDURE — 93000 ELECTROCARDIOGRAM COMPLETE: CPT | Performed by: FAMILY MEDICINE

## 2021-02-05 PROCEDURE — 99213 OFFICE O/P EST LOW 20 MIN: CPT | Performed by: FAMILY MEDICINE

## 2021-02-05 NOTE — PROGRESS NOTES
Assessment & Plan     Chest wall pain  Appears musculoskeletal    Rest, tylenol  Follow up only if unimproved.   - EKG 12-lead complete w/read - Clinics     No follow-ups on file.    Lamine Franco MD  [facility]    Subjective  chest pain    Ivy is a 75 year old who presents to clinic today for the following health issues     HPI       Chest Pain  Onset/Duration: 2 months   Description:   Location: left side of lateral chest wall  Character: achey  Radiation: pain moves up or down  Duration: intermittent   Intensity: mild  Progression of Symptoms: improving, better over the last 2 days  Accompanying Signs & Symptoms:  Shortness of breath: no  Sweating: no  Nausea/vomiting: no  Lightheadedness: no  Palpitations: no  Fever/Chills: no  Cough: no           Heartburn: no  History:   Family history of heart disease: Father had a stroke in his 60 or 70 yrs  Tobacco use: no  Previous similar symptoms: YES normal work up  Precipitating factors:   Worse with exertion: no  Worse with deep breaths: no           Related to eating: no           Better with burping: no  Alleviating factors: mild  Therapies tried and outcome: rest ( no upper body workout) has improved the symptoms     Hypertension Follow-up      Do you check your blood pressure regularly outside of the clinic? Yes     Are you following a low salt diet? Yes    Are your blood pressures ever more than 140 on the top number (systolic) OR more   than 90 on the bottom number (diastolic), for example 140/90? No      How many servings of fruits and vegetables do you eat daily?  2-3    On average, how many sweetened beverages do you drink each day (Examples: soda, juice, sweet tea, etc.  Do NOT count diet or artificially sweetened beverages)?   0    How many days per week do you exercise enough to make your heart beat faster? 6    How many minutes a day do you exercise enough to make your heart beat faster? 60 or more    How many days  per week do you miss taking your medication? 0      Review of Systems   Constitutional, HEENT, cardiovascular, pulmonary, gi and gu systems are negative, except as otherwise noted.      Objective    BP (!) 136/90   Pulse 86   Temp 98.7  F (37.1  C) (Temporal)   Wt 72.1 kg (159 lb)   SpO2 98%   BMI 22.81 kg/m    Body mass index is 22.81 kg/m .  Physical Exam   GENERAL: healthy, alert and no distress  NECK: no adenopathy, no asymmetry, masses, or scars and thyroid normal to palpation  RESP: lungs clear to auscultation - no rales, rhonchi or wheezes  CV: regular rate and rhythm, normal S1 S2, no S3 or S4, no murmur, click or rub, no peripheral edema and peripheral pulses strong  ABDOMEN: soft, nontender, no hepatosplenomegaly, no masses and bowel sounds normal  MS: no gross musculoskeletal defects noted, no edema  SKIN: no suspicious lesions or rashes  NEURO: Normal strength and tone, mentation intact and speech normal    EKG - Reviewed and interpreted by me appears normal, NSR, normal axis, normal intervals, no acute ST/T changes c/w ischemia, no LVH by voltage criteria, unchanged from previous tracings             check TSH  c/w home dose synthroid  op endo f/u

## 2021-04-06 ENCOUNTER — OFFICE VISIT (OUTPATIENT)
Dept: OTOLARYNGOLOGY | Facility: CLINIC | Age: 76
End: 2021-04-06
Payer: COMMERCIAL

## 2021-04-06 VITALS — WEIGHT: 159 LBS | BODY MASS INDEX: 22.76 KG/M2 | HEIGHT: 70 IN

## 2021-04-06 DIAGNOSIS — M54.2 NECK PAIN: ICD-10-CM

## 2021-04-06 PROCEDURE — 99213 OFFICE O/P EST LOW 20 MIN: CPT | Performed by: OTOLARYNGOLOGY

## 2021-04-06 RX ORDER — OXYCODONE AND ACETAMINOPHEN 5; 325 MG/1; MG/1
1 TABLET ORAL EVERY 6 HOURS PRN
Qty: 20 TABLET | Refills: 0 | Status: SHIPPED | OUTPATIENT
Start: 2021-04-06 | End: 2021-10-26

## 2021-04-06 ASSESSMENT — PAIN SCALES - GENERAL: PAINLEVEL: NO PAIN (0)

## 2021-04-06 ASSESSMENT — MIFFLIN-ST. JEOR: SCORE: 1472.47

## 2021-04-06 NOTE — PROGRESS NOTES
"  Otolaryngology Clinic      Name: Ivy Haynes  MRN: [id number]  Age: 74 year old  : 2021      Chief Complaint:   RECHECK     History of Present Illness:   Ivy Haynes is a 74 year old male with a history of intermittent right-sided neck pain, degenerative disease in his spine, and [place] syndrome s/p resection of styloid process who presents for follow up. He was last seen on 9/3/19 at which time he was still having intermittent pain and treating occasionally with Percocet.    Today he states things continue the same way. He has about 20 pain-free days each month, several days of nagging, tolerable pain, and a few days of severe pain which requires the pain medication. At this time, he has 2.5 tabs of Percocet left. The resection of his right styloid process was done about 15 years ago, he thinks.    He endorses a tickle in the back of his throat. He gets a lot of thick mucus drainage in his throat (not brown or green-colored). He has always cleared his throat somewhat often, but has been doing this more lately. These symptoms are worse in the morning. He takes Pepcid for reflux. He drinks about 60 oz of water per day.    He continues to exercise daily on a machine at home. He used to run long distance for years, but had a total knee replacement 7 years ago. 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    Review of Systems:   Pertinent items are noted in HPI or as in patient entered ROS below, remainder of complete ROS is negative.    ENT ROS 2020   Neurology -   Ears, Nose, Throat Ear pain, Sore throat   Cardiopulmonary -   Gastrointestinal/Genitourinary Heartburn/indigestion   Musculoskeletal Neck pain        Physical Exam:   Ht 1.778 m (5' 10\")   Wt 72.1 kg (159 lb)   BMI 22.81 kg/m       PHYSICAL EXAMINATION:    Constitutional:  The patient was unaccompanied, well-groomed, and in no acute distress.    Skin:  " Warm and pink.    Neurologic:  Alert and oriented x 3.  CN's III-XII within normal limits.  Voice normal.   Psychiatric:  The patient's affect was calm, cooperative, and appropriate.    Respiratory:  Breathing comfortably without stridor or exertion of accessory muscles.    Eyes: Extraocular movement intact.    Head:  Normocephalic and atraumatic.  No lesions or scars.       Nose:  Sinuses were non-tender.  Anterior rhinoscopy revealed midline septum and absence of purulence or polyps.    OC/OP:  Minimal geographic tongue changes  , floor of mouth, buccal mucosa, and palate.  No lesions or masses on inspection or palpation.  No abnormal lymph tissue in the oropharynx.  The pterygoid region is non-tender.    Neck:  Supple with normal laryngeal and tracheal landmarks.  The parotid beds were without masses.  No palpable thyroid.  Lymphatic:  There is no palpable lymphadenopathy in the neck.     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 He continues to have the same intermittent neck pain and uses sparing Percocet, refilled today. He endorses morning symptoms of increased mucus drainage and throat tickle. Already taking Pepcid for reflux; I suggested he could try gargling with club soda in the morning. Follow-up in 6 months.    Cervicalgia  - oxyCODONE-acetaminophen (PERCOCET) 5-325 MG tablet  Dispense: 20 tablet; Refill: 0

## 2021-04-06 NOTE — PATIENT INSTRUCTIONS
1. You were seen in the ENT Clinic today by Dr. Torres.  If you have any questions or concerns after your appointment, please call   -  ENT Clinic: [phone]    2. Plan to return to clinic in 6 months      Norma Kiser LPN  [facility] Otolaryngology  [phone]

## 2021-04-06 NOTE — LETTER
2021       RE: Ivy Haynes  [address]  [facility] [address]     Dear Colleague,    Thank you for referring your patient, Ivy Haynes, to the [facility] [place] at [facility]. Please see a copy of my visit note below.      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 He was last seen on 9/3/19 at which time he was still having intermittent pain and treating occasionally with Percocet.    Today he states things continue the same way. He has about 20 pain-free days each month, several days of nagging, tolerable pain, and a few days of severe pain which requires the pain medication. At this time, he has 2.5 tabs of Percocet left. The resection of his right styloid process was done about 15 years ago, he thinks.    He endorses a tickle in the back of his throat. He gets a lot of thick mucus drainage in his throat (not brown or green-colored). He has always cleared his throat somewhat often, but has been doing this more lately. These symptoms are worse in the morning. He takes Pepcid for reflux. He drinks about 60 oz of water per day.    He continues to exercise daily on a machine at home. He used to run long distance for years, but had a total knee replacement 7 years ago. 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    Review of Systems:   Pertinent items are noted in HPI or as in patient entered ROS below, remainder of complete ROS is negative.    ENT ROS 2020   Neurology -   Ears, Nose, Throat Ear pain, Sore throat  "  Cardiopulmonary -   Gastrointestinal/Genitourinary Heartburn/indigestion   Musculoskeletal Neck pain        Physical Exam:   Ht 1.778 m (5' 10\")   Wt 72.1 kg (159 lb)   BMI 22.81 kg/m       PHYSICAL EXAMINATION:    Constitutional:  The patient was unaccompanied, well-groomed, and in no acute distress.    Skin:  Warm and pink.    Neurologic:  Alert and oriented x 3.  CN's III-XII within normal limits.  Voice normal.   Psychiatric:  The patient's affect was calm, cooperative, and appropriate.    Respiratory:  Breathing comfortably without stridor or exertion of accessory muscles.    Eyes: Extraocular movement intact.    Head:  Normocephalic and atraumatic.  No lesions or scars.       Nose:  Sinuses were non-tender.  Anterior rhinoscopy revealed midline septum and absence of purulence or polyps.    OC/OP:  Minimal geographic tongue changes  , floor of mouth, buccal mucosa, and palate.  No lesions or masses on inspection or palpation.  No abnormal lymph tissue in the oropharynx.  The pterygoid region is non-tender.    Neck:  Supple with normal laryngeal and tracheal landmarks.  The parotid beds were without masses.  No palpable thyroid.  Lymphatic:  There is no palpable lymphadenopathy in the neck.     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 He continues to have the same intermittent neck pain and uses sparing Percocet, refilled today. He endorses morning symptoms of increased mucus drainage and throat tickle. Already taking Pepcid for reflux; I suggested he could try gargling with club soda in the morning. Follow-up in 6 months.    Cervicalgia  - oxyCODONE-acetaminophen (PERCOCET) 5-325 MG tablet  Dispense: 20 tablet; Refill: 0           Again, thank you for allowing me to participate in the care of your patient.  "     Sincerely,    Connor Torres MD

## 2021-04-06 NOTE — NURSING NOTE
"Chief Complaint   Patient presents with     RECHECK     6 month follow up       Height 1.778 m (5' 10\"), weight 72.1 kg (159 lb).    Deb France, EMT    "

## 2021-04-07 ENCOUNTER — TELEPHONE (OUTPATIENT)
Dept: OTOLARYNGOLOGY | Facility: CLINIC | Age: 76
End: 2021-04-07

## 2021-04-07 NOTE — TELEPHONE ENCOUNTER
LVM for patient to schedule 6 month follow up with Dr. Torres.     Left call center number for scheduling.

## 2021-04-30 ENCOUNTER — OFFICE VISIT (OUTPATIENT)
Dept: FAMILY MEDICINE | Facility: CLINIC | Age: 76
End: 2021-04-30
Payer: COMMERCIAL

## 2021-04-30 VITALS
HEIGHT: 70 IN | RESPIRATION RATE: 20 BRPM | TEMPERATURE: 98 F | WEIGHT: 158 LBS | DIASTOLIC BLOOD PRESSURE: 82 MMHG | BODY MASS INDEX: 22.62 KG/M2 | HEART RATE: 89 BPM | OXYGEN SATURATION: 100 % | SYSTOLIC BLOOD PRESSURE: 134 MMHG

## 2021-04-30 DIAGNOSIS — R10.9 FLANK PAIN: Primary | ICD-10-CM

## 2021-04-30 DIAGNOSIS — I10 HYPERTENSION GOAL BP (BLOOD PRESSURE) < 140/90: ICD-10-CM

## 2021-04-30 DIAGNOSIS — R10.9 FLANK PAIN: ICD-10-CM

## 2021-04-30 DIAGNOSIS — M54.12 CERVICAL RADICULAR PAIN: ICD-10-CM

## 2021-04-30 LAB
ALBUMIN SERPL-MCNC: 4.3 G/DL (ref 3.4–5)
ALBUMIN UR-MCNC: NEGATIVE MG/DL
ALP SERPL-CCNC: 166 U/L (ref 40–150)
ALT SERPL W P-5'-P-CCNC: 18 U/L (ref 0–70)
ANION GAP SERPL CALCULATED.3IONS-SCNC: 5 MMOL/L (ref 3–14)
APPEARANCE UR: CLEAR
AST SERPL W P-5'-P-CCNC: 24 U/L (ref 0–45)
BILIRUB SERPL-MCNC: 0.4 MG/DL (ref 0.2–1.3)
BILIRUB UR QL STRIP: NEGATIVE
BUN SERPL-MCNC: 11 MG/DL (ref 7–30)
CALCIUM SERPL-MCNC: 9.1 MG/DL (ref 8.5–10.1)
CHLORIDE SERPL-SCNC: 107 MMOL/L (ref 94–109)
CO2 SERPL-SCNC: 26 MMOL/L (ref 20–32)
COLOR UR AUTO: YELLOW
CREAT SERPL-MCNC: 1.02 MG/DL (ref 0.66–1.25)
ERYTHROCYTE [DISTWIDTH] IN BLOOD BY AUTOMATED COUNT: 12.7 % (ref 10–15)
GFR SERPL CREATININE-BSD FRML MDRD: 72 ML/MIN/{1.73_M2}
GLUCOSE SERPL-MCNC: 115 MG/DL (ref 70–99)
GLUCOSE UR STRIP-MCNC: NEGATIVE MG/DL
HCT VFR BLD AUTO: 46 % (ref 40–53)
HGB BLD-MCNC: 15.6 G/DL (ref 13.3–17.7)
HGB UR QL STRIP: ABNORMAL
KETONES UR STRIP-MCNC: NEGATIVE MG/DL
LEUKOCYTE ESTERASE UR QL STRIP: NEGATIVE
MCH RBC QN AUTO: 33.3 PG (ref 26.5–33)
MCHC RBC AUTO-ENTMCNC: 33.9 G/DL (ref 31.5–36.5)
MCV RBC AUTO: 98 FL (ref 78–100)
NITRATE UR QL: NEGATIVE
PH UR STRIP: 5 PH (ref 5–7)
PLATELET # BLD AUTO: 188 10E9/L (ref 150–450)
POTASSIUM SERPL-SCNC: 4.2 MMOL/L (ref 3.4–5.3)
PROT SERPL-MCNC: 7.9 G/DL (ref 6.8–8.8)
RBC # BLD AUTO: 4.69 10E12/L (ref 4.4–5.9)
RBC #/AREA URNS AUTO: ABNORMAL /HPF
SODIUM SERPL-SCNC: 138 MMOL/L (ref 133–144)
SOURCE: ABNORMAL
SP GR UR STRIP: <=1.005 (ref 1–1.03)
UROBILINOGEN UR STRIP-ACNC: 0.2 EU/DL (ref 0.2–1)
WBC # BLD AUTO: 3.4 10E9/L (ref 4–11)
WBC #/AREA URNS AUTO: ABNORMAL /HPF

## 2021-04-30 PROCEDURE — 81001 URINALYSIS AUTO W/SCOPE: CPT | Performed by: FAMILY MEDICINE

## 2021-04-30 PROCEDURE — 85027 COMPLETE CBC AUTOMATED: CPT | Performed by: FAMILY MEDICINE

## 2021-04-30 PROCEDURE — 99214 OFFICE O/P EST MOD 30 MIN: CPT | Performed by: FAMILY MEDICINE

## 2021-04-30 PROCEDURE — 36415 COLL VENOUS BLD VENIPUNCTURE: CPT | Performed by: FAMILY MEDICINE

## 2021-04-30 PROCEDURE — 80053 COMPREHEN METABOLIC PANEL: CPT | Performed by: FAMILY MEDICINE

## 2021-04-30 ASSESSMENT — MIFFLIN-ST. JEOR: SCORE: 1462.93

## 2021-04-30 NOTE — PROGRESS NOTES
"    Assessment & Plan     Flank pain  Likely musculoskeletal   - UA with Microscopic reflex to Culture  - **CBC with platelets FUTURE anytime  - **Comprehensive metabolic panel FUTURE anytime  - UA with Microscopic reflex to Culture    Hypertension goal BP (blood pressure) < 140/90  Well controlled     Cervical radicular pain  Better, likely causing these \" pressure sensations\" into head  reassurance  Follow up only if unimproved.              Regular exercise  See Patient Instructions    No follow-ups on file.    Lamine Franco MD  [facility] MALU Haynes is a 76 year old who presents for the following health issues     HPI     Dizziness \" rushed/ pressure into head\"  Onset/Duration: 2 weeks   Description:   Do you feel faint: no  Does it feel like the surroundings (bed, room) are moving: no  Unsteady/off balance: no  Have you passed out or fallen: no  Intensity: mild    Progression of Symptoms: 3-4 dizzy episodes over the last two weeks, last a couple of hours   Accompanying Signs & Symptoms:  Heart palpitations or chest pain: patient has chronic chest pain   Nausea, vomiting: no  Weakness or lack of coordination in arms or legs: no  Vision or speech changes: no  Numbness or tingling: no  Ringing in ears (Tinnitus): no  Hearing Loss: no  History:   Head trauma/concussion history: no  Previous similar symptoms: no  Recent bleeding history: no  Any new medications (BP?): no  Precipitating factors:   Worse with activity: no, usually occurs after working out and eating breakfast in the morning   Worse with head movement: no, pressure in head   Alleviating factors:   Does staying in a fixed position give relief: no, most recent episode was Monday 4/26/2021 and lasted the whole day  Therapies tried and outcome: None    Hypertension Follow-up      Do you check your blood pressure regularly outside of the clinic? Yes     Are you following a low salt diet? Yes    Are your blood " "pressures ever more than 140 on the top number (systolic) OR more   than 90 on the bottom number (diastolic), for example 140/90? No      How many servings of fruits and vegetables do you eat daily?  2-3    On average, how many sweetened beverages do you drink each day (Examples: soda, juice, sweet tea, etc.  Do NOT count diet or artificially sweetened beverages)?   0    How many days per week do you exercise enough to make your heart beat faster? 7    How many minutes a day do you exercise enough to make your heart beat faster? 60 or more    How many days per week do you miss taking your medication? 0      Review of Systems   Constitutional, HEENT, cardiovascular, pulmonary, gi and gu systems are negative, except as otherwise noted.      Objective    /82   Pulse 89   Temp 98  F (36.7  C) (Temporal)   Resp 20   Ht 1.778 m (5' 10\")   Wt 71.7 kg (158 lb)   SpO2 100%   BMI 22.67 kg/m    Body mass index is 22.67 kg/m .  Physical Exam   GENERAL: healthy, alert and no distress  NECK: no adenopathy, no asymmetry, masses, or scars and thyroid normal to palpation  RESP: lungs clear to auscultation - no rales, rhonchi or wheezes  CV: regular rate and rhythm, normal S1 S2, no S3 or S4, no murmur, click or rub, no peripheral edema and peripheral pulses strong  ABDOMEN: soft, nontender, no hepatosplenomegaly, no masses and bowel sounds normal  SKIN: no suspicious lesions or rashes  NEURO: Normal strength and tone, mentation intact and speech normal  LYMPH: no cervical, supraclavicular, axillary, or inguinal adenopathy    Orders Only on 11/20/2020   Component Date Value Ref Range Status     COVID-19 Virus PCR to [facility] - So* 11/20/2020 Nasopharyngeal   Final     COVID-19 Virus PCR to [facility] - Re* 11/20/2020 Not Detected   Final    Comment: Collection of multiple specimens from the same patient may be necessary to   detect the virus. The possibility of a false negative should be considered if   the patient's recent " exposure or clinical presentation suggests 2019 nCOV   infection and diagnostic tests for other causes of illness are negative.   Repeat testing may be considered in this setting.  Patient sample was heat inactivated and amplified using the HDPCR SARS-CoV-2   assay ([employer] The HDPCRTM SARS-CoV-2 assay is a reverse   transcription real-time polymerase chain reaction (qRT-PCR) test intended for   the qualitative detection of nucleic acid  from SARS-CoV-2 in human nasopharyngeal swabs, oropharyngeal swabs, anterior   nasal swabs, mid-turbinate nasal swabs as well as nasal aspirate, nasal wash,   and bronchoalveolar lavage (BAL) specimens from individuals who are suspected   of COVID-19 by their healthcare provider.  A negative result does not rule out the presence of real-time PCR inhibitors   in the sp                           ecimen or COVID-19 RNA in concentrations below the limit of detection   of the assay. The possibility of a false negative should be considered if the   patients recent exposure or clinical presentation suggests COVID-19.   Additional testing or repeat testing requires consultation with the   laboratory.  Nasopharyngeal specimen is the preferred choice for swab-based SARS CoV2   testing. When collection of a nasopharyngeal swab is not possible the   following are acceptable alternatives:  an oropharyngeal (OP) specimen collected by a healthcare professional, or a   nasal mid-turbinate (NMT) swab collected by a healthcare professional or by   onsite self-collection (using a flocked tapered swab), or an anterior nares   specimen collected by a healthcare professional or by onsite self-collection   (using a round foam swab). (Centers for Disease Control)  Testing performed by [facility] ([facility]) [address] [place] [address]  The test performance characteristics were determined by  [facility]. It has not been   cleared or approved by the FDA.  The laboratory is regulated under the Clinical Laboratory Improvement   Amendments of 1988 (CLIA-88) as qualified to perform high-complexity testing.   This test is used for clinical purposes. It should not be regarded as   investigational or for research.       Sodium 11/20/2020 141  133 - 144 mmol/L Final     Potassium 11/20/2020 3.9  3.4 - 5.3 mmol/L Final     Chloride 11/20/2020 109  94 - 109 mmol/L Final     Carbon Dioxide 11/20/2020 28  20 - 32 mmol/L Final     Anion Gap 11/20/2020 4  3 - 14 mmol/L Final     Glucose 11/20/2020 108* 70 - 99 mg/dL Final     Urea Nitrogen 11/20/2020 8  7 - 30 mg/dL Final     Creatinine 11/20/2020 1.07  0.66 - 1.25 mg/dL Final     GFR Estimate 11/20/2020 68  >60 mL/min/[1.73_m2] Final    Comment: Non  GFR Calc  Starting 12/18/2018, serum creatinine based estimated GFR (eGFR) will be   calculated using the Chronic Kidney Disease Epidemiology Collaboration   (CKD-EPI) equation.       GFR Estimate If Black 11/20/2020 79  >60 mL/min/[1.73_m2] Final    Comment:  GFR Calc  Starting 12/18/2018, serum creatinine based estimated GFR (eGFR) will be   calculated using the Chronic Kidney Disease Epidemiology Collaboration   (CKD-EPI) equation.       Calcium 11/20/2020 9.0  8.5 - 10.1 mg/dL Final     WBC 11/20/2020 4.4  4.0 - 11.0 10e9/L Final     RBC Count 11/20/2020 4.67  4.4 - 5.9 10e12/L Final     Hemoglobin 11/20/2020 15.5  13.3 - 17.7 g/dL Final     Hematocrit 11/20/2020 46.4  40.0 - 53.0 % Final     MCV 11/20/2020 99  78 - 100 fl Final     MCH 11/20/2020 33.2* 26.5 - 33.0 pg Final     MCHC 11/20/2020 33.4  31.5 - 36.5 g/dL Final     RDW 11/20/2020 12.2  10.0 - 15.0 % Final     Platelet Count 11/20/2020 172  150 - 450 10e9/L Final

## 2021-05-03 ENCOUNTER — NURSE TRIAGE (OUTPATIENT)
Dept: FAMILY MEDICINE | Facility: CLINIC | Age: 76
End: 2021-05-03

## 2021-05-03 ENCOUNTER — VIRTUAL VISIT (OUTPATIENT)
Dept: FAMILY MEDICINE | Facility: CLINIC | Age: 76
End: 2021-05-03
Payer: COMMERCIAL

## 2021-05-03 ENCOUNTER — TELEPHONE (OUTPATIENT)
Dept: FAMILY MEDICINE | Facility: CLINIC | Age: 76
End: 2021-05-03

## 2021-05-03 DIAGNOSIS — N50.812 TESTICULAR PAIN, LEFT: Primary | ICD-10-CM

## 2021-05-03 DIAGNOSIS — N50.812 PAIN IN LEFT TESTICLE: ICD-10-CM

## 2021-05-03 DIAGNOSIS — G89.29 RIGHT FLANK PAIN, CHRONIC: Primary | ICD-10-CM

## 2021-05-03 DIAGNOSIS — R10.9 RIGHT FLANK PAIN, CHRONIC: Primary | ICD-10-CM

## 2021-05-03 DIAGNOSIS — N45.1 EPIDIDYMITIS: ICD-10-CM

## 2021-05-03 DIAGNOSIS — I10 HYPERTENSION GOAL BP (BLOOD PRESSURE) < 140/90: ICD-10-CM

## 2021-05-03 DIAGNOSIS — R10.9 FLANK PAIN: ICD-10-CM

## 2021-05-03 PROCEDURE — 99214 OFFICE O/P EST MOD 30 MIN: CPT | Mod: 95 | Performed by: FAMILY MEDICINE

## 2021-05-03 RX ORDER — DOXYCYCLINE 100 MG/1
100 CAPSULE ORAL 2 TIMES DAILY
Qty: 20 CAPSULE | Refills: 0 | Status: SHIPPED | OUTPATIENT
Start: 2021-05-03 | End: 2021-05-13

## 2021-05-03 NOTE — PROGRESS NOTES
Ivy is a 76 year old who is being evaluated via a billable telephone visit.      What phone number would you like to be contacted at? cell  How would you like to obtain your AVS? MyChart    Assessment & Plan     Right flank pain, chronic  Worries about kidneys   reviewed labs, get ULTRASOUND     Pain in left testicle  likely epidymidis  Get ULTRASOUND, start on doxy    Hypertension goal BP (blood pressure) < 140/90  Better now  Call if recurs               Regular exercise  See Patient Instructions    No follow-ups on file.    Lamine Franco MD  [facility]    Subjective   Ivy is a 76 year old who presents for the following health issues   Encounter Diagnoses   Name Primary?     Right flank pain, chronic   On and off for some time, UA was normal as was renal finction Yes     Pain in left testicle episodic, no risk for STI      Hypertension goal BP (blood pressure) < 140/90   Was elevated this morning, deidre nunez       HPI     Hypertension Follow-up      Do you check your blood pressure regularly outside of the clinic? Yes     Are you following a low salt diet? Yes    Are your blood pressures ever more than 140 on the top number (systolic) OR more   than 90 on the bottom number (diastolic), for example 140/90? Yes      How many servings of fruits and vegetables do you eat daily?  2-3    On average, how many sweetened beverages do you drink each day (Examples: soda, juice, sweet tea, etc.  Do NOT count diet or artificially sweetened beverages)?   0    How many days per week do you exercise enough to make your heart beat faster? 6         Review of Systems   Constitutional, HEENT, cardiovascular, pulmonary, gi and gu systems are negative, except as otherwise noted.      Objective           Vitals:  No vitals were obtained today due to virtual visit.    Physical Exam   healthy, alert and no distress  PSYCH: Alert and oriented times 3; coherent speech, normal   rate and volume, able  to articulate logical thoughts, able   to abstract reason, no tangential thoughts, no hallucinations   or delusions  His affect is normal  RESP: No cough, no audible wheezing, able to talk in full sentences  Remainder of exam unable to be completed due to telephone visits    Orders Only on 04/30/2021   Component Date Value Ref Range Status     Color Urine 04/30/2021 Yellow   Final     Appearance Urine 04/30/2021 Clear   Final     Glucose Urine 04/30/2021 Negative  NEG^Negative mg/dL Final     Bilirubin Urine 04/30/2021 Negative  NEG^Negative Final     Ketones Urine 04/30/2021 Negative  NEG^Negative mg/dL Final     Specific Gravity Urine 04/30/2021 <=1.005  1.003 - 1.035 Final     pH Urine 04/30/2021 5.0  5.0 - 7.0 pH Final     Protein Albumin Urine 04/30/2021 Negative  NEG^Negative mg/dL Final     Urobilinogen Urine 04/30/2021 0.2  0.2 - 1.0 EU/dL Final     Nitrite Urine 04/30/2021 Negative  NEG^Negative Final     Blood Urine 04/30/2021 Trace* NEG^Negative Final     Leukocyte Esterase Urine 04/30/2021 Negative  NEG^Negative Final     Source 04/30/2021 Midstream Urine   Final     WBC Urine 04/30/2021 0 - 5  OTO5^0 - 5 /HPF Final     RBC Urine 04/30/2021 O - 2  OTO2^O - 2 /HPF Final     Sodium 04/30/2021 138  133 - 144 mmol/L Final     Potassium 04/30/2021 4.2  3.4 - 5.3 mmol/L Final     Chloride 04/30/2021 107  94 - 109 mmol/L Final     Carbon Dioxide 04/30/2021 26  20 - 32 mmol/L Final     Anion Gap 04/30/2021 5  3 - 14 mmol/L Final     Glucose 04/30/2021 115* 70 - 99 mg/dL Final     Urea Nitrogen 04/30/2021 11  7 - 30 mg/dL Final     Creatinine 04/30/2021 1.02  0.66 - 1.25 mg/dL Final     GFR Estimate 04/30/2021 72  >60 mL/min/[1.73_m2] Final    Comment: Non  GFR Calc  Starting 12/18/2018, serum creatinine based estimated GFR (eGFR) will be   calculated using the Chronic Kidney Disease Epidemiology Collaboration   (CKD-EPI) equation.       GFR Estimate If Black 04/30/2021 83  >60 mL/min/[1.73_m2]  Final    Comment:  GFR Calc  Starting 12/18/2018, serum creatinine based estimated GFR (eGFR) will be   calculated using the Chronic Kidney Disease Epidemiology Collaboration   (CKD-EPI) equation.       Calcium 04/30/2021 9.1  8.5 - 10.1 mg/dL Final     Bilirubin Total 04/30/2021 0.4  0.2 - 1.3 mg/dL Final     Albumin 04/30/2021 4.3  3.4 - 5.0 g/dL Final     Protein Total 04/30/2021 7.9  6.8 - 8.8 g/dL Final     Alkaline Phosphatase 04/30/2021 166* 40 - 150 U/L Final     ALT 04/30/2021 18  0 - 70 U/L Final     AST 04/30/2021 24  0 - 45 U/L Final     WBC 04/30/2021 3.4* 4.0 - 11.0 10e9/L Final     RBC Count 04/30/2021 4.69  4.4 - 5.9 10e12/L Final     Hemoglobin 04/30/2021 15.6  13.3 - 17.7 g/dL Final     Hematocrit 04/30/2021 46.0  40.0 - 53.0 % Final     MCV 04/30/2021 98  78 - 100 fl Final     MCH 04/30/2021 33.3* 26.5 - 33.0 pg Final     MCHC 04/30/2021 33.9  31.5 - 36.5 g/dL Final     RDW 04/30/2021 12.7  10.0 - 15.0 % Final     Platelet Count 04/30/2021 188  150 - 450 10e9/L Final               Phone call duration: 14 minutes

## 2021-05-03 NOTE — TELEPHONE ENCOUNTER
" Higher than normal BP's this morning. Taking medications as advised. Had visit on Friday for flank pain. Flank pain has continued. Also having some testicular pain. Has a hx of this.     Advised in person OV today. Went over signs/symptoms of when to go tot he ER. The Pt expressed understanding.     Transferred to central scheduling.     Lala Walter RN   [facility] -- Triage Nurse        Additional Information    Systolic BP >= 180 OR Diastolic >= 110    Negative: Sounds like a life-threatening emergency to the triager    Negative: Pregnant > 20 weeks or postpartum (< 6 weeks after delivery) and new hand or face swelling    Negative: Pregnant > 20 weeks and BP > 140/90    Negative: Systolic BP >= 160 OR Diastolic >= 100, and any cardiac or neurologic symptoms (e.g., chest pain, difficulty breathing, unsteady gait, blurred vision)    Negative: Patient sounds very sick or weak to the triager    Negative: BP Systolic BP >= 140 OR Diastolic >= 90 and postpartum (from 0 to 6 weeks after delivery)    Negative: Systolic BP >= 180 OR Diastolic >= 110, and missed most recent dose of blood pressure medication    Answer Assessment - Initial Assessment Questions  1. BLOOD PRESSURE: \"What is the blood pressure?\" \"Did you take at least two measurements 5 minutes apart?\"      163/101, 162/95 HR 70  2. ONSET: \"When did you take your blood pressure?\"      This AM  3. HOW: \"How did you obtain the blood pressure?\" (e.g., visiting nurse, automatic home BP monitor)      Automatic BP monitor   4. HISTORY: \"Do you have a history of high blood pressure?\"      Yes, however the Pt reports taking his BP daily, readings have been in normal range.   5. MEDICATIONS: \"Are you taking any medications for blood pressure?\" \"Have you missed any doses recently?\"      Taking Amlodipine as advised, no missed doses.   6. OTHER SYMPTOMS: \"Do you have any symptoms?\" (e.g., headache, chest pain, blurred vision, difficulty breathing, weakness)      " "None.   7. PREGNANCY: \"Is there any chance you are pregnant?\" \"When was your last menstrual period?\"      NA    Protocols used: HIGH BLOOD PRESSURE-A-OH      "

## 2021-05-04 ENCOUNTER — HOSPITAL ENCOUNTER (OUTPATIENT)
Dept: ULTRASOUND IMAGING | Facility: CLINIC | Age: 76
End: 2021-05-04
Attending: FAMILY MEDICINE
Payer: COMMERCIAL

## 2021-05-04 DIAGNOSIS — N50.812 TESTICULAR PAIN, LEFT: ICD-10-CM

## 2021-05-04 DIAGNOSIS — N45.1 EPIDIDYMITIS: ICD-10-CM

## 2021-05-04 DIAGNOSIS — R10.9 FLANK PAIN: ICD-10-CM

## 2021-05-04 PROCEDURE — 76870 US EXAM SCROTUM: CPT

## 2021-05-04 PROCEDURE — 76770 US EXAM ABDO BACK WALL COMP: CPT | Mod: 26 | Performed by: RADIOLOGY

## 2021-05-04 PROCEDURE — 93976 VASCULAR STUDY: CPT

## 2021-05-04 PROCEDURE — 76870 US EXAM SCROTUM: CPT | Mod: 26 | Performed by: RADIOLOGY

## 2021-05-04 PROCEDURE — 76770 US EXAM ABDO BACK WALL COMP: CPT

## 2021-06-01 ENCOUNTER — OFFICE VISIT (OUTPATIENT)
Dept: OTOLARYNGOLOGY | Facility: CLINIC | Age: 76
End: 2021-06-01
Payer: COMMERCIAL

## 2021-06-01 VITALS
OXYGEN SATURATION: 99 % | TEMPERATURE: 98.8 F | WEIGHT: 156.53 LBS | BODY MASS INDEX: 22.41 KG/M2 | HEIGHT: 70 IN | HEART RATE: 96 BPM

## 2021-06-01 DIAGNOSIS — K21.9 GASTROESOPHAGEAL REFLUX DISEASE WITHOUT ESOPHAGITIS: Primary | ICD-10-CM

## 2021-06-01 PROCEDURE — 99213 OFFICE O/P EST LOW 20 MIN: CPT | Performed by: OTOLARYNGOLOGY

## 2021-06-01 ASSESSMENT — PAIN SCALES - GENERAL: PAINLEVEL: NO PAIN (0)

## 2021-06-01 ASSESSMENT — MIFFLIN-ST. JEOR: SCORE: 1456.25

## 2021-06-01 NOTE — NURSING NOTE
"Chief Complaint   Patient presents with     RECHECK     white hard growth on the floor of the mouth       Pulse 96, temperature 98.8  F (37.1  C), temperature source Temporal, height 1.778 m (5' 10\"), weight 71 kg (156 lb 8.4 oz), SpO2 99 %.    Deb France, EMT    "

## 2021-06-01 NOTE — LETTER
6/1/2021       RE: Ivy Haynes  [address]  [facility] [address]     Dear Colleague,    Thank you for referring your patient, Ivy Haynes, to the [facility] [place] at [facility]. Please see a copy of my visit note below.    HISTORY OF PRESENT ILLNESS: Ivy is a gentleman who is a 76 years of age.  He has had an unusual occurrence of what sounds to be some sort of intraoral infection in the floor of the mouth on the right side.  Right-sided facial pain and floor of mouth pain that seem to get some blood or some pus out of something when squeezing an area near his submandibular gland internally.  He also got a history of some implants or something I incidentally.  It does not sound like it was a severe problem, but he certainly had a lot of pain from this and it clearly did not manifest itself as some kind of a severe floor of mouth infection or anything else of this nature.  He has no other current complaints at present time today.    PHYSICAL EXAMINATION:  The patient is alert and oriented x3, and pleasant.  Skin of the face, lips and neck on him is otherwise normal.  He had a little bit of asymmetry along the floor of the mouth on the right side, but I do not feel any stones or any other abnormalities.  Dentally, things look fine.  There is no tenderness around his teeth or the inside of his jaw.    ASSESSMENT:  The patient with history of oral cavity pain.  He probably had some sort of infection, whether this is a sublingual gland infection or submandibular gland infection, it is hard to know.    Whether or not it was an impacted stone he may have taken out, I do not know this either.    PLAN:  I did say that I will follow him up again in three months just to feel this area again and then if there is a problem otherwise, he will call us sooner and I talked to him about potentially getting a floor of mouth CAT scan to  look for stones and to look for abnormalities of the glands.    We are not going to do this automatically today.  He does not prefer to have that and I think that is reasonable because he is already over the acute episode.        Again, thank you for allowing me to participate in the care of your patient.      Sincerely,    Connor Torres MD

## 2021-06-01 NOTE — PROGRESS NOTES
HISTORY OF PRESENT ILLNESS: Ivy is a gentleman who is a 76 years of age.  He has had an unusual occurrence of what sounds to be some sort of intraoral infection in the floor of the mouth on the right side.  Right-sided facial pain and floor of mouth pain that seem to get some blood or some pus out of something when squeezing an area near his submandibular gland internally.  He also got a history of some implants or something I incidentally.  It does not sound like it was a severe problem, but he certainly had a lot of pain from this and it clearly did not manifest itself as some kind of a severe floor of mouth infection or anything else of this nature.  He has no other current complaints at present time today.    PHYSICAL EXAMINATION:  The patient is alert and oriented x3, and pleasant.  Skin of the face, lips and neck on him is otherwise normal.  He had a little bit of asymmetry along the floor of the mouth on the right side, but I do not feel any stones or any other abnormalities.  Dentally, things look fine.  There is no tenderness around his teeth or the inside of his jaw.    ASSESSMENT:  The patient with history of oral cavity pain.  He probably had some sort of infection, whether this is a sublingual gland infection or submandibular gland infection, it is hard to know.    Whether or not it was an impacted stone he may have taken out, I do not know this either.    PLAN:  I did say that I will follow him up again in three months just to feel this area again and then if there is a problem otherwise, he will call us sooner and I talked to him about potentially getting a floor of mouth CAT scan to look for stones and to look for abnormalities of the glands.    We are not going to do this automatically today.  He does not prefer to have that and I think that is reasonable because he is already over the acute episode.

## 2021-06-15 ENCOUNTER — ANCILLARY PROCEDURE (OUTPATIENT)
Dept: GENERAL RADIOLOGY | Facility: CLINIC | Age: 76
End: 2021-06-15
Attending: FAMILY MEDICINE
Payer: COMMERCIAL

## 2021-06-15 ENCOUNTER — OFFICE VISIT (OUTPATIENT)
Dept: ORTHOPEDICS | Facility: CLINIC | Age: 76
End: 2021-06-15
Payer: COMMERCIAL

## 2021-06-15 VITALS — BODY MASS INDEX: 22.33 KG/M2 | HEIGHT: 70 IN | WEIGHT: 156 LBS

## 2021-06-15 DIAGNOSIS — M25.522 LEFT ELBOW PAIN: ICD-10-CM

## 2021-06-15 DIAGNOSIS — M25.522 LEFT ELBOW PAIN: Primary | ICD-10-CM

## 2021-06-15 PROCEDURE — 99203 OFFICE O/P NEW LOW 30 MIN: CPT | Performed by: FAMILY MEDICINE

## 2021-06-15 PROCEDURE — 73080 X-RAY EXAM OF ELBOW: CPT | Mod: LT | Performed by: RADIOLOGY

## 2021-06-15 ASSESSMENT — MIFFLIN-ST. JEOR: SCORE: 1453.86

## 2021-06-15 NOTE — LETTER
6/15/2021      RE: Ivy Haynes  [address]  [facility] [address]       CHIEF COMPLAINT:  Consult (Left elbow pain)       HISTORY OF PRESENT ILLNESS  Mr. Haynes is a pleasant 76 year old year old male who presents to clinic today with left elbow pain.  Ivy explains that he started to have left elbow pain when doing push ups and overhead extensions with 25lb weights.  He notes a clicking/catching sensation at elbow.    Onset: gradual  Location: left elbow  Quality:  aching  Duration: 3-4 months   Severity: 10/10 at worst  Timing:intermittent episodes while moving elbow  Modifying factors:  resting and non-use makes it better, movement and use makes it worse  Associated signs & symptoms: pain, clicking  Previous similar pain: No  Treatments to date: Rest, ibuprofen    Additional history: as documented    Review of Systems:    Have you recently had a a fever, chills, weight loss? No    Do you have any vision problems? No    Do you have any chest pain or edema? No    Do you have any shortness of breath or wheezing?  No    Do you have stomach problems? No    Do you have any numbness or focal weakness? No    Do you have diabetes? No    Do you have problems with bleeding or clotting? No    Do you have an rashes or other skin lesions? No    MEDICAL HISTORY  Patient Active Problem List   Diagnosis     Hepatitis C     GERD (gastroesophageal reflux disease)     CARDIOVASCULAR SCREENING; LDL GOAL LESS THAN 160     Enchondroma of femur     Left knee DJD     Osteoarthritis, knee     S/P total knee replacement     Advanced directives, counseling/discussion     Hypertension goal BP (blood pressure) < 140/90     Chest pain     Tinea pedis     Rosacea     Retention of urine     Hepatic fibrosis     Groin pain     Dysuria     Dermatofibroma     Condyloma acuminatum     Backache       Current Outpatient Medications   Medication Sig Dispense Refill     amLODIPine (NORVASC) 2.5 MG tablet Take 1 tablet (2.5 mg) by mouth daily  "90 tablet 3     famotidine (PEPCID) 20 MG tablet Take 1 tablet (20 mg) by mouth as needed       ibuprofen (ADVIL/MOTRIN) 100 MG tablet Take 100 mg by mouth daily       OMEPRAZOLE PO Take by mouth as needed       oxyCODONE-acetaminophen (PERCOCET) 5-325 MG tablet Take 1 tablet by mouth every 6 hours as needed for pain (Patient not taking: Reported on 4/30/2021) 20 tablet 0     triamcinolone (KENALOG) 0.1 % external cream Apply topically 2 times daily (Patient taking differently: Apply topically daily as needed ) 15 g 1       Allergies   Allergen Reactions     Sulfa Drugs        Family History   Problem Relation Age of Onset     Hypertension Mother      Cerebrovascular Disease Father 69     Cancer Paternal Grandfather      Cancer Son      No Known Problems Half-Sister      No Known Problems Half-Sister      No Known Problems Half-Sister      No Known Problems Half-Sister      No Known Problems Half-Brother      Kidney failure Half-Brother      Deep Vein Thrombosis No family hx of      Anesthesia Reaction No family hx of        Additional medical/Social/Surgical histories reviewed in [facility] and updated as appropriate.       PHYSICAL EXAM  Ht 1.778 m (5' 10\")   Wt 70.8 kg (156 lb)   BMI 22.38 kg/m      General  - normal appearance, in no obvious distress  HEENT  - conjunctivae not injected, moist mucous membranes  CV  - normal radial pulse  Pulm  - normal respiratory pattern, non-labored  Musculoskeletal - elbow  - inspection: normal joint alignment, no obvious deformity, no swelling  - palpation: Tenderness to palpation at distal tricep tendon medially.  Nontender radiocapitellar joint.  - ROM:  150 deg flexion   0 deg extension   90 deg pronation   90 deg supination  - strength: 5/5  strength, 5/5 flexion, extension, pronation, supination  - special tests:  (-) varus  (-) valgus  (-) Tinel's  Neuro  - no sensory or motor deficit, grossly normal coordination, normal muscle tone  Skin  - no ecchymosis, erythema, " warmth, or induration, no obvious rash  Psych  - interactive, appropriate, normal mood and affect    IMAGING : X-ray right elbow 3 view. Final results and radiologist's interpretation, available in the [facility] health record. Images were reviewed with the patient/family members in the office today. My personal interpretation of the performed imaging is possible loose body at radiocapitellar joint, appears well-corticated.     ASSESSMENT & PLAN  Mr. Haynes is a 76 year old year old male who presents to clinic today with subacute onset of pain, mechanical symptoms of left elbow.    Diagnosis:  Acute pain of left elbow    Given persistence of pain as well as mechanical symptoms, MRI of left elbow was ordered.  Rule out radiocapitellar loose body of joint versus osteoarthritis.  Also possibility of triceps tendinopathy/partial tear.    Continue activity as tolerated, avoid push-ups, isolated tricep extension    It was a pleasure seeing Ivy today.    Bobby William DO, [facility]  Primary Care Sports Medicine    32 minutes on date of the encounter doing chart review, history and examination, independent imaging review, documentation, and additional activities noted above.          Bobby William DO

## 2021-06-15 NOTE — PROGRESS NOTES
CHIEF COMPLAINT:  Consult (Left elbow pain)       HISTORY OF PRESENT ILLNESS  Mr. Haynes is a pleasant 76 year old year old male who presents to clinic today with left elbow pain.  Ivy explains that he started to have left elbow pain when doing push ups and overhead extensions with 25lb weights.  He notes a clicking/catching sensation at elbow.    Onset: gradual  Location: left elbow  Quality:  aching  Duration: 3-4 months   Severity: 10/10 at worst  Timing:intermittent episodes while moving elbow  Modifying factors:  resting and non-use makes it better, movement and use makes it worse  Associated signs & symptoms: pain, clicking  Previous similar pain: No  Treatments to date: Rest, ibuprofen    Additional history: as documented    Review of Systems:    Have you recently had a a fever, chills, weight loss? No    Do you have any vision problems? No    Do you have any chest pain or edema? No    Do you have any shortness of breath or wheezing?  No    Do you have stomach problems? No    Do you have any numbness or focal weakness? No    Do you have diabetes? No    Do you have problems with bleeding or clotting? No    Do you have an rashes or other skin lesions? No    MEDICAL HISTORY  Patient Active Problem List   Diagnosis     Hepatitis C     GERD (gastroesophageal reflux disease)     CARDIOVASCULAR SCREENING; LDL GOAL LESS THAN 160     Enchondroma of femur     Left knee DJD     Osteoarthritis, knee     S/P total knee replacement     Advanced directives, counseling/discussion     Hypertension goal BP (blood pressure) < 140/90     Chest pain     Tinea pedis     Rosacea     Retention of urine     Hepatic fibrosis     Groin pain     Dysuria     Dermatofibroma     Condyloma acuminatum     Backache       Current Outpatient Medications   Medication Sig Dispense Refill     amLODIPine (NORVASC) 2.5 MG tablet Take 1 tablet (2.5 mg) by mouth daily 90 tablet 3     famotidine (PEPCID) 20 MG tablet Take 1 tablet (20 mg) by mouth as  "needed       ibuprofen (ADVIL/MOTRIN) 100 MG tablet Take 100 mg by mouth daily       OMEPRAZOLE PO Take by mouth as needed       oxyCODONE-acetaminophen (PERCOCET) 5-325 MG tablet Take 1 tablet by mouth every 6 hours as needed for pain (Patient not taking: Reported on 4/30/2021) 20 tablet 0     triamcinolone (KENALOG) 0.1 % external cream Apply topically 2 times daily (Patient taking differently: Apply topically daily as needed ) 15 g 1       Allergies   Allergen Reactions     Sulfa Drugs        Family History   Problem Relation Age of Onset     Hypertension Mother      Cerebrovascular Disease Father 69     Cancer Paternal Grandfather      Cancer Son      No Known Problems Half-Sister      No Known Problems Half-Sister      No Known Problems Half-Sister      No Known Problems Half-Sister      No Known Problems Half-Brother      Kidney failure Half-Brother      Deep Vein Thrombosis No family hx of      Anesthesia Reaction No family hx of        Additional medical/Social/Surgical histories reviewed in [facility] and updated as appropriate.       PHYSICAL EXAM  Ht 1.778 m (5' 10\")   Wt 70.8 kg (156 lb)   BMI 22.38 kg/m      General  - normal appearance, in no obvious distress  HEENT  - conjunctivae not injected, moist mucous membranes  CV  - normal radial pulse  Pulm  - normal respiratory pattern, non-labored  Musculoskeletal - elbow  - inspection: normal joint alignment, no obvious deformity, no swelling  - palpation: Tenderness to palpation at distal tricep tendon medially.  Nontender radiocapitellar joint.  - ROM:  150 deg flexion   0 deg extension   90 deg pronation   90 deg supination  - strength: 5/5  strength, 5/5 flexion, extension, pronation, supination  - special tests:  (-) varus  (-) valgus  (-) Tinel's  Neuro  - no sensory or motor deficit, grossly normal coordination, normal muscle tone  Skin  - no ecchymosis, erythema, warmth, or induration, no obvious rash  Psych  - interactive, appropriate, normal mood " and affect    IMAGING : X-ray right elbow 3 view. Final results and radiologist's interpretation, available in the [facility] health record. Images were reviewed with the patient/family members in the office today. My personal interpretation of the performed imaging is possible loose body at radiocapitellar joint, appears well-corticated.     ASSESSMENT & PLAN  Mr. Haynes is a 76 year old year old male who presents to clinic today with subacute onset of pain, mechanical symptoms of left elbow.    Diagnosis:  Acute pain of left elbow    Given persistence of pain as well as mechanical symptoms, MRI of left elbow was ordered.  Rule out radiocapitellar loose body of joint versus osteoarthritis.  Also possibility of triceps tendinopathy/partial tear.    Continue activity as tolerated, avoid push-ups, isolated tricep extension    It was a pleasure seeing Ivy today.    Bobby William DO, [facility]  Primary Care Sports Medicine    32 minutes on date of the encounter doing chart review, history and examination, independent imaging review, documentation, and additional activities noted above.

## 2021-06-23 ENCOUNTER — ANCILLARY PROCEDURE (OUTPATIENT)
Dept: MRI IMAGING | Facility: CLINIC | Age: 76
End: 2021-06-23
Attending: FAMILY MEDICINE
Payer: COMMERCIAL

## 2021-06-23 PROCEDURE — 73221 MRI JOINT UPR EXTREM W/O DYE: CPT | Mod: LT | Performed by: RADIOLOGY

## 2021-06-29 ENCOUNTER — OFFICE VISIT (OUTPATIENT)
Dept: ORTHOPEDICS | Facility: CLINIC | Age: 76
End: 2021-06-29
Payer: COMMERCIAL

## 2021-06-29 VITALS — WEIGHT: 156 LBS | BODY MASS INDEX: 22.33 KG/M2 | HEIGHT: 70 IN

## 2021-06-29 DIAGNOSIS — M25.522 LEFT ELBOW PAIN: ICD-10-CM

## 2021-06-29 DIAGNOSIS — M19.022 PRIMARY OSTEOARTHRITIS, LEFT ELBOW: Primary | ICD-10-CM

## 2021-06-29 PROCEDURE — 99213 OFFICE O/P EST LOW 20 MIN: CPT | Performed by: FAMILY MEDICINE

## 2021-06-29 ASSESSMENT — MIFFLIN-ST. JEOR: SCORE: 1453.86

## 2021-06-29 NOTE — PATIENT INSTRUCTIONS
"Tennis Elbow Instructions    LATERAL EPICONDYLITIS (TENNIS ELBOW):    Pain on the outside of elbow worsened with any gripping activity even as little as lifting a coffee cup; weakness of ; aching pain    Usually there is not any numbness or tingling involved (please let us know if this is happening as it may be something else)    TREATMENT:    Avoid aggravating activities until pain free at rest and with exercises. Then return slowly with pain being your guide. IF IT HURTS, DO NOT DO IT!    Wrist brace allows the muscles to relax in neutral position. The muscles that extend and rotate your wrist are attached at the lateral elbow. It is these tendons that are painful so the wrist brace protects them (wear brace as much as possible, including sleep until pain free then may wean out. First stop during daytime but continue for sleep and activities you can do with brace on)    Brace options: \"chopat\" elbow strap relieves pressure on the tendon attachment but does squeeze muscle so may initially hurt. Often helpful once resuming activities and not using wrist brace.    Ice 10-15 minutes after activity. (or ice cup massage 5-7 min- fill darren cup and freze. peel away paper to use)    Cross-friction massage (rub painful area)    Anti-inflammatory such as Aleve (220mg) 2 tabs twice daily with food for 14 days to decrease inflammation, then as needed for pain.    Strengthening: Perform exercises as instructed either in handout or by occupational therapy.     These should be very easy with little to no weight.    Goal: 2 sets of 20 for each strength exercise, 1-2 times per day, 5 days a week    Hold stretches for 30 secs and repeat 2-3 times per day      Eduar Twist: You may purchase a flex  bar by [employer] and perform exercises called \"Eduar Twist\". Search online for Eduar twist tennis elbow exercises and you will find videos on how to perform. The bar may be purchased online as well at sites such as " amazon.      You may do the stretching exercises right away. You may do the strengthening exercises when stretching is nearly painless.    STRETCHING EXERCISES    Wrist active range of motion, flexion and extension: Bend the wrist of your injured arm forward and back as far as you can. Do 2 sets of 15.    Wrist stretch: Press the back of the hand on your injured side with your other hand to help bend your wrist. Hold for 15 to 30 seconds. Next, stretch the hand back by pressing the fingers in a backward direction. Hold for 15 to 30 seconds. Keep the arm on your injured side straight during this exercise. Do 3 sets.    Forearm pronation and supination: Bend the elbow of your injured arm 90 degrees, keeping your elbow at your side. Turn your palm up and hold for 5 seconds. Then slowly turn your palm down and hold for 5 seconds. Make sure you keep your elbow at your side and bent 90 degrees while you do the exercise. Do 2 sets of 15.  Active elbow flexion and extension: Gently bring the palm of the hand on your injured side up toward your shoulder, bending your elbow as much as you can. Then straighten your elbow as far as you can. Repeat 15 times. Do 2 sets of 15.  STRENGTHENING EXERCISES    Eccentric wrist flexion: Hold a can or hammer handle in the hand of your injured side with your palm up. Use the hand on the side that is not injured to bend your wrist up. Then let go of your wrist and use just your injured side to lower the weight slowly back to the starting position. Do 3 sets of 15. Gradually increase the weight you are holding.    Eccentric wrist extension: Hold a soup can or hammer handle in the hand of your injured side with your palm facing down. Use the hand on the side that is not injured to bend your wrist up. Then let go of your wrist and use just your injured side to lower the weight slowly back to the starting position. Do 3 sets of 15. Gradually increase the weight you are holding.    Wrist radial  "deviation strengthening: Put your wrist in the sideways position with your thumb up. Hold a can of soup or a hammer handle and gently bend your wrist up, with the thumb reaching toward the ceiling. Slowly lower to the starting position. Do not move your forearm throughout this exercise. Do 2 sets of 15.    Forearm pronation and supination strengthening: Hold a soup can or hammer handle in your hand and bend your elbow 90 degrees. Slowly turn your hand so your palm is up and then down. Do 2 sets of 15.  Wrist extension with broom handle: Stand up and hold a broom handle in both hands. With your arms at shoulder level, elbows straight and palms down, roll the broom handle backward in your hand. Do 2 sets of 15.              Medial Epicondylitis    Golfer s Elbow Instructions    MEDIAL EPICONDYLITIS (AKA GOLFER'S ELBOW):  -pain on the inside of elbow worsened with any gripping or lifting activity  -weakness of   -aching or burning pain  -Usually there is not any numbness or tingling involved (please let us know if this is happening as it may be something else)    TREATMENT:  -Avoid aggravating activities until pain free at rest and with exercises. Then return slowly with pain being your guide. IF IT HURTS, DO NOT DO IT!  -wrist brace allows the muscles to relax in neutral position. The muscles that extend and rotate your wrist are attached at the lateral elbow.It is these tendons that are painful so the wrist brace protects them (wear brace 24hours/day until pain free then may wean out except for activities)  - \"chopat\" elbow strap relieves pressure on the tendon attachment but does squeeze muscle so may initially hurt. Often helpful once resuming activities and not using wrist brace.  -Ice 10-15 minutes after activity. (or ice cup massage 5-7 min)  -cross-friction massage (rub painful area)  -Take Aleve (220mg) 2 tabs twice daily with food for 14 days to decrease inflammation, then as needed for pain.    HOME " "EXERCISES:  -Perform exercises as instructed in handout:  Goal:  2 sets of 20 for each strength exercise   1-2 times per day   5 days a week   Stretch after strengthening- Hold stretches for 30 secs, repeat 2-3 times   Avoid stretching through pain  -An excellent therapy program for epicondylitis is called \"Reverse Eduar Twist\"  - you may do this through formal therapy or on your own.  -To do at home, purchase a flex  bar by [employer] online. there are different strengths so adjust according to your fitness level and pain. The bar may be purchased online as well at sites such as amazon.  - Search online for \"Reverse Eduar twist elbow exercises\" and you will find videos on how to perform.  -Follow-up in 6 weeks or sooner if not improved, unable to do exercises do to pain, or if further concern.  -If not improving, we need to make sure the diagnosis is correct. Occasionally, elbow pain is due to nerve irritation.    -Once diagnosis is confirmed, we may consider nitroglycerin patch (see below for info), formal occupational therapy if not started, lidocaine injection to stimulate healing response (tenotomy), or other therapy modalities.  -We will try to avoid cortisone injections as they may delay healing but will consider if pain prevents improvement with other modalities  -rarely do you need surgery to alleviate pain    Other treatment options include:  -consider Active release therapy with a chiropractor.  For more information on ART check [email or web address].[email or web address].  -Therapeutic massage    If problem flares again after resolved, restart brace full-time, restart icing, Aleve, and exercises. If it does not calm down, schedule follow up evaluation.    You may do the stretching exercises right away. You may do the strengthening exercises when stretching is nearly painless.    STRETCHING EXERCISES  Wrist active range of motion, flexion and extension: Bend the wrist of your injured arm forward and back as far as you can. " Do 2 sets of 15.    Wrist stretch: Press the back of the hand on your injured side with your other hand to help bend your wrist. Hold for 15 to 30 seconds. Next, stretch the hand back by pressing the fingers in a backward direction. Hold for 15 to 30 seconds. Keep the arm on your injured side straight during this exercise. Do 3 sets.    Forearm pronation and supination: Bend the elbow of your injured arm 90 degrees, keeping your elbow at your side. Turn your palm up and hold for 5 seconds. Then slowly turn your palm down and hold for 5 seconds. Make sure you keep your elbow at your side and bent 90 degrees while you do the exercise. Do 2 sets of 15.    STRENGTHENING EXERCISES  Eccentric wrist flexion: Hold a can or hammer handle in the hand of your injured side with your palm up. Use the hand on the side that is not injured to bend your wrist up. Then let go of your wrist and use just your injured side to lower the weight slowly back to the starting position. Do 3 sets of 15. Gradually increase the weight you are holding.    Eccentric wrist extension: Hold a soup can or hammer handle in the hand of your injured side with your palm facing down. Use the hand on the side that is not injured to bend your wrist up. Then let go of your wrist and use just your injured side to lower the weight slowly back to the starting position. Do 3 sets of 15. Gradually increase the weight you are holding.     strengthening: Squeeze a soft rubber ball and hold the squeeze for 5 seconds. Do 2 sets of 15.    Forearm pronation and supination strengthening: Hold a soup can or hammer handle in your hand and bend your elbow 90 degrees. Slowly turn your hand so your palm is up and then down. Do 2 sets of 15.    Resisted elbow flexion and extension: Hold a can of soup with your palm up. Slowly bend your elbow so that your hand is coming toward your shoulder. Then lower it slowly so your arm is completely straight. Do 2 sets of 15. Slowly  increase the weight you are using.      Developed by [employer].  Published by [employer].  Copyright  2014 [employer] and/or one of its subsidiaries. All rights reserved.    References  Triceps Tendon Injury    You may start stretches this week  Aleve twice daily for first week, icing daily.  Strengthening or loading exercises may begin next week    Andorran stretch: Stand with your fingers clasped together and your hands high above your head. Stretch by reaching down behind your head and trying to touch your upper back while keeping your hands clasped. Keep your elbows as close to your ears as possible. Hold this position for 15 to 30 seconds. Repeat 3 times.    Triceps towel stretch: Stand with your injured arm over your head holding the end of a towel. Put your other arm behind your back and grab the towel. Stretch your top arm behind your head by pulling the towel down toward the floor with your uninjured arm. Keep the elbow of your injured arm as close to your ear as possible. Hold for 15 to 30 seconds. Repeat 3 times.    Towel resistance exercise: Stand with your injured arm over your head holding the end of a towel. Put your other arm behind your back and grab the towel. Lift the hand of your injured arm toward the ceiling while pulling down on the towel with your other hand. Keep the elbow of your injured arm as close to your ear as possible. Hold for 10 seconds. Repeat 10 times.    Andorran press: Sit and hold a small weight or dumbbell with both hands as if it were a baseball bat. Reach your arms over your head toward the ceiling. Bending your elbows, slowly lower the weight behind your head until the weight touches your upper back. Lift the weight up over your head and reach toward the ceiling again. Do 2 sets of 8 to 12 repetitions.    Modified push-up: Get onto your hands and knees, with your hands directly below your shoulders. Slowly bend your arms and lower yourself toward the floor, being  careful to keep your spine straight. When you can do 2 sets of 15 easily, do this with your feet off the floor. Gradually progress to doing regular push-ups with your legs out straight.    Triceps kick back: Lean forward with the hand of your uninjured arm resting on a table or chair for support. Hold a weight with the hand of your injured arm. Keep the elbow of your injured arm against your side. Your arm should be bent at a 90-degree angle with the upper arm parallel to the floor. Move your forearm back until it is straight and also parallel to the floor. Then bring it back to the starting position. Do 2 sets of 8 to 12 repetitions.    Developed by [employer].  Published by [employer].  Copyright  2014 [employer] and/or one of its subsidiaries. All rights reserved.    References

## 2021-06-29 NOTE — PROGRESS NOTES
ESTABLISHED PATIENT FOLLOW-UP:  Follow Up of the Left Elbow       HISTORY OF PRESENT ILLNESS  Mr. Haynes is a pleasant 76 year old year old male who presents to clinic today for follow-up of L elbow MRI    Date of injury/onset: ~3/2021   Date last seen: 6/15/21  Following Therapeutic Plan: yes MRI  Pain:  unchanged  Function: unchanged  Interval History: MRI left elbow.  Has not performed his typical pushups and pull ups in 3 months.  He notes pain is overall minimal without exercise. Wishes to return to exercise.    Review of Systems:    Have you recently had a a fever, chills, weight loss? No    Do you have any vision problems? No    Do you have any chest pain or edema? No    Do you have any shortness of breath or wheezing?  No    Do you have stomach problems? No    Do you have any numbness or focal weakness? No    Do you have diabetes? No    Do you have problems with bleeding or clotting? No    Do you have an rashes or other skin lesions? No      MEDICAL HISTORY  Patient Active Problem List   Diagnosis     Hepatitis C     GERD (gastroesophageal reflux disease)     CARDIOVASCULAR SCREENING; LDL GOAL LESS THAN 160     Enchondroma of femur     Left knee DJD     Osteoarthritis, knee     S/P total knee replacement     Advanced directives, counseling/discussion     Hypertension goal BP (blood pressure) < 140/90     Chest pain     Tinea pedis     Rosacea     Retention of urine     Hepatic fibrosis     Groin pain     Dysuria     Dermatofibroma     Condyloma acuminatum     Backache       Current Outpatient Medications   Medication Sig Dispense Refill     amLODIPine (NORVASC) 2.5 MG tablet Take 1 tablet (2.5 mg) by mouth daily 90 tablet 3     famotidine (PEPCID) 20 MG tablet Take 1 tablet (20 mg) by mouth as needed       ibuprofen (ADVIL/MOTRIN) 100 MG tablet Take 100 mg by mouth daily       OMEPRAZOLE PO Take by mouth as needed       oxyCODONE-acetaminophen (PERCOCET) 5-325 MG tablet Take 1 tablet by mouth every 6 hours as  "needed for pain (Patient not taking: Reported on 4/30/2021) 20 tablet 0     triamcinolone (KENALOG) 0.1 % external cream Apply topically 2 times daily (Patient taking differently: Apply topically daily as needed ) 15 g 1       Allergies   Allergen Reactions     Sulfa Drugs        Family History   Problem Relation Age of Onset     Hypertension Mother      Cerebrovascular Disease Father 69     Cancer Paternal Grandfather      Cancer Son      No Known Problems Half-Sister      No Known Problems Half-Sister      No Known Problems Half-Sister      No Known Problems Half-Sister      No Known Problems Half-Brother      Kidney failure Half-Brother      Deep Vein Thrombosis No family hx of      Anesthesia Reaction No family hx of        Additional medical/Social/Surgical histories reviewed in [facility] and updated as appropriate.       PHYSICAL EXAM  Ht 1.778 m (5' 10\")   Wt 70.8 kg (156 lb)   BMI 22.38 kg/m      Deferred    IMAGING :     MRI ELBOW LEFT 6/23/21 WO CON    IMPRESSION:  1. Small left elbow joint effusion.  2. Marked osteoarthrosis in the left elbow radiocapitellar joint with  areas of full-thickness cartilage loss along the radial head and  capitellar surfaces. Additionally there is an adjacent joint body  measuring 6 x 3 mm, as described above.  3. Mild tendinosis of the left elbow distal triceps tendon, which is  otherwise intact.  4. Tendinosis with low-grade partial-thickness tearing of both the  common flexor and common extensor tendons at their origin. Findings  consistent with medial and lateral epicondylitis respectively.     NAPOLEON FARMER MD     ASSESSMENT & PLAN  Mr. Haynes is a 76 year old year old male who presents to clinic today with Follow Up of the Left Elbow    Diagnosis:  1.  Primary osteoarthritis of left elbow  2.  Lateral epicondylosis of left elbow  3.  Medial epicondylosis of left elbow  4.  Tendinosis trictang Haynes and I had a discussion regarding treatment options given his marked " osteoarthrosis.  He has had a history of corticosteroid injections in his left knee which did not turn out to be favorable for him at a young age.  He would like to avoid any repeat corticosteroid injections.  I did place an order for him to at least consult with our hand therapy team for a tailored exercise program that he can continue by himself.  I will also provide him with additional handouts for elbow strengthening.  He will use pain as his guide otherwise.  We did discuss further surgical means down the road however given his high level activity we will certainly maximize conservative treatments.    If he continues to have pain and is unable to progress in his workouts, I would like to see him back and we may discuss other options such as [facility].    It was a pleasure seeing Ivy.    Bobby William DO, CAQSM  Primary Care Sports Medicine

## 2021-06-29 NOTE — LETTER
6/29/2021      RE: Ivy Haynes  [address]  [facility] [address]       ESTABLISHED PATIENT FOLLOW-UP:  Follow Up of the Left Elbow       HISTORY OF PRESENT ILLNESS  Mr. Haynes is a pleasant 76 year old year old male who presents to clinic today for follow-up of L elbow MRI    Date of injury/onset: ~3/2021   Date last seen: 6/15/21  Following Therapeutic Plan: yes MRI  Pain:  unchanged  Function: unchanged  Interval History: MRI left elbow.  Has not performed his typical pushups and pull ups in 3 months.  He notes pain is overall minimal without exercise. Wishes to return to exercise.    Review of Systems:    Have you recently had a a fever, chills, weight loss? No    Do you have any vision problems? No    Do you have any chest pain or edema? No    Do you have any shortness of breath or wheezing?  No    Do you have stomach problems? No    Do you have any numbness or focal weakness? No    Do you have diabetes? No    Do you have problems with bleeding or clotting? No    Do you have an rashes or other skin lesions? No      MEDICAL HISTORY  Patient Active Problem List   Diagnosis     Hepatitis C     GERD (gastroesophageal reflux disease)     CARDIOVASCULAR SCREENING; LDL GOAL LESS THAN 160     Enchondroma of femur     Left knee DJD     Osteoarthritis, knee     S/P total knee replacement     Advanced directives, counseling/discussion     Hypertension goal BP (blood pressure) < 140/90     Chest pain     Tinea pedis     Rosacea     Retention of urine     Hepatic fibrosis     Groin pain     Dysuria     Dermatofibroma     Condyloma acuminatum     Backache       Current Outpatient Medications   Medication Sig Dispense Refill     amLODIPine (NORVASC) 2.5 MG tablet Take 1 tablet (2.5 mg) by mouth daily 90 tablet 3     famotidine (PEPCID) 20 MG tablet Take 1 tablet (20 mg) by mouth as needed       ibuprofen (ADVIL/MOTRIN) 100 MG tablet Take 100 mg by mouth daily       OMEPRAZOLE PO Take by mouth as needed        "oxyCODONE-acetaminophen (PERCOCET) 5-325 MG tablet Take 1 tablet by mouth every 6 hours as needed for pain (Patient not taking: Reported on 4/30/2021) 20 tablet 0     triamcinolone (KENALOG) 0.1 % external cream Apply topically 2 times daily (Patient taking differently: Apply topically daily as needed ) 15 g 1       Allergies   Allergen Reactions     Sulfa Drugs        Family History   Problem Relation Age of Onset     Hypertension Mother      Cerebrovascular Disease Father 69     Cancer Paternal Grandfather      Cancer Son      No Known Problems Half-Sister      No Known Problems Half-Sister      No Known Problems Half-Sister      No Known Problems Half-Sister      No Known Problems Half-Brother      Kidney failure Half-Brother      Deep Vein Thrombosis No family hx of      Anesthesia Reaction No family hx of        Additional medical/Social/Surgical histories reviewed in [facility] and updated as appropriate.       PHYSICAL EXAM  Ht 1.778 m (5' 10\")   Wt 70.8 kg (156 lb)   BMI 22.38 kg/m      Deferred    IMAGING :     MRI ELBOW LEFT 6/23/21 WO CON    IMPRESSION:  1. Small left elbow joint effusion.  2. Marked osteoarthrosis in the left elbow radiocapitellar joint with  areas of full-thickness cartilage loss along the radial head and  capitellar surfaces. Additionally there is an adjacent joint body  measuring 6 x 3 mm, as described above.  3. Mild tendinosis of the left elbow distal triceps tendon, which is  otherwise intact.  4. Tendinosis with low-grade partial-thickness tearing of both the  common flexor and common extensor tendons at their origin. Findings  consistent with medial and lateral epicondylitis respectively.     NAPOLEON FARMER MD     ASSESSMENT & PLAN  Mr. Haynes is a 76 year old year old male who presents to clinic today with Follow Up of the Left Elbow    Diagnosis:  1.  Primary osteoarthritis of left elbow  2.  Lateral epicondylosis of left elbow  3.  Medial epicondylosis of left elbow  4.  " Tendinosis trictang Haynes and I had a discussion regarding treatment options given his marked osteoarthrosis.  He has had a history of corticosteroid injections in his left knee which did not turn out to be favorable for him at a young age.  He would like to avoid any repeat corticosteroid injections.  I did place an order for him to at least consult with our hand therapy team for a tailored exercise program that he can continue by himself.  I will also provide him with additional handouts for elbow strengthening.  He will use pain as his guide otherwise.  We did discuss further surgical means down the road however given his high level activity we will certainly maximize conservative treatments.    If he continues to have pain and is unable to progress in his workouts, I would like to see him back and we may discuss other options such as [facility].    It was a pleasure seeing Ivy.    Bobby William DO, [facility]  Primary Care Sports Medicine

## 2021-07-30 ENCOUNTER — THERAPY VISIT (OUTPATIENT)
Dept: OCCUPATIONAL THERAPY | Facility: CLINIC | Age: 76
End: 2021-07-30
Attending: FAMILY MEDICINE
Payer: COMMERCIAL

## 2021-07-30 DIAGNOSIS — M25.522 LEFT ELBOW PAIN: ICD-10-CM

## 2021-07-30 PROCEDURE — 97165 OT EVAL LOW COMPLEX 30 MIN: CPT | Mod: GO | Performed by: OCCUPATIONAL THERAPIST

## 2021-07-30 PROCEDURE — 97035 APP MDLTY 1+ULTRASOUND EA 15: CPT | Mod: GO | Performed by: OCCUPATIONAL THERAPIST

## 2021-07-30 PROCEDURE — 97535 SELF CARE MNGMENT TRAINING: CPT | Mod: GO | Performed by: OCCUPATIONAL THERAPIST

## 2021-07-30 PROCEDURE — 97110 THERAPEUTIC EXERCISES: CPT | Mod: GO | Performed by: OCCUPATIONAL THERAPIST

## 2021-07-30 NOTE — PROGRESS NOTES
"Hand Therapy Initial Evaluation    Current Date:  7/30/2021    Diagnosis: Left elbow pain   DOI: 6/29/2021    Subjective:  Ivy Haynes is a 76 year old male.    Patient reports symptoms of the left elbow which occurred due to overuse at home gym. Since onset symptoms are Unchanged  General health as reported by patient is good.     Patient states that he exercises every day, that he does not want to stop. He states that he is \"addicted\" to it.       Pertinent medical history includes:   Past Medical History:   Diagnosis Date     Back pain      Chest pain      Gastro-oesophageal reflux disease      GERD (gastroesophageal reflux disease)      Hemorrhoid      Hepatitis C      Hypertension      Trigeminal neuralgia      Medical allergies:    Allergies   Allergen Reactions     Sulfa Drugs      Surgical history:   Past Surgical History:   Procedure Laterality Date     ARTHROPLASTY KNEE  11/9/2012    Procedure: ARTHROPLASTY KNEE;;  Surgeon: Ang Arellano MD;  Location: [facility] OR     ENT SURGERY  2009    Styloid process broken off     HERNIA REPAIR  12/10     RESECT TUMOR LOWER EXTREMITY  11/9/2012    Procedure: RESECT TUMOR LOWER EXTREMITY;  Excision of Tumor Left Distal Femur, Left Total Knee Replacement ;  Surgeon: Ang Arellano MD;  Location: [facility] OR     styloid process temporal bone surg  02/10     TONSILLECTOMY  1954     Medication history:   Current Outpatient Medications   Medication     amLODIPine (NORVASC) 2.5 MG tablet     famotidine (PEPCID) 20 MG tablet     ibuprofen (ADVIL/MOTRIN) 100 MG tablet     OMEPRAZOLE PO     oxyCODONE-acetaminophen (PERCOCET) 5-325 MG tablet     triamcinolone (KENALOG) 0.1 % external cream     No current facility-administered medications for this visit.     Current occupation is retired     Occupational Profile Information:  Right hand dominant  Prior functional level:  no limitations  Patient reports symptoms of pain and weakness/loss of strength  Special tests:  MRI, xray   Previous " treatment: ice  Barriers include:none  Mobility: No difficulty  Transportation: public transit, and per family   Leisure activities/hobbies: home gym, [employer]     Objective:  Pain Level (Scale 0-10)   7/30/2021   At Rest 0   With Use Moderate to high      Pain Description  Date 7/30/2021   Location Elbow lateral, medial, triceps    Pain Quality Burning   Frequency intermittent     Pain is worst  daytime or nighttime, does not wake from sleep    Exacerbated by  weight bearing in wrist extension    Relieved by Rest, stretch    Progression Unchanged      Posture  Normal    Sensation  WNL throughout all nerve distributions; per patient report    ROM  Pain Report: - none  + mild    ++ moderate    +++ severe   Elbow 7/30/2021 7/30/2021   AROM (PROM) R L   Extension 0 0   Flexion 155 155   Supination 75 75   Pronation 75 70     Wrist 7/30/2021 7/30/2021   AROM (PROM) R L   Extension 60 75   Flexion 60 60   RD 3 8   UD 30 21     Clinical Examination of the Elbow  ACTIVE/PASSIVE TESTING 7/30/2021   A. Elbow Flexion -   P.  Elbow Flexion -         -Full pronation -         -Full supination -   A. Elbow Extension -   P.  Elbow Extension -         -Full pronation -         -Full supination -   A.  Forearm Pronation -   P.  Forearm Pronation -   A.  Forearm Supination -   P.  Forearm Supination -   P.  Wrist Flexion -   P.  Wrist Extension -   RESISTED TESTING    R.  Wrist Elbow Flexion -         -Forearm supinated -         -Forearm neutral -         -Forearm pronated -   R.  Wrist Elbow Extension -   R.  Forearm Pronation -   R.  Forearm Supination -   R.  Wrist Extension with RD ++   R.  Wrist Extension with UD -   R.  MCPJ Extension (2-5) -   R.  Wrist Flexion with RD -   R.  Wrist Flexion with UD +   EXTRA TESTS:  Lateral sided pain    Push up sign (PLRI) +    test (lateral tendinopathy) -   EXTRA TESTS:  Medial sided pain -     Strength   (Measured in pounds)  Pain Report: - none  + mild    ++ moderate    +++ severe     7/30/2021 7/30/2021   Trials R L   1  2  3 78  77   Average 78 77       7/30/2021 7/30/2021    R L   Elbow Ext 78 75     Palpation  Pain Report:  - none    + mild    ++ moderate    +++ severe    7/30/2021   Triangular Interval -   Infraspinatus -   Teres Major -   Pec Major -   Pec Minor -   Proximal Triceps -   Spiral Groove -   Distal Triceps +   Anconeus -   ECRB at LEP ++   ECU at LEP -   EDC at LEP -   Radial Head -   Extensor Wad -   PIN Site -     Palpation  Pain Report:  - none    + mild    ++ moderate    +++ severe   Date 7/30/2021   MEP +   Flexor Origin -   Flexor Wad -   Pronator Teres -   [place] -     Assessment:  Patient presents with symptoms consistent with diagnosis of left elbow  pain,  with conservative intervention.     Patient's limitations or Problem List includes:  Pain and Tightness in musculature of the left elbow which interferes with the patient's ability to perform Self Care Tasks (bathing), Recreational Activities and Household Chores as compared to previous level of function.    Rehab Potential:  Excellent - Return to full activity, no limitations    Patient will benefit from skilled Occupational Therapy to decrease pain to return to previous activity level and resume normal daily tasks and to reach their rehab potential.    Barriers to Learning:  No barrier    Communication Issues:  Patient appears to be able to clearly communicate and understand verbal and written communication and follow directions correctly.    Chart Review: Chart Review and Brief history including review of medical and/or therapy records relating to the presenting problem    Identified Performance Deficits: functional mobility, health management and maintenance, home establishment and management and leisure activities    Assessment of Occupational Performance:  3-5 Performance Deficits    Clinical Decision Making (Complexity): Low complexity    Treatment Explanation:  The following has been  discussed with the patient:  RX ordered/plan of care  Anticipated outcomes  Possible risks and side effects    Plan:  Frequency:  1 X week, once daily  Duration:  for 6 weeks    Treatment Plan:   Modalities:  US and Paraffin  Therapeutic Exercise:  AROM, AAROM, PROM, Place and Hold, Contract Relax, Isotonics, Isometrics and Stabilization  Neuromuscular re-education:  Nerve Gliding, Sensory re-education, Desensitization, Kinesthetic Training, Kinesiotaping and Strain Counter Strain  Manual Techniques:  Joint mobilization, Friction massage and Myofascial release  Orthotic Fabrication:  Static and Forearm based  Self Care:  Self Care Tasks, Ergonomic Considerations and Community Transportation    Discharge Plan:  Achieve all LTG.  Independent in home treatment program.  Reach maximal therapeutic benefit.    Home Exercise Program:  Latissimus Dorsi Stretch Seated  EMR Notes  HEP - Sets 3  Reps 10-15 sec hold  Sessions per day 1-2  Notes  Forearm Passive Range of Motion Flexor Stretch  EMR Notes  HEP - Sets 3  Reps 15 sec hold  Sessions per day 1-2  Notes  Forearm Passive Range of Motion Extensor Stretch  EMR Notes  HEP - Sets 3  Reps 10-15 sec hold  Sessions per day 1-2  Notes  Wrist Strengthening Eccentric Extension with Flexbar  EMR Notes  HEP - Sets 1-3  Reps 10  Sessions per day every other day  Notes      Next Visit:  Us   counterforce bracing   Encourage rest of wrist and elbow

## 2021-08-13 ENCOUNTER — THERAPY VISIT (OUTPATIENT)
Dept: OCCUPATIONAL THERAPY | Facility: CLINIC | Age: 76
End: 2021-08-13
Payer: COMMERCIAL

## 2021-08-13 DIAGNOSIS — M25.522 LEFT ELBOW PAIN: Primary | ICD-10-CM

## 2021-08-13 PROCEDURE — 97140 MANUAL THERAPY 1/> REGIONS: CPT | Mod: GO | Performed by: OCCUPATIONAL THERAPIST

## 2021-08-13 PROCEDURE — 97110 THERAPEUTIC EXERCISES: CPT | Mod: GO | Performed by: OCCUPATIONAL THERAPIST

## 2021-09-23 ENCOUNTER — TELEPHONE (OUTPATIENT)
Dept: OTOLARYNGOLOGY | Facility: CLINIC | Age: 76
End: 2021-09-23

## 2021-09-23 PROBLEM — M25.522 LEFT ELBOW PAIN: Status: RESOLVED | Noted: 2021-07-30 | Resolved: 2021-09-23

## 2021-09-23 NOTE — TELEPHONE ENCOUNTER
LVM after being unable to reach. Stated 10/12 appt has to be rescheduled due to provider being unavailable, left ENT scheduling number if patient had any questions or concerns.

## 2021-10-01 DIAGNOSIS — I10 HYPERTENSION GOAL BP (BLOOD PRESSURE) < 140/90: ICD-10-CM

## 2021-10-02 DIAGNOSIS — I10 HYPERTENSION GOAL BP (BLOOD PRESSURE) < 140/90: ICD-10-CM

## 2021-10-04 RX ORDER — AMLODIPINE BESYLATE 5 MG/1
TABLET ORAL
Qty: 90 TABLET | Refills: 1 | OUTPATIENT
Start: 2021-10-04

## 2021-10-04 RX ORDER — AMLODIPINE BESYLATE 2.5 MG/1
TABLET ORAL
Qty: 90 TABLET | Refills: 3 | Status: SHIPPED | OUTPATIENT
Start: 2021-10-04 | End: 2022-09-20

## 2021-10-04 NOTE — TELEPHONE ENCOUNTER
"Requested Prescriptions   Signed Prescriptions Disp Refills    amLODIPine (NORVASC) 2.5 MG tablet 90 tablet 3     Sig: TAKE 1 TABLET(2.5 MG) BY MOUTH DAILY       Calcium Channel Blockers Protocol  Passed - 10/2/2021  3:39 AM        Passed - Blood pressure under 140/90 in past 12 months     BP Readings from Last 3 Encounters:   04/30/21 134/82   02/05/21 136/86   11/20/20 (!) 182/98                 Passed - Recent (12 mo) or future (30 days) visit within the authorizing provider's specialty     Patient has had an office visit with the authorizing provider or a provider within the authorizing providers department within the previous 12 mos or has a future within next 30 days. See \"Patient Info\" tab in inbasket, or \"Choose Columns\" in Meds & Orders section of the refill encounter.              Passed - Medication is active on med list        Passed - Patient is age 18 or older        Passed - Normal serum creatinine on file in past 12 months     Recent Labs   Lab Test 04/30/21  1645   CR 1.02       Ok to refill medication if creatinine is low             Nelda Roper RN  [facility]     "

## 2021-10-26 ENCOUNTER — OFFICE VISIT (OUTPATIENT)
Dept: OTOLARYNGOLOGY | Facility: CLINIC | Age: 76
End: 2021-10-26
Payer: COMMERCIAL

## 2021-10-26 VITALS — BODY MASS INDEX: 22.76 KG/M2 | WEIGHT: 159 LBS | OXYGEN SATURATION: 99 % | HEART RATE: 78 BPM | HEIGHT: 70 IN

## 2021-10-26 DIAGNOSIS — M54.2 NECK PAIN: ICD-10-CM

## 2021-10-26 PROCEDURE — 99213 OFFICE O/P EST LOW 20 MIN: CPT | Performed by: OTOLARYNGOLOGY

## 2021-10-26 RX ORDER — OXYCODONE AND ACETAMINOPHEN 5; 325 MG/1; MG/1
1 TABLET ORAL EVERY 6 HOURS PRN
Qty: 20 TABLET | Refills: 0 | Status: SHIPPED | OUTPATIENT
Start: 2021-10-26 | End: 2022-04-15

## 2021-10-26 ASSESSMENT — MIFFLIN-ST. JEOR: SCORE: 1467.47

## 2021-10-26 ASSESSMENT — PAIN SCALES - GENERAL: PAINLEVEL: NO PAIN (0)

## 2021-10-26 NOTE — LETTER
"10/26/2021       RE: Ivy Haynes  [address]  [place] [address]     Dear Colleague,    Thank you for referring your patient, Ivy Haynes, to the [facility] [place] at [facility]. Please see a copy of my visit note below.      Otolaryngology Clinic    Name: Ivy Haynes  MRN: [id number]  Age: 76 year old  : 1945  10/26/2021      Chief Complaint:   Follow up    History of Present Illness:   Ivy Haynes is a 76 year old male who presents for follow up.  The patient has history of intermittent right-sided neck pain.  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 Orion Mccormack many years ago.On visit in 2021 he had a likely acute intraoral infection, but it was difficult to discern whether it was sublingual vs submandibular.     On follow up here he states that this area has cleared up. He had a root canal last week but otherwise has not had any new dental or intraoral issues.      Review of Systems:   Pertinent items are noted in HPI or as in patient entered ROS below, remainder of complete ROS is negative.    ENT ROS 2020   Neurology -   Ears, Nose, Throat Ear pain, Sore throat   Cardiopulmonary -   Gastrointestinal/Genitourinary Heartburn/indigestion   Musculoskeletal Neck pain        Physical Exam:   Pulse 78   Ht 1.778 m (5' 10\")   Wt 72.1 kg (159 lb)   SpO2 99%   BMI 22.81 kg/m       PHYSICAL EXAMINATION:    Constitutional:  The patient was unaccompanied, well-groomed, and in no acute distress.    Skin:  Warm and pink.    Neurologic:  Alert and oriented x 3.  CN's III-XII within normal limits.  Voice normal.   Psychiatric:  The patient's affect was calm, cooperative, and appropriate.    Respiratory:  Breathing comfortably without stridor or exertion of accessory muscles.    Eyes: Extraocular movement intact.  "   Head:  Normocephalic and atraumatic.  No lesions or scars.    Ears:  Pinnae and tragus non-tender.    Nose:  Sinuses were non-tender.  Anterior rhinoscopy revealed midline septum and absence of purulence or polyps.    OC/OP:  Normal tongue, floor of mouth, buccal mucosa, and palate.  No lesions or masses on inspection or palpation.  No abnormal lymph tissue in the oropharynx.  The pterygoid region is non-tender.    Neck:  Supple with normal laryngeal and tracheal landmarks.  The parotid beds were without masses.  No palpable thyroid.  Lymphatic:  There is no palpable lymphadenopathy in the neck.      Assessment and Plan:  No diagnosis found.  His intraoral infection has improved and he has no other complaints at this time. He should follow in 6 months.         Scribe Disclosure:  I, Fran Cueva, am serving as a scribe to document services personally performed by Connor Torres MD at this visit, based upon the provider's statements to me. All documentation has been reviewed by the aforementioned provider prior to being entered into the official medical record.       Again, thank you for allowing me to participate in the care of your patient.      Sincerely,    Connor Torres MD

## 2021-10-26 NOTE — NURSING NOTE
"Chief Complaint   Patient presents with     RECHECK     follow up       Pulse 78, height 1.778 m (5' 10\"), weight 72.1 kg (159 lb), SpO2 99 %.    Deb France, EMT    "

## 2021-10-26 NOTE — PROGRESS NOTES
"  Otolaryngology Clinic    Name: Ivy Haynes  MRN: [id number]  Age: 76 year old  : 1945  10/26/2021      Chief Complaint:   Follow up    History of Present Illness:   Ivy Haynes is a 76 year old male who presents for follow up.  The patient has history of intermittent right-sided neck pain.  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 Orion Mccormack many years ago.On visit in 2021 he had a likely acute intraoral infection, but it was difficult to discern whether it was sublingual vs submandibular.     On follow up here he states that this area has cleared up. He had a root canal last week but otherwise has not had any new dental or intraoral issues.      Review of Systems:   Pertinent items are noted in HPI or as in patient entered ROS below, remainder of complete ROS is negative.    ENT ROS 2020   Neurology -   Ears, Nose, Throat Ear pain, Sore throat   Cardiopulmonary -   Gastrointestinal/Genitourinary Heartburn/indigestion   Musculoskeletal Neck pain        Physical Exam:   Pulse 78   Ht 1.778 m (5' 10\")   Wt 72.1 kg (159 lb)   SpO2 99%   BMI 22.81 kg/m       PHYSICAL EXAMINATION:    Constitutional:  The patient was unaccompanied, well-groomed, and in no acute distress.    Skin:  Warm and pink.    Neurologic:  Alert and oriented x 3.  CN's III-XII within normal limits.  Voice normal.   Psychiatric:  The patient's affect was calm, cooperative, and appropriate.    Respiratory:  Breathing comfortably without stridor or exertion of accessory muscles.    Eyes: Extraocular movement intact.    Head:  Normocephalic and atraumatic.  No lesions or scars.    Ears:  Pinnae and tragus non-tender.    Nose:  Sinuses were non-tender.  Anterior rhinoscopy revealed midline septum and absence of purulence or polyps.    OC/OP:  Normal tongue, floor of mouth, buccal mucosa, and palate.  No lesions or masses on inspection or " palpation.  No abnormal lymph tissue in the oropharynx.  The pterygoid region is non-tender.    Neck:  Supple with normal laryngeal and tracheal landmarks.  The parotid beds were without masses.  No palpable thyroid.  Lymphatic:  There is no palpable lymphadenopathy in the neck.      Assessment and Plan:  No diagnosis found.  His intraoral infection has improved and he has no other complaints at this time. He should follow in 6 months.         Scribe Disclosure:  IFran, am serving as a scribe to document services personally performed by Connor Torres MD at this visit, based upon the provider's statements to me. All documentation has been reviewed by the aforementioned provider prior to being entered into the official medical record.

## 2021-10-26 NOTE — PATIENT INSTRUCTIONS
1. Please follow-up in clinic in 6 months  2. Please call the ENT clinic with any questions,concerns, new or worsening symptoms.    -Clinic number is [phone]   - Crystal's direct line (Dr. Torres's nurse) [phone]

## 2021-11-05 ENCOUNTER — MYC MEDICAL ADVICE (OUTPATIENT)
Dept: OTOLARYNGOLOGY | Facility: CLINIC | Age: 76
End: 2021-11-05

## 2021-11-09 DIAGNOSIS — M54.2 NECK PAIN: Primary | ICD-10-CM

## 2021-11-11 ENCOUNTER — TELEPHONE (OUTPATIENT)
Dept: OTOLARYNGOLOGY | Facility: CLINIC | Age: 76
End: 2021-11-11
Payer: COMMERCIAL

## 2021-11-11 NOTE — TELEPHONE ENCOUNTER
Spoke with patient regarding scheduling facial CT Scan and then a follow up with Dr. Torres after completed for a phone call appointment. Scheduled patient accordingly and sent AVS Printout to confirmed address.-Per Patient

## 2021-11-17 ENCOUNTER — ANCILLARY PROCEDURE (OUTPATIENT)
Dept: CT IMAGING | Facility: CLINIC | Age: 76
End: 2021-11-17
Attending: OTOLARYNGOLOGY
Payer: COMMERCIAL

## 2021-11-17 DIAGNOSIS — M54.2 NECK PAIN: ICD-10-CM

## 2021-11-17 PROCEDURE — 70486 CT MAXILLOFACIAL W/O DYE: CPT | Mod: GC | Performed by: STUDENT IN AN ORGANIZED HEALTH CARE EDUCATION/TRAINING PROGRAM

## 2021-12-10 ENCOUNTER — APPOINTMENT (OUTPATIENT)
Dept: CT IMAGING | Facility: CLINIC | Age: 76
End: 2021-12-10
Attending: FAMILY MEDICINE
Payer: COMMERCIAL

## 2021-12-10 ENCOUNTER — HOSPITAL ENCOUNTER (EMERGENCY)
Facility: CLINIC | Age: 76
Discharge: HOME | End: 2021-12-10
Attending: FAMILY MEDICINE | Admitting: FAMILY MEDICINE
Payer: COMMERCIAL

## 2021-12-10 ENCOUNTER — TELEPHONE (OUTPATIENT)
Dept: FAMILY MEDICINE | Facility: CLINIC | Age: 76
End: 2021-12-10
Payer: COMMERCIAL

## 2021-12-10 ENCOUNTER — NURSE TRIAGE (OUTPATIENT)
Dept: FAMILY MEDICINE | Facility: CLINIC | Age: 76
End: 2021-12-10
Payer: COMMERCIAL

## 2021-12-10 VITALS
RESPIRATION RATE: 16 BRPM | TEMPERATURE: 97.7 F | OXYGEN SATURATION: 99 % | HEART RATE: 99 BPM | DIASTOLIC BLOOD PRESSURE: 93 MMHG | SYSTOLIC BLOOD PRESSURE: 156 MMHG

## 2021-12-10 DIAGNOSIS — G44.019 EPISODIC CLUSTER HEADACHE, NOT INTRACTABLE: ICD-10-CM

## 2021-12-10 LAB
ALBUMIN SERPL-MCNC: 3.8 G/DL (ref 3.4–5)
ALP SERPL-CCNC: 145 U/L (ref 40–150)
ALT SERPL W P-5'-P-CCNC: 25 U/L (ref 0–70)
ANION GAP SERPL CALCULATED.3IONS-SCNC: 6 MMOL/L (ref 3–14)
AST SERPL W P-5'-P-CCNC: 26 U/L (ref 0–45)
BASOPHILS # BLD AUTO: 0 10E3/UL (ref 0–0.2)
BASOPHILS NFR BLD AUTO: 1 %
BILIRUB SERPL-MCNC: 0.5 MG/DL (ref 0.2–1.3)
BUN SERPL-MCNC: 13 MG/DL (ref 7–30)
CALCIUM SERPL-MCNC: 9.2 MG/DL (ref 8.5–10.1)
CHLORIDE BLD-SCNC: 107 MMOL/L (ref 94–109)
CO2 SERPL-SCNC: 27 MMOL/L (ref 20–32)
CREAT SERPL-MCNC: 1.08 MG/DL (ref 0.66–1.25)
EOSINOPHIL # BLD AUTO: 0 10E3/UL (ref 0–0.7)
EOSINOPHIL NFR BLD AUTO: 0 %
ERYTHROCYTE [DISTWIDTH] IN BLOOD BY AUTOMATED COUNT: 11.9 % (ref 10–15)
ERYTHROCYTE [SEDIMENTATION RATE] IN BLOOD BY WESTERGREN METHOD: 6 MM/HR (ref 0–20)
GFR SERPL CREATININE-BSD FRML MDRD: 67 ML/MIN/1.73M2
GLUCOSE BLD-MCNC: 99 MG/DL (ref 70–99)
HCT VFR BLD AUTO: 46.1 % (ref 40–53)
HGB BLD-MCNC: 15.8 G/DL (ref 13.3–17.7)
IMM GRANULOCYTES # BLD: 0 10E3/UL
IMM GRANULOCYTES NFR BLD: 0 %
LYMPHOCYTES # BLD AUTO: 1.1 10E3/UL (ref 0.8–5.3)
LYMPHOCYTES NFR BLD AUTO: 33 %
MCH RBC QN AUTO: 33.6 PG (ref 26.5–33)
MCHC RBC AUTO-ENTMCNC: 34.3 G/DL (ref 31.5–36.5)
MCV RBC AUTO: 98 FL (ref 78–100)
MONOCYTES # BLD AUTO: 0.5 10E3/UL (ref 0–1.3)
MONOCYTES NFR BLD AUTO: 15 %
NEUTROPHILS # BLD AUTO: 1.7 10E3/UL (ref 1.6–8.3)
NEUTROPHILS NFR BLD AUTO: 51 %
NRBC # BLD AUTO: 0 10E3/UL
NRBC BLD AUTO-RTO: 0 /100
PLATELET # BLD AUTO: 188 10E3/UL (ref 150–450)
POTASSIUM BLD-SCNC: 4.3 MMOL/L (ref 3.4–5.3)
PROT SERPL-MCNC: 7.8 G/DL (ref 6.8–8.8)
RBC # BLD AUTO: 4.7 10E6/UL (ref 4.4–5.9)
SODIUM SERPL-SCNC: 140 MMOL/L (ref 133–144)
WBC # BLD AUTO: 3.3 10E3/UL (ref 4–11)

## 2021-12-10 PROCEDURE — 250N000011 HC RX IP 250 OP 636: Performed by: FAMILY MEDICINE

## 2021-12-10 PROCEDURE — 85041 AUTOMATED RBC COUNT: CPT | Performed by: FAMILY MEDICINE

## 2021-12-10 PROCEDURE — 99284 EMERGENCY DEPT VISIT MOD MDM: CPT | Performed by: FAMILY MEDICINE

## 2021-12-10 PROCEDURE — 36415 COLL VENOUS BLD VENIPUNCTURE: CPT | Performed by: FAMILY MEDICINE

## 2021-12-10 PROCEDURE — 99285 EMERGENCY DEPT VISIT HI MDM: CPT | Mod: 25 | Performed by: FAMILY MEDICINE

## 2021-12-10 PROCEDURE — 70496 CT ANGIOGRAPHY HEAD: CPT

## 2021-12-10 PROCEDURE — 85652 RBC SED RATE AUTOMATED: CPT | Performed by: FAMILY MEDICINE

## 2021-12-10 PROCEDURE — 80053 COMPREHEN METABOLIC PANEL: CPT | Performed by: FAMILY MEDICINE

## 2021-12-10 PROCEDURE — 250N000009 HC RX 250: Performed by: FAMILY MEDICINE

## 2021-12-10 RX ORDER — IOPAMIDOL 755 MG/ML
100 INJECTION, SOLUTION INTRAVASCULAR ONCE
Status: COMPLETED | OUTPATIENT
Start: 2021-12-10 | End: 2021-12-10

## 2021-12-10 RX ORDER — BUTALBITAL, ACETAMINOPHEN AND CAFFEINE 50; 325; 40 MG/1; MG/1; MG/1
1-2 TABLET ORAL EVERY 6 HOURS PRN
Qty: 10 TABLET | Refills: 0 | Status: SHIPPED | OUTPATIENT
Start: 2021-12-10 | End: 2022-04-15

## 2021-12-10 RX ADMIN — SODIUM CHLORIDE 80 ML: 9 INJECTION, SOLUTION INTRAVENOUS at 20:01

## 2021-12-10 RX ADMIN — IOPAMIDOL 70 ML: 755 INJECTION, SOLUTION INTRAVENOUS at 20:01

## 2021-12-10 ASSESSMENT — ENCOUNTER SYMPTOMS: HEADACHES: 1

## 2021-12-10 NOTE — TELEPHONE ENCOUNTER
Attempted to call pt, left vm stating to please go to ED/UC.     Thanks,  BRAYAN Lutz  [facility]

## 2021-12-10 NOTE — ED TRIAGE NOTES
Pt complaining of right parietal HA for the past couple weeks and unresolved OTC meds.  Pt states the pain is worse today. Pt also mentions feeling light headed from time to time.

## 2021-12-10 NOTE — ED NOTES
Pt c/o right sided head pain with sharp shooting pains off/on for over a week. Pt states has a dull ache in right ear.  Denies visual disturbances.  Right eye chun and twitches.

## 2021-12-10 NOTE — ED PROVIDER NOTES
"    [facility] EMERGENCY DEPARTMENT ([facility])  12/10/21  History     Chief Complaint   Patient presents with     Headache     The history is provided by the patient.     Ivy Haynes is a 76 year old male who has a significant medical history of hypertension, trigeminal neuralgia, hepatitis C, GERD who presents to the Emergency Department with c/o right-sided temporoparietal headache occurring for 7 days.  Patient states that 7 days ago, reviewed woke up at approximately 2/3 AM with sharp, severe, \"scary\" right-sided temporoparietal headache.  He states that the severity decreased shortly after this episode.  He states that he experienced dull pain at the same site, sometimes \"sharp pain\", but not severe.  Today he states that this area has been \"firing\" sharp pain, with less severity.  He states that the dull headache somewhat consistent and is accompanied by transient sharp pains.  States his right eye has been twitching and \"watery\".  He denies rhinorrhea on the right side.  He denies experiencing redness and the right eye.  He reports feeling right-sided ear pressure.  Of note, he reports a diagnosis of bigeminal neuralgia 25 years ago, he is unsure if the pain he experienced at that time is similar to the pain he is experiencing today.  He reports having hypertension, takes amlodipine daily, today his blood pressure was 134/88.      Notes reviewed    Past Medical History  Past Medical History:   Diagnosis Date     Back pain      Chest pain      Gastro-oesophageal reflux disease      GERD (gastroesophageal reflux disease)      Hemorrhoid      Hepatitis C      Hypertension      Trigeminal neuralgia      Past Surgical History:   Procedure Laterality Date     ARTHROPLASTY KNEE  11/9/2012    Procedure: ARTHROPLASTY KNEE;;  Surgeon: Ang Arellano MD;  Location: [facility] OR     ENT SURGERY  2009    Styloid process broken off     HERNIA REPAIR  12/10     RESECT TUMOR LOWER EXTREMITY  11/9/2012    Procedure: RESECT " "TUMOR LOWER EXTREMITY;  Excision of Tumor Left Distal Femur, Left Total Knee Replacement ;  Surgeon: Ang Arellano MD;  Location: [facility] OR     styloid process temporal bone surg  02/10     TONSILLECTOMY       butalbital-acetaminophen-caffeine (ESGIC) -40 MG tablet  amLODIPine (NORVASC) 2.5 MG tablet  famotidine (PEPCID) 20 MG tablet  ibuprofen (ADVIL/MOTRIN) 100 MG tablet  OMEPRAZOLE PO  oxyCODONE-acetaminophen (PERCOCET) 5-325 MG tablet  triamcinolone (KENALOG) 0.1 % external cream      Allergies   Allergen Reactions     Sulfa Drugs      Family History  Family History   Problem Relation Age of Onset     Hypertension Mother      Cerebrovascular Disease Father 69     Cancer Paternal Grandfather      Cancer Son      No Known Problems Half-Sister      No Known Problems Half-Sister      No Known Problems Half-Sister      No Known Problems Half-Sister      No Known Problems Half-Brother      Kidney failure Half-Brother      Deep Vein Thrombosis No family hx of      Anesthesia Reaction No family hx of      Social History   Social History     Tobacco Use     Smoking status: Former Smoker     Packs/day: 0.10     Years: 4.00     Pack years: 0.40     Types: Cigarettes     Start date: 2011     Quit date: 1981     Years since quittin.5     Smokeless tobacco: Never Used     Tobacco comment: quit  until . started after trip to [place]   Substance Use Topics     Alcohol use: Yes     Comment: 1-2 beer a week      Drug use: Yes     Types: Marijuana     Comment: Every other month-\"just a puff of marijuana\"      Past medical history, past surgical history, medications, allergies, family history, and social history were reviewed with the patient. No additional pertinent items.     I have reviewed the Medications, Allergies, Past Medical and Surgical History, and Social History in the Epic system.    Review of Systems   Neurological: Positive for headaches.     A complete review of systems was performed " with pertinent positives and negatives noted in the HPI, and all other systems negative.    Physical Exam   BP: (!) 156/93  Pulse: 99  Temp: 97.7  F (36.5  C)  Resp: 16  SpO2: 99 %      Physical Exam  Vitals and nursing note reviewed.   Constitutional:       General: He is not in acute distress.     Appearance: He is not diaphoretic.   HENT:      Head: Atraumatic.        Nose: Nose normal.      Mouth/Throat:      Mouth: Mucous membranes are moist.   Eyes:      General: No scleral icterus.     Conjunctiva/sclera: Conjunctivae normal.      Pupils: Pupils are equal, round, and reactive to light.      Funduscopic exam:     Right eye: No papilledema. Venous pulsations present.         Left eye: No papilledema. Venous pulsations present.  Cardiovascular:      Heart sounds: Normal heart sounds.   Pulmonary:      Effort: No respiratory distress.      Breath sounds: Normal breath sounds.   Abdominal:      General: Bowel sounds are normal.      Palpations: Abdomen is soft.      Tenderness: There is no abdominal tenderness.   Musculoskeletal:         General: No tenderness.   Skin:     General: Skin is warm.      Findings: No rash.   Neurological:      General: No focal deficit present.      Mental Status: He is alert and oriented to person, place, and time.      Cranial Nerves: Cranial nerves are intact.      Sensory: Sensation is intact.      Motor: Motor function is intact.      Coordination: Coordination is intact.      Gait: Gait is intact.      Deep Tendon Reflexes: Reflexes are normal and symmetric.         ED Course     At 5:11 PM the patient was seen and examined by Bobby Delong MD in Room ED09.        Procedures               The medical record was reviewed and interpreted.  Current labs reviewed and interpreted.  Current images reviewed and interpreted: CTA brain.  Managed outpatient prescription medications.     Results for orders placed or performed during the hospital encounter of 12/10/21 (from the past 24  hour(s))   CBC with platelets differential    Narrative    The following orders were created for panel order CBC with platelets differential.  Procedure                               Abnormality         Status                     ---------                               -----------         ------                     CBC with platelets and d...[[id number]]  Abnormal            Final result                 Please view results for these tests on the individual orders.   Comprehensive metabolic panel   Result Value Ref Range    Sodium 140 133 - 144 mmol/L    Potassium 4.3 3.4 - 5.3 mmol/L    Chloride 107 94 - 109 mmol/L    Carbon Dioxide (CO2) 27 20 - 32 mmol/L    Anion Gap 6 3 - 14 mmol/L    Urea Nitrogen 13 7 - 30 mg/dL    Creatinine 1.08 0.66 - 1.25 mg/dL    Calcium 9.2 8.5 - 10.1 mg/dL    Glucose 99 70 - 99 mg/dL    Alkaline Phosphatase 145 40 - 150 U/L    AST 26 0 - 45 U/L    ALT 25 0 - 70 U/L    Protein Total 7.8 6.8 - 8.8 g/dL    Albumin 3.8 3.4 - 5.0 g/dL    Bilirubin Total 0.5 0.2 - 1.3 mg/dL    GFR Estimate 67 >60 mL/min/1.73m2   Erythrocyte sedimentation rate auto   Result Value Ref Range    Erythrocyte Sedimentation Rate 6 0 - 20 mm/hr   CBC with platelets and differential   Result Value Ref Range    WBC Count 3.3 (L) 4.0 - 11.0 10e3/uL    RBC Count 4.70 4.40 - 5.90 10e6/uL    Hemoglobin 15.8 13.3 - 17.7 g/dL    Hematocrit 46.1 40.0 - 53.0 %    MCV 98 78 - 100 fL    MCH 33.6 (H) 26.5 - 33.0 pg    MCHC 34.3 31.5 - 36.5 g/dL    RDW 11.9 10.0 - 15.0 %    Platelet Count 188 150 - 450 10e3/uL    % Neutrophils 51 %    % Lymphocytes 33 %    % Monocytes 15 %    % Eosinophils 0 %    % Basophils 1 %    % Immature Granulocytes 0 %    NRBCs per 100 WBC 0 <1 /100    Absolute Neutrophils 1.7 1.6 - 8.3 10e3/uL    Absolute Lymphocytes 1.1 0.8 - 5.3 10e3/uL    Absolute Monocytes 0.5 0.0 - 1.3 10e3/uL    Absolute Eosinophils 0.0 0.0 - 0.7 10e3/uL    Absolute Basophils 0.0 0.0 - 0.2 10e3/uL    Absolute Immature Granulocytes 0.0  <=0.4 10e3/uL    Absolute NRBCs 0.0 10e3/uL   Extra Tube    Narrative    The following orders were created for panel order Extra Tube.  Procedure                               Abnormality         Status                     ---------                               -----------         ------                     Extra Red Top Tube[[id number]]                                                            Please view results for these tests on the individual orders.   CTA Head with Contrast    Narrative    EXAM: CTA HEAD WITH CONTRAST  LOCATION: [facility]  DATE/TIME: 12/10/2021 7:46 PM    INDICATION: Subarachnoid hemorrhage  COMPARISON: None.  CONTRAST: 70mL of isovue 370  TECHNIQUE: Head CT angiogram with IV contrast. Noncontrast head CT followed by axial helical CT images of the intracranial vessels obtained during the arterial phase of intravenous contrast administration. Axial helical 2D reconstructed images and   multiplanar 3D MIP reconstructed images of the intracranial vessels were performed by the technologist. Dose reduction techniques were used.     FINDINGS:   NONCONTRAST HEAD CT:   INTRACRANIAL CONTENTS: No intracranial hemorrhage, extraaxial collection, or mass effect.  No CT evidence of acute infarct. Mild presumed chronic small vessel ischemic changes. Mild generalized volume loss. No hydrocephalus.     VISUALIZED ORBITS/SINUSES/MASTOIDS: No intraorbital abnormality. No paranasal sinus mucosal disease. No middle ear or mastoid effusion.    BONES/SOFT TISSUES: No acute abnormality.    HEAD CTA:  ANTERIOR CIRCULATION: No stenosis/occlusion, aneurysm, or high flow vascular malformation. Fetal origin right posterior cerebral artery.    POSTERIOR CIRCULATION: No stenosis/occlusion, aneurysm, or high flow vascular malformation. Dominant left vertebral artery supplies the basilar artery with a small right vertebral artery supplying the right posterior inferior cerebellar  artery (PICA).     DURAL VENOUS SINUSES: Only faintly opacified.      Impression    IMPRESSION:   HEAD CT:  1.  No acute intracranial process.    HEAD CTA:   1.  Normal CTA [place] [facility] [place].     Medications   sodium chloride 0.9 % bag 500mL for CT scan flush use (80 mLs Intravenous Given 12/10/21 2001)   iopamidol (ISOVUE-370) solution 100 mL (70 mLs Intravenous Given 12/10/21 2001)             Assessments & Plan (with Medical Decision Making)   Ivy Haynes is a 76 year old male who is presenting with episodic sharp right-sided headache which can be accompanied with watering of his eye but no visual change, fever, trauma, or other neurologic symptoms.  Differential diagnosis considered includes life threatening causes such as subarachnoid hemorrhage, intracranial aneurysm, CNS neoplasm, meningitis, cerebral venous thrombosis, pseudotumor cerebri, arterial dissection, temporal arteritis, as well as more benign headache syndromes such as cluster headache, migraine headache, muscle tension headache, acute viral syndrome.   On exam his initial blood pressure slightly elevated his other vital signs are normal.  His mental status is normal.  He has an area of scalp tenderness on the right but no other associated objective finding and no tenderness directly over the temporal artery.  Funduscopic exam (nondilated) unremarkable.  Neck supple.  Cardiovascular exam normal, complete neurologic exam normal.  The remainder of a complete physical exam is normal.  Patient's labs including sedimentation rate are without significant abnormality.  Did image with CTA of the head given a right-sided pulsatile headache, this shows no bleed, aneurysm, mass, or other significant abnormality.  The patient had a similar episode of recurrent headaches nearly 20 years ago which was self-limited.  Clinically this scenario seems far away most likely to be a more benign headache syndrome such as cluster headache.  Will treat symptomatically,  patient will follow up with neurology if he has persistence, worsening, or significant change in his headache.  We discussed the indications for emergency department return and follow-up.  Stable for discharge.      I have reviewed the nursing notes.    I have reviewed the findings, diagnosis, plan and need for follow up with the patient.    New Prescriptions    BUTALBITAL-ACETAMINOPHEN-CAFFEINE (ESGIC) -40 MG TABLET    Take 1-2 tablets by mouth every 6 hours as needed for headaches       Final diagnoses:   Episodic cluster headache, not intractable       I, Mati Menard am serving as a trained medical scribe to document services personally performed by Bobby Delong MD based on the provider's statements to me.      I, Bobby Delong MD, was physically present and have reviewed and verified the accuracy of this note documented by Mati Menard.     Bobby Delong MD  12/10/2021   [facility] EMERGENCY DEPARTMENT     Bobby Delong MD  12/10/21 2042

## 2021-12-10 NOTE — TELEPHONE ENCOUNTER
POD,    Dr. Franco out of office. Does anyone have any availability to see this patient today or should I advise patient to go to UC/ED?    Please advise.     Thanks,  BRAYAN Lutz  [facility]

## 2021-12-10 NOTE — TELEPHONE ENCOUNTER
"Patient calling for sudden onset pain to R side of head about a week ago, symptoms persisting now. States similar to trigeminal neuralgia flare had 20 years ago. Sending to PCP team as high priority for appointment today or tomorrow.    Donya Bello RN      Reason for Disposition    Unexplained headache that is present > 24 hours    Answer Assessment - Initial Assessment Questions  1. LOCATION: \"Where does it hurt?\"       R side of head  2. ONSET: \"When did the headache start?\" (Minutes, hours or days)       About a week ago. It woke him up in the morning, has been having low grade pain ever since.   3. PATTERN: \"Does the pain come and go, or has it been constant since it started?\"      constant  4. SEVERITY: \"How bad is the pain?\" and \"What does it keep you from doing?\"  (e.g., Scale 1-10; mild, moderate, or severe)    - MILD (1-3): doesn't interfere with normal activities     - MODERATE (4-7): interferes with normal activities or awakens from sleep     - SEVERE (8-10): excruciating pain, unable to do any normal activities         3/10 currently  5. RECURRENT SYMPTOM: \"Have you ever had headaches before?\" If so, ask: \"When was the last time?\" and \"What happened that time?\"       Had pain like this 20 years ago-saw neurologist, dx with trigeminal neuralgia  6. CAUSE: \"What do you think is causing the headache?\"      Unknown, similar to past  7. MIGRAINE: \"Have you been diagnosed with migraine headaches?\" If so, ask: \"Is this headache similar?\"       NO  8. HEAD INJURY: \"Has there been any recent injury to the head?\"       No  9. OTHER SYMPTOMS: \"Do you have any other symptoms?\" (fever, stiff neck, eye pain, sore throat, cold symptoms)      No vision changes, does have cervical spine issues at baseline, no changes  10. PREGNANCY: \"Is there any chance you are pregnant?\" \"When was your last menstrual period?\"        N/A    Protocols used: HEADACHE-A-OH      "

## 2021-12-11 NOTE — DISCHARGE INSTRUCTIONS
Please make an appointment to follow up with [facility] (phone: [phone]) if you continue to have persistent episodes which do not respond to the medication and measures prescribed.

## 2022-02-13 ENCOUNTER — HEALTH MAINTENANCE LETTER (OUTPATIENT)
Age: 77
End: 2022-02-13

## 2022-03-29 ENCOUNTER — OFFICE VISIT (OUTPATIENT)
Dept: URGENT CARE | Facility: URGENT CARE | Age: 77
End: 2022-03-29
Payer: COMMERCIAL

## 2022-03-29 ENCOUNTER — TELEPHONE (OUTPATIENT)
Dept: FAMILY MEDICINE | Facility: CLINIC | Age: 77
End: 2022-03-29
Payer: COMMERCIAL

## 2022-03-29 VITALS
TEMPERATURE: 98.7 F | HEART RATE: 81 BPM | WEIGHT: 159 LBS | DIASTOLIC BLOOD PRESSURE: 84 MMHG | SYSTOLIC BLOOD PRESSURE: 144 MMHG | BODY MASS INDEX: 22.81 KG/M2 | OXYGEN SATURATION: 99 %

## 2022-03-29 DIAGNOSIS — M94.0 COSTOCHONDRITIS: ICD-10-CM

## 2022-03-29 DIAGNOSIS — R10.11 RUQ ABDOMINAL PAIN: Primary | ICD-10-CM

## 2022-03-29 PROCEDURE — 99214 OFFICE O/P EST MOD 30 MIN: CPT | Performed by: NURSE PRACTITIONER

## 2022-03-29 PROCEDURE — 82150 ASSAY OF AMYLASE: CPT | Performed by: NURSE PRACTITIONER

## 2022-03-29 PROCEDURE — 80053 COMPREHEN METABOLIC PANEL: CPT | Performed by: NURSE PRACTITIONER

## 2022-03-29 PROCEDURE — 36415 COLL VENOUS BLD VENIPUNCTURE: CPT | Performed by: NURSE PRACTITIONER

## 2022-03-29 PROCEDURE — 83690 ASSAY OF LIPASE: CPT | Performed by: NURSE PRACTITIONER

## 2022-03-29 NOTE — PROGRESS NOTES
Chief Complaint   Patient presents with     Urgent Care     Back Pain     c/o back and rib pain for 2 weeks     SUBJECTIVE:  Ivy Haynes is a 76 year old male who presents to the clinic today with RUQ abdominal pain for 2 weeks. It is subtle, tender to touch under his right anterior ribcage, hurts with movement. He is still able to vigorously exercise daily. Had a GERD flare last week, improving with omeprazole. Declines any known injury, cough, shortness of breath, bloody sputum, jaundice, pale or bloody stools, constipation, fevers. He has a relevant history of Hep C as well as hernia repairs.    Past Medical History:   Diagnosis Date     Back pain      Chest pain      Gastro-oesophageal reflux disease      GERD (gastroesophageal reflux disease)      Hemorrhoid      Hepatitis C      Hypertension      Trigeminal neuralgia      amLODIPine (NORVASC) 2.5 MG tablet, TAKE 1 TABLET(2.5 MG) BY MOUTH DAILY  butalbital-acetaminophen-caffeine (ESGIC) -40 MG tablet, Take 1-2 tablets by mouth every 6 hours as needed for headaches  famotidine (PEPCID) 20 MG tablet, Take 1 tablet (20 mg) by mouth as needed (Patient not taking: Reported on 3/29/2022)  ibuprofen (ADVIL/MOTRIN) 100 MG tablet, Take 100 mg by mouth daily  OMEPRAZOLE PO, Take by mouth as needed  oxyCODONE-acetaminophen (PERCOCET) 5-325 MG tablet, Take 1 tablet by mouth every 6 hours as needed for pain  triamcinolone (KENALOG) 0.1 % external cream, Apply topically 2 times daily (Patient not taking: Reported on 3/29/2022)    No current facility-administered medications on file prior to visit.    Social History     Tobacco Use     Smoking status: Former Smoker     Packs/day: 0.10     Years: 4.00     Pack years: 0.40     Types: Cigarettes     Start date: 2011     Quit date: 1981     Years since quittin.8     Smokeless tobacco: Never Used     Tobacco comment: quit  until . started after trip to [place]   Substance Use Topics     Alcohol use: Yes      Comment: 1-2 beer a week      Allergies   Allergen Reactions     Sulfa Drugs      Review of Systems   All systems negative except for those listed above in HPI.    EXAM:   BP (!) 144/84   Pulse 81   Temp 98.7  F (37.1  C) (Tympanic)   Wt 72.1 kg (159 lb)   SpO2 99%   BMI 22.81 kg/m      Physical Exam  Vitals reviewed.   Constitutional:       General: He is not in acute distress.     Appearance: Normal appearance. He is not ill-appearing, toxic-appearing or diaphoretic.   HENT:      Head: Normocephalic and atraumatic.      Nose: Nose normal.      Mouth/Throat:      Mouth: Mucous membranes are moist.      Pharynx: Oropharynx is clear.   Eyes:      General: No scleral icterus.     Extraocular Movements: Extraocular movements intact.      Conjunctiva/sclera: Conjunctivae normal.      Pupils: Pupils are equal, round, and reactive to light.   Cardiovascular:      Rate and Rhythm: Normal rate.      Pulses: Normal pulses.      Heart sounds: Normal heart sounds.   Pulmonary:      Effort: Pulmonary effort is normal. No respiratory distress.      Breath sounds: Normal breath sounds. No stridor. No wheezing, rhonchi or rales.   Chest:      Chest wall: No tenderness.   Abdominal:      General: Abdomen is flat. Bowel sounds are normal. There is no distension.      Palpations: Abdomen is soft. There is no mass.      Tenderness: There is no abdominal tenderness. There is no right CVA tenderness, left CVA tenderness, guarding or rebound.      Hernia: No hernia is present.   Musculoskeletal:         General: Tenderness (right anterior lower ribcage tenderness) present. Normal range of motion.      Cervical back: Normal range of motion and neck supple.   Skin:     General: Skin is warm and dry.      Coloration: Skin is not jaundiced or pale.      Findings: No rash.   Neurological:      General: No focal deficit present.      Mental Status: He is alert and oriented to person, place, and time.      Motor: No weakness.    Psychiatric:         Mood and Affect: Mood normal.         Behavior: Behavior normal.       ASSESSMENT:    ICD-10-CM    1. RUQ abdominal pain  R10.11 Comprehensive metabolic panel (BMP + Alb, Alk Phos, ALT, AST, Total. Bili, TP)     Lipase     Amylase     Comprehensive metabolic panel (BMP + Alb, Alk Phos, ALT, AST, Total. Bili, TP)     Lipase     Amylase   2. Costochondritis  M94.0      PLAN:    Discussed differentials of costochondritis, muscle strain, gallstone hepatitis  Rest, ice, heat, stretch, exercise as tolerated  Labs pending, we call for abnormals  Keep primary care provider follow-up this week  Urgent care is limited without imaging beyond XR    Follow up with primary care provider with any problems, questions or concerns or if symptoms worsen or fail to improve. Patient agreed to plan and verbalized understanding.    Cecelia Gómez, [facility] CARE [place]

## 2022-03-29 NOTE — TELEPHONE ENCOUNTER
Dr. Franco,    Patient having right upper quadrant pain, ridge of rib cage and in back. Started about a week and a half ago. Pain feels dull, constantly there especially with deep breathing and movement like twisted. Pain is 6/10. Per pt has not tried any OTC meds other than omeprazole.     Per pt does work out a lot and gets HR up to 150 for about an hour or more and pain does not bother pt when he does this kind of stuff.     Per pt has been having problems with his acid reflux as well. Started taking omeprazole and this has helped.     Ok to use your same day Friday opening?    [phone], detailed vm ok     Thanks,  BRAYAN Lutz  [facility]

## 2022-03-30 ENCOUNTER — TELEPHONE (OUTPATIENT)
Dept: FAMILY MEDICINE | Facility: CLINIC | Age: 77
End: 2022-03-30
Payer: COMMERCIAL

## 2022-03-30 LAB — LIPASE SERPL-CCNC: 122 U/L (ref 73–393)

## 2022-03-30 NOTE — TELEPHONE ENCOUNTER
Spoke with pt, he understands and will be at the appointment this week as planned    Joanne Rojas RN   [facility]

## 2022-03-30 NOTE — TELEPHONE ENCOUNTER
Dr. Franco,    Pt has appt  with you Friday. Stated he went to  to have labs done. Reporting more symptoms that started last night including itching around ankles and hands and continues ot have URQ pain into back.     Patient asking if he can have an ultrasound of liver before appt with you on Friday?    [phone], ok detailed vm.     Thanks,  BRAYAN Lutz  [facility]

## 2022-03-31 LAB
ALBUMIN SERPL-MCNC: 3.9 G/DL (ref 3.4–5)
ALP SERPL-CCNC: 157 U/L (ref 40–150)
ALT SERPL W P-5'-P-CCNC: 28 U/L (ref 0–70)
AMYLASE SERPL-CCNC: 213 U/L (ref 30–110)
ANION GAP SERPL CALCULATED.3IONS-SCNC: 2 MMOL/L (ref 3–14)
AST SERPL W P-5'-P-CCNC: 44 U/L (ref 0–45)
BILIRUB SERPL-MCNC: 0.4 MG/DL (ref 0.2–1.3)
BUN SERPL-MCNC: 10 MG/DL (ref 7–30)
CALCIUM SERPL-MCNC: 9.6 MG/DL (ref 8.5–10.1)
CHLORIDE BLD-SCNC: 108 MMOL/L (ref 94–109)
CO2 SERPL-SCNC: 30 MMOL/L (ref 20–32)
CREAT SERPL-MCNC: 1.16 MG/DL (ref 0.66–1.25)
GFR SERPL CREATININE-BSD FRML MDRD: 66 ML/MIN/1.73M2
GLUCOSE BLD-MCNC: 78 MG/DL (ref 70–99)
POTASSIUM BLD-SCNC: 5.1 MMOL/L (ref 3.4–5.3)
PROT SERPL-MCNC: 7.6 G/DL (ref 6.8–8.8)
SODIUM SERPL-SCNC: 140 MMOL/L (ref 133–144)

## 2022-04-01 ENCOUNTER — OFFICE VISIT (OUTPATIENT)
Dept: FAMILY MEDICINE | Facility: CLINIC | Age: 77
End: 2022-04-01
Payer: COMMERCIAL

## 2022-04-01 ENCOUNTER — LAB (OUTPATIENT)
Dept: LAB | Facility: CLINIC | Age: 77
End: 2022-04-01

## 2022-04-01 VITALS
HEART RATE: 88 BPM | HEIGHT: 70 IN | BODY MASS INDEX: 22.08 KG/M2 | WEIGHT: 154.25 LBS | SYSTOLIC BLOOD PRESSURE: 159 MMHG | DIASTOLIC BLOOD PRESSURE: 92 MMHG | TEMPERATURE: 97.7 F

## 2022-04-01 DIAGNOSIS — R10.13 ABDOMINAL PAIN, EPIGASTRIC: ICD-10-CM

## 2022-04-01 DIAGNOSIS — R10.13 ABDOMINAL PAIN, EPIGASTRIC: Primary | ICD-10-CM

## 2022-04-01 LAB
ALBUMIN UR-MCNC: NEGATIVE MG/DL
AMYLASE SERPL-CCNC: 218 U/L (ref 30–110)
APPEARANCE UR: CLEAR
BACTERIA #/AREA URNS HPF: ABNORMAL /HPF
BILIRUB UR QL STRIP: NEGATIVE
COLOR UR AUTO: YELLOW
ERYTHROCYTE [DISTWIDTH] IN BLOOD BY AUTOMATED COUNT: 12.4 % (ref 10–15)
GLUCOSE UR STRIP-MCNC: NEGATIVE MG/DL
HCT VFR BLD AUTO: 44.2 % (ref 40–53)
HGB BLD-MCNC: 14.8 G/DL (ref 13.3–17.7)
HGB UR QL STRIP: ABNORMAL
KETONES UR STRIP-MCNC: NEGATIVE MG/DL
LEUKOCYTE ESTERASE UR QL STRIP: NEGATIVE
LIPASE SERPL-CCNC: 146 U/L (ref 73–393)
MCH RBC QN AUTO: 33 PG (ref 26.5–33)
MCHC RBC AUTO-ENTMCNC: 33.5 G/DL (ref 31.5–36.5)
MCV RBC AUTO: 98 FL (ref 78–100)
NITRATE UR QL: NEGATIVE
PH UR STRIP: 6.5 [PH] (ref 5–7)
PLATELET # BLD AUTO: 177 10E3/UL (ref 150–450)
RBC # BLD AUTO: 4.49 10E6/UL (ref 4.4–5.9)
RBC #/AREA URNS AUTO: ABNORMAL /HPF
SP GR UR STRIP: <=1.005 (ref 1–1.03)
SQUAMOUS #/AREA URNS AUTO: ABNORMAL /LPF
UROBILINOGEN UR STRIP-ACNC: 0.2 E.U./DL
WBC # BLD AUTO: 3.2 10E3/UL (ref 4–11)
WBC #/AREA URNS AUTO: ABNORMAL /HPF

## 2022-04-01 PROCEDURE — 85027 COMPLETE CBC AUTOMATED: CPT

## 2022-04-01 PROCEDURE — 82150 ASSAY OF AMYLASE: CPT

## 2022-04-01 PROCEDURE — 99214 OFFICE O/P EST MOD 30 MIN: CPT | Performed by: FAMILY MEDICINE

## 2022-04-01 PROCEDURE — 81001 URINALYSIS AUTO W/SCOPE: CPT

## 2022-04-01 PROCEDURE — 36415 COLL VENOUS BLD VENIPUNCTURE: CPT

## 2022-04-01 PROCEDURE — 83690 ASSAY OF LIPASE: CPT

## 2022-04-01 NOTE — PROGRESS NOTES
"  Assessment & Plan     Abdominal pain, epigastric  Gradually improving with PPI   no bowel or urinary symptoms   Times seen in UC, normal labs except slightly high amylase ( normal lipase)  - Amylase; Future  - Lipase; Future  - CBC with platelets; Future  - US Abdomen Complete; Future  - UA with Microscopic reflex to Culture - lab collect; Future    Review of external notes as documented elsewhere in note  Review of the result(s) of each unique test - labs  15 minutes spent on the date of the encounter doing review of outside records and review of test results        Regular exercise  See Patient Instructions    No follow-ups on file.    Lamine Franco MD  [facility] MALU Haynes is a 76 year old who presents for the following health issues  accompanied by his self.    HPI     Concern - Been having abdominal pain for 3 to 3 1/2 weeks. RUQ    Therapies tried and outcome:         Review of Systems   Constitutional, HEENT, cardiovascular, pulmonary, gi and gu systems are negative, except as otherwise noted.      Objective    BP (!) 159/92   Pulse 88   Temp 97.7  F (36.5  C) (Temporal)   Ht 1.78 m (5' 10.08\")   Wt 70 kg (154 lb 4 oz)   BMI 22.08 kg/m    Body mass index is 22.08 kg/m .  Physical Exam   GENERAL: healthy, alert and no distress  HENT: ear canals and TM's normal, nose and mouth without ulcers or lesions  NECK: no adenopathy, no asymmetry, masses, or scars and thyroid normal to palpation  RESP: lungs clear to auscultation - no rales, rhonchi or wheezes  CV: regular rate and rhythm, normal S1 S2, no S3 or S4, no murmur, click or rub, no peripheral edema and peripheral pulses strong  ABDOMEN: soft, nontender, no hepatosplenomegaly, no masses and bowel sounds normal  MS: no gross musculoskeletal defects noted, no edema  SKIN: no suspicious lesions or rashes  BACK: no CVA tenderness, no paralumbar tenderness    Office Visit on 03/29/2022   Component Date Value Ref " Range Status     Sodium 03/29/2022 140  133 - 144 mmol/L Final     Potassium 03/29/2022 5.1  3.4 - 5.3 mmol/L Final     Chloride 03/29/2022 108  94 - 109 mmol/L Final     Carbon Dioxide (CO2) 03/29/2022 30  20 - 32 mmol/L Final     Anion Gap 03/29/2022 2 (A) 3 - 14 mmol/L Final     Urea Nitrogen 03/29/2022 10  7 - 30 mg/dL Final     Creatinine 03/29/2022 1.16  0.66 - 1.25 mg/dL Final     Calcium 03/29/2022 9.6  8.5 - 10.1 mg/dL Final     Glucose 03/29/2022 78  70 - 99 mg/dL Final     Alkaline Phosphatase 03/29/2022 157 (A) 40 - 150 U/L Final     AST 03/29/2022 44  0 - 45 U/L Final     ALT 03/29/2022 28  0 - 70 U/L Final     Protein Total 03/29/2022 7.6  6.8 - 8.8 g/dL Final     Albumin 03/29/2022 3.9  3.4 - 5.0 g/dL Final     Bilirubin Total 03/29/2022 0.4  0.2 - 1.3 mg/dL Final     GFR Estimate 03/29/2022 66  >60 mL/min/1.73m2 Final    Effective December 21, 2021 eGFRcr in adults is calculated using the 2021 CKD-EPI creatinine equation which includes age and gender (Seth et al., NEJ, DOI: 10.1056/AHASfq6116678)     Lipase 03/29/2022 122  73 - 393 U/L Final     Amylase 03/29/2022 213 (A) 30 - 110 U/L Final

## 2022-04-05 ENCOUNTER — TELEPHONE (OUTPATIENT)
Dept: FAMILY MEDICINE | Facility: CLINIC | Age: 77
End: 2022-04-05

## 2022-04-05 ENCOUNTER — ANCILLARY PROCEDURE (OUTPATIENT)
Dept: ULTRASOUND IMAGING | Facility: CLINIC | Age: 77
End: 2022-04-05
Attending: FAMILY MEDICINE
Payer: COMMERCIAL

## 2022-04-05 DIAGNOSIS — R10.13 ABDOMINAL PAIN, EPIGASTRIC: ICD-10-CM

## 2022-04-05 LAB — RADIOLOGIST FLAGS: NORMAL

## 2022-04-05 PROCEDURE — 76700 US EXAM ABDOM COMPLETE: CPT | Performed by: RADIOLOGY

## 2022-04-05 NOTE — TELEPHONE ENCOUNTER
I called patient reviewed ULTRASOUND  Pain resolving  Plan to do Follow up in 2-3 months with ULTRASOUND  If pain worse or any hematuria do CT.

## 2022-04-13 NOTE — PATIENT INSTRUCTIONS
Patient Education   Personalized Prevention Plan  You are due for the preventive services outlined below.  Your care team is available to assist you in scheduling these services.  If you have already completed any of these items, please share that information with your care team to update in your medical record.  Health Maintenance Due   Topic Date Due     URINE DRUG SCREEN  Never done     ANNUAL REVIEW OF HM ORDERS  Never done     Zoster (Shingles) Vaccine (1 of 2) Never done     Discuss Advance Care Planning  05/30/2018     FALL RISK ASSESSMENT  09/25/2021     PHQ-2 (once per calendar year)  01/01/2022     Diptheria Tetanus Pertussis (DTAP/TDAP/TD) Vaccine (2 - Tdap) 01/10/2022

## 2022-04-13 NOTE — PROGRESS NOTES
"  SUBJECTIVE:   Ivy Haynes is a 77 year old male who presents for Preventive Visit.      Patient has been advised of split billing requirements and indicates understanding: Yes  Are you in the first 12 months of your Medicare Part B coverage?  No    Physical Health:    In general, how would you rate your overall physical health? good    Outside of work, how many days during the week do you exercise? 6-7 days/week    Outside of work, approximately how many minutes a day do you exercise?greater than 60 minutes    If you drink alcohol do you typically have >3 drinks per day or >7 drinks per week? Not Applicable    Do you usually eat at least 4 servings of fruit and vegetables a day, include whole grains & fiber and avoid regularly eating high fat or \"junk\" foods? Yes    Do you have any problems taking medications regularly?  No    Do you have any side effects from medications? none    Needs assistance for the following daily activities: no assistance needed    Which of the following safety concerns are present in your home?  none identified     Hearing impairment: No    In the past 6 months, have you been bothered by leaking of urine? no    Mental Health:    In general, how would you rate your overall mental or emotional health? excellent  PHQ-2 Score:      Do you feel safe in your environment? Yes    Have you ever done Advance Care Planning? (For example, a Health Directive, POLST, or a discussion with a medical provider or your loved ones about your wishes): No, advance care planning information given to patient to review.  Patient declined advance care planning discussion at this time.    Additional concerns to address?  No    Fall risk:       Cognitive Screenin) Repeat 3 items (Leader, Season, Table)      2) Clock draw:   NORMAL  3) 3 item recall: Recalls 1 object   Results: NORMAL clock, 1-2 items recalled: COGNITIVE IMPAIRMENT LESS LIKELY    Mini-CogTM Copyright S Dean. Licensed by the author for use in " [employer]; reprinted with permission ([email or web address]). All rights reserved.      Do you have sleep apnea, excessive snoring or daytime drowsiness?: no            Reviewed and updated as needed this visit by clinical staff   Tobacco  Allergies  Meds   Med Hx  Surg Hx  Fam Hx  Soc Hx          Reviewed and updated as needed this visit by Provider                   Social History     Tobacco Use     Smoking status: Former Smoker     Packs/day: 0.10     Years: 4.00     Pack years: 0.40     Types: Cigarettes     Start date: 2011     Quit date: 1981     Years since quittin.9     Smokeless tobacco: Never Used     Tobacco comment: quit  until . started after trip to [place]   Substance Use Topics     Alcohol use: Yes     Comment: 1-2 beer a week                            Current providers sharing in care for this patient include:   Patient Care Team:  Lamine Franco MD as PCP - General (Family Practice)  Te Soriano MD as MD (Surgery)  Regine Ochoa MD as MD (Family Medicine - Sports Medicine)  Connor Torres MD as MD (Otolaryngology)  Lamine Franco MD as Assigned PCP  Connor Torres MD as Assigned Surgical Provider  Bobby William DO as Assigned Musculoskeletal Provider    The following health maintenance items are reviewed in Epic and correct as of today:  Health Maintenance   Topic Date Due     URINE DRUG SCREEN  Never done     ANNUAL REVIEW OF HM ORDERS  Never done     ZOSTER IMMUNIZATION (1 of 2) Never done     ADVANCE CARE PLANNING  2018     FALL RISK ASSESSMENT  2021     PHQ-2 (once per calendar year)  2022     DTAP/TDAP/TD IMMUNIZATION (2 - Tdap) 01/10/2022     MEDICARE ANNUAL WELLNESS VISIT  04/15/2023     LIPID  2025     COLORECTAL CANCER SCREENING  2029     INFLUENZA VACCINE  Completed     Pneumococcal Vaccine: 65+ Years  Completed     AORTIC ANEURYSM SCREENING (SYSTEM ASSIGNED)   "Completed     COVID-19 Vaccine  Completed     IPV IMMUNIZATION  Aged Out     MENINGITIS IMMUNIZATION  Aged Out     HEPATITIS B IMMUNIZATION  Aged Out     Lab work is in process  Pneumonia Vaccine:Adults age 65+ who received Pneumovax (PPSV23) at 65 years or older: Should be given PCV13 > 1 year after their most recent PPSV23  Declines adacel  ROS:  Constitutional, HEENT, cardiovascular, pulmonary, gi and gu systems are negative, except as otherwise noted.    OBJECTIVE:   BP (!) 152/82   Pulse 84   Temp 98.5  F (36.9  C) (Temporal)   Ht 5' 11.3\" (1.811 m)   Wt 159 lb 4 oz (72.2 kg)   SpO2 99%   BMI 22.02 kg/m   Estimated body mass index is 22.02 kg/m  as calculated from the following:    Height as of this encounter: 5' 11.3\" (1.811 m).    Weight as of this encounter: 159 lb 4 oz (72.2 kg).  EXAM:   GENERAL: healthy, alert and no distress  EYES: Eyes grossly normal to inspection, PERRL and conjunctivae and sclerae normal  HENT: ear canals and TM's normal, nose and mouth without ulcers or lesions  NECK: no adenopathy, no asymmetry, masses, or scars and thyroid normal to palpation  RESP: lungs clear to auscultation - no rales, rhonchi or wheezes  CV: regular rate and rhythm, normal S1 S2, no S3 or S4, no murmur, click or rub, no peripheral edema and peripheral pulses strong  ABDOMEN: soft, nontender, no hepatosplenomegaly, no masses and bowel sounds normal  RECTAL (male): normal sphincter tone, no rectal masses, prostate normal size, smooth, nontender without nodules or masses  MS: no gross musculoskeletal defects noted, no edema  SKIN: no suspicious lesions or rashes  NEURO: Normal strength and tone, mentation intact and speech normal  PSYCH: mentation appears normal, affect normal/bright  LYMPH: no cervical, supraclavicular, axillary, or inguinal adenopathy    Diagnostic Test Results:  Labs reviewed in Epic  Results for orders placed or performed in visit on 04/15/22   UA with Microscopic reflex to Culture - lab " "collect     Status: Normal    Specimen: Urine, Clean Catch   Result Value Ref Range    Color Urine Yellow Colorless, Straw, Light Yellow, Yellow    Appearance Urine Clear Clear    Glucose Urine Negative Negative mg/dL    Bilirubin Urine Negative Negative    Ketones Urine Negative Negative mg/dL    Specific Gravity Urine <=1.005 1.003 - 1.035    Blood Urine Negative Negative    pH Urine 5.5 5.0 - 7.0    Protein Albumin Urine Negative Negative mg/dL    Urobilinogen Urine 0.2 0.2, 1.0 E.U./dL    Nitrite Urine Negative Negative    Leukocyte Esterase Urine Negative Negative   Urine Microscopic     Status: Abnormal   Result Value Ref Range    Bacteria Urine None Seen None Seen /HPF    RBC Urine 0-2 0-2 /HPF /HPF    WBC Urine 0-5 0-5 /HPF /HPF    Squamous Epithelials Urine Few (A) None Seen /LPF    Narrative    Urine Culture not indicated       ASSESSMENT / PLAN:   (Z00.00) Encounter for Medicare annual wellness exam  (primary encounter diagnosis)  Comment: in excellent health  Plan: Lipid panel reflex to direct LDL Fasting, UA         with Microscopic reflex to Culture - lab         collect            (K21.00) Gastroesophageal reflux disease with esophagitis without hemorrhage  Comment: Well controlled on medications   Plan: us times 4-8 weeks then wean off as able        COUNSELING:  Reviewed preventive health counseling, as reflected in patient instructions       Regular exercise       Healthy diet/nutrition       Vision screening       Hearing screening       Dental care       Bladder control       Immunizations    Vaccinated for: Covid  and Declined: Td due to Concerns about side effects/safety               Colon cancer screening       Prostate cancer screening    Estimated body mass index is 22.02 kg/m  as calculated from the following:    Height as of this encounter: 5' 11.3\" (1.811 m).    Weight as of this encounter: 159 lb 4 oz (72.2 kg).        He reports that he quit smoking about 40 years ago. His smoking use " included cigarettes. He started smoking about 11 years ago. He has a 0.40 pack-year smoking history. He has never used smokeless tobacco.    Appropriate preventive services were discussed with this patient, including applicable screening as appropriate for cardiovascular disease, diabetes, osteopenia/osteoporosis, and glaucoma.  As appropriate for age/gender, discussed screening for colorectal cancer, prostate cancer, breast cancer, and cervical cancer. Checklist reviewing preventive services available has been given to the patient.    Reviewed patients plan of care and provided an AVS. The Basic Care Plan (routine screening as documented in Health Maintenance) for Ivy meets the Care Plan requirement. This Care Plan has been established and reviewed with the Patient.    Counseling Resources:  ATP IV Guidelines  Pooled Cohorts Equation Calculator  Breast Cancer Risk Calculator  BRCA-Related Cancer Risk Assessment: FHS-7 Tool  FRAX Risk Assessment  ICSI Preventive Guidelines  Dietary Guidelines for Americans, 2010  USDA's MyPlate  ASA Prophylaxis  Lung CA Screening    Lamine Franco MD  [facility]

## 2022-04-15 ENCOUNTER — LAB (OUTPATIENT)
Dept: LAB | Facility: CLINIC | Age: 77
End: 2022-04-15

## 2022-04-15 ENCOUNTER — OFFICE VISIT (OUTPATIENT)
Dept: FAMILY MEDICINE | Facility: CLINIC | Age: 77
End: 2022-04-15
Payer: COMMERCIAL

## 2022-04-15 VITALS
HEIGHT: 71 IN | DIASTOLIC BLOOD PRESSURE: 82 MMHG | BODY MASS INDEX: 22.29 KG/M2 | TEMPERATURE: 98.5 F | HEART RATE: 84 BPM | WEIGHT: 159.25 LBS | OXYGEN SATURATION: 99 % | SYSTOLIC BLOOD PRESSURE: 152 MMHG

## 2022-04-15 DIAGNOSIS — Z00.00 ENCOUNTER FOR MEDICARE ANNUAL WELLNESS EXAM: ICD-10-CM

## 2022-04-15 DIAGNOSIS — K21.00 GASTROESOPHAGEAL REFLUX DISEASE WITH ESOPHAGITIS WITHOUT HEMORRHAGE: ICD-10-CM

## 2022-04-15 DIAGNOSIS — Z00.00 ENCOUNTER FOR MEDICARE ANNUAL WELLNESS EXAM: Primary | ICD-10-CM

## 2022-04-15 LAB
ALBUMIN UR-MCNC: NEGATIVE MG/DL
APPEARANCE UR: CLEAR
BACTERIA #/AREA URNS HPF: ABNORMAL /HPF
BILIRUB UR QL STRIP: NEGATIVE
COLOR UR AUTO: YELLOW
GLUCOSE UR STRIP-MCNC: NEGATIVE MG/DL
HGB UR QL STRIP: NEGATIVE
KETONES UR STRIP-MCNC: NEGATIVE MG/DL
LEUKOCYTE ESTERASE UR QL STRIP: NEGATIVE
NITRATE UR QL: NEGATIVE
PH UR STRIP: 5.5 [PH] (ref 5–7)
RBC #/AREA URNS AUTO: ABNORMAL /HPF
SP GR UR STRIP: <=1.005 (ref 1–1.03)
SQUAMOUS #/AREA URNS AUTO: ABNORMAL /LPF
UROBILINOGEN UR STRIP-ACNC: 0.2 E.U./DL
WBC #/AREA URNS AUTO: ABNORMAL /HPF

## 2022-04-15 PROCEDURE — 80061 LIPID PANEL: CPT

## 2022-04-15 PROCEDURE — 36415 COLL VENOUS BLD VENIPUNCTURE: CPT

## 2022-04-15 PROCEDURE — 99397 PER PM REEVAL EST PAT 65+ YR: CPT | Performed by: FAMILY MEDICINE

## 2022-04-15 PROCEDURE — 0054A COVID-19,PF,PFIZER (12+ YRS): CPT | Performed by: FAMILY MEDICINE

## 2022-04-15 PROCEDURE — 81001 URINALYSIS AUTO W/SCOPE: CPT

## 2022-04-15 PROCEDURE — 91305 COVID-19,PF,PFIZER (12+ YRS): CPT | Performed by: FAMILY MEDICINE

## 2022-04-16 LAB
CHOLEST SERPL-MCNC: 166 MG/DL
FASTING STATUS PATIENT QL REPORTED: YES
HDLC SERPL-MCNC: 67 MG/DL
LDLC SERPL CALC-MCNC: 89 MG/DL
NONHDLC SERPL-MCNC: 99 MG/DL
TRIGL SERPL-MCNC: 52 MG/DL

## 2022-04-26 ENCOUNTER — OFFICE VISIT (OUTPATIENT)
Dept: OTOLARYNGOLOGY | Facility: CLINIC | Age: 77
End: 2022-04-26
Payer: COMMERCIAL

## 2022-04-26 VITALS
HEIGHT: 71 IN | HEART RATE: 85 BPM | DIASTOLIC BLOOD PRESSURE: 86 MMHG | TEMPERATURE: 97.6 F | WEIGHT: 159 LBS | SYSTOLIC BLOOD PRESSURE: 151 MMHG | OXYGEN SATURATION: 100 % | BODY MASS INDEX: 22.26 KG/M2

## 2022-04-26 DIAGNOSIS — M54.2 NECK PAIN: Primary | ICD-10-CM

## 2022-04-26 PROCEDURE — 99213 OFFICE O/P EST LOW 20 MIN: CPT | Performed by: OTOLARYNGOLOGY

## 2022-04-26 RX ORDER — OXYCODONE AND ACETAMINOPHEN 5; 325 MG/1; MG/1
1 TABLET ORAL EVERY 6 HOURS PRN
Qty: 20 TABLET | Refills: 0 | Status: SHIPPED | OUTPATIENT
Start: 2022-04-26 | End: 2022-04-29

## 2022-04-26 ASSESSMENT — PAIN SCALES - GENERAL: PAINLEVEL: NO PAIN (0)

## 2022-04-26 NOTE — PROGRESS NOTES
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 The patient appeared to have an intraoral infection (difficult to tell whether this was sublingual or submandibular infection) in 2021 and we decided against treatments. The patient last visited with me 6 months ago (10/26/2021) with report of resolution of the intraoral infection. He also noted a recent root canal procedure at that time that he recovered well from.    Today, the patient reports occasional left-styloid pain that occurs daily  that resolves for about one hour. Mr. Haynes reports that he is able to tolerate this pain. We have given him Percocet in the past, but he reports that he has not needed to take this recently. He points to the area of pain with his finger and describes the pain to occur at 1 cm away from the styloid. He denies any pain from his neck wrapping around to his left jaw. The patient reports that he is here to today for me to evaluate his recent CT facial bone scan and monitor his left styloid for preventative measures. He is working out 7 days a week with a variety of cardio and body weight exercises.    Review of Systems:   Pertinent items are noted in HPI or as in patient entered ROS below, remainder of complete ROS is negative.    ENT ROS 2020   Neurology -   Ears, Nose, Throat Ear pain, Sore throat   Cardiopulmonary -   Gastrointestinal/Genitourinary Heartburn/indigestion   Musculoskeletal Neck pain      Physical Exam:   BP (!) 151/86 (BP Location: Right arm, Patient Position: Sitting, Cuff Size: Adult Regular)   Pulse 85   Temp 97.6  F (36.4  C) (Temporal)   Ht 1.803  "m (5' 11\")   Wt 72.1 kg (159 lb)   SpO2 100%   BMI 22.18 kg/m       PHYSICAL EXAMINATION:    Constitutional:  The patient was unaccompanied, well-groomed, and in no acute distress.    Skin:  Warm and pink.    Neurologic:  Alert and oriented x 3.  CN's III-XII within normal limits.  Voice normal.   Psychiatric:  The patient's affect was calm, cooperative, and appropriate.    Respiratory:  Breathing comfortably without stridor or exertion of accessory muscles.    Eyes: Extraocular movement intact.    Head:  Normocephalic and atraumatic.  No lesions or scars.    Ears:  Pinnae and tragus non-tender.    Nose:  Sinuses were non-tender.  Anterior rhinoscopy revealed midline septum and absence of purulence or polyps.    OC/OP:  Normal tongue, floor of mouth, buccal mucosa, and palate.  No lesions or masses on inspection or palpation.  No abnormal lymph tissue in the oropharynx.  The pterygoid region is non-tender.    Neck:  Supple with normal laryngeal and tracheal landmarks.  The parotid beds were without masses.  No palpable thyroid.  Lymphatic:  There is no palpable lymphadenopathy in the neck.      Imaging:  CT FACIAL BONES WITHOUT CONTRAST 11/17/2021:  Impression:   1. No significant change in size or appearance of bilateral elongated  styloid processes, left greater than right.  2. No evidence of sialolithiasis.    Imaging was independently reviewed.    Assessment and Plan:  No diagnosis found.  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 We reviewed his latest CT facial bones imaging, and we discussed that there is no concerning abnormalities. We reviewed his left styloid pain and I do not feel that this pain is concerning, nor does it correlate with any abnormality on his CT scan. We discussed that he may have his stylohyoid " ligament is calcified and may be causing him pain. We discussed trying lemon juice to help this calcification. I recommended refilling his Percocet for break-through pain, and he is amenable to this. I gave him a paper prescription for this. We'll have him follow up in 6 months.      Follow-up: No follow-ups on file.         Scribe Disclosure:  I, Kyra Johnson, am serving as a scribe to document services personally performed by Connor Torres MD at this visit, based upon the provider's statements to me. All documentation has been reviewed by the aforementioned provider prior to being entered into the official medical record.

## 2022-04-26 NOTE — LETTER
2022       RE: Ivy Haynes  [address]  [facility] [address]     Dear Colleague,    Thank you for referring your patient, Ivy Haynes, to the [facility] [place] [facility]. Please see a copy of my visit note below.      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 The patient appeared to have an intraoral infection (difficult to tell whether this was sublingual or submandibular infection) in 2021 and we decided against treatments. The patient last visited with me 6 months ago (10/26/2021) with report of resolution of the intraoral infection. He also noted a recent root canal procedure at that time that he recovered well from.    Today, the patient reports occasional left-styloid pain that occurs daily  that resolves for about one hour. Mr. Haynes reports that he is able to tolerate this pain. We have given him Percocet in the past, but he reports that he has not needed to take this recently. He points to the area of pain with his finger and describes the pain to occur at 1 cm away from the styloid. He denies any pain from his neck wrapping around to his left jaw. The patient reports that he is here to today for me to evaluate his recent CT facial bone scan and monitor his left styloid for preventative measures. He is working out 7 days a week with a variety of cardio and body weight exercises.    Review of Systems:   Pertinent items are noted in HPI or as in patient entered ROS below, remainder of complete ROS is negative.    ENT ROS 2020   Neurology -  "  Ears, Nose, Throat Ear pain, Sore throat   Cardiopulmonary -   Gastrointestinal/Genitourinary Heartburn/indigestion   Musculoskeletal Neck pain      Physical Exam:   BP (!) 151/86 (BP Location: Right arm, Patient Position: Sitting, Cuff Size: Adult Regular)   Pulse 85   Temp 97.6  F (36.4  C) (Temporal)   Ht 1.803 m (5' 11\")   Wt 72.1 kg (159 lb)   SpO2 100%   BMI 22.18 kg/m       PHYSICAL EXAMINATION:    Constitutional:  The patient was unaccompanied, well-groomed, and in no acute distress.    Skin:  Warm and pink.    Neurologic:  Alert and oriented x 3.  CN's III-XII within normal limits.  Voice normal.   Psychiatric:  The patient's affect was calm, cooperative, and appropriate.    Respiratory:  Breathing comfortably without stridor or exertion of accessory muscles.    Eyes: Extraocular movement intact.    Head:  Normocephalic and atraumatic.  No lesions or scars.    Ears:  Pinnae and tragus non-tender.    Nose:  Sinuses were non-tender.  Anterior rhinoscopy revealed midline septum and absence of purulence or polyps.    OC/OP:  Normal tongue, floor of mouth, buccal mucosa, and palate.  No lesions or masses on inspection or palpation.  No abnormal lymph tissue in the oropharynx.  The pterygoid region is non-tender.    Neck:  Supple with normal laryngeal and tracheal landmarks.  The parotid beds were without masses.  No palpable thyroid.  Lymphatic:  There is no palpable lymphadenopathy in the neck.      Imaging:  CT FACIAL BONES WITHOUT CONTRAST 11/17/2021:  Impression:   1. No significant change in size or appearance of bilateral elongated  styloid processes, left greater than right.  2. No evidence of sialolithiasis.    Imaging was independently reviewed.    Assessment and Plan:  No diagnosis found.  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 We reviewed his latest CT facial bones imaging, and we discussed that there is no concerning abnormalities. We reviewed his left styloid pain and I do not feel that this pain is concerning, nor does it correlate with any abnormality on his CT scan. We discussed that he may have his stylohyoid ligament is calcified and may be causing him pain. We discussed trying lemon juice to help this calcification. I recommended refilling his Percocet for break-through pain, and he is amenable to this. I gave him a paper prescription for this. We'll have him follow up in 6 months.      Follow-up: No follow-ups on file.         Scribe Disclosure:  I, Kyra Johnson, am serving as a scribe to document services personally performed by Connor Torres MD at this visit, based upon the provider's statements to me. All documentation has been reviewed by the aforementioned provider prior to being entered into the official medical record.           Again, thank you for allowing me to participate in the care of your patient.      Sincerely,    Connor Torres MD

## 2022-04-26 NOTE — PATIENT INSTRUCTIONS
"You were seen in the clinic today by Dr. Torres. If you have any questions or concerns after your appointment, please call the clinic at [phone]. Press \"1\" for scheduling, press \"3\" for nurse advice.    2.   The following has been recommended for you based upon your appointment today:   -Prescription for Percocet given today.    3.   Plan to return the clinic in 6 months.       ISHMAEL Paredes  [facility]  Department of Otolaryngology  [phone]   "

## 2022-04-26 NOTE — NURSING NOTE
"Chief Complaint   Patient presents with     RECHECK     6 month follow up       Blood pressure (!) 151/86, pulse 85, temperature 97.6  F (36.4  C), temperature source Temporal, height 1.803 m (5' 11\"), weight 72.1 kg (159 lb), SpO2 100 %.    Deb France, EMT    "

## 2022-05-02 NOTE — TELEPHONE ENCOUNTER
Fabiana,     Patient reports he feels worse today (sitting down feels dizzy, blows his nose feels dizzy). He reports good fluid intake (at least 48 ounces of water per day).     He feels congested, no cough or shortness of breath but reports it is hard to sleep due to the congestion (blowing his nose consistently). He is taking ibuprofen PM to help him sleep.     He has a fever today, 99.8    He is wondering if there is a medication he can help for his congestion (he mentioned albuterol) and/or something to help with his symptoms.     Pharmacy cued.     Thank you,   ROXIE Love RN  [facility]         No

## 2022-06-23 ENCOUNTER — TELEPHONE (OUTPATIENT)
Dept: GASTROENTEROLOGY | Facility: CLINIC | Age: 77
End: 2022-06-23

## 2022-06-23 NOTE — TELEPHONE ENCOUNTER
[facility] Call Center    Reason for Call: Other: Patient called to schedule colonoscopy, currently no order in Epic     Action Taken: Other: The patient is contacting his PCP's office to request an order from whichever provider is covering for his PCP Dr Franco, who is out of town.    Travel Screening: Not Applicable

## 2022-06-24 ENCOUNTER — TELEPHONE (OUTPATIENT)
Dept: FAMILY MEDICINE | Facility: CLINIC | Age: 77
End: 2022-06-24

## 2022-06-24 DIAGNOSIS — Z12.11 SPECIAL SCREENING FOR MALIGNANT NEOPLASMS, COLON: Primary | ICD-10-CM

## 2022-06-24 NOTE — TELEPHONE ENCOUNTER
RNs,   Ivy called.   Requested orders for a colonoscopy.   Patient states he is due for one, and would like to schedule.   Please advise.   Thanks!  Josefina DELGADO     [facility]

## 2022-06-24 NOTE — TELEPHONE ENCOUNTER
Dr. Franco,     See note below, pt requesting colonoscopy. Order pended for your review/revision/approval    Thank you,   Georgie Hobbs RN  [facility]

## 2022-06-28 NOTE — TELEPHONE ENCOUNTER
[facility], and contact info to schedule    Georgie Hobbs RN  [facility]

## 2022-07-12 ENCOUNTER — OFFICE VISIT (OUTPATIENT)
Dept: FAMILY MEDICINE | Facility: CLINIC | Age: 77
End: 2022-07-12
Payer: COMMERCIAL

## 2022-07-12 VITALS
OXYGEN SATURATION: 98 % | HEART RATE: 86 BPM | WEIGHT: 156 LBS | BODY MASS INDEX: 22.33 KG/M2 | SYSTOLIC BLOOD PRESSURE: 124 MMHG | HEIGHT: 70 IN | TEMPERATURE: 97.8 F | DIASTOLIC BLOOD PRESSURE: 78 MMHG

## 2022-07-12 DIAGNOSIS — N43.3 HYDROCELE OF TESTIS: Primary | ICD-10-CM

## 2022-07-12 PROCEDURE — 99213 OFFICE O/P EST LOW 20 MIN: CPT | Performed by: FAMILY MEDICINE

## 2022-07-12 ASSESSMENT — PAIN SCALES - GENERAL: PAINLEVEL: NO PAIN (0)

## 2022-07-12 NOTE — PROGRESS NOTES
Assessment & Plan     Hydrocele of testis  Benign  Follow up only if unimproved.     Reviewed recent ULTRASOUND, since then had normal colonoscopy and normal CT of kidneys  Review of the result(s) of each unique test - US   8 minutes spent on the date of the encounter doing review of outside records and review of test results        Regular exercise  See Patient Instructions    No follow-ups on file.    Lamine Franco MD  [facility] MALU Haynes is a 77 year old accompanied by his , presenting for the following health issues:  No chief complaint on file.      History of Present Illness       Reason for visit:  Hydrocele    He eats 4 or more servings of fruits and vegetables daily.He consumes 0 sweetened beverage(s) daily.He exercises with enough effort to increase his heart rate 60 or more minutes per day.  He exercises with enough effort to increase his heart rate 7 days per week.   He is taking medications regularly.       Hypertension Follow-up      Do you check your blood pressure regularly outside of the clinic? Yes     Are you following a low salt diet? Yes    Are your blood pressures ever more than 140 on the top number (systolic) OR more   than 90 on the bottom number (diastolic), for example 140/90? No        Review of Systems   Constitutional, HEENT, cardiovascular, pulmonary, gi and gu systems are negative, except as otherwise noted.      Objective    There were no vitals taken for this visit.  There is no height or weight on file to calculate BMI.  Physical Exam   GENERAL: healthy, alert and no distress  EYES: Eyes grossly normal to inspection, PERRL and conjunctivae and sclerae normal   (male): hydrocele present left and penis normal without urethral discharge  SKIN: no suspicious lesions or rashes  NEURO: Normal strength and tone, mentation intact and speech normal  PSYCH: mentation appears normal, affect normal/bright    Lab on 04/15/2022   Component Date  Value Ref Range Status     Color Urine 04/15/2022 Yellow  Colorless, Straw, Light Yellow, Yellow Final     Appearance Urine 04/15/2022 Clear  Clear Final     Glucose Urine 04/15/2022 Negative  Negative mg/dL Final     Bilirubin Urine 04/15/2022 Negative  Negative Final     Ketones Urine 04/15/2022 Negative  Negative mg/dL Final     Specific Gravity Urine 04/15/2022 <=1.005  1.003 - 1.035 Final     Blood Urine 04/15/2022 Negative  Negative Final     pH Urine 04/15/2022 5.5  5.0 - 7.0 Final     Protein Albumin Urine 04/15/2022 Negative  Negative mg/dL Final     Urobilinogen Urine 04/15/2022 0.2  0.2, 1.0 E.U./dL Final     Nitrite Urine 04/15/2022 Negative  Negative Final     Leukocyte Esterase Urine 04/15/2022 Negative  Negative Final     Cholesterol 04/15/2022 166  <200 mg/dL Final     Triglycerides 04/15/2022 52  <150 mg/dL Final     Direct Measure HDL 04/15/2022 67  >=40 mg/dL Final     LDL Cholesterol Calculated 04/15/2022 89  <=100 mg/dL Final     Non HDL Cholesterol 04/15/2022 99  <130 mg/dL Final     Patient Fasting > 8hrs? 04/15/2022 Yes   Final     Bacteria Urine 04/15/2022 None Seen  None Seen /HPF Final     RBC Urine 04/15/2022 0-2  0-2 /HPF /HPF Final     WBC Urine 04/15/2022 0-5  0-5 /HPF /HPF Final     Squamous Epithelials Urine 04/15/2022 Few (A) None Seen /LPF Final                   .  ..

## 2022-08-08 ENCOUNTER — NURSE TRIAGE (OUTPATIENT)
Dept: FAMILY MEDICINE | Facility: CLINIC | Age: 77
End: 2022-08-08

## 2022-08-08 NOTE — TELEPHONE ENCOUNTER
"See below, swelling x2 days. Mild. Calves feel tight but not red or swollen, no other sx.    OK seeing pt for this issue as video visit today? Prefers to only see PCP.    Reason for Disposition    MILD to MODERATE ankle swelling (e.g., can't move joint normally, can't do usual activities) (Exceptions: Itchy, localized swelling; swelling is chronic.)    Answer Assessment - Initial Assessment Questions  1. LOCATION: \"Which ankle is swollen?\" \"Where is the swelling?\"      Both ankles  2. ONSET: \"When did the swelling start?\"      Saturday night starting with R ankle, yesterday both   3. SWELLING: \"How bad is the swelling?\" Or, \"How large is it?\" (e.g., mild, moderate, severe; size of localized swelling)     - NONE: No joint swelling.    - LOCALIZED: Localized; small area of puffy or swollen skin (e.g., insect bite, skin irritation).    - MILD: Joint looks or feels mildly swollen or puffy.    - MODERATE: Swollen; interferes with normal activities (e.g., work or school); decreased range of movement; may be limping.    - SEVERE: Very swollen; can't move swollen joint at all; limping a lot or unable to walk.      Mild   4. PAIN: \"Is there any pain?\" If Yes, ask: \"How bad is it?\" (Scale 1-10; or mild, moderate, severe)    - NONE (0): no pain.    - MILD (1-3): doesn't interfere with normal activities.     - MODERATE (4-7): interferes with normal activities (e.g., work or school) or awakens from sleep, limping.     - SEVERE (8-10): excruciating pain, unable to do any normal activities, unable to walk.       no  5. CAUSE: \"What do you think caused the ankle swelling?\"      Unsure, has never had this before. Works out every day.  6. OTHER SYMPTOMS: \"Do you have any other symptoms?\" (e.g., fever, chest pain, difficulty breathing, calf pain)      Calves feel heavy, not red or swollen. No fever, chest pain, or difficulty breathing.   7. PREGNANCY: \"Is there any chance you are pregnant?\" \"When was your last menstrual period?\"      " N.A    Protocols used: ANKLE SWELLING-A-OH

## 2022-08-08 NOTE — TELEPHONE ENCOUNTER
My phone is not working, Muufrit sent to pt that has been read to schedule.    Antonella ABDALLA RN

## 2022-09-20 DIAGNOSIS — I10 HYPERTENSION GOAL BP (BLOOD PRESSURE) < 140/90: ICD-10-CM

## 2022-09-20 RX ORDER — AMLODIPINE BESYLATE 2.5 MG/1
2.5 TABLET ORAL DAILY
Qty: 90 TABLET | Refills: 3 | Status: SHIPPED | OUTPATIENT
Start: 2022-09-20 | End: 2023-08-09

## 2022-09-20 NOTE — TELEPHONE ENCOUNTER
Prescription approved per [facility] Refill Protocol.  Passed protocol     Georgie Hobbs RN  [facility]

## 2022-09-23 ENCOUNTER — OFFICE VISIT (OUTPATIENT)
Dept: FAMILY MEDICINE | Facility: CLINIC | Age: 77
End: 2022-09-23
Payer: COMMERCIAL

## 2022-09-23 VITALS
OXYGEN SATURATION: 100 % | DIASTOLIC BLOOD PRESSURE: 83 MMHG | HEART RATE: 78 BPM | SYSTOLIC BLOOD PRESSURE: 138 MMHG | WEIGHT: 159.5 LBS | TEMPERATURE: 98.2 F | HEIGHT: 70 IN | BODY MASS INDEX: 22.83 KG/M2

## 2022-09-23 DIAGNOSIS — M25.551 HIP PAIN, RIGHT: ICD-10-CM

## 2022-09-23 DIAGNOSIS — I10 HYPERTENSION GOAL BP (BLOOD PRESSURE) < 140/90: Primary | ICD-10-CM

## 2022-09-23 DIAGNOSIS — H53.9 VISION CHANGES: ICD-10-CM

## 2022-09-23 DIAGNOSIS — Z23 NEED FOR COVID-19 VACCINE: ICD-10-CM

## 2022-09-23 DIAGNOSIS — G47.19 EXCESSIVE DAYTIME SLEEPINESS: ICD-10-CM

## 2022-09-23 DIAGNOSIS — Z23 NEED FOR IMMUNIZATION AGAINST INFLUENZA: ICD-10-CM

## 2022-09-23 LAB
ERYTHROCYTE [DISTWIDTH] IN BLOOD BY AUTOMATED COUNT: 12.4 % (ref 10–15)
HCT VFR BLD AUTO: 45.6 % (ref 40–53)
HGB BLD-MCNC: 15.4 G/DL (ref 13.3–17.7)
MCH RBC QN AUTO: 33.1 PG (ref 26.5–33)
MCHC RBC AUTO-ENTMCNC: 33.8 G/DL (ref 31.5–36.5)
MCV RBC AUTO: 98 FL (ref 78–100)
PLATELET # BLD AUTO: 192 10E3/UL (ref 150–450)
RBC # BLD AUTO: 4.65 10E6/UL (ref 4.4–5.9)
WBC # BLD AUTO: 3.6 10E3/UL (ref 4–11)

## 2022-09-23 PROCEDURE — 91312 COVID-19,PF,PFIZER BOOSTER BIVALENT: CPT | Performed by: FAMILY MEDICINE

## 2022-09-23 PROCEDURE — 36415 COLL VENOUS BLD VENIPUNCTURE: CPT | Performed by: FAMILY MEDICINE

## 2022-09-23 PROCEDURE — 0124A COVID-19,PF,PFIZER BOOSTER BIVALENT: CPT | Performed by: FAMILY MEDICINE

## 2022-09-23 PROCEDURE — 99214 OFFICE O/P EST MOD 30 MIN: CPT | Mod: 25 | Performed by: FAMILY MEDICINE

## 2022-09-23 PROCEDURE — 85027 COMPLETE CBC AUTOMATED: CPT | Performed by: FAMILY MEDICINE

## 2022-09-23 PROCEDURE — G0008 ADMIN INFLUENZA VIRUS VAC: HCPCS | Performed by: FAMILY MEDICINE

## 2022-09-23 PROCEDURE — 90662 IIV NO PRSV INCREASED AG IM: CPT | Performed by: FAMILY MEDICINE

## 2022-09-23 PROCEDURE — 80053 COMPREHEN METABOLIC PANEL: CPT | Performed by: FAMILY MEDICINE

## 2022-09-23 ASSESSMENT — PAIN SCALES - GENERAL: PAINLEVEL: NO PAIN (0)

## 2022-09-23 NOTE — PROGRESS NOTES
Assessment & Plan     Hypertension goal BP (blood pressure) < 140/90  Well controlled   - CBC with platelets; Future  - Comprehensive metabolic panel (BMP + Alb, Alk Phos, ALT, AST, Total. Bili, TP); Future  - CBC with platelets  - Comprehensive metabolic panel (BMP + Alb, Alk Phos, ALT, AST, Total. Bili, TP)    Excessive daytime sleepiness  Worse, Follow up with consultant as planned.   - Adult Sleep Eval & Management  Referral; Future  Need for COVID-19 vaccine  done    Need for immunization against influenza  done    Hip pain, right  Likely psoas muscle pain/ stetch/ esrt  If not better get xray/ PHYSICAL THERAPY     Vision changes  Need s eval    - Adult Eye  Referral; Future             Nicotine/Tobacco Cessation:  He reports that he has been smoking cigarettes. He started smoking about 11 years ago. He has a 0.40 pack-year smoking history. He has never used smokeless tobacco.  Nicotine/Tobacco Cessation Plan:   Information offered: Patient not interested at this time      Regular exercise  See Patient Instructions    No follow-ups on file.    Lamine Franco MD  [facility]    Merline Haynes is a 77 year old presenting for the following health issues:  Groin Pain and Hip Pain      History of Present Illness       Reason for visit:  Groin and hip pain  Symptom onset:  More than a month  Symptoms include:  Pain  Symptom intensity:  Moderate  Symptom progression:  Staying the same  Had these symptoms before:  Yes  Has tried/received treatment for these symptoms:  No  What makes it worse:  Core exercises  What makes it better:  Stretching the psoas muscle    He eats 2-3 servings of fruits and vegetables daily.He consumes 1 sweetened beverage(s) daily.He exercises with enough effort to increase his heart rate 60 or more minutes per day.  He exercises with enough effort to increase his heart rate 6 days per week.   He is taking medications regularly.     Took  "his BP this morning and it was much lower - 134/87    Hypertension Follow-up      Do you check your blood pressure regularly outside of the clinic? Yes     Are you following a low salt diet? Yes    Are your blood pressures ever more than 140 on the top number (systolic) OR more   than 90 on the bottom number (diastolic), for example 140/90? No        Review of Systems   Constitutional, HEENT, cardiovascular, pulmonary, gi and gu systems are negative, except as otherwise noted.      Objective    /83   Pulse 78   Temp 98.2  F (36.8  C) (Temporal)   Ht 1.789 m (5' 10.43\")   Wt 72.3 kg (159 lb 8 oz)   SpO2 100%   BMI 22.61 kg/m    Body mass index is 22.61 kg/m .  Physical Exam   GENERAL: healthy, alert and no distress  NECK: no adenopathy, no asymmetry, masses, or scars and thyroid normal to palpation  RESP: lungs clear to auscultation - no rales, rhonchi or wheezes  CV: regular rate and rhythm, normal S1 S2, no S3 or S4, no murmur, click or rub, no peripheral edema and peripheral pulses strong  ABDOMEN: soft, nontender, no hepatosplenomegaly, no masses and bowel sounds normal  MS: normal muscle tone and tenderness to palpation right inner hip  SKIN: no suspicious lesions or rashes  NEURO: Normal strength and tone, mentation intact and speech normal    Lab on 04/15/2022   Component Date Value Ref Range Status     Color Urine 04/15/2022 Yellow  Colorless, Straw, Light Yellow, Yellow Final     Appearance Urine 04/15/2022 Clear  Clear Final     Glucose Urine 04/15/2022 Negative  Negative mg/dL Final     Bilirubin Urine 04/15/2022 Negative  Negative Final     Ketones Urine 04/15/2022 Negative  Negative mg/dL Final     Specific Gravity Urine 04/15/2022 <=1.005  1.003 - 1.035 Final     Blood Urine 04/15/2022 Negative  Negative Final     pH Urine 04/15/2022 5.5  5.0 - 7.0 Final     Protein Albumin Urine 04/15/2022 Negative  Negative mg/dL Final     Urobilinogen Urine 04/15/2022 0.2  0.2, 1.0 E.U./dL Final     " Nitrite Urine 04/15/2022 Negative  Negative Final     Leukocyte Esterase Urine 04/15/2022 Negative  Negative Final     Cholesterol 04/15/2022 166  <200 mg/dL Final     Triglycerides 04/15/2022 52  <150 mg/dL Final     Direct Measure HDL 04/15/2022 67  >=40 mg/dL Final     LDL Cholesterol Calculated 04/15/2022 89  <=100 mg/dL Final     Non HDL Cholesterol 04/15/2022 99  <130 mg/dL Final     Patient Fasting > 8hrs? 04/15/2022 Yes   Final     Bacteria Urine 04/15/2022 None Seen  None Seen /HPF Final     RBC Urine 04/15/2022 0-2  0-2 /HPF /HPF Final     WBC Urine 04/15/2022 0-5  0-5 /HPF /HPF Final     Squamous Epithelials Urine 04/15/2022 Few (A) None Seen /LPF Final

## 2022-09-24 LAB
ALBUMIN SERPL-MCNC: 3.9 G/DL (ref 3.4–5)
ALP SERPL-CCNC: 140 U/L (ref 40–150)
ALT SERPL W P-5'-P-CCNC: 21 U/L (ref 0–70)
ANION GAP SERPL CALCULATED.3IONS-SCNC: 5 MMOL/L (ref 3–14)
AST SERPL W P-5'-P-CCNC: 20 U/L (ref 0–45)
BILIRUB SERPL-MCNC: 0.5 MG/DL (ref 0.2–1.3)
BUN SERPL-MCNC: 14 MG/DL (ref 7–30)
CALCIUM SERPL-MCNC: 8.8 MG/DL (ref 8.5–10.1)
CHLORIDE BLD-SCNC: 107 MMOL/L (ref 94–109)
CO2 SERPL-SCNC: 29 MMOL/L (ref 20–32)
CREAT SERPL-MCNC: 1.04 MG/DL (ref 0.66–1.25)
GFR SERPL CREATININE-BSD FRML MDRD: 75 ML/MIN/1.73M2
GLUCOSE BLD-MCNC: 93 MG/DL (ref 70–99)
POTASSIUM BLD-SCNC: 4.3 MMOL/L (ref 3.4–5.3)
PROT SERPL-MCNC: 7.3 G/DL (ref 6.8–8.8)
SODIUM SERPL-SCNC: 141 MMOL/L (ref 133–144)

## 2022-10-14 ENCOUNTER — THERAPY VISIT (OUTPATIENT)
Dept: PHYSICAL THERAPY | Facility: CLINIC | Age: 77
End: 2022-10-14
Attending: FAMILY MEDICINE
Payer: COMMERCIAL

## 2022-10-14 DIAGNOSIS — M25.551 HIP PAIN, RIGHT: ICD-10-CM

## 2022-10-14 PROCEDURE — 97110 THERAPEUTIC EXERCISES: CPT | Mod: GP | Performed by: PHYSICAL THERAPIST

## 2022-10-14 PROCEDURE — 97161 PT EVAL LOW COMPLEX 20 MIN: CPT | Mod: GP | Performed by: PHYSICAL THERAPIST

## 2022-10-14 NOTE — PROGRESS NOTES
[facility]    OUTPATIENT Physical Therapy ORTHOPEDIC EVALUATION  PLAN OF TREATMENT FOR OUTPATIENT REHABILITATION  (COMPLETE FOR INITIAL CLAIMS ONLY)  Patient's Last Name, First Name, M.I.  YOB: 1945  Ivy Haynes       Provider s Name:  [facility]   Medical Record No.  [id number]   Start of Care Date:  10/14/22   Onset Date:   08/01/22   Treatment Diagnosis:  R hip pain; Hip flexor strain Medical Diagnosis:  Hip pain, right       Goals:     10/14/22 0500   Body Part   Goals listed below are for R hip   Goal #1   Goal #1 squatting/kneeling   Previous Functional Level No restrictions   Current Functional Level Can squat with pain of level    Performance Level 1/10   STG Target Performance Do a partial squat   Performance Level painfree   Rationale for proper body mechanics while performing housework;for proper body mechanics while performing yardwork and home maintenance   Due date 11/11/22   LTG Target Performance Do a full squat   Performance Level painfree   Rationale for proper body mechanics while performing housework;for proper body mechanics while performing yardwork and home maintenance;for proper body mechanics when lifting, personal hygiene, dressing   Due date 12/09/22       Therapy Frequency:  2x month  Predicted Duration of Therapy Intervention:  1 month    Irvin Degroot, PT                 I CERTIFY THE NEED FOR THESE SERVICES FURNISHED UNDER        THIS PLAN OF TREATMENT AND WHILE UNDER MY CARE     (Physician attestation of this document indicates review and certification of the therapy plan).                     Certification Date From:  10/14/22   Certification Date To:  01/02/23    Referring Provider:  Lamine Franco    Initial Assessment        See Epic Evaluation SOC Date: 10/14/22

## 2022-10-14 NOTE — PROGRESS NOTES
Physical Therapy Initial Evaluation  Subjective:    Therapist Generated HPI Evaluation  Problem details: Pt presents to PT with a chief complaint of R anterior hip/groin pain with reported onset ~2 months ago while completing core exercises (supine abdominal exercise-bicycle kicks). Pt reports a very active lifestyle with consistent strengthening exercises at home and worried about continuing with his core exercises due to the R hip pain. Pain worse with active hip flexion, but has improved with stretching and activity modification. .         Type of problem:  Right hip.    This is a new condition.  Condition occurred with:  Repetition/overuse.  Where condition occurred: during recreation/sport.  Patient reports pain:  Anterior and groin.  Pain is described as aching and is intermittent.  Pain radiates to:  No radiation. Pain is the same all the time.  Since onset symptoms are gradually improving.  Associated symptoms:  Loss of motion/stiffness and loss of strength. Exacerbated by: walking after prolonged sitting   and relieved by rest.      Restrictions due to condition include:  Working in normal job without restrictions.  Barriers include:  None as reported by patient.    Patient Health History         Pain is reported as 4/10 on pain scale.  General health as reported by patient is excellent.  Pertinent medical history includes: high blood pressure.   Red flags:  None as reported by patient.  Medical allergies: none.   Surgeries: hernia.    Current medications:  High blood pressure medication.    Occupation: [employer].                                       Objective:  System                                           Hip Evaluation  HIP AROM:  AROM:    Left Hip:     Normal    Right Hip:   Normal (Min loss of R hip ext 5 deg, compared to L hip ext)                  Hip PROM:    Flexion: Left: WNL   Right: WNL        Internal Rotation: Left: 40    Right: 30*        Pain: Min pain with end range R hip IR PROM         Hip Strength:    Flexion:   Left: 5/5   Pain:  Right:  5-/5   +  Pain: strong/painful                    Extension:  Left: 5/5  Pain:Right: 5/5    Pain:    Abduction:  Left: 5/5     Pain:Right: 5/5   +   Pain:                    Hip Palpation:        Right hip tenderness not present at:  hip flexors  Functional Testing:          Quad:    Single leg squat:   Left:    Right:   Moderate loss of control and femoral IR                     General     ROS    Assessment/Plan:    Patient is a 77 year old male with right side hip complaints.    Patient has the following significant findings with corresponding treatment plan.                Diagnosis 1:  R hip pain  Pain -  manual therapy, splint/taping/bracing/orthotics, self management, education and home program  Decreased ROM/flexibility - manual therapy and therapeutic exercise  Decreased strength - therapeutic exercise and therapeutic activities  Impaired muscle performance - neuro re-education  Decreased function - therapeutic activities    Therapy Evaluation Codes:   Cumulative Therapy Evaluation is: Low complexity.    Previous and current functional limitations:  (See Goal Flow Sheet for this information)    Short term and Long term goals: (See Goal Flow Sheet for this information)     Communication ability:  Patient appears to be able to clearly communicate and understand verbal and written communication and follow directions correctly.  Treatment Explanation - The following has been discussed with the patient:   RX ordered/plan of care  Anticipated outcomes  Possible risks and side effects  This patient would benefit from PT intervention to resume normal activities.   Rehab potential is good.    Frequency:  2 x month, once daily  Duration:  For 4 weeks  Discharge Plan:  Achieve all LTG.  Independent in home treatment program.  Reach maximal therapeutic benefit.    Please refer to the daily flowsheet for treatment today, total treatment time and time spent  performing 1:1 timed codes.

## 2022-10-25 ENCOUNTER — OFFICE VISIT (OUTPATIENT)
Dept: OTOLARYNGOLOGY | Facility: CLINIC | Age: 77
End: 2022-10-25
Payer: COMMERCIAL

## 2022-10-25 VITALS
BODY MASS INDEX: 22.82 KG/M2 | WEIGHT: 163 LBS | SYSTOLIC BLOOD PRESSURE: 146 MMHG | HEART RATE: 85 BPM | DIASTOLIC BLOOD PRESSURE: 76 MMHG | HEIGHT: 71 IN

## 2022-10-25 DIAGNOSIS — M54.2 CERVICALGIA: Primary | ICD-10-CM

## 2022-10-25 PROCEDURE — 99213 OFFICE O/P EST LOW 20 MIN: CPT | Performed by: OTOLARYNGOLOGY

## 2022-10-25 RX ORDER — TAMSULOSIN HYDROCHLORIDE 0.4 MG/1
CAPSULE ORAL
COMMUNITY
Start: 2022-09-18 | End: 2024-04-11

## 2022-10-25 RX ORDER — OXYCODONE AND ACETAMINOPHEN 5; 325 MG/1; MG/1
1 TABLET ORAL EVERY 6 HOURS PRN
Qty: 20 TABLET | Refills: 0 | Status: SHIPPED | OUTPATIENT
Start: 2022-10-25 | End: 2022-10-30

## 2022-10-25 ASSESSMENT — PAIN SCALES - GENERAL: PAINLEVEL: NO PAIN (0)

## 2022-10-25 NOTE — PATIENT INSTRUCTIONS
"You were seen in the clinic today by Dr. Torres. If you have any questions or concerns after your appointment, please call the clinic at [phone]. Press \"1\" for scheduling, press \"3\" for nurse advice.      2.   Plan to return the clinic in 6 months    Jackie FAUSTIN, RN  [facility]  Department of Otolaryngology  [phone]     "

## 2022-10-25 NOTE — NURSING NOTE
"Chief Complaint   Patient presents with     RECHECK     6 month follow up       Blood pressure (!) 146/76, pulse 85, height 1.803 m (5' 11\"), weight 73.9 kg (163 lb).    Deb France, EMT    "

## 2022-10-25 NOTE — LETTER
"10/25/2022       RE: Ivy Haynes  [address]  [place] [address]     Dear Colleague,    Thank you for referring your patient, Ivy Haynes, to the [facility] [place] [facility]. Please see a copy of my visit note below.      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 The patient appeared to have an intraoral infection (difficult to tell whether this was sublingual or submandibular infection) in 2021 and we decided against treatments and patient reported resolution 10/26/2021. 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    He has been doing well since our last appointment without new oral complaints. His styloid pain occurs at similar intensity and timeline. He has been stretching his neck daily for 45 minutes.      Review of Systems:   Pertinent items are noted in HPI or as in patient entered ROS below, remainder of complete ROS is negative.    ENT ROS 2020   Neurology: -   Ears, Nose, Throat: Ear pain, Sore throat   Cardiopulmonary: -   Gastrointestinal/Genitourinary: Heartburn/indigestion   Musculoskeletal: Neck pain        Physical Exam:   BP (!) 146/76 (BP Location: Right arm, Patient Position: Sitting, Cuff Size: Adult Regular)   Pulse 85   Ht 1.803 m (5' 11\")   Wt 73.9 kg (163 lb)   BMI 22.73 kg/m       PHYSICAL EXAMINATION:    Constitutional:  The patient was unaccompanied, " well-groomed, and in no acute distress.    Skin:  Warm and pink.    Neurologic:  Alert and oriented x 3.  CN's III-XII within normal limits.  Voice normal.   Psychiatric:  The patient's affect was calm, cooperative, and appropriate.    Respiratory:  Breathing comfortably without stridor or exertion of accessory muscles.    Eyes: Extraocular movement intact.    Head:  Normocephalic and atraumatic.  No lesions or scars.    OC/OP:  Normal tongue, floor of mouth, buccal mucosa, and palate.  No lesions or masses on inspection or palpation.  No abnormal lymph tissue in the oropharynx.  The pterygoid region is non-tender. Point tenderness of the left hyoid.   Neck:  Supple with normal laryngeal and tracheal landmarks.  The parotid beds were without masses.  No palpable thyroid.  Lymphatic:  There is no palpable lymphadenopathy in the neck.      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 Overall his pain has been stable since our last appointment. He does have some point left hyoid tenderness and we briefly discussed possibility of steroid injection if it were to worsen in the future. Otherwise, I will provide percocet refill for break-through pain as I have in the past. Follow-up in 6 months.        Scribe Disclosure:  I, Brandon Hall, am serving as a scribe to document services personally performed by Connor Torres MD at this visit, based upon the provider's statements to me. All documentation has been reviewed by the aforementioned provider prior to being entered into the official medical record.    Again, thank you for allowing me to participate in the care of your patient.      Sincerely,    Connor Torres MD

## 2022-10-25 NOTE — PROGRESS NOTES
"  Otolaryngology Clinic    Name: Ivy Haynes  MRN: [id number]  Age: 77 year old  : 1945  10/25/2022      Chief Complaint:   Follow up     History of Present Illness:   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 The patient appeared to have an intraoral infection (difficult to tell whether this was sublingual or submandibular infection) in 2021 and we decided against treatments and patient reported resolution 10/26/2021. 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    He has been doing well since our last appointment without new oral complaints. His styloid pain occurs at similar intensity and timeline. He has been stretching his neck daily for 45 minutes.      Review of Systems:   Pertinent items are noted in HPI or as in patient entered ROS below, remainder of complete ROS is negative.    ENT ROS 2020   Neurology: -   Ears, Nose, Throat: Ear pain, Sore throat   Cardiopulmonary: -   Gastrointestinal/Genitourinary: Heartburn/indigestion   Musculoskeletal: Neck pain        Physical Exam:   BP (!) 146/76 (BP Location: Right arm, Patient Position: Sitting, Cuff Size: Adult Regular)   Pulse 85   Ht 1.803 m (5' 11\")   Wt 73.9 kg (163 lb)   BMI 22.73 kg/m       PHYSICAL EXAMINATION:    Constitutional:  The patient was unaccompanied, well-groomed, and in no acute distress.    Skin:  Warm and pink.    Neurologic:  Alert and oriented x 3.  CN's III-XII within normal limits.  Voice normal.   Psychiatric:  The patient's affect was calm, cooperative, and appropriate.    Respiratory:  Breathing comfortably without stridor or exertion of accessory muscles.  "   Eyes: Extraocular movement intact.    Head:  Normocephalic and atraumatic.  No lesions or scars.    OC/OP:  Normal tongue, floor of mouth, buccal mucosa, and palate.  No lesions or masses on inspection or palpation.  No abnormal lymph tissue in the oropharynx.  The pterygoid region is non-tender. Point tenderness of the left hyoid.   Neck:  Supple with normal laryngeal and tracheal landmarks.  The parotid beds were without masses.  No palpable thyroid.  Lymphatic:  There is no palpable lymphadenopathy in the neck.      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 Overall his pain has been stable since our last appointment. He does have some point left hyoid tenderness and we briefly discussed possibility of steroid injection if it were to worsen in the future. Otherwise, I will provide percocet refill for break-through pain as I have in the past. Follow-up in 6 months.         Scribe Disclosure:  I, Brandon Hall, am serving as a scribe to document services personally performed by Connor Torres MD at this visit, based upon the provider's statements to me. All documentation has been reviewed by the aforementioned provider prior to being entered into the official medical record.

## 2022-11-19 ENCOUNTER — HOSPITAL ENCOUNTER (EMERGENCY)
Facility: CLINIC | Age: 77
Discharge: HOME | End: 2022-11-19
Attending: FAMILY MEDICINE | Admitting: FAMILY MEDICINE
Payer: COMMERCIAL

## 2022-11-19 ENCOUNTER — APPOINTMENT (OUTPATIENT)
Dept: CT IMAGING | Facility: CLINIC | Age: 77
End: 2022-11-19
Attending: FAMILY MEDICINE
Payer: COMMERCIAL

## 2022-11-19 VITALS
SYSTOLIC BLOOD PRESSURE: 188 MMHG | WEIGHT: 164.2 LBS | RESPIRATION RATE: 16 BRPM | BODY MASS INDEX: 22.9 KG/M2 | HEART RATE: 83 BPM | DIASTOLIC BLOOD PRESSURE: 92 MMHG | TEMPERATURE: 97.5 F | OXYGEN SATURATION: 98 %

## 2022-11-19 DIAGNOSIS — Q62.11 HYDRONEPHROSIS WITH URETEROPELVIC JUNCTION (UPJ) OBSTRUCTION: ICD-10-CM

## 2022-11-19 DIAGNOSIS — N13.0 ACQUIRED HYDRONEPHROSIS WITH URETEROPELVIC JUNCTION (UPJ) OBSTRUCTION: ICD-10-CM

## 2022-11-19 LAB
ALBUMIN SERPL-MCNC: 3.9 G/DL (ref 3.4–5)
ALBUMIN UR-MCNC: NEGATIVE MG/DL
ALP SERPL-CCNC: 144 U/L (ref 40–150)
ALT SERPL W P-5'-P-CCNC: 22 U/L (ref 0–70)
ANION GAP SERPL CALCULATED.3IONS-SCNC: 6 MMOL/L (ref 3–14)
APPEARANCE UR: CLEAR
AST SERPL W P-5'-P-CCNC: 27 U/L (ref 0–45)
BASOPHILS # BLD AUTO: 0 10E3/UL (ref 0–0.2)
BASOPHILS NFR BLD AUTO: 0 %
BILIRUB SERPL-MCNC: 0.4 MG/DL (ref 0.2–1.3)
BILIRUB UR QL STRIP: NEGATIVE
BUN SERPL-MCNC: 15 MG/DL (ref 7–30)
CALCIUM SERPL-MCNC: 8.9 MG/DL (ref 8.5–10.1)
CHLORIDE BLD-SCNC: 103 MMOL/L (ref 94–109)
CO2 SERPL-SCNC: 28 MMOL/L (ref 20–32)
COLOR UR AUTO: NORMAL
CREAT SERPL-MCNC: 1.65 MG/DL (ref 0.66–1.25)
EOSINOPHIL # BLD AUTO: 0.1 10E3/UL (ref 0–0.7)
EOSINOPHIL NFR BLD AUTO: 1 %
ERYTHROCYTE [DISTWIDTH] IN BLOOD BY AUTOMATED COUNT: 11.9 % (ref 10–15)
GFR SERPL CREATININE-BSD FRML MDRD: 43 ML/MIN/1.73M2
GLUCOSE BLD-MCNC: 120 MG/DL (ref 70–99)
GLUCOSE UR STRIP-MCNC: NEGATIVE MG/DL
HCT VFR BLD AUTO: 43.2 % (ref 40–53)
HGB BLD-MCNC: 14.8 G/DL (ref 13.3–17.7)
HGB UR QL STRIP: NEGATIVE
HOLD SPECIMEN: NORMAL
HOLD SPECIMEN: NORMAL
IMM GRANULOCYTES # BLD: 0 10E3/UL
IMM GRANULOCYTES NFR BLD: 0 %
KETONES UR STRIP-MCNC: NEGATIVE MG/DL
LEUKOCYTE ESTERASE UR QL STRIP: NEGATIVE
LYMPHOCYTES # BLD AUTO: 1.9 10E3/UL (ref 0.8–5.3)
LYMPHOCYTES NFR BLD AUTO: 40 %
MCH RBC QN AUTO: 33.6 PG (ref 26.5–33)
MCHC RBC AUTO-ENTMCNC: 34.3 G/DL (ref 31.5–36.5)
MCV RBC AUTO: 98 FL (ref 78–100)
MONOCYTES # BLD AUTO: 0.7 10E3/UL (ref 0–1.3)
MONOCYTES NFR BLD AUTO: 15 %
NEUTROPHILS # BLD AUTO: 2.1 10E3/UL (ref 1.6–8.3)
NEUTROPHILS NFR BLD AUTO: 44 %
NITRATE UR QL: NEGATIVE
NRBC # BLD AUTO: 0 10E3/UL
NRBC BLD AUTO-RTO: 0 /100
PH UR STRIP: 7 [PH] (ref 5–7)
PLATELET # BLD AUTO: 208 10E3/UL (ref 150–450)
POTASSIUM BLD-SCNC: 3.8 MMOL/L (ref 3.4–5.3)
PROT SERPL-MCNC: 7.8 G/DL (ref 6.8–8.8)
RBC # BLD AUTO: 4.41 10E6/UL (ref 4.4–5.9)
RBC URINE: 0 /HPF
SODIUM SERPL-SCNC: 137 MMOL/L (ref 133–144)
SP GR UR STRIP: 1.01 (ref 1–1.03)
UROBILINOGEN UR STRIP-MCNC: NORMAL MG/DL
WBC # BLD AUTO: 4.8 10E3/UL (ref 4–11)
WBC URINE: 0 /HPF

## 2022-11-19 PROCEDURE — 96361 HYDRATE IV INFUSION ADD-ON: CPT

## 2022-11-19 PROCEDURE — 81001 URINALYSIS AUTO W/SCOPE: CPT | Performed by: FAMILY MEDICINE

## 2022-11-19 PROCEDURE — 82040 ASSAY OF SERUM ALBUMIN: CPT | Performed by: FAMILY MEDICINE

## 2022-11-19 PROCEDURE — 36415 COLL VENOUS BLD VENIPUNCTURE: CPT | Performed by: FAMILY MEDICINE

## 2022-11-19 PROCEDURE — 250N000011 HC RX IP 250 OP 636: Performed by: FAMILY MEDICINE

## 2022-11-19 PROCEDURE — 80053 COMPREHEN METABOLIC PANEL: CPT | Performed by: FAMILY MEDICINE

## 2022-11-19 PROCEDURE — 99285 EMERGENCY DEPT VISIT HI MDM: CPT | Mod: 25

## 2022-11-19 PROCEDURE — 99285 EMERGENCY DEPT VISIT HI MDM: CPT | Performed by: FAMILY MEDICINE

## 2022-11-19 PROCEDURE — 96376 TX/PRO/DX INJ SAME DRUG ADON: CPT

## 2022-11-19 PROCEDURE — 85025 COMPLETE CBC W/AUTO DIFF WBC: CPT | Performed by: FAMILY MEDICINE

## 2022-11-19 PROCEDURE — 74176 CT ABD & PELVIS W/O CONTRAST: CPT

## 2022-11-19 PROCEDURE — 258N000003 HC RX IP 258 OP 636: Performed by: FAMILY MEDICINE

## 2022-11-19 PROCEDURE — 96374 THER/PROPH/DIAG INJ IV PUSH: CPT

## 2022-11-19 PROCEDURE — 96375 TX/PRO/DX INJ NEW DRUG ADDON: CPT

## 2022-11-19 RX ORDER — SODIUM CHLORIDE 9 MG/ML
INJECTION, SOLUTION INTRAVENOUS CONTINUOUS
Status: DISCONTINUED | OUTPATIENT
Start: 2022-11-19 | End: 2022-11-20 | Stop reason: HOSPADM

## 2022-11-19 RX ORDER — KETOROLAC TROMETHAMINE 15 MG/ML
15 INJECTION, SOLUTION INTRAMUSCULAR; INTRAVENOUS ONCE
Status: COMPLETED | OUTPATIENT
Start: 2022-11-19 | End: 2022-11-19

## 2022-11-19 RX ORDER — HYDROMORPHONE HYDROCHLORIDE 1 MG/ML
0.5 INJECTION, SOLUTION INTRAMUSCULAR; INTRAVENOUS; SUBCUTANEOUS ONCE
Status: COMPLETED | OUTPATIENT
Start: 2022-11-19 | End: 2022-11-19

## 2022-11-19 RX ORDER — OXYCODONE AND ACETAMINOPHEN 5; 325 MG/1; MG/1
1 TABLET ORAL EVERY 6 HOURS PRN
Qty: 12 TABLET | Refills: 0 | Status: SHIPPED | OUTPATIENT
Start: 2022-11-19 | End: 2023-04-18

## 2022-11-19 RX ADMIN — HYDROMORPHONE HYDROCHLORIDE 0.5 MG: 1 INJECTION, SOLUTION INTRAMUSCULAR; INTRAVENOUS; SUBCUTANEOUS at 21:50

## 2022-11-19 RX ADMIN — HYDROMORPHONE HYDROCHLORIDE 0.5 MG: 1 INJECTION, SOLUTION INTRAMUSCULAR; INTRAVENOUS; SUBCUTANEOUS at 21:16

## 2022-11-19 RX ADMIN — KETOROLAC TROMETHAMINE 15 MG: 15 INJECTION, SOLUTION INTRAMUSCULAR; INTRAVENOUS at 21:16

## 2022-11-19 RX ADMIN — SODIUM CHLORIDE 1000 ML: 9 INJECTION, SOLUTION INTRAVENOUS at 21:15

## 2022-11-19 ASSESSMENT — ACTIVITIES OF DAILY LIVING (ADL)
ADLS_ACUITY_SCORE: 33
ADLS_ACUITY_SCORE: 35

## 2022-11-20 NOTE — ED PROVIDER NOTES
ED Provider Note  [facility]      History     Chief Complaint   Patient presents with     Back Pain     HPI  Ivy Haynes is a 77 year old male with a PMH of hepatitis C, GERD, HTN, trigeminal neuralgia and cervicalgia who presents to the ED today reporting back pain.  Patient has had similar pain in the past and was told that he had mild hydronephrosis with pain resolving within the month this was earlier this year in April and has not had any recurrence until most recently over the last week he has had acute right flank pain which is in calcified most notably over the last 24 hours he has had multiple episodes of spasm and intense pain as well as nausea secondary to the severity of the pain.  Denies any blood in his urine denies any fevers denies any other acute medical history.    Past Medical History  Past Medical History:   Diagnosis Date     Back pain      Chest pain      Gastro-oesophageal reflux disease      GERD (gastroesophageal reflux disease)      Hemorrhoid      Hepatitis C      Hypertension      Trigeminal neuralgia      Past Surgical History:   Procedure Laterality Date     ARTHROPLASTY KNEE  11/9/2012    Procedure: ARTHROPLASTY KNEE;;  Surgeon: Ang Arellano MD;  Location: [facility] OR     ENT SURGERY  2009    Styloid process broken off     HERNIA REPAIR  12/10     RESECT TUMOR LOWER EXTREMITY  11/9/2012    Procedure: RESECT TUMOR LOWER EXTREMITY;  Excision of Tumor Left Distal Femur, Left Total Knee Replacement ;  Surgeon: Ang Arellano MD;  Location: [facility] OR     styloid process temporal bone surg  02/10     TONSILLECTOMY  1954     amLODIPine (NORVASC) 2.5 MG tablet  famotidine (PEPCID) 20 MG tablet  oxyCODONE-acetaminophen (PERCOCET) 5-325 MG tablet  tamsulosin (FLOMAX) 0.4 MG capsule  triamcinolone (KENALOG) 0.1 % external cream      Allergies   Allergen Reactions     Sulfa Drugs      Family History  Family History   Problem Relation Age of Onset     Hypertension Mother   "    Cerebrovascular Disease Father 69     Cancer Paternal Grandfather      Cancer Son      No Known Problems Half-Sister      No Known Problems Half-Sister      No Known Problems Half-Sister      No Known Problems Half-Sister      No Known Problems Half-Brother      Kidney failure Half-Brother      Deep Vein Thrombosis No family hx of      Anesthesia Reaction No family hx of      Social History   Social History     Tobacco Use     Smoking status: Every Day     Packs/day: 0.10     Years: 4.00     Pack years: 0.40     Types: Cigarettes     Start date: 2011     Last attempt to quit: 1981     Years since quittin.5     Smokeless tobacco: Never     Tobacco comments:     quit  until . started after trip to [place]   Vaping Use     Vaping Use: Never used   Substance Use Topics     Alcohol use: Yes     Comment: 2-3 beer a week     Drug use: Yes     Types: Marijuana     Comment: Every other month-\"just a puff of marijuana\"      Past medical history, past surgical history, medications, allergies, family history, and social history were reviewed with the patient. No additional pertinent items.       Review of Systems  A complete review of systems was performed with pertinent positives and negatives noted in the HPI, and all other systems negative.    Physical Exam   BP: (!) 179/106  Pulse: 97  Temp: 97.5  F (36.4  C)  Resp: 16  Weight: 74.5 kg (164 lb 3.2 oz)  SpO2: 100 %  Physical Exam  Constitutional:       General: He is not in acute distress.     Appearance: He is not diaphoretic.   HENT:      Head: Atraumatic.      Mouth/Throat:      Mouth: Oropharynx is clear and moist.   Eyes:      General: No scleral icterus.     Pupils: Pupils are equal, round, and reactive to light.   Cardiovascular:      Pulses: Intact distal pulses.      Heart sounds: Normal heart sounds.   Pulmonary:      Effort: No respiratory distress.      Breath sounds: Normal breath sounds.   Abdominal:      General: Bowel sounds are " normal.      Palpations: Abdomen is soft.      Tenderness: There is abdominal tenderness. There is right CVA tenderness.   Musculoskeletal:         General: No tenderness or edema.   Skin:     General: Skin is warm.      Findings: No rash.   Neurological:      General: No focal deficit present.      Mental Status: He is oriented to person, place, and time.      Sensory: No sensory deficit.      Coordination: Coordination normal.         ED Course      Procedures     Results for orders placed or performed during the hospital encounter of 11/19/22   CT Abdomen Pelvis w/o Contrast     Status: None    Narrative    EXAM: CT ABDOMEN PELVIS W/O CONTRAST  LOCATION: [facility]  DATE/TIME: 11/19/2022 9:55 PM    INDICATION: right flank pain  COMPARISON: Abdominal ultrasound 04/05/2022.  TECHNIQUE: CT scan of the abdomen and pelvis was performed without IV contrast. Multiplanar reformats were obtained. Dose reduction techniques were used.  CONTRAST: None.    FINDINGS:   LOWER CHEST: Mild emphysematous changes.    HEPATOBILIARY: Normal.    PANCREAS: Normal.    SPLEEN: Normal.    ADRENAL GLANDS: Normal.    KIDNEYS/BLADDER: Moderate right hydronephrosis with transition at the ureteropelvic junction, increased since prior ultrasound. No nephroureterolithiasis visualized. No left hydronephrosis. Urinary bladder is unremarkable.    BOWEL: No obstruction or inflammatory change. Normal appendix. Moderate volume formed stool in the colon.    LYMPH NODES: Normal.    VASCULATURE: Moderate calcified atherosclerosis.    PELVIC ORGANS: Normal.    MUSCULOSKELETAL: No acute bony abnormality.        Impression    IMPRESSION:   1.  Moderate right hydronephrosis, increased since prior ultrasound, could be secondary to chronic ureteropelvic junction obstruction but recommend urology referral and CT urogram to exclude underlying urothelial mass/lesion.  2.  No nephroureterolithiasis.     Comprehensive  metabolic panel     Status: Abnormal   Result Value Ref Range    Sodium 137 133 - 144 mmol/L    Potassium 3.8 3.4 - 5.3 mmol/L    Chloride 103 94 - 109 mmol/L    Carbon Dioxide (CO2) 28 20 - 32 mmol/L    Anion Gap 6 3 - 14 mmol/L    Urea Nitrogen 15 7 - 30 mg/dL    Creatinine 1.65 (H) 0.66 - 1.25 mg/dL    Calcium 8.9 8.5 - 10.1 mg/dL    Glucose 120 (H) 70 - 99 mg/dL    Alkaline Phosphatase 144 40 - 150 U/L    AST 27 0 - 45 U/L    ALT 22 0 - 70 U/L    Protein Total 7.8 6.8 - 8.8 g/dL    Albumin 3.9 3.4 - 5.0 g/dL    Bilirubin Total 0.4 0.2 - 1.3 mg/dL    GFR Estimate 43 (L) >60 mL/min/1.73m2   UA with Microscopic reflex to Culture     Status: Normal    Specimen: Urine, Midstream   Result Value Ref Range    Color Urine Straw Colorless, Straw, Light Yellow, Yellow    Appearance Urine Clear Clear    Glucose Urine Negative Negative mg/dL    Bilirubin Urine Negative Negative    Ketones Urine Negative Negative mg/dL    Specific Gravity Urine 1.007 1.003 - 1.035    Blood Urine Negative Negative    pH Urine 7.0 5.0 - 7.0    Protein Albumin Urine Negative Negative mg/dL    Urobilinogen Urine Normal Normal, 2.0 mg/dL    Nitrite Urine Negative Negative    Leukocyte Esterase Urine Negative Negative    RBC Urine 0 <=2 /HPF    WBC Urine 0 <=5 /HPF    Narrative    Urine Culture not indicated   [place] Draw     Status: None    Narrative    The following orders were created for panel order [place] Draw.  Procedure                               Abnormality         Status                     ---------                               -----------         ------                     Extra Blue Top Tube[[id number]]                              Final result               Extra Red Top Tube[[id number]]                               Final result                 Please view results for these tests on the individual orders.   CBC with platelets and differential     Status: Abnormal   Result Value Ref Range    WBC Count 4.8 4.0 - 11.0 10e3/uL    RBC  Count 4.41 4.40 - 5.90 10e6/uL    Hemoglobin 14.8 13.3 - 17.7 g/dL    Hematocrit 43.2 40.0 - 53.0 %    MCV 98 78 - 100 fL    MCH 33.6 (H) 26.5 - 33.0 pg    MCHC 34.3 31.5 - 36.5 g/dL    RDW 11.9 10.0 - 15.0 %    Platelet Count 208 150 - 450 10e3/uL    % Neutrophils 44 %    % Lymphocytes 40 %    % Monocytes 15 %    % Eosinophils 1 %    % Basophils 0 %    % Immature Granulocytes 0 %    NRBCs per 100 WBC 0 <1 /100    Absolute Neutrophils 2.1 1.6 - 8.3 10e3/uL    Absolute Lymphocytes 1.9 0.8 - 5.3 10e3/uL    Absolute Monocytes 0.7 0.0 - 1.3 10e3/uL    Absolute Eosinophils 0.1 0.0 - 0.7 10e3/uL    Absolute Basophils 0.0 0.0 - 0.2 10e3/uL    Absolute Immature Granulocytes 0.0 <=0.4 10e3/uL    Absolute NRBCs 0.0 10e3/uL   Extra Blue Top Tube     Status: None   Result Value Ref Range    Hold Specimen JIC    Extra Red Top Tube     Status: None   Result Value Ref Range    Hold Specimen JIC    CBC with platelets differential     Status: Abnormal    Narrative    The following orders were created for panel order CBC with platelets differential.  Procedure                               Abnormality         Status                     ---------                               -----------         ------                     CBC with platelets and d...[[id number]]  Abnormal            Final result                 Please view results for these tests on the individual orders.     Medications   0.9% sodium chloride BOLUS (0 mLs Intravenous Stopped 11/19/22 2226)   ketorolac (TORADOL) injection 15 mg (15 mg Intravenous Given 11/19/22 2116)   HYDROmorphone (PF) (DILAUDID) injection 0.5 mg (0.5 mg Intravenous Given 11/19/22 2116)   HYDROmorphone (PF) (DILAUDID) injection 0.5 mg (0.5 mg Intravenous Given 11/19/22 2150)        Assessments & Plan (with Medical Decision Making)       I have reviewed the nursing notes. I have reviewed the findings, diagnosis, plan and need for follow up with the patient.    Discharge Medication List as of 11/19/2022  10:49 PM      START taking these medications    Details   oxyCODONE-acetaminophen (PERCOCET) 5-325 MG tablet Take 1 tablet by mouth every 6 hours as needed for severe pain (7-10), Disp-12 tablet, R-0, [employer]         Patient with hydronephrosis secondary to possible obstruction at the juncture of the ureter and renal pelvis no stone noted this may be anatomic in nature and at this time patient will require further evaluation and treatment he understands this is current pain management with Dilaudid and anti-inflammatory was noted he will be discharged home with pain medications but is expected be following up with urology soon as is possible.  He return to the emergency room if he has any marked increase in pain or develops any fevers or weakness.    Final diagnoses:   Hydronephrosis with ureteropelvic junction (UPJ) obstruction       --    [facility] EMERGENCY DEPARTMENT  11/19/2022     Irivn Warner MD  11/21/22 8544

## 2022-11-20 NOTE — DISCHARGE INSTRUCTIONS
Discharge to home with plans to follow-up with urology  You may use Percocet to manage the pain until then or return to the emergency room if marked increase in pain or if you develop any fevers.

## 2022-11-20 NOTE — ED TRIAGE NOTES
Patient presents to ED with c/o R sided back pain. Sxs onset wed evening. Patient denies SOB. Hx of hydronephrosis. Denies having blood in urine or trouble voiding. Patient reports he works out frequently. No recent injury.       Triage Assessment     Row Name 11/19/22 2051       Triage Assessment (Adult)    Airway WDL WDL       Respiratory WDL    Respiratory WDL WDL       Skin Circulation/Temperature WDL    Skin Circulation/Temperature WDL WDL       Cardiac WDL    Cardiac WDL WDL       Peripheral/Neurovascular WDL    Peripheral Neurovascular WDL WDL       Cognitive/Neuro/Behavioral WDL    Cognitive/Neuro/Behavioral WDL WDL       Tea Coma Scale    Best Eye Response 4-->(E4) spontaneous    Best Motor Response 6-->(M6) obeys commands    Best Verbal Response 5-->(V5) oriented    [place] Coma Scale Score 15

## 2022-11-21 ENCOUNTER — TELEPHONE (OUTPATIENT)
Dept: UROLOGY | Facility: CLINIC | Age: 77
End: 2022-11-21

## 2022-11-23 ENCOUNTER — PRE VISIT (OUTPATIENT)
Dept: UROLOGY | Facility: CLINIC | Age: 77
End: 2022-11-23

## 2022-11-23 ENCOUNTER — VIRTUAL VISIT (OUTPATIENT)
Dept: UROLOGY | Facility: CLINIC | Age: 77
End: 2022-11-23
Payer: COMMERCIAL

## 2022-11-23 DIAGNOSIS — N13.30 HYDRONEPHROSIS OF RIGHT KIDNEY: Primary | ICD-10-CM

## 2022-11-23 PROCEDURE — 99204 OFFICE O/P NEW MOD 45 MIN: CPT | Mod: 95 | Performed by: UROLOGY

## 2022-11-23 RX ORDER — CEFAZOLIN SODIUM 2 G/50ML
2 SOLUTION INTRAVENOUS SEE ADMIN INSTRUCTIONS
Status: CANCELLED | OUTPATIENT
Start: 2022-11-23

## 2022-11-23 RX ORDER — CEFAZOLIN SODIUM 2 G/50ML
2 SOLUTION INTRAVENOUS
Status: CANCELLED | OUTPATIENT
Start: 2022-11-23

## 2022-11-23 NOTE — LETTER
11/23/2022       RE: Ivy Haynes  [address]  [facility] [address]     Dear Colleague,    Thank you for referring your patient, Ivy Haynes, to the [facility] [place] at [facility]. Please see a copy of my visit note below.    Ivy is a 77 year old who is being evaluated via a billable video visit.      Video Visit Technology for this patient: Well Video Visit- Patient was left in waiting room    How would you like to obtain your AVS? MyChart  If the video visit is dropped, the invitation should be resent by: Text to cell phone: [phone]  Will anyone else be joining your video visit? No        Video-Visit Details    Video Start Time: 100p    Type of service:  Video Visit    Video End Time:130p    Originating Location (pt. Location): Home        Distant Location (provider location):  Off-site    Platform used for Video Visit: PredicSis    HPI:  Ivy Haynes is a 77 year old male being seen for new right flank pain x 6 months or so.  Pain is nearly constant.  He exercises a lot.  He does feel that stretching helps.  No history of kidney stones that he knows about.  Had KIM in April 2022 which showed some hydro  No blood in urine  Went to ER and had CT with stabbing pain.  No changes with caffeine intake.    Exam:  Exam:           Constitutional - No apparent distress. Patient of stated age.               Eyes - no redness or discharge.              Respiratory - no cough. no labored breathing              Musculoskeletal - full range of motion in all extremities              Skin - no visible skin discoloration or lesions              Neurological - no tremors              Psychiatric - no anxiety, alert & oriented                 The rest of a comprehensive physical examination is deferred due to PHE (public health emergency) video visit restrictions.      Review of Imaging:  The following imaging exams were independently viewed and  interpreted by me and discussed with patient:  I personally reviewed CT scan  It shows R hydro suspicious for UPJO.  I think I see a crossing vessel. Or perhaps it a high insertion?    Review of Labs:  The following labs were reviewed by me and discussed with the patient:  Cr was 1.04. now 1.65    Assessment & Plan   Right hydronephrosis. Suspected R UPJ obstruction.  Cysto, RPG. Possible ureteroscopy.  I would lean against stent placement as it will obfuscate the ureteral obstruction  He is eager to have surgery    I also discussed likely finding of UPJ with crossing vessel.  I discussed robotic pyeloplasty and usual perioperative course.    Miguel Angel Waller MD  Reconstructive Urology  [facility]

## 2022-11-23 NOTE — PROGRESS NOTES
Ivy is a 77 year old who is being evaluated via a billable video visit.      Video Visit Technology for this patient: [employer] Video Visit- Patient was left in waiting room    How would you like to obtain your AVS? MyChart  If the video visit is dropped, the invitation should be resent by: Text to cell phone: [phone]  Will anyone else be joining your video visit? No        Video-Visit Details    Video Start Time: 100p    Type of service:  Video Visit    Video End Time:130p    Originating Location (pt. Location): Home        Distant Location (provider location):  Off-site    Platform used for Video Visit: [employer]    HPI:  Ivy Haynes is a 77 year old male being seen for new right flank pain x 6 months or so.  Pain is nearly constant.  He exercises a lot.  He does feel that stretching helps.  No history of kidney stones that he knows about.  Had KIM in April 2022 which showed some hydro  No blood in urine  Went to ER and had CT with stabbing pain.  No changes with caffeine intake.    Exam:  Exam:           Constitutional - No apparent distress. Patient of stated age.               Eyes - no redness or discharge.              Respiratory - no cough. no labored breathing              Musculoskeletal - full range of motion in all extremities              Skin - no visible skin discoloration or lesions              Neurological - no tremors              Psychiatric - no anxiety, alert & oriented                 The rest of a comprehensive physical examination is deferred due to PHE (public health emergency) video visit restrictions.      Review of Imaging:  The following imaging exams were independently viewed and interpreted by me and discussed with patient:  I personally reviewed CT scan  It shows R hydro suspicious for UPJO.  I think I see a crossing vessel. Or perhaps it a high insertion?    Review of Labs:  The following labs were reviewed by me and discussed with the patient:  Cr was 1.04. now 1.65    Assessment  & Plan   Right hydronephrosis. Suspected R UPJ obstruction.  Cysto, RPG. Possible ureteroscopy.  I would lean against stent placement as it will obfuscate the ureteral obstruction  He is eager to have surgery    I also discussed likely finding of UPJ with crossing vessel.  I discussed robotic pyeloplasty and usual perioperative course.    Miguel Angel Waller MD  Reconstructive Urology  [facility]    =====================

## 2022-11-23 NOTE — NURSING NOTE
"Chief Complaint   Patient presents with     Consult     Ureteral stricture and right hydronephrosis       There were no vitals taken for this visit. There is no height or weight on file to calculate BMI.    Patient Active Problem List   Diagnosis     Hepatitis C     GERD (gastroesophageal reflux disease)     CARDIOVASCULAR SCREENING; LDL GOAL LESS THAN 160     Enchondroma of femur     Left knee DJD     Osteoarthritis, knee     S/P total knee replacement     Advanced directives, counseling/discussion     Hypertension goal BP (blood pressure) < 140/90     Chest pain     Tinea pedis     Rosacea     Retention of urine     Hepatic fibrosis     Groin pain     Dysuria     Dermatofibroma     Condyloma acuminatum     Backache       Allergies   Allergen Reactions     Sulfa Drugs        Current Outpatient Medications   Medication Sig Dispense Refill     amLODIPine (NORVASC) 2.5 MG tablet Take 1 tablet (2.5 mg) by mouth daily 90 tablet 3     famotidine (PEPCID) 20 MG tablet Take 20 mg by mouth as needed       oxyCODONE-acetaminophen (PERCOCET) 5-325 MG tablet Take 1 tablet by mouth every 6 hours as needed for severe pain (7-10) 12 tablet 0     tamsulosin (FLOMAX) 0.4 MG capsule TAKE 1 CAPSULE BY MOUTH DAILY AFTER A MEAL. TAKE WITHIN 30 MINUTES AFTER THE SAME MEAL EACH DAY       triamcinolone (KENALOG) 0.1 % external cream Apply topically 2 times daily 15 g 1       Social History     Tobacco Use     Smoking status: Every Day     Packs/day: 0.10     Years: 4.00     Pack years: 0.40     Types: Cigarettes     Start date: 2011     Last attempt to quit: 1981     Years since quittin.5     Smokeless tobacco: Never     Tobacco comments:     quit  until . started after trip to [place]   Vaping Use     Vaping Use: Never used   Substance Use Topics     Alcohol use: Yes     Comment: 2-3 beer a week     Drug use: Yes     Types: Marijuana     Comment: Every other month-\"just a puff of marijuana\"       Clifton Hale " EMT  11/23/2022  1:27 PM

## 2022-11-23 NOTE — TELEPHONE ENCOUNTER
Spoke to pt. Right flank pain rated 6/10. Constant and goes up in intensity. Able to tolerate it at this time. Pt is taking percocet for relief. Not taking tylenol and ibuprofen. Able to urinate well. No hematuria. Felt he had a fever yesterday, but not today. Has been in bed for 18 hours straight. Not able to eat or drink for past 24 hours. Feeling nauseous, but not vomiting. Advised to go to ER for possible dehydration, but pt states that he is feeling better now and will attempt to get up and eat/drink now. Assisted to schedule VV with Dr. Waller to establish care.     Quin Young, BRAYAN MSN

## 2022-11-23 NOTE — TELEPHONE ENCOUNTER
MEDICAL RECORDS REQUEST   [facility]  Urology Clinic  [address] [place] [address]  PHONE: [phone]  Fax: [phone]        FUTURE VISIT INFORMATION                                                   Ivy Haynes, : 1945 scheduled for future visit at [facility]    APPOINTMENT INFORMATION:    Date: 2022    Provider:  Miguel Angel Waller MD    Reason for Visit/Diagnosis: possible ureteral stricture and right hydronephrosis    REFERRAL INFORMATION:    Referring provider:  Irvin Warner MD      RECORDS REQUESTED FOR VISIT                                                     NOTES  STATUS/DETAILS   DISCHARGE REPORT from the ER  yes, 2022 -- Irvin Warner MD @ [facility]   MEDICATION LIST  yes   LABS     URINALYSIS (UA)  yes   IMAGING (IMAGES & REPORT)  yes, 2022 -- CT ABD PELVIS  2022 -- US ABD      PRE-VISIT CHECKLIST      Record collection complete Yes   Appointment appropriately scheduled           (right time/right provider) Yes   Joint diagnostic appointment coordinated correctly          (ensure right order & amount of time) Yes   MyChart activation Yes   Questionnaire complete If no, please explain pending

## 2022-12-01 ENCOUNTER — OFFICE VISIT (OUTPATIENT)
Dept: FAMILY MEDICINE | Facility: CLINIC | Age: 77
End: 2022-12-01
Payer: COMMERCIAL

## 2022-12-01 VITALS
OXYGEN SATURATION: 100 % | HEIGHT: 71 IN | HEART RATE: 92 BPM | DIASTOLIC BLOOD PRESSURE: 89 MMHG | BODY MASS INDEX: 21.7 KG/M2 | RESPIRATION RATE: 18 BRPM | TEMPERATURE: 99.2 F | SYSTOLIC BLOOD PRESSURE: 166 MMHG | WEIGHT: 155 LBS

## 2022-12-01 DIAGNOSIS — N13.2 HYDRONEPHROSIS WITH URINARY OBSTRUCTION DUE TO RENAL CALCULUS: ICD-10-CM

## 2022-12-01 DIAGNOSIS — I10 HYPERTENSION GOAL BP (BLOOD PRESSURE) < 140/90: ICD-10-CM

## 2022-12-01 DIAGNOSIS — Z01.818 PREOP GENERAL PHYSICAL EXAM: Primary | ICD-10-CM

## 2022-12-01 LAB
ERYTHROCYTE [DISTWIDTH] IN BLOOD BY AUTOMATED COUNT: 11.9 % (ref 10–15)
HCT VFR BLD AUTO: 45.3 % (ref 40–53)
HGB BLD-MCNC: 15.5 G/DL (ref 13.3–17.7)
MCH RBC QN AUTO: 33 PG (ref 26.5–33)
MCHC RBC AUTO-ENTMCNC: 34.2 G/DL (ref 31.5–36.5)
MCV RBC AUTO: 96 FL (ref 78–100)
PLATELET # BLD AUTO: 215 10E3/UL (ref 150–450)
RBC # BLD AUTO: 4.7 10E6/UL (ref 4.4–5.9)
WBC # BLD AUTO: 3.3 10E3/UL (ref 4–11)

## 2022-12-01 PROCEDURE — 80048 BASIC METABOLIC PNL TOTAL CA: CPT | Performed by: FAMILY MEDICINE

## 2022-12-01 PROCEDURE — 93000 ELECTROCARDIOGRAM COMPLETE: CPT | Performed by: FAMILY MEDICINE

## 2022-12-01 PROCEDURE — 99214 OFFICE O/P EST MOD 30 MIN: CPT | Performed by: FAMILY MEDICINE

## 2022-12-01 PROCEDURE — 36415 COLL VENOUS BLD VENIPUNCTURE: CPT | Performed by: FAMILY MEDICINE

## 2022-12-01 PROCEDURE — 85027 COMPLETE CBC AUTOMATED: CPT | Performed by: FAMILY MEDICINE

## 2022-12-01 ASSESSMENT — PAIN SCALES - GENERAL: PAINLEVEL: MILD PAIN (2)

## 2022-12-01 NOTE — PROGRESS NOTES
[address]  [address]  [facility] [address]  Phone: [phone]  Fax: [phone]  Primary Provider: Lamine Franco  Pre-op Performing Provider: LAMINE FRANCO      PREOPERATIVE EVALUATION:  Today's date: 12/1/2022    Ivy Haynes is a 77 year old male who presents for a preoperative evaluation.    Surgical Information:  Surgery/Procedure: Robotic asisted Pyeloplasty  Surgery Location: [facility]   Surgeon: CANDY Claudio  Surgery Date: 1/7/2022  Time of Surgery: unknown  Where patient plans to recover: At home with family  Fax number for surgical facility: Note need to be faxed, [phone]    Type of Anesthesia Anticipated: General    Assessment & Plan     The proposed surgical procedure is considered LOW risk.    Preop general physical exam  Ok for porcedure  - EKG 12-lead complete w/read - Clinics    Hydronephrosis with urinary obstruction due to renal calculus  See CT   recheck BMP    Hypertension goal BP (blood pressure) < 140/90  High today, atke 5 mg =norvacs, call me Monday with BP readings             Risks and Recommendations:  The patient has the following additional risks and recommendations for perioperative complications:   - No identified additional risk factors other than previously addressed        RECOMMENDATION:  APPROVAL GIVEN to proceed with proposed procedure, without further diagnostic evaluation.    Review of external notes as documented above   Review of prior external note(s) from - [facility]where [facility] from [facility]  reviewed          18 minutes spent on the date of the encounter doing chart review, review of outside records and review of test results         Subjective     HPI related to upcoming procedure:   Encounter Diagnoses   Name Primary?     Preop general physical exam Yes     Hydronephrosis with urinary obstruction due to renal calculus      Hypertension goal BP (blood  pressure) < 140/90          Preop Questions 12/1/2022   1. Have you ever had a heart attack or stroke? No   2. Have you ever had surgery on your heart or blood vessels, such as a stent placement, a coronary artery bypass, or surgery on an artery in your head, neck, heart, or legs? No   3. Do you have chest pain with activity? No   4. Do you have a history of  heart failure? No   5. Do you currently have a cold, bronchitis or symptoms of other infection? No   6. Do you have a cough, shortness of breath, or wheezing? No   7. Do you or anyone in your family have previous history of blood clots? No   8. Do you or does anyone in your family have a serious bleeding problem such as prolonged bleeding following surgeries or cuts? No   9. Have you ever had problems with anemia or been told to take iron pills? No   10. Have you had any abnormal blood loss such as black, tarry or bloody stools? No   11. Have you ever had a blood transfusion? No   12. Are you willing to have a blood transfusion if it is medically needed before, during, or after your surgery? Yes   13. Have you or any of your relatives ever had problems with anesthesia? UNKNOWN -    14. Do you have sleep apnea, excessive snoring or daytime drowsiness? No   15. Do you have any artifical heart valves or other implanted medical devices like a pacemaker, defibrillator, or continuous glucose monitor? No   16. Do you have artificial joints? YES -    17. Are you allergic to latex? No       Health Care Directive:  Patient does not have a Health Care Directive or Living Will:      Preoperative Review of :   reviewed - no record of controlled substances prescribed.      Status of Chronic Conditions:  HYPERTENSION - Patient has longstanding history of HTN , currently denies any symptoms referable to elevated blood pressure. Specifically denies chest pain, palpitations, dyspnea, orthopnea, PND or peripheral edema. Blood pressure readings have been in normal range at  home but higher with kidney ston. Current medication regimen is as listed below. Patient denies any side effects of medication.       Review of Systems  CONSTITUTIONAL: NEGATIVE for fever, chills, change in weight  INTEGUMENTARY/SKIN: NEGATIVE for worrisome rashes, moles or lesions  EYES: NEGATIVE for vision changes or irritation  ENT/MOUTH: NEGATIVE for ear, mouth and throat problems  RESP: NEGATIVE for significant cough or SOB  CV: NEGATIVE for chest pain, palpitations or peripheral edema  GI: NEGATIVE for nausea, abdominal pain, heartburn, or change in bowel habits  : NEGATIVE for frequency, dysuria, or hematuria  MUSCULOSKELETAL: NEGATIVE for significant arthralgias or myalgia  NEURO: NEGATIVE for weakness, dizziness or paresthesias  ENDOCRINE: NEGATIVE for temperature intolerance, skin/hair changes  HEME: NEGATIVE for bleeding problems  PSYCHIATRIC: NEGATIVE for changes in mood or affect    Patient Active Problem List    Diagnosis Date Noted     Chest pain      Priority: Medium     Retention of urine 09/23/2016     Priority: Medium     Hepatic fibrosis 05/15/2015     Priority: Medium     Overview:   Suspected on FibroScan, MRI with elastography pending       Rosacea 05/07/2015     Priority: Medium     Dermatofibroma 01/16/2014     Priority: Medium     Condyloma acuminatum 01/16/2014     Priority: Medium     Advanced directives, counseling/discussion 05/30/2013     Priority: Medium     Advance Care Planning:   ACP Review and Resources Provided:  Reviewed chart for advance care plan.  Ivy Haynes has no plan or code status on file. Discussed available resources and provided with information. Confirmed code status reflects current choices pending further ACP discussions.  Confirmed/documented designated decision maker(s). See permanent comments section of demographics in clinical tab.   Added by Alejandra Moody on 5/30/2013             Hypertension goal BP (blood pressure) < 140/90 05/30/2013     Priority: Medium      S/P total knee replacement 12/10/2012     Priority: Medium     Osteoarthritis, knee 11/09/2012     Priority: Medium     Enchondroma of femur 09/17/2012     Priority: Medium     Left knee DJD 09/17/2012     Priority: Medium     Tinea pedis 08/09/2012     Priority: Medium     Groin pain 02/13/2012     Priority: Medium     CARDIOVASCULAR SCREENING; LDL GOAL LESS THAN 160 01/10/2012     Priority: Medium     Dysuria 11/17/2011     Priority: Medium     Hepatitis C 05/24/2011     Priority: Medium     GERD (gastroesophageal reflux disease) 05/24/2011     Priority: Medium     Backache 11/03/2010     Priority: Medium      Past Medical History:   Diagnosis Date     Back pain      Chest pain      Gastro-oesophageal reflux disease      GERD (gastroesophageal reflux disease)      Hemorrhoid      Hepatitis C      Hypertension      Trigeminal neuralgia      Past Surgical History:   Procedure Laterality Date     ARTHROPLASTY KNEE  11/9/2012    Procedure: ARTHROPLASTY KNEE;;  Surgeon: Ang Arellano MD;  Location: [facility] OR     ENT SURGERY  2009    Styloid process broken off     HERNIA REPAIR  12/10     RESECT TUMOR LOWER EXTREMITY  11/9/2012    Procedure: RESECT TUMOR LOWER EXTREMITY;  Excision of Tumor Left Distal Femur, Left Total Knee Replacement ;  Surgeon: Ang Arellano MD;  Location: [facility] OR     styloid process temporal bone surg  02/10     TONSILLECTOMY  1954     Current Outpatient Medications   Medication Sig Dispense Refill     amLODIPine (NORVASC) 2.5 MG tablet Take 1 tablet (2.5 mg) by mouth daily 90 tablet 3     famotidine (PEPCID) 20 MG tablet Take 20 mg by mouth as needed       tamsulosin (FLOMAX) 0.4 MG capsule TAKE 1 CAPSULE BY MOUTH DAILY AFTER A MEAL. TAKE WITHIN 30 MINUTES AFTER THE SAME MEAL EACH DAY       oxyCODONE-acetaminophen (PERCOCET) 5-325 MG tablet Take 1 tablet by mouth every 6 hours as needed for severe pain (7-10) 12 tablet 0     triamcinolone (KENALOG) 0.1 % external cream Apply topically 2 times  "daily (Patient not taking: Reported on 2022) 15 g 1       Allergies   Allergen Reactions     Sulfa Drugs         Social History     Tobacco Use     Smoking status: Former     Packs/day: 0.10     Years: 4.00     Pack years: 0.40     Types: Cigarettes     Start date: 2011     Quit date: 2021     Years since quittin.9     Passive exposure: Past     Smokeless tobacco: Never     Tobacco comments:     quit  until . started after trip to [place]   Substance Use Topics     Alcohol use: Yes     Comment: 2-3 beer a week       History   Drug Use     Types: Marijuana     Comment: Every other month-\"just a puff of marijuana\"         Objective     BP (!) 166/89   Pulse 92   Temp 99.2  F (37.3  C) (Temporal)   Resp 18   Ht 1.803 m (5' 10.98\")   Wt 70.3 kg (155 lb)   SpO2 100%   BMI 21.63 kg/m      Physical Exam    GENERAL APPEARANCE: healthy, alert and no distress     EYES: EOMI,  PERRL     HENT: ear canals and TM's normal and nose and mouth without ulcers or lesions     NECK: no adenopathy, no asymmetry, masses, or scars and thyroid normal to palpation     RESP: lungs clear to auscultation - no rales, rhonchi or wheezes     CV: regular rates and rhythm, normal S1 S2, no S3 or S4 and no murmur, click or rub     ABDOMEN:  soft, nontender, no HSM or masses and bowel sounds normal     MS: extremities normal- no gross deformities noted, no evidence of inflammation in joints, FROM in all extremities.     SKIN: no suspicious lesions or rashes     NEURO: Normal strength and tone, sensory exam grossly normal, mentation intact and speech normal     PSYCH: mentation appears normal. and affect normal/bright     LYMPHATICS: No cervical adenopathy    Recent Labs   Lab Test 22  1430   HGB 14.8 15.4    192    141   POTASSIUM 3.8 4.3   CR 1.65* 1.04        Diagnostics:  Recent Results (from the past 720 hour(s))   Comprehensive metabolic panel    Collection Time: 22  9:15 PM "   Result Value Ref Range    Sodium 137 133 - 144 mmol/L    Potassium 3.8 3.4 - 5.3 mmol/L    Chloride 103 94 - 109 mmol/L    Carbon Dioxide (CO2) 28 20 - 32 mmol/L    Anion Gap 6 3 - 14 mmol/L    Urea Nitrogen 15 7 - 30 mg/dL    Creatinine 1.65 (H) 0.66 - 1.25 mg/dL    Calcium 8.9 8.5 - 10.1 mg/dL    Glucose 120 (H) 70 - 99 mg/dL    Alkaline Phosphatase 144 40 - 150 U/L    AST 27 0 - 45 U/L    ALT 22 0 - 70 U/L    Protein Total 7.8 6.8 - 8.8 g/dL    Albumin 3.9 3.4 - 5.0 g/dL    Bilirubin Total 0.4 0.2 - 1.3 mg/dL    GFR Estimate 43 (L) >60 mL/min/1.73m2   UA with Microscopic reflex to Culture    Collection Time: 11/19/22  9:15 PM    Specimen: Urine, Midstream   Result Value Ref Range    Color Urine Straw Colorless, Straw, Light Yellow, Yellow    Appearance Urine Clear Clear    Glucose Urine Negative Negative mg/dL    Bilirubin Urine Negative Negative    Ketones Urine Negative Negative mg/dL    Specific Gravity Urine 1.007 1.003 - 1.035    Blood Urine Negative Negative    pH Urine 7.0 5.0 - 7.0    Protein Albumin Urine Negative Negative mg/dL    Urobilinogen Urine Normal Normal, 2.0 mg/dL    Nitrite Urine Negative Negative    Leukocyte Esterase Urine Negative Negative    RBC Urine 0 <=2 /HPF    WBC Urine 0 <=5 /HPF   CBC with platelets and differential    Collection Time: 11/19/22  9:15 PM   Result Value Ref Range    WBC Count 4.8 4.0 - 11.0 10e3/uL    RBC Count 4.41 4.40 - 5.90 10e6/uL    Hemoglobin 14.8 13.3 - 17.7 g/dL    Hematocrit 43.2 40.0 - 53.0 %    MCV 98 78 - 100 fL    MCH 33.6 (H) 26.5 - 33.0 pg    MCHC 34.3 31.5 - 36.5 g/dL    RDW 11.9 10.0 - 15.0 %    Platelet Count 208 150 - 450 10e3/uL    % Neutrophils 44 %    % Lymphocytes 40 %    % Monocytes 15 %    % Eosinophils 1 %    % Basophils 0 %    % Immature Granulocytes 0 %    NRBCs per 100 WBC 0 <1 /100    Absolute Neutrophils 2.1 1.6 - 8.3 10e3/uL    Absolute Lymphocytes 1.9 0.8 - 5.3 10e3/uL    Absolute Monocytes 0.7 0.0 - 1.3 10e3/uL    Absolute  Eosinophils 0.1 0.0 - 0.7 10e3/uL    Absolute Basophils 0.0 0.0 - 0.2 10e3/uL    Absolute Immature Granulocytes 0.0 <=0.4 10e3/uL    Absolute NRBCs 0.0 10e3/uL   Extra Blue Top Tube    Collection Time: 11/19/22  9:15 PM   Result Value Ref Range    Hold Specimen JIC    Extra Red Top Tube    Collection Time: 11/19/22  9:15 PM   Result Value Ref Range    Hold Specimen JIC       EKG: appears normal, NSR, normal axis, normal intervals, no acute ST/T changes c/w ischemia, no LVH by voltage criteria, unchanged from previous tracings    Revised Cardiac Risk Index (RCRI):  The patient has the following serious cardiovascular risks for perioperative complications:   - No serious cardiac risks = 0 points     RCRI Interpretation: 0 points: Class I (very low risk - 0.4% complication rate)           Signed Electronically by: Lamine Franco MD  Copy of this evaluation report is provided to requesting physician.

## 2022-12-02 LAB
ANION GAP SERPL CALCULATED.3IONS-SCNC: 15 MMOL/L (ref 7–15)
BUN SERPL-MCNC: 12.2 MG/DL (ref 8–23)
CALCIUM SERPL-MCNC: 9.9 MG/DL (ref 8.8–10.2)
CHLORIDE SERPL-SCNC: 101 MMOL/L (ref 98–107)
CREAT SERPL-MCNC: 1.58 MG/DL (ref 0.67–1.17)
DEPRECATED HCO3 PLAS-SCNC: 22 MMOL/L (ref 22–29)
GFR SERPL CREATININE-BSD FRML MDRD: 45 ML/MIN/1.73M2
GLUCOSE SERPL-MCNC: 99 MG/DL (ref 70–99)
POTASSIUM SERPL-SCNC: 4.7 MMOL/L (ref 3.4–5.3)
SODIUM SERPL-SCNC: 138 MMOL/L (ref 136–145)

## 2022-12-05 ENCOUNTER — TELEPHONE (OUTPATIENT)
Dept: UROLOGY | Facility: CLINIC | Age: 77
End: 2022-12-05

## 2022-12-05 NOTE — TELEPHONE ENCOUNTER
I called pt to clarify where he was going to have surgery. Pt states he is having his surgery at [facility].    Scheduling notified.    Jeanette Paulino CMA  12/05/22  3:48 PM

## 2023-01-19 NOTE — TELEPHONE ENCOUNTER
FUTURE VISIT INFORMATION      FUTURE VISIT INFORMATION:    Date: 4/18/23    Time: 1:40pm    Location: [facility]  REFERRAL INFORMATION:    Referring provider:  Dr. Franco    Referring providers clinic:  [facility]    Reason for visit/diagnosis  eye exam, scheduled with pt, records at [facility] eye    RECORDS REQUESTED FROM:       Clinic name Comments Records Status Imaging Status    PRIMARY CARE Ov/referral 9/23/22 EPIC

## 2023-03-16 ENCOUNTER — TELEPHONE (OUTPATIENT)
Dept: OTOLARYNGOLOGY | Facility: CLINIC | Age: 78
End: 2023-03-16
Payer: COMMERCIAL

## 2023-03-16 NOTE — TELEPHONE ENCOUNTER
Spoke with patient regarding Provider out of clinic and appointment will need rescheduling. Rescheduled patient accordingly and [facility]Per [facility]

## 2023-04-12 NOTE — TELEPHONE ENCOUNTER
[facility] Call Center    Phone Message    May a detailed message be left on voicemail: yes     Reason for Call: Appointment Intake    Referring Provider Name: Irvin Warner MD    Diagnosis and/or Symptoms: Hydronephrosis with ureteropelvic junction (UPJ) obstruction [Q62.11]    Provider wants patient seen ASAP.     Action Taken: Other: Urology     Travel Screening: Not Applicable                                                                       Pt is a 89 M Hx of CAD x2 s/p stents, HLD, HTN, AS, GI bleed, essential thrombocytosis, PE on Eliquis, AAA (s/p EVAR 11/16/17) who presented to [facility] ED with complain of fever, chills and left groin erythema x 1day and abdominal pain x2wks.  Pt has been having intermittent abdominal pain for about 2wks and today he told his home health aid that he "didn't feel good" and at the time was noted to have fever of 100.8, chills and his left groin EVAR incision site was noted to be erythematous and so patient was brought to the [facility] ED. According Pt's health aid, he appeared a little bit more distended and was nauseous earlier today, but denies diarrhea, constipation, vomiting, chest pain, SOB, dizziness, or dysuria.    Upon arrival to ED pt /62, , Temp 100.9.  Pt received, 1L fluid bolus and responded to fluid bolus.    Past Medical History:  Aneurysm    CAD (coronary artery disease)    Foot drop, left    GERD (gastroesophageal reflux disease).    Past Surgical History:  Endoleak post (EVAR) endovascular aneurysm repair    H/O heart artery stent  x 2  History of back surgery  x 4  History of cholecystectomy.          	  MEDICATIONS:    piperacillin/tazobactam IVPB. 2.25 Gram(s) IV Intermittent every 6 hours      escitalopram 10 milliGRAM(s) Oral daily  temazepam 30 milliGRAM(s) Oral at bedtime PRN    bisacodyl 5 milliGRAM(s) Oral every 12 hours PRN  docusate sodium 100 milliGRAM(s) Oral three times a day  pantoprazole    Tablet 40 milliGRAM(s) Oral before breakfast      aspirin  chewable 81 milliGRAM(s) Oral daily  hydroxyurea 500 milliGRAM(s) Oral every 12 hours      Complaint:     Otherwise 12 point review of systems is normal.    PHYSICAL EXAM:    Constitutional: NAD  Eyes: PERRL, EOMI, sclera non-icteric  Neck: supple, no masses, no JVD  Respiratory: CTA b/l, good air entry b/l, no wheezing, rhonchi, rales, +  Cardiovascular: RRR, normal S1S2, no M/R/G  Gastrointestinal: soft, NTND, no masses palpable, BS+  Extremities: :L groin erythema improved  Neurological: AAOx3      ICU Vital Signs Last 24 Hrs  T(C): 36.4 (27 Apr 2018 13:25), Max: 37.1 (27 Apr 2018 05:30)  T(F): 97.6 (27 Apr 2018 13:25), Max: 98.8 (27 Apr 2018 05:30)  HR: 70 (27 Apr 2018 14:02) (64 - 83)  BP: 145/63 (27 Apr 2018 14:02) (135/66 - 163/82)  BP(mean): --  ABP: --  ABP(mean): --  RR: 16 (27 Apr 2018 14:02) (16 - 16)  SpO2: 98% (27 Apr 2018 14:02) (95% - 98%)        ECG:      CHEST X RAY    CT    MRI    MRA    CT ANGIO    CAROTID DUPLEX    DUPLEX   < from: US Duplex Venous Lower Ext Complete, Bilateral (04.26.18 @ 14:15) >    IMPRESSION:  1. Since 12/5/2017, there has been resolution of deep venous thrombosis   in both deep femoral veins and in the left femoral and popliteal veins.    2. There is new eccentrically located thrombus within the distal portion   of the right femoral vein. The peripheral location of this thrombus   suggests that it may be chronic. However, it was not present on the prior   study and could be acute.    < end of copied text >    Echocardiogram  < from: Echocardiogram (04.26.18 @ 17:53) >  There is mild concentric left ventricular hypertrophy.The left   ventricular wall   motion is normal.The left ventricular ejection fraction is normal.The   left   ventricular ejection fraction is 65%.The right ventricle is mildly   dilated.The   right ventricular systolic function is normal.The left atrial size is   normal.   The mitral inflow pattern is consistent with impaired left ventricular   relaxation with mildly elevated left atrial pressure (8-14mmHg).  Right   atrial   size is normal.Calcified aortic valve. There is trace aortic   regurgitation.   There is Moderate aortic stenosis. The peak pressure gradient is 44   mmHg. The   mean pressure gradient is 24 mmHg. The calculated aortic valve area   using the   continuity equation is 1.1 cm2.  Structurally normal mitralvalve. There   is   mild mitral regurgitation.Structurally normal tricuspid valve. No   tricuspid   regurgitation noted. The pulmonary artery systolic pressure is estimated   to be   32 mmHg.  Structurally normal pulmonic valve. No pulmonic regurgitation   noted.There is no pericardial effusion.Compared to the echo performed on   12/5/2017, the RV function and pulmonary pressures appear within normal   limits.  Procedure Details  A complete two-dimensional transthoracic echocardiogram was performed (2D,  M-mode, spectral and color flow doppler).  Study Quality: Good.  Left Ventricle  There is mild concentric left ventricular hypertrophy.  The left ventricular wall motion is normal.  The left ventricular ejection fraction is normal.  The left ventricular ejection fraction is 65%.  Left Atrium  The left atrial size is normal.  The mitral inflow pattern is consistent with impaired left ventricular  relaxation with mildly elevated left atrial pressure (8-14mmHg).  Right Atrium  Right atrial size is normal.  Right Ventricle  The right ventricle is mildly dilated.  The right ventricular systolic function is normal.  Aortic Valve  Calcified aortic valve.  There is trace aortic regurgitation.  The peak pressure gradient is 44 mmHg.  The mean pressure gradient is 24 mmHg.  The dimensionless index (ratio of LVOTvelocity to aortic velocity) was  calculated to be 0.32.  There is Moderate aortic stenosis.  The calculated aortic valve area using the continuity equation is 1.1 cm2.  Mitral Valve  Structurally normal mitral valve.  There is mild mitral regurgitation.  Tricuspid Valve  Structurally normal tricuspid valve.  No tricuspid regurgitation noted.  The pulmonary artery systolic pressure is estimated to be 32 mmHg.  Pulmonic Valve  Structurally normal pulmonic valve.  No pulmonic regurgitation noted.  Arteries and Venous System  No aortic root dilatation.  The inferior vena cava is normal in size (<2.1 cm) with normal inspiratory  collapse (>50%) consistent with normal right atrialpressure.  Pericardium / Pleura  There is no pericardial effusion.    < end of copied text >    Catheterization:    Stress Test:     LABS:	 	  CARDIAC MARKERS:  Troponin T, Serum: 0.05 ng/mL <HH> [0.00 - 0.01] (04-24 @ 21:06)                              7.9    3.1   )-----------( 294      ( 27 Apr 2018 16:54 )             24.2     04-27    138  |  100  |  23  ----------------------------<  85  4.8   |  28  |  1.14    Ca    8.2<L>      27 Apr 2018 06:41  Phos  3.3     04-27  Mg     2.2     04-27      ASSESSMENT/PLAN: 	     Streptococcal bacteremia.    Gallium scan today No

## 2023-04-18 ENCOUNTER — PRE VISIT (OUTPATIENT)
Dept: OPHTHALMOLOGY | Facility: CLINIC | Age: 78
End: 2023-04-18

## 2023-04-18 ENCOUNTER — OFFICE VISIT (OUTPATIENT)
Dept: OPHTHALMOLOGY | Facility: CLINIC | Age: 78
End: 2023-04-18
Attending: FAMILY MEDICINE
Payer: COMMERCIAL

## 2023-04-18 VITALS — BODY MASS INDEX: 21.42 KG/M2 | HEIGHT: 71 IN | WEIGHT: 153 LBS

## 2023-04-18 DIAGNOSIS — H04.123 DRY EYES, BILATERAL: ICD-10-CM

## 2023-04-18 DIAGNOSIS — H52.00 HYPERMETROPIA, UNSPECIFIED LATERALITY: ICD-10-CM

## 2023-04-18 DIAGNOSIS — H47.239 LARGE PHYSIOLOGIC CUPPING OF OPTIC DISC: Primary | ICD-10-CM

## 2023-04-18 DIAGNOSIS — H52.4 PRESBYOPIA OF BOTH EYES: ICD-10-CM

## 2023-04-18 DIAGNOSIS — H52.223 REGULAR ASTIGMATISM OF BOTH EYES: ICD-10-CM

## 2023-04-18 DIAGNOSIS — H53.9 VISION CHANGES: ICD-10-CM

## 2023-04-18 PROCEDURE — 92133 CPTRZD OPH DX IMG PST SGM ON: CPT | Performed by: OPHTHALMOLOGY

## 2023-04-18 PROCEDURE — 92015 DETERMINE REFRACTIVE STATE: CPT | Performed by: OPHTHALMOLOGY

## 2023-04-18 PROCEDURE — 92004 COMPRE OPH EXAM NEW PT 1/>: CPT | Performed by: OPHTHALMOLOGY

## 2023-04-18 ASSESSMENT — CONF VISUAL FIELD
OD_SUPERIOR_TEMPORAL_RESTRICTION: 0
METHOD: COUNTING FINGERS
OD_NORMAL: 1
OD_INFERIOR_TEMPORAL_RESTRICTION: 0
OS_INFERIOR_TEMPORAL_RESTRICTION: 0
OD_SUPERIOR_NASAL_RESTRICTION: 0
OS_SUPERIOR_TEMPORAL_RESTRICTION: 0
OS_NORMAL: 1
OS_SUPERIOR_NASAL_RESTRICTION: 0
OS_INFERIOR_NASAL_RESTRICTION: 0
OD_INFERIOR_NASAL_RESTRICTION: 0

## 2023-04-18 ASSESSMENT — REFRACTION_WEARINGRX
OD_ADD: +2.50
OS_SPHERE: +3.25
OS_ADD: +2.50
SPECS_TYPE: PAL
OD_CYLINDER: +1.00
OS_CYLINDER: +1.50
OS_AXIS: 078
OD_SPHERE: +2.25
OD_AXIS: 087

## 2023-04-18 ASSESSMENT — VISUAL ACUITY
OS_CC: 20/20
OS_CC: J1+/-
METHOD: SNELLEN - LINEAR
OD_CC: 20/20
OD_CC: J1+/-
OS_CC+: -3
OD_CC+: -1

## 2023-04-18 ASSESSMENT — CUP TO DISC RATIO
OS_RATIO: 0.45
OD_RATIO: 0.4

## 2023-04-18 ASSESSMENT — REFRACTION_MANIFEST
OS_AXIS: 068
OD_CYLINDER: +0.75
OS_SPHERE: +3.25
OD_AXIS: 095
OD_SPHERE: +2.25
OS_ADD: +2.75
OS_CYLINDER: +1.25
OD_ADD: +2.75

## 2023-04-18 ASSESSMENT — SLIT LAMP EXAM - LIDS
COMMENTS: NORMAL
COMMENTS: NORMAL

## 2023-04-18 ASSESSMENT — EXTERNAL EXAM - RIGHT EYE: OD_EXAM: NORMAL

## 2023-04-18 ASSESSMENT — TONOMETRY
OD_IOP_MMHG: 20
OS_IOP_MMHG: 18
IOP_METHOD: TONOPEN

## 2023-04-18 ASSESSMENT — EXTERNAL EXAM - LEFT EYE: OS_EXAM: NORMAL

## 2023-04-18 NOTE — PROGRESS NOTES
HPI  Faruq Ivy is a 78 year old male here for comprehensive eye exam.    HPI     COMPREHENSIVE EYE EXAM    In both eyes.  Associated symptoms include eye pain and tearing.  Negative for dryness, itching and discharge.  Treatments tried include no treatments.  Pain was noted as 0/10. Additional comments: Comprehensive exam. Update glasses Rx.            Comments    Left eye chun a lot > in the morning for the past 2 years.   Establishing new Eye doctor today. Last years exam else where glasses were no good.   Has been wearing an older pair. Feels they could be sharper each eye distance and near. Has to hold print out further to focus near.     ELOISA Alonso COT 2:15 PM April 18, 2023                  Last edited by Lianna Dawson COT on 4/18/2023  2:15 PM.             PMH:   Past Medical History:   Diagnosis Date     Back pain      Chest pain      Gastro-oesophageal reflux disease      GERD (gastroesophageal reflux disease)      Hemorrhoid      Hepatitis C      Hypertension      Trigeminal neuralgia       POH: glasses for hyperopia since age 10, no surgery, no trauma  Oc Meds: none  flowmax  FH: Denies any glaucoma, age related macular degeneration, or other known eye diseases         Assessment & Plan      1. 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 Vision changes - Both Eyes    3. Hypermetropia, unspecified laterality - Both Eyes    4. Regular astigmatism of both eyes - Both Eyes    5. Presbyopia of both eyes - Both Eyes    6. Dry eyes, bilateral - Both Eyes      Mild changes on refraction, good acuity both eyes.  Possible reflex tearing left eye, trial of artificial tear drops four times a day both eyes.  Larger cup-to-disc ratio for hyperope, but normal intraocular pressure, no known FH of glaucoma and, normal OCT retinal nerve fiber layer today (see report).  Discussed with patient ok to monitor for glaucoma annually due to low  risk.     -----------------------------------------------------------------------------------       Patient disposition:   Return in about 1 year (around 4/18/2024) for Comprehensive Exam. Patient to call sooner as needed.    Complete documentation of historical and exam elements from today's encounter can be found in the full encounter summary report (not reduplicated in this progress note). I personally obtained the chief complaint(s) and history of present illness.  I have confirmed and edited as necessary the CC, HPI, PMH/PSH, social history, FMH, ROS, and exam/neuro findings as obtained by the technician or others. I have examined this patient myself and I personally viewed the image(s) and studies listed above and the documentation reflects my findings and interpretation.     Marily Aldana MD

## 2023-04-18 NOTE — NURSING NOTE
Chief Complaints and History of Present Illnesses   Patient presents with     COMPREHENSIVE EYE EXAM     Comprehensive exam. Update glasses Rx.      Chief Complaint(s) and History of Present Illness(es)     COMPREHENSIVE EYE EXAM            Laterality: both eyes    Associated symptoms: eye pain and tearing.  Negative for dryness, itching and discharge    Treatments tried: no treatments    Pain scale: 0/10    Comments: Comprehensive exam. Update glasses Rx.           Comments    Left eye chun a lot > in the morning for the past 2 years.   Establishing new Eye doctor today. Last years exam else where glasses were no good.   Has been wearing an older pair. Feels they could be sharper each eye distance and near. Has to hold print out further to focus near.     Lianna Dawson, COT COT 2:15 PM April 18, 2023

## 2023-04-20 ENCOUNTER — PATIENT OUTREACH (OUTPATIENT)
Dept: CARE COORDINATION | Facility: CLINIC | Age: 78
End: 2023-04-20
Payer: COMMERCIAL

## 2023-04-21 ENCOUNTER — OFFICE VISIT (OUTPATIENT)
Dept: OTOLARYNGOLOGY | Facility: CLINIC | Age: 78
End: 2023-04-21
Payer: COMMERCIAL

## 2023-04-21 VITALS
DIASTOLIC BLOOD PRESSURE: 76 MMHG | OXYGEN SATURATION: 99 % | BODY MASS INDEX: 22.12 KG/M2 | HEIGHT: 71 IN | SYSTOLIC BLOOD PRESSURE: 152 MMHG | TEMPERATURE: 98.7 F | HEART RATE: 74 BPM | WEIGHT: 158 LBS

## 2023-04-21 DIAGNOSIS — M54.2 CERVICALGIA: ICD-10-CM

## 2023-04-21 PROCEDURE — 99213 OFFICE O/P EST LOW 20 MIN: CPT | Performed by: OTOLARYNGOLOGY

## 2023-04-21 RX ORDER — OXYCODONE AND ACETAMINOPHEN 5; 325 MG/1; MG/1
1 TABLET ORAL EVERY 6 HOURS PRN
Qty: 20 TABLET | Refills: 0 | Status: SHIPPED | OUTPATIENT
Start: 2023-04-21 | End: 2023-04-26

## 2023-04-21 RX ORDER — OXYCODONE AND ACETAMINOPHEN 5; 325 MG/1; MG/1
1 TABLET ORAL EVERY 6 HOURS PRN
Qty: 20 TABLET | Refills: 0 | Status: CANCELLED | OUTPATIENT
Start: 2023-04-21

## 2023-04-21 ASSESSMENT — PAIN SCALES - GENERAL: PAINLEVEL: MODERATE PAIN (5)

## 2023-04-21 NOTE — NURSING NOTE
"Chief Complaint   Patient presents with     RECHECK     6 month follow up     Blood pressure (!) 152/76, pulse 74, temperature 98.7  F (37.1  C), height 1.803 m (5' 11\"), weight 71.7 kg (158 lb), SpO2 99 %.    Loc Rodriguez LPN    "

## 2023-04-21 NOTE — PATIENT INSTRUCTIONS
"You were seen in the clinic today by Dr. Torres. If you have any questions or concerns after your appointment, please call the clinic at [phone]. Press \"1\" for scheduling, press \"3\" for nurse advice.    2.   The following has been recommended for you based upon your appointment today:   -Try using Mucinex, an over-the-counter medication,  in addition to gargling with club soda, to treat the problems you have been experiencing with thick mucus.   -We have ordered a refill of your oxycodone medication to help treat your chronic pain.    3.   Plan to return to the clinic in 6 months for follow-up.    Jackie FAUSTIN, RN  [facility]  Department of Otolaryngology  [phone]      "

## 2023-04-21 NOTE — PROGRESS NOTES
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 The patient appeared to have an intraoral infection (difficult to tell whether this was sublingual or submandibular infection) in 2021 and we decided against treatments and patient reported resolution 10/26/2021. At our last visit on 10/25/22, his styloid pain was relatively unchanged.    Today, he reports he has been exercising intensively, including hand stand push-ups. However, he had a fall recently, landing on his left shoulder, resulting in pain, which radiated into his neck 1.5 weeks ago. Additionally, he reports increased acid reflux causing increased mucus production, leading to postnasal drip and cough. He has been taking Pepcid for this, which does help. Lastly, he endorses ongoing pain along the hyoid.     Review of Systems:   Pertinent items are noted in HPI or as in patient entered ROS below, remainder of complete ROS is negative.       2023    11:26 AM    ENT ROS   Constitutional Problems with sleep   Ears, Nose, Throat Ear pain    Nasal congestion or drainage   Cardiopulmonary Cough        Physical Exam:   BP (!) 152/76   Pulse 74   Temp 98.7  F (37.1  C)   Ht 1.803 m (5' 11\")   Wt 71.7 kg (158 lb)   SpO2 99%   BMI 22.04 kg/m       PHYSICAL EXAMINATION:    Constitutional:  The patient was unaccompanied, well-groomed, and in no acute distress.    Skin:  Warm and pink.    Neurologic:  Alert and oriented x 3.  CN's III-XII within normal limits.  Voice normal.   Psychiatric:  The patient's affect was calm, cooperative, and appropriate.    Respiratory:  Breathing comfortably without stridor " or exertion of accessory muscles.    Eyes: Extraocular movement intact.    Head:  Normocephalic and atraumatic.  No lesions or scars.    Ears:  Pinnae and tragus non-tender.  EAC's and TM's were clear.    Nose:  Sinuses were non-tender.  Anterior rhinoscopy revealed midline septum and absence of purulence or polyps.    OC/OP:  Normal tongue, floor of mouth, buccal mucosa, and palate.  No lesions or masses on inspection or palpation.  No abnormal lymph tissue in the oropharynx.  The pterygoid region is non-tender.    Neck:  Supple with normal laryngeal and tracheal landmarks.  The parotid beds were without masses.  No palpable thyroid.  Lymphatic:  There is no palpable lymphadenopathy in the neck.      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 On exam, he is well appearing. For his increased mucus production, I recommended Mucinex, nasal irrigations, and club soda gargles. I will refill his Percocet prescription. He will follow up in 6 months.    Scribe Disclosure:  I, Destinee Johnson, am serving as a scribe to document services personally performed by Connor Torres MD at this visit, based upon the provider's statements to me. All documentation has been reviewed by the aforementioned provider prior to being entered into the official medical record.

## 2023-04-21 NOTE — LETTER
"2023       RE: Ivy Haynes  [address]  [facility] [address]     Dear Colleague,    Thank you for referring your patient, Ivy Haynes, to the [facility] [place] at [facility]. Please see a copy of my visit note below.      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 Orion Mccormack many years ago who presents for follow up. The patient appeared to have an intraoral infection (difficult to tell whether this was sublingual or submandibular infection) in 2021 and we decided against treatments and patient reported resolution 10/26/2021. At our last visit on 10/25/22, his styloid pain was relatively unchanged.    Today, he reports he has been exercising intensively, including hand stand push-ups. However, he had a fall recently, landing on his left shoulder, resulting in pain, which radiated into his neck 1.5 weeks ago. Additionally, he reports increased acid reflux causing increased mucus production, leading to postnasal drip and cough. He has been taking Pepcid for this, which does help. Lastly, he endorses ongoing pain along the hyoid.     Review of Systems:   Pertinent items are noted in HPI or as in patient entered ROS below, remainder of complete ROS is negative.       2023    11:26 AM    ENT ROS   Constitutional Problems with sleep   Ears, Nose, Throat Ear pain    Nasal congestion or drainage   Cardiopulmonary Cough        Physical Exam:   BP (!) 152/76   Pulse 74   Temp 98.7  F (37.1  C)   Ht 1.803 m (5' 11\")   Wt 71.7 kg (158 lb)   SpO2 99%   BMI 22.04 kg/m       PHYSICAL EXAMINATION:    Constitutional: "  The patient was unaccompanied, well-groomed, and in no acute distress.    Skin:  Warm and pink.    Neurologic:  Alert and oriented x 3.  CN's III-XII within normal limits.  Voice normal.   Psychiatric:  The patient's affect was calm, cooperative, and appropriate.    Respiratory:  Breathing comfortably without stridor or exertion of accessory muscles.    Eyes: Extraocular movement intact.    Head:  Normocephalic and atraumatic.  No lesions or scars.    Ears:  Pinnae and tragus non-tender.  EAC's and TM's were clear.    Nose:  Sinuses were non-tender.  Anterior rhinoscopy revealed midline septum and absence of purulence or polyps.    OC/OP:  Normal tongue, floor of mouth, buccal mucosa, and palate.  No lesions or masses on inspection or palpation.  No abnormal lymph tissue in the oropharynx.  The pterygoid region is non-tender.    Neck:  Supple with normal laryngeal and tracheal landmarks.  The parotid beds were without masses.  No palpable thyroid.  Lymphatic:  There is no palpable lymphadenopathy in the neck.      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 On exam, he is well appearing. For his increased mucus production, I recommended Mucinex, nasal irrigations, and club soda gargles. I will refill his Percocet prescription. He will follow up in 6 months.    Scribe Disclosure:  I, Destinee Johnson, am serving as a scribe to document services personally performed by Connor Torres MD at this visit, based upon the provider's statements to me. All documentation has been reviewed by the aforementioned provider prior to being entered into the official medical record.           Again, thank you for allowing me to participate in the care of your patient.      Sincerely,    Connor Torres MD

## 2023-05-11 ENCOUNTER — OFFICE VISIT (OUTPATIENT)
Dept: FAMILY MEDICINE | Facility: CLINIC | Age: 78
End: 2023-05-11
Payer: COMMERCIAL

## 2023-05-11 VITALS
OXYGEN SATURATION: 100 % | HEART RATE: 81 BPM | WEIGHT: 157.5 LBS | RESPIRATION RATE: 20 BRPM | SYSTOLIC BLOOD PRESSURE: 136 MMHG | TEMPERATURE: 98.2 F | BODY MASS INDEX: 22.55 KG/M2 | DIASTOLIC BLOOD PRESSURE: 84 MMHG | HEIGHT: 70 IN

## 2023-05-11 DIAGNOSIS — Z00.00 ENCOUNTER FOR MEDICARE ANNUAL WELLNESS EXAM: Primary | ICD-10-CM

## 2023-05-11 DIAGNOSIS — Z12.5 SCREENING FOR PROSTATE CANCER: ICD-10-CM

## 2023-05-11 DIAGNOSIS — I10 HYPERTENSION GOAL BP (BLOOD PRESSURE) < 140/90: ICD-10-CM

## 2023-05-11 LAB
ERYTHROCYTE [DISTWIDTH] IN BLOOD BY AUTOMATED COUNT: 12 % (ref 10–15)
HCT VFR BLD AUTO: 42.7 % (ref 40–53)
HGB BLD-MCNC: 14.4 G/DL (ref 13.3–17.7)
MCH RBC QN AUTO: 33.2 PG (ref 26.5–33)
MCHC RBC AUTO-ENTMCNC: 33.7 G/DL (ref 31.5–36.5)
MCV RBC AUTO: 98 FL (ref 78–100)
PLATELET # BLD AUTO: 175 10E3/UL (ref 150–450)
RBC # BLD AUTO: 4.34 10E6/UL (ref 4.4–5.9)
WBC # BLD AUTO: 2.6 10E3/UL (ref 4–11)

## 2023-05-11 PROCEDURE — 99213 OFFICE O/P EST LOW 20 MIN: CPT | Mod: 25 | Performed by: FAMILY MEDICINE

## 2023-05-11 PROCEDURE — G0009 ADMIN PNEUMOCOCCAL VACCINE: HCPCS | Performed by: FAMILY MEDICINE

## 2023-05-11 PROCEDURE — 99397 PER PM REEVAL EST PAT 65+ YR: CPT | Mod: 25 | Performed by: FAMILY MEDICINE

## 2023-05-11 PROCEDURE — 80053 COMPREHEN METABOLIC PANEL: CPT | Performed by: FAMILY MEDICINE

## 2023-05-11 PROCEDURE — G0103 PSA SCREENING: HCPCS | Performed by: FAMILY MEDICINE

## 2023-05-11 PROCEDURE — 90677 PCV20 VACCINE IM: CPT | Performed by: FAMILY MEDICINE

## 2023-05-11 PROCEDURE — 80061 LIPID PANEL: CPT | Performed by: FAMILY MEDICINE

## 2023-05-11 PROCEDURE — 85027 COMPLETE CBC AUTOMATED: CPT | Performed by: FAMILY MEDICINE

## 2023-05-11 PROCEDURE — 36415 COLL VENOUS BLD VENIPUNCTURE: CPT | Performed by: FAMILY MEDICINE

## 2023-05-11 ASSESSMENT — ENCOUNTER SYMPTOMS
COUGH: 0
CHILLS: 0
ABDOMINAL PAIN: 0
HEMATURIA: 0
HEMATOCHEZIA: 0
DIZZINESS: 0
ARTHRALGIAS: 1
NAUSEA: 0
SORE THROAT: 0
DYSURIA: 0
SHORTNESS OF BREATH: 0
PARESTHESIAS: 0
FEVER: 0
WEAKNESS: 0
NERVOUS/ANXIOUS: 0
JOINT SWELLING: 0
MYALGIAS: 1
HEADACHES: 0
PALPITATIONS: 0
EYE PAIN: 0
CONSTIPATION: 0
DIARRHEA: 0
FREQUENCY: 0
HEARTBURN: 1

## 2023-05-11 ASSESSMENT — ACTIVITIES OF DAILY LIVING (ADL): CURRENT_FUNCTION: NO ASSISTANCE NEEDED

## 2023-05-11 ASSESSMENT — PAIN SCALES - GENERAL: PAINLEVEL: NO PAIN (0)

## 2023-05-11 NOTE — PROGRESS NOTES
"SUBJECTIVE:   Ivy is a 78 year old who presents for Preventive Visit.    Are you in the first 12 months of your Medicare coverage?  No    Healthy Habits:     In general, how would you rate your overall health?  Excellent    Frequency of exercise:  6-7 days/week    Duration of exercise:  Greater than 60 minutes    Do you usually eat at least 4 servings of fruit and vegetables a day, include whole grains    & fiber and avoid regularly eating high fat or \"junk\" foods?  Yes    Taking medications regularly:  Yes    Medication side effects:  None    Ability to successfully perform activities of daily living:  No assistance needed    Home Safety:  No safety concerns identified    Hearing Impairment:  No hearing concerns    In the past 6 months, have you been bothered by leaking of urine?  No    In general, how would you rate your overall mental or emotional health?  Good      PHQ-2 Total Score: 1    Additional concerns today:  No      Have you ever done Advance Care Planning? (For example, a Health Directive, POLST, or a discussion with a medical provider or your loved ones about your wishes):        Fall risk  Fallen 2 or more times in the past year?: No  Any fall with injury in the past year?: No    Cognitive Screening   1) Repeat 3 items (Leader, Season, Table)    2) Clock draw: NORMAL  3) 3 item recall: Recalls 3 objects  Results: 3 items recalled: COGNITIVE IMPAIRMENT LESS LIKELY    Mini-CogTM Copyright BINTA Moran. Licensed by the author for use in [facility]; reprinted with permission (herve@.[facility]). All rights reserved.      Do you have sleep apnea, excessive snoring or daytime drowsiness?:  mild    Reviewed and updated as needed this visit by clinical staff   Tobacco  Allergies  Meds              Reviewed and updated as needed this visit by Provider                 Social History     Tobacco Use     Smoking status: Former     Packs/day: 0.10     Years: 4.00     Pack years: 0.40     Types: Cigarettes "     Start date: 2011     Quit date: 2021     Years since quittin.3     Passive exposure: Past     Smokeless tobacco: Never     Tobacco comments:     quit  until . started after trip to [place]   Vaping Use     Vaping status: Never Used   Substance Use Topics     Alcohol use: Yes     Comment: 2-3 beer a week             2023    12:40 PM   Alcohol Use   Prescreen: >3 drinks/day or >7 drinks/week? No     Do you have a current opioid prescription? no  Do you use any other controlled substances or medications that are not prescribed by a provider? None          Hypertension Follow-up      Do you check your blood pressure regularly outside of the clinic? Yes     Are you following a low salt diet? Yes    Are your blood pressures ever more than 140 on the top number (systolic) OR more   than 90 on the bottom number (diastolic), for example 140/90? No      Current providers sharing in care for this patient include:   Patient Care Team:  Lamine Franco MD as PCP - General (Family Practice)  Te Soriano MD as MD (Surgery)  Regine Ochoa MD as MD (Family Medicine - Sports Medicine)  Connor Torres MD as MD (Otolaryngology)  Lamine Franco MD as Assigned PCP  Marily Aldana MD as MD (Ophthalmology)  Lamine Franco MD as Assigned Pain Medication Provider  Connor Torres MD as Assigned Surgical Provider    The following health maintenance items are reviewed in Epic and correct as of today:  Health Maintenance   Topic Date Due     URINE DRUG SCREEN  Never done     ANNUAL REVIEW OF HM ORDERS  Never done     HEPATITIS C SCREENING  Never done     ZOSTER IMMUNIZATION (1 of 2) Never done     LUNG CANCER SCREENING  Never done     DTAP/TDAP/TD IMMUNIZATION (3 - Td or Tdap) 01/10/2022     COVID-19 Vaccine (6 - Pfizer series) 2023     MEDICARE ANNUAL WELLNESS VISIT  04/15/2023     FALL RISK ASSESSMENT  2024     LIPID   "04/15/2027     ADVANCE CARE PLANNING  04/15/2027     PHQ-2 (once per calendar year)  Completed     INFLUENZA VACCINE  Completed     Pneumococcal Vaccine: 65+ Years  Completed     IPV IMMUNIZATION  Aged Out     MENINGITIS IMMUNIZATION  Aged Out     COLORECTAL CANCER SCREENING  Discontinued     Lab work is in process            Review of Systems   Constitutional: Negative for chills and fever.   HENT: Negative for congestion, ear pain, hearing loss and sore throat.    Eyes: Negative for pain and visual disturbance.   Respiratory: Negative for cough and shortness of breath.    Cardiovascular: Negative for chest pain, palpitations and peripheral edema.   Gastrointestinal: Positive for heartburn. Negative for abdominal pain, constipation, diarrhea, hematochezia and nausea.   Genitourinary: Negative for dysuria, frequency, genital sores, hematuria, impotence, penile discharge and urgency.   Musculoskeletal: Positive for arthralgias and myalgias. Negative for joint swelling.   Skin: Negative for rash.   Neurological: Negative for dizziness, weakness, headaches and paresthesias.   Psychiatric/Behavioral: Negative for mood changes. The patient is not nervous/anxious.      Constitutional, HEENT, cardiovascular, pulmonary, gi and gu systems are negative, except as otherwise noted.    OBJECTIVE:   /84 (BP Location: Left arm, Patient Position: Sitting, Cuff Size: Adult Regular)   Pulse 81   Temp 98.2  F (36.8  C) (Temporal)   Resp 20   Ht 1.785 m (5' 10.28\")   Wt 71.4 kg (157 lb 8 oz)   SpO2 100%   BMI 22.42 kg/m   Estimated body mass index is 22.42 kg/m  as calculated from the following:    Height as of this encounter: 1.785 m (5' 10.28\").    Weight as of this encounter: 71.4 kg (157 lb 8 oz).  Physical Exam  GENERAL: healthy, alert and no distress  NECK: no adenopathy, no asymmetry, masses, or scars and thyroid normal to palpation  RESP: lungs clear to auscultation - no rales, rhonchi or wheezes  CV: regular rate " and rhythm, normal S1 S2, no S3 or S4, no murmur, click or rub, no peripheral edema and peripheral pulses strong  ABDOMEN: soft, nontender, no hepatosplenomegaly, no masses and bowel sounds normal  MS: no gross musculoskeletal defects noted, no edema  SKIN: no suspicious lesions or rashes  NEURO: Normal strength and tone, mentation intact and speech normal  PSYCH: mentation appears normal, affect normal/bright  LYMPH: no cervical, supraclavicular, axillary, or inguinal adenopathy    Diagnostic Test Results:  Labs reviewed in Epic    ASSESSMENT / PLAN:   (Z00.00) Encounter for Medicare annual wellness exam  (primary encounter diagnosis)  Comment: in excellent health  Plan: PRIMARY CARE FOLLOW-UP SCHEDULING        Call if concerns    (Z12.5) Screening for prostate cancer  Comment: sent  Plan: PSA, screen       Follow up with consultant as planned.     (I10) Hypertension goal BP (blood pressure) < 140/90  Comment: Well controlled   Plan: CBC with platelets, Comprehensive metabolic         panel (BMP + Alb, Alk Phos, ALT, AST, Total.         Bili, TP), Lipid panel reflex to direct LDL         Fasting                    COUNSELING:  Reviewed preventive health counseling, as reflected in patient instructions       Regular exercise       Healthy diet/nutrition       Vision screening       Hearing screening       Dental care       Bladder control       Fall risk prevention       Colon cancer screening       Prostate cancer screening        He reports that he quit smoking about 2 years ago. His smoking use included cigarettes. He started smoking about 12 years ago. He has a 0.40 pack-year smoking history. He has been exposed to tobacco smoke. He has never used smokeless tobacco.      Appropriate preventive services were discussed with this patient, including applicable screening as appropriate for cardiovascular disease, diabetes, osteopenia/osteoporosis, and glaucoma.  As appropriate for age/gender, discussed screening for  colorectal cancer, prostate cancer, breast cancer, and cervical cancer. Checklist reviewing preventive services available has been given to the patient.    Reviewed patients plan of care and provided an AVS. The Basic Care Plan (routine screening as documented in Health Maintenance) for Ivy meets the Care Plan requirement. This Care Plan has been established and reviewed with the Patient.      Lamine Franco MD  [facility]    Identified Health Risks:

## 2023-05-11 NOTE — PATIENT INSTRUCTIONS
Patient Education   Personalized Prevention Plan  You are due for the preventive services outlined below.  Your care team is available to assist you in scheduling these services.  If you have already completed any of these items, please share that information with your care team to update in your medical record.  Health Maintenance Due   Topic Date Due     URINE DRUG SCREEN  Never done     ANNUAL REVIEW OF HM ORDERS  Never done     Hepatitis C Screening  Never done     Zoster (Shingles) Vaccine (1 of 2) Never done     LUNG CANCER SCREENING  Never done     Diptheria Tetanus Pertussis (DTAP/TDAP/TD) Vaccine (3 - Td or Tdap) 01/10/2022     COVID-19 Vaccine (6 - Pfizer series) 01/23/2023     Annual Wellness Visit  04/15/2023

## 2023-05-12 LAB
ALBUMIN SERPL BCG-MCNC: 4.2 G/DL (ref 3.5–5.2)
ALP SERPL-CCNC: 157 U/L (ref 40–129)
ALT SERPL W P-5'-P-CCNC: 13 U/L (ref 10–50)
ANION GAP SERPL CALCULATED.3IONS-SCNC: 14 MMOL/L (ref 7–15)
AST SERPL W P-5'-P-CCNC: 30 U/L (ref 10–50)
BILIRUB SERPL-MCNC: 0.4 MG/DL
BUN SERPL-MCNC: 7.4 MG/DL (ref 8–23)
CALCIUM SERPL-MCNC: 9.4 MG/DL (ref 8.8–10.2)
CHLORIDE SERPL-SCNC: 105 MMOL/L (ref 98–107)
CHOLEST SERPL-MCNC: 179 MG/DL
CREAT SERPL-MCNC: 1.18 MG/DL (ref 0.67–1.17)
DEPRECATED HCO3 PLAS-SCNC: 23 MMOL/L (ref 22–29)
GFR SERPL CREATININE-BSD FRML MDRD: 64 ML/MIN/1.73M2
GLUCOSE SERPL-MCNC: 91 MG/DL (ref 70–99)
HDLC SERPL-MCNC: 78 MG/DL
LDLC SERPL CALC-MCNC: 83 MG/DL
NONHDLC SERPL-MCNC: 101 MG/DL
POTASSIUM SERPL-SCNC: 4.4 MMOL/L (ref 3.4–5.3)
PROT SERPL-MCNC: 6.7 G/DL (ref 6.4–8.3)
PSA SERPL DL<=0.01 NG/ML-MCNC: 4.11 NG/ML (ref 0–6.5)
SODIUM SERPL-SCNC: 142 MMOL/L (ref 136–145)
TRIGL SERPL-MCNC: 89 MG/DL

## 2023-06-21 ENCOUNTER — TELEPHONE (OUTPATIENT)
Dept: FAMILY MEDICINE | Facility: CLINIC | Age: 78
End: 2023-06-21
Payer: COMMERCIAL

## 2023-06-21 NOTE — TELEPHONE ENCOUNTER
Called pt and he states its not like urgency and diarrhea but that the stools are very loose and have never been that way before. While talking to pt he states wife is currently talking to scheduling also as she has an appointment on Friday 6/23 she wants to cancel and instead have pt take the appointment. The  told the wife, Catherine, she will cancel the appointment and put Faruq into that slot.    Advised pt to monitor hydration status and have electrolyte drink or two in order to replenish lost electrolytes with loose stools and otherwise to call back if worsens.    Vinh Francois RN   [facility]

## 2023-06-21 NOTE — TELEPHONE ENCOUNTER
Reason for Call:  Appointment Request    Patient requesting this type of appt:  LOOSE STOOLS FOR THE LAST FEW WEEKS- HAS SKIN DISCOLORATION ON NOSE AND AROUND EYES    Requested provider: Lamine Franco    Reason patient unable to be scheduled: Not within requested timeframe    When does patient want to be seen/preferred time: 1-2 days    Comments: prefers Dr. Franco     Could we send this information to you in [facility] or would you prefer to receive a phone call?:   Patient would prefer a phone call   Okay to leave a detailed message?: Yes at Cell number on file:    Telephone Information:   Mobile [phone]       Call taken on 6/21/2023 at 1:52 PM by Gabriella HARDY

## 2023-06-23 ENCOUNTER — OFFICE VISIT (OUTPATIENT)
Dept: FAMILY MEDICINE | Facility: CLINIC | Age: 78
End: 2023-06-23
Payer: COMMERCIAL

## 2023-06-23 VITALS
BODY MASS INDEX: 22.33 KG/M2 | HEART RATE: 85 BPM | OXYGEN SATURATION: 99 % | TEMPERATURE: 98.1 F | SYSTOLIC BLOOD PRESSURE: 124 MMHG | HEIGHT: 70 IN | WEIGHT: 156 LBS | RESPIRATION RATE: 20 BRPM | DIASTOLIC BLOOD PRESSURE: 82 MMHG

## 2023-06-23 DIAGNOSIS — L81.9 HYPERPIGMENTATION OF SKIN: ICD-10-CM

## 2023-06-23 DIAGNOSIS — R07.89 CHEST WALL PAIN: ICD-10-CM

## 2023-06-23 DIAGNOSIS — B34.9 VIRAL SYNDROME: Primary | ICD-10-CM

## 2023-06-23 DIAGNOSIS — K21.00 GASTROESOPHAGEAL REFLUX DISEASE WITH ESOPHAGITIS WITHOUT HEMORRHAGE: ICD-10-CM

## 2023-06-23 DIAGNOSIS — L50.9 HIVES: ICD-10-CM

## 2023-06-23 LAB
ALBUMIN SERPL BCG-MCNC: 4.6 G/DL (ref 3.5–5.2)
ALP SERPL-CCNC: 145 U/L (ref 40–129)
ALT SERPL W P-5'-P-CCNC: 15 U/L (ref 0–70)
ANION GAP SERPL CALCULATED.3IONS-SCNC: 10 MMOL/L (ref 7–15)
AST SERPL W P-5'-P-CCNC: 31 U/L (ref 0–45)
BILIRUB SERPL-MCNC: 0.5 MG/DL
BUN SERPL-MCNC: 8.5 MG/DL (ref 8–23)
CALCIUM SERPL-MCNC: 9.3 MG/DL (ref 8.8–10.2)
CHLORIDE SERPL-SCNC: 103 MMOL/L (ref 98–107)
CREAT SERPL-MCNC: 1.1 MG/DL (ref 0.67–1.17)
DEPRECATED HCO3 PLAS-SCNC: 25 MMOL/L (ref 22–29)
ERYTHROCYTE [DISTWIDTH] IN BLOOD BY AUTOMATED COUNT: 11.9 % (ref 10–15)
GFR SERPL CREATININE-BSD FRML MDRD: 70 ML/MIN/1.73M2
GLUCOSE SERPL-MCNC: 115 MG/DL (ref 70–99)
HCT VFR BLD AUTO: 44.6 % (ref 40–53)
HGB BLD-MCNC: 15 G/DL (ref 13.3–17.7)
MCH RBC QN AUTO: 33.2 PG (ref 26.5–33)
MCHC RBC AUTO-ENTMCNC: 33.6 G/DL (ref 31.5–36.5)
MCV RBC AUTO: 99 FL (ref 78–100)
PLATELET # BLD AUTO: 180 10E3/UL (ref 150–450)
POTASSIUM SERPL-SCNC: 4.1 MMOL/L (ref 3.4–5.3)
PROT SERPL-MCNC: 7.2 G/DL (ref 6.4–8.3)
RBC # BLD AUTO: 4.52 10E6/UL (ref 4.4–5.9)
SODIUM SERPL-SCNC: 138 MMOL/L (ref 136–145)
WBC # BLD AUTO: 2.9 10E3/UL (ref 4–11)

## 2023-06-23 PROCEDURE — 80053 COMPREHEN METABOLIC PANEL: CPT | Performed by: FAMILY MEDICINE

## 2023-06-23 PROCEDURE — 36415 COLL VENOUS BLD VENIPUNCTURE: CPT | Performed by: FAMILY MEDICINE

## 2023-06-23 PROCEDURE — 93000 ELECTROCARDIOGRAM COMPLETE: CPT | Performed by: FAMILY MEDICINE

## 2023-06-23 PROCEDURE — 85027 COMPLETE CBC AUTOMATED: CPT | Performed by: FAMILY MEDICINE

## 2023-06-23 PROCEDURE — 99214 OFFICE O/P EST MOD 30 MIN: CPT | Mod: 25 | Performed by: FAMILY MEDICINE

## 2023-06-23 ASSESSMENT — PAIN SCALES - GENERAL: PAINLEVEL: NO PAIN (0)

## 2023-06-23 ASSESSMENT — ENCOUNTER SYMPTOMS: DIARRHEA: 1

## 2023-06-23 NOTE — PROGRESS NOTES
"  Assessment & Plan     Viral syndrome  resolving  - CBC with platelets  - CBC with platelets    Hives  Likely viral exanthem  - Adult Dermatology Referral  - Comprehensive metabolic panel (BMP + Alb, Alk Phos, ALT, AST, Total. Bili, TP)  - Comprehensive metabolic panel (BMP + Alb, Alk Phos, ALT, AST, Total. Bili, TP)    Chest wall pain  Pleuritic and recurrent  Not exertional  reassurance  - EKG 12-lead complete w/read - Clinics    Gastroesophageal reflux disease with esophagitis without hemorrhage  Was worse, better with PPI  Take 2 weeks then stop    Hyperpigmentation of skin  Follow up with consultant as planned.   - Adult Dermatology Referral                   Lamine Franco MD  [facility] MALU Haynes is a 78 year old, presenting for the following health issues:  Diarrhea (Not diarrhea according to pt), Derm Problem (Itching, face discoloration), Gastrophageal Reflux, and Chest Pain        6/23/2023     1:46 PM   Additional Questions   Roomed by Jovita Lucero         6/23/2023     1:47 PM   Patient Reported Additional Medications   Patient reports taking the following new medications omeprazole     Diarrhea    History of Present Illness       Reason for visit:  Itchy skin,loose stools,back pain  Symptom onset:  1-2 weeks ago  Symptoms include:  Loose stools, ichy skin,back pain,skin darkening bridge of nose  Symptom intensity:  Mild  Symptom progression:  Worsening  Had these symptoms before:  No  What makes it worse:  No  What makes it better:  No    He eats 0-1 servings of fruits and vegetables daily.He consumes 0 sweetened beverage(s) daily.He exercises with enough effort to increase his heart rate 60 or more minutes per day.  He exercises with enough effort to increase his heart rate 7 days per week.   He is taking medications regularly.       Pt reports change in stool about 2 weeks ago  Dark line on bridge of nose, \"came out of nowhere\" 3 days ago    Chest " "Pain  Onset/Duration: most days  Description:   Location: left side  Character: achey  Radiation: on left side  Duration: few minutes   Intensity: mild  Progression of Symptoms: waxing and waning  Accompanying Signs & Symptoms:  Shortness of breath: No  Sweating: No  Nausea/vomiting: No  Lightheadedness: No  Palpitations: No  Fever/Chills: No  Cough: No           Heartburn: YES     Previous similar symptoms: YES- for years  Precipitating factors:   Worse with exertion: No  Worse with deep breaths: No           Related to eating: YES           Better with burping: No     Diarrhea  Onset/Duration: few days  Description:       Consistency of stool: runny       Blood in stool: No     Progression of Symptoms: improving  Accompanying signs and symptoms:       Fever: No       Nausea/Vomiting: No       Abdominal pain: No       Weight loss: No       Episodes of constipation: No     Rash  Onset/Duration: days  Description  Location: face     Itching: no  Intensity:  mild  Progression of Symptoms:  same  Accompanying signs and symptoms:   Fever: No            Review of Systems   Gastrointestinal: Positive for diarrhea.      Constitutional, HEENT, cardiovascular, pulmonary, gi and gu systems are negative, except as otherwise noted.      Objective    /82   Pulse 85   Temp 98.1  F (36.7  C) (Temporal)   Resp 20   Ht 1.78 m (5' 10.08\")   Wt 70.8 kg (156 lb)   SpO2 99%   BMI 22.33 kg/m    Body mass index is 22.33 kg/m .  Physical Exam   GENERAL: alert and no distress  EYES: Eyes grossly normal to inspection, PERRL and conjunctivae and sclerae normal  NECK: no adenopathy, no asymmetry, masses, or scars and thyroid normal to palpation  RESP: lungs clear to auscultation - no rales, rhonchi or wheezes  CV: regular rate and rhythm, normal S1 S2, no S3 or S4, no murmur, click or rub, no peripheral edema and peripheral pulses strong  ABDOMEN: soft, nontender, no hepatosplenomegaly, no masses and bowel sounds normal  MS: " normal muscle tone and tenderness to palpation left chest wall  SKIN: hyperpigmentation - face    EKG - Reviewed and interpreted by me appears normal, NSR, normal axis, normal intervals, no acute ST/T changes c/w ischemia, no LVH by voltage criteria, unchanged from previous tracings  Office Visit on 05/11/2023   Component Date Value Ref Range Status     WBC Count 05/11/2023 2.6 (L)  4.0 - 11.0 10e3/uL Final     RBC Count 05/11/2023 4.34 (L)  4.40 - 5.90 10e6/uL Final     Hemoglobin 05/11/2023 14.4  13.3 - 17.7 g/dL Final     Hematocrit 05/11/2023 42.7  40.0 - 53.0 % Final     MCV 05/11/2023 98  78 - 100 fL Final     MCH 05/11/2023 33.2 (H)  26.5 - 33.0 pg Final     MCHC 05/11/2023 33.7  31.5 - 36.5 g/dL Final     RDW 05/11/2023 12.0  10.0 - 15.0 % Final     Platelet Count 05/11/2023 175  150 - 450 10e3/uL Final     Prostate Specific Antigen Screen 05/11/2023 4.11  0.00 - 6.50 ng/mL Final     Sodium 05/11/2023 142  136 - 145 mmol/L Final     Potassium 05/11/2023 4.4  3.4 - 5.3 mmol/L Final     Chloride 05/11/2023 105  98 - 107 mmol/L Final     Carbon Dioxide (CO2) 05/11/2023 23  22 - 29 mmol/L Final     Anion Gap 05/11/2023 14  7 - 15 mmol/L Final     Urea Nitrogen 05/11/2023 7.4 (L)  8.0 - 23.0 mg/dL Final     Creatinine 05/11/2023 1.18 (H)  0.67 - 1.17 mg/dL Final     Calcium 05/11/2023 9.4  8.8 - 10.2 mg/dL Final     Glucose 05/11/2023 91  70 - 99 mg/dL Final     Alkaline Phosphatase 05/11/2023 157 (H)  40 - 129 U/L Final     AST 05/11/2023 30  10 - 50 U/L Final     ALT 05/11/2023 13  10 - 50 U/L Final     Protein Total 05/11/2023 6.7  6.4 - 8.3 g/dL Final     Albumin 05/11/2023 4.2  3.5 - 5.2 g/dL Final     Bilirubin Total 05/11/2023 0.4  <=1.2 mg/dL Final     GFR Estimate 05/11/2023 64  >60 mL/min/1.73m2 Final    eGFR calculated using 2021 CKD-EPI equation.     Cholesterol 05/11/2023 179  <200 mg/dL Final     Triglycerides 05/11/2023 89  <150 mg/dL Final     Direct Measure HDL 05/11/2023 78  >=40 mg/dL Final      LDL Cholesterol Calculated 05/11/2023 83  <=100 mg/dL Final     Non HDL Cholesterol 05/11/2023 101  <130 mg/dL Final

## 2023-08-09 DIAGNOSIS — I10 HYPERTENSION GOAL BP (BLOOD PRESSURE) < 140/90: ICD-10-CM

## 2023-08-09 RX ORDER — AMLODIPINE BESYLATE 2.5 MG/1
2.5 TABLET ORAL DAILY
Qty: 90 TABLET | Refills: 3 | Status: SHIPPED | OUTPATIENT
Start: 2023-08-09 | End: 2024-07-29

## 2023-08-09 NOTE — TELEPHONE ENCOUNTER
"Requested Prescriptions   Pending Prescriptions Disp Refills    amLODIPine (NORVASC) 2.5 MG tablet [Pharmacy Med Name: AMLODIPINE BESYLATE 2.5MG TABLETS] 90 tablet 3     Sig: TAKE 1 TABLET(2.5 MG) BY MOUTH DAILY       Calcium Channel Blockers Protocol  Passed - 8/9/2023 11:00 AM        Passed - Blood pressure under 140/90 in past 12 months     BP Readings from Last 3 Encounters:   06/23/23 124/82   05/11/23 136/84   04/21/23 (!) 152/76                 Passed - Recent (12 mo) or future (30 days) visit within the authorizing provider's specialty     Patient has had an office visit with the authorizing provider or a provider within the authorizing providers department within the previous 12 mos or has a future within next 30 days. See \"Patient Info\" tab in inbasket, or \"Choose Columns\" in Meds & Orders section of the refill encounter.              Passed - Medication is active on med list        Passed - Patient is age 18 or older        Passed - Normal serum creatinine on file in past 12 months     Recent Labs   Lab Test 06/23/23  1448   CR 1.10       Ok to refill medication if creatinine is low              Prescription approved per [facility] Refill Protocol.     Pt was last seen on 06/23/23    Rosy Chan RN  [facility] Medicine   "

## 2023-09-22 ENCOUNTER — OFFICE VISIT (OUTPATIENT)
Dept: FAMILY MEDICINE | Facility: CLINIC | Age: 78
End: 2023-09-22
Payer: COMMERCIAL

## 2023-09-22 VITALS
SYSTOLIC BLOOD PRESSURE: 136 MMHG | TEMPERATURE: 97.9 F | OXYGEN SATURATION: 99 % | DIASTOLIC BLOOD PRESSURE: 75 MMHG | WEIGHT: 158.3 LBS | HEIGHT: 71 IN | HEART RATE: 79 BPM | BODY MASS INDEX: 22.16 KG/M2 | RESPIRATION RATE: 13 BRPM

## 2023-09-22 DIAGNOSIS — K21.00 GASTROESOPHAGEAL REFLUX DISEASE WITH ESOPHAGITIS WITHOUT HEMORRHAGE: ICD-10-CM

## 2023-09-22 DIAGNOSIS — I10 HYPERTENSION GOAL BP (BLOOD PRESSURE) < 140/90: ICD-10-CM

## 2023-09-22 DIAGNOSIS — R53.83 FATIGUE, UNSPECIFIED TYPE: Primary | ICD-10-CM

## 2023-09-22 LAB
ALBUMIN SERPL BCG-MCNC: 4.1 G/DL (ref 3.5–5.2)
ALP SERPL-CCNC: 132 U/L (ref 40–129)
ALT SERPL W P-5'-P-CCNC: 11 U/L (ref 0–70)
ANION GAP SERPL CALCULATED.3IONS-SCNC: 13 MMOL/L (ref 7–15)
AST SERPL W P-5'-P-CCNC: 28 U/L (ref 0–45)
BILIRUB SERPL-MCNC: 0.6 MG/DL
BUN SERPL-MCNC: 11.4 MG/DL (ref 8–23)
CALCIUM SERPL-MCNC: 9.5 MG/DL (ref 8.8–10.2)
CHLORIDE SERPL-SCNC: 103 MMOL/L (ref 98–107)
CREAT SERPL-MCNC: 1.12 MG/DL (ref 0.67–1.17)
DEPRECATED HCO3 PLAS-SCNC: 24 MMOL/L (ref 22–29)
EGFRCR SERPLBLD CKD-EPI 2021: 68 ML/MIN/1.73M2
ERYTHROCYTE [DISTWIDTH] IN BLOOD BY AUTOMATED COUNT: 12 % (ref 10–15)
GLUCOSE SERPL-MCNC: 102 MG/DL (ref 70–99)
HCT VFR BLD AUTO: 43.5 % (ref 40–53)
HGB BLD-MCNC: 14.8 G/DL (ref 13.3–17.7)
MCH RBC QN AUTO: 33 PG (ref 26.5–33)
MCHC RBC AUTO-ENTMCNC: 34 G/DL (ref 31.5–36.5)
MCV RBC AUTO: 97 FL (ref 78–100)
PLATELET # BLD AUTO: 175 10E3/UL (ref 150–450)
POTASSIUM SERPL-SCNC: 4.2 MMOL/L (ref 3.4–5.3)
PROT SERPL-MCNC: 6.8 G/DL (ref 6.4–8.3)
RBC # BLD AUTO: 4.48 10E6/UL (ref 4.4–5.9)
SODIUM SERPL-SCNC: 140 MMOL/L (ref 136–145)
WBC # BLD AUTO: 2.6 10E3/UL (ref 4–11)

## 2023-09-22 PROCEDURE — 99214 OFFICE O/P EST MOD 30 MIN: CPT | Mod: 25 | Performed by: FAMILY MEDICINE

## 2023-09-22 PROCEDURE — 85027 COMPLETE CBC AUTOMATED: CPT | Performed by: FAMILY MEDICINE

## 2023-09-22 PROCEDURE — 36415 COLL VENOUS BLD VENIPUNCTURE: CPT | Performed by: FAMILY MEDICINE

## 2023-09-22 PROCEDURE — G0008 ADMIN INFLUENZA VIRUS VAC: HCPCS | Performed by: FAMILY MEDICINE

## 2023-09-22 PROCEDURE — 90662 IIV NO PRSV INCREASED AG IM: CPT | Performed by: FAMILY MEDICINE

## 2023-09-22 PROCEDURE — 80053 COMPREHEN METABOLIC PANEL: CPT | Performed by: FAMILY MEDICINE

## 2023-09-22 ASSESSMENT — PAIN SCALES - GENERAL: PAINLEVEL: NO PAIN (0)

## 2023-09-22 NOTE — PROGRESS NOTES
"  Assessment & Plan     Fatigue, unspecified type  Likely due to GERD, is now improved after the resumes PPI ( omeprazole 40 mg)  - Comprehensive metabolic panel (BMP + Alb, Alk Phos, ALT, AST, Total. Bili, TP)  - CBC with platelets    Gastroesophageal reflux disease with esophagitis without hemorrhage  Take for 4 weeks , then 20 mg daily then 20 every other day  - omeprazole (PRILOSEC) 20 MG DR capsule  Dispense: 30 capsule; Refill: 1      HTN Well controlled          Regular exercise  See Patient Instructions    Lamine Franco MD  [facility] is a 78 year old, presenting for the following health issues:  Gastrophageal Reflux        9/22/2023    12:54 PM   Additional Questions   Roomed by Dinorah Dorado       History of Present Illness       Reason for visit:  Fatigue and head pressure    He eats 2-3 servings of fruits and vegetables daily.He consumes 0 sweetened beverage(s) daily.He exercises with enough effort to increase his heart rate 60 or more minutes per day.  He exercises with enough effort to increase his heart rate 7 days per week.   He is taking medications regularly.                 Review of Systems   Constitutional, HEENT, cardiovascular, pulmonary, gi and gu systems are negative, except as otherwise noted.      Objective    /75   Pulse 79   Temp 97.9  F (36.6  C) (Temporal)   Resp 13   Ht 1.791 m (5' 10.5\")   Wt 71.8 kg (158 lb 4.8 oz)   SpO2 99%   BMI 22.39 kg/m    Body mass index is 22.39 kg/m .  Physical Exam   GENERAL: healthy, alert and no distress  EYES: Eyes grossly normal to inspection, PERRL and conjunctivae and sclerae normal  HENT: ear canals and TM's normal, nose and mouth without ulcers or lesions  NECK: no adenopathy, no asymmetry, masses, or scars and thyroid normal to palpation  RESP: lungs clear to auscultation - no rales, rhonchi or wheezes  CV: regular rate and rhythm, normal S1 S2, no S3 or S4, no murmur, click or " rub, no peripheral edema and peripheral pulses strong  ABDOMEN: soft, nontender, no hepatosplenomegaly, no masses and bowel sounds normal  MS: no gross musculoskeletal defects noted, no edema    Office Visit on 06/23/2023   Component Date Value Ref Range Status    WBC Count 06/23/2023 2.9 (L)  4.0 - 11.0 10e3/uL Final    RBC Count 06/23/2023 4.52  4.40 - 5.90 10e6/uL Final    Hemoglobin 06/23/2023 15.0  13.3 - 17.7 g/dL Final    Hematocrit 06/23/2023 44.6  40.0 - 53.0 % Final    MCV 06/23/2023 99  78 - 100 fL Final    MCH 06/23/2023 33.2 (H)  26.5 - 33.0 pg Final    MCHC 06/23/2023 33.6  31.5 - 36.5 g/dL Final    RDW 06/23/2023 11.9  10.0 - 15.0 % Final    Platelet Count 06/23/2023 180  150 - 450 10e3/uL Final    Sodium 06/23/2023 138  136 - 145 mmol/L Final    Potassium 06/23/2023 4.1  3.4 - 5.3 mmol/L Final    Chloride 06/23/2023 103  98 - 107 mmol/L Final    Carbon Dioxide (CO2) 06/23/2023 25  22 - 29 mmol/L Final    Anion Gap 06/23/2023 10  7 - 15 mmol/L Final    Urea Nitrogen 06/23/2023 8.5  8.0 - 23.0 mg/dL Final    Creatinine 06/23/2023 1.10  0.67 - 1.17 mg/dL Final    Calcium 06/23/2023 9.3  8.8 - 10.2 mg/dL Final    Glucose 06/23/2023 115 (H)  70 - 99 mg/dL Final    Alkaline Phosphatase 06/23/2023 145 (H)  40 - 129 U/L Final    AST 06/23/2023 31  0 - 45 U/L Final    Reference intervals for this test were updated on 6/12/2023 to more accurately reflect our healthy population. There may be differences in the flagging of prior results with similar values performed with this method. Interpretation of those prior results can be made in the context of the updated reference intervals.    ALT 06/23/2023 15  0 - 70 U/L Final    Reference intervals for this test were updated on 6/12/2023 to more accurately reflect our healthy population. There may be differences in the flagging of prior results with similar values performed with this method. Interpretation of those prior results can be made in the context of the  updated reference intervals.      Protein Total 06/23/2023 7.2  6.4 - 8.3 g/dL Final    Albumin 06/23/2023 4.6  3.5 - 5.2 g/dL Final    Bilirubin Total 06/23/2023 0.5  <=1.2 mg/dL Final    GFR Estimate 06/23/2023 70  >60 mL/min/1.73m2 Final

## 2023-10-05 ENCOUNTER — OFFICE VISIT (OUTPATIENT)
Dept: DERMATOLOGY | Facility: CLINIC | Age: 78
End: 2023-10-05
Payer: COMMERCIAL

## 2023-10-05 DIAGNOSIS — L50.9 HIVES: ICD-10-CM

## 2023-10-05 DIAGNOSIS — L81.9 HYPERPIGMENTATION OF SKIN: ICD-10-CM

## 2023-10-05 DIAGNOSIS — L73.9 FOLLICULITIS: Primary | ICD-10-CM

## 2023-10-05 PROCEDURE — 99204 OFFICE O/P NEW MOD 45 MIN: CPT | Mod: GC | Performed by: STUDENT IN AN ORGANIZED HEALTH CARE EDUCATION/TRAINING PROGRAM

## 2023-10-05 RX ORDER — CLINDAMYCIN PHOSPHATE 10 UG/ML
LOTION TOPICAL
Qty: 60 ML | Refills: 4 | Status: SHIPPED | OUTPATIENT
Start: 2023-10-05 | End: 2024-04-11

## 2023-10-05 RX ORDER — TRIAMCINOLONE ACETONIDE 1 MG/G
OINTMENT TOPICAL 2 TIMES DAILY
Qty: 15 G | Refills: 3 | Status: SHIPPED | OUTPATIENT
Start: 2023-10-05

## 2023-10-05 ASSESSMENT — PAIN SCALES - GENERAL: PAINLEVEL: NO PAIN (0)

## 2023-10-05 NOTE — PROGRESS NOTES
[facility] Dermatology Note  Encounter Date: Oct 5, 2023  Office Visit     Dermatology Problem List:  1. Folliculitis, posterior scalp  Current tx: BPO wash daily, clinda lotion BID, triam ointment PRN    ____________________________________________    Assessment & Plan:     # Folliculitis, posterior scalp (chronic flaring)a  Isolated pustule on back of scalp without any active scarring or large nodules. Pt previously using clindamycin lotion but not finding this helpful. Previously trialed keto shampoo as well w/o resolution. No e/o AKN or dissecting cellulitis of the scalp. Lower suspicion for pityrosporum. Pt mainly wanted reassurance that this was not a reflection of an internal disease and finds the spots tolerable. However, he is amenable to trial topicals/treatment for folliculitis as outlined below:   -5% Benzoyl Peroxide Wash Daily; counseled on bleaching effect on frabic/not skin  -continue with Clindamycin Lotion BID  -0.1% Triamcinolone Ointment BID PRN for two weeks on, one week off cycles.      Procedures Performed:   None    Follow-up: prn for new or changing lesions    Staff and Resident:     Bhupinder Saha MD  Internal Medicine-Dermatology PGY-2    Pt seen and examined with Dr. Belcher   ____________________________________________    CC: No chief complaint on file.    HPI:  Mr. Ivy Haynes is a(n) 78 year old male who presents today as a new patient for painful bumps on the back of the scalp. Has been getting them for years. Used clindamycin lotion and nizoral, which he did not find particularly helpful. He denies having similar painful bumps on the face, axilla, groin, or buttocks. He denies having any significant scarring from this.    Patient is otherwise feeling well, without additional skin concerns.    Labs Reviewed:  N/A    Physical Exam:  Vitals: There were no vitals taken for this visit.  SKIN: focused skin exam of scalp  - erythematous pustule on back of scalp. No scarring  or nodules appreciated   - No other lesions of concern on areas examined.     Medications:  Current Outpatient Medications   Medication    amLODIPine (NORVASC) 2.5 MG tablet    famotidine (PEPCID) 20 MG tablet    omeprazole (PRILOSEC) 20 MG DR capsule    tamsulosin (FLOMAX) 0.4 MG capsule     No current facility-administered medications for this visit.      Past Medical History:   Patient Active Problem List   Diagnosis    Hepatitis C    GERD (gastroesophageal reflux disease)    CARDIOVASCULAR SCREENING; LDL GOAL LESS THAN 160    Enchondroma of femur    Left knee DJD    Osteoarthritis, knee    S/P total knee replacement    Advanced directives, counseling/discussion    Hypertension goal BP (blood pressure) < 140/90    Chest pain    Tinea pedis    Rosacea    Retention of urine    Hepatic fibrosis    Groin pain    Dysuria    Dermatofibroma    Condyloma acuminatum    Backache     Past Medical History:   Diagnosis Date    Back pain     Chest pain     Gastro-oesophageal reflux disease     GERD (gastroesophageal reflux disease)     Hemorrhoid     Hepatitis C     Hypertension     Trigeminal neuralgia        CC Lamine Franco MD  [address]  [place] [address] on close of this encounter.

## 2023-10-05 NOTE — LETTER
10/5/2023       RE: Ivy Haynes  [address]  [facility] [address]     Dear Colleague,    Thank you for referring your patient, Ivy Haynes, to the [facility] [place] [facility]. Please see a copy of my visit note below.    [facility] Dermatology Note  Encounter Date: Oct 5, 2023  Office Visit     Dermatology Problem List:  1. Folliculitis, posterior scalp  Current tx: BPO wash daily, clinda lotion BID, triam ointment PRN    ____________________________________________    Assessment & Plan:     # Folliculitis, posterior scalp (chronic flaring)a  Isolated pustule on back of scalp without any active scarring or large nodules. Pt previously using clindamycin lotion but not finding this helpful. Previously trialed keto shampoo as well w/o resolution. No e/o AKN or dissecting cellulitis of the scalp. Lower suspicion for pityrosporum. Pt mainly wanted reassurance that this was not a reflection of an internal disease and finds the spots tolerable. However, he is amenable to trial topicals/treatment for folliculitis as outlined below:   -5% Benzoyl Peroxide Wash Daily; counseled on bleaching effect on frabic/not skin  -continue with Clindamycin Lotion BID  -0.1% Triamcinolone Ointment BID PRN for two weeks on, one week off cycles.      Procedures Performed:   None    Follow-up: prn for new or changing lesions    Staff and Resident:     Bhupinder Saha MD  Internal Medicine-Dermatology PGY-2    Pt seen and examined with Dr. Belcher   ____________________________________________    CC: No chief complaint on file.    HPI:  Mr. Ivy Haynes is a(n) 78 year old male who presents today as a new patient for painful bumps on the back of the scalp. Has been getting them for years. Used clindamycin lotion and nizoral, which he did not find particularly helpful. He denies having similar painful bumps on the face, axilla, groin, or  buttocks. He denies having any significant scarring from this.    Patient is otherwise feeling well, without additional skin concerns.    Labs Reviewed:  N/A    Physical Exam:  Vitals: There were no vitals taken for this visit.  SKIN: focused skin exam of scalp  - erythematous pustule on back of scalp. No scarring or nodules appreciated   - No other lesions of concern on areas examined.     Medications:  Current Outpatient Medications   Medication    amLODIPine (NORVASC) 2.5 MG tablet    famotidine (PEPCID) 20 MG tablet    omeprazole (PRILOSEC) 20 MG DR capsule    tamsulosin (FLOMAX) 0.4 MG capsule     No current facility-administered medications for this visit.      Past Medical History:   Patient Active Problem List   Diagnosis    Hepatitis C    GERD (gastroesophageal reflux disease)    CARDIOVASCULAR SCREENING; LDL GOAL LESS THAN 160    Enchondroma of femur    Left knee DJD    Osteoarthritis, knee    S/P total knee replacement    Advanced directives, counseling/discussion    Hypertension goal BP (blood pressure) < 140/90    Chest pain    Tinea pedis    Rosacea    Retention of urine    Hepatic fibrosis    Groin pain    Dysuria    Dermatofibroma    Condyloma acuminatum    Backache     Past Medical History:   Diagnosis Date    Back pain     Chest pain     Gastro-oesophageal reflux disease     GERD (gastroesophageal reflux disease)     Hemorrhoid     Hepatitis C     Hypertension     Trigeminal neuralgia        CC Lamine Franco MD  [address] [address] on close of this encounter.     Attestation with edits by Paul Belcher MD at 10/5/2023 11:11 AM:  I have personally examined this patient and agree with the resident doctor's documentation and plan of care. I have reviewed and amended the resident's note. The documentation accurately reflects my clinical observations, diagnoses, treatment and follow-up plans.     Paul Belcher MD  Dermatology Staff

## 2023-10-05 NOTE — NURSING NOTE
Dermatology Rooming Note    Ivy Haynes's goals for this visit include:   Chief Complaint   Patient presents with    Derm Problem     Ivy is here today for zits on the scalp      Cj SHELDON CMA

## 2023-10-24 ENCOUNTER — OFFICE VISIT (OUTPATIENT)
Dept: OTOLARYNGOLOGY | Facility: CLINIC | Age: 78
End: 2023-10-24
Payer: COMMERCIAL

## 2023-10-24 VITALS
HEART RATE: 82 BPM | BODY MASS INDEX: 22.12 KG/M2 | DIASTOLIC BLOOD PRESSURE: 94 MMHG | HEIGHT: 71 IN | SYSTOLIC BLOOD PRESSURE: 169 MMHG | WEIGHT: 158 LBS | OXYGEN SATURATION: 100 %

## 2023-10-24 DIAGNOSIS — M54.2 CERVICALGIA: Primary | ICD-10-CM

## 2023-10-24 PROCEDURE — 99213 OFFICE O/P EST LOW 20 MIN: CPT | Performed by: OTOLARYNGOLOGY

## 2023-10-24 RX ORDER — OXYCODONE AND ACETAMINOPHEN 5; 325 MG/1; MG/1
1 TABLET ORAL EVERY 6 HOURS PRN
Qty: 20 TABLET | Refills: 0 | Status: SHIPPED | OUTPATIENT
Start: 2023-10-24 | End: 2023-10-27

## 2023-10-24 ASSESSMENT — PAIN SCALES - GENERAL: PAINLEVEL: MODERATE PAIN (5)

## 2023-10-24 NOTE — PROGRESS NOTES
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 The patient appeared to have an intraoral infection (difficult to tell whether this was sublingual or submandibular infection) in 2021 and we decided against treatments and patient reported resolution 10/26/2021. At our last visit on 10/25/22, his styloid pain was relatively unchanged.    Today, he reports he has been exercising intensively, including hand stand push-ups. However, he had a fall recently, landing on his left shoulder, resulting in pain, which radiated into his neck 1.5 weeks ago. Additionally, he reports increased acid reflux causing increased mucus production, leading to postnasal drip and cough. He has been taking Pepcid for this, which does help. Lastly, he endorses ongoing pain along the hyoid.     Review of Systems:   Pertinent items are noted in HPI or as in patient entered ROS below, remainder of complete ROS is negative.       2023    11:26 AM    ENT ROS   Constitutional Problems with sleep   Ears, Nose, Throat Ear pain    Nasal congestion or drainage   Cardiopulmonary Cough        Physical Exam:   BP (!) 169/94 (BP Location: Right arm, Patient Position: Sitting, Cuff Size: Adult Regular)   Pulse 82   Ht 1.791 m (5' 10.5\")   Wt 71.7 kg (158 lb)   SpO2 100%   BMI 22.35 kg/m       PHYSICAL EXAMINATION:    Constitutional:  The patient was unaccompanied, well-groomed, and in no acute distress.    Skin:  Warm and pink.    Neurologic:  Alert and oriented x 3.  CN's III-XII within normal limits.  Voice normal.   Psychiatric:  The patient's affect was calm, cooperative, and appropriate. "    Respiratory:  Breathing comfortably without stridor or exertion of accessory muscles.    Eyes: Extraocular movement intact.    Head:  Normocephalic and atraumatic.  No lesions or scars.    Ears:  Pinnae and tragus non-tender.  EAC's and TM's were clear.    Nose:  Sinuses were non-tender.  Anterior rhinoscopy revealed midline septum and absence of purulence or polyps.    OC/OP:  Normal tongue, floor of mouth, buccal mucosa, and palate.  No lesions or masses on inspection or palpation.  No abnormal lymph tissue in the oropharynx.  The pterygoid region is non-tender.    Neck:  Supple with normal laryngeal and tracheal landmarks.  The parotid beds were without masses.  No palpable thyroid.  Lymphatic:  There is no palpable lymphadenopathy in the neck.      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 On exam, he is well appearing. For his increased mucus production, I recommended Mucinex, nasal irrigations, and club soda gargles. I will refill his Percocet prescription. He will follow up in 6 months.

## 2023-10-24 NOTE — LETTER
"10/24/2023       RE: Ivy Haynes  [address]  [facility] [address]     Dear Colleague,    Thank you for referring your patient, Ivy Haynes, to the [facility] EAR [facility]SE AND [facility]RO[facility] [place] at [facility]. Please see a copy of my visit note below.      Otolaryngology Clinic    Name: Ivy Haynes  MRN: [id number]  Age: 78 year old  : 1945  10/24/2023      Chief Complaint:   Follow up     History of Present Illness:   Ivy Haynes is a 78 year old male with a history of intermittent right-sided neck pain, degenerative spine disease, and Eagles syndrome s/p styloid process resection by Dr. Orion Mccormack many years ago who presents for follow up. The patient appeared to have an intraoral infection (difficult to tell whether this was sublingual or submandibular infection) in 2021 and we decided against treatments and patient reported resolution 10/26/2021. At our last visit on 10/25/22, his styloid pain was relatively unchanged.    Today, he reports he has been exercising intensively, including hand stand push-ups. However, he had a fall recently, landing on his left shoulder, resulting in pain, which radiated into his neck 1.5 weeks ago. Additionally, he reports increased acid reflux causing increased mucus production, leading to postnasal drip and cough. He has been taking Pepcid for this, which does help. Lastly, he endorses ongoing pain along the hyoid.     Review of Systems:   Pertinent items are noted in HPI or as in patient entered ROS below, remainder of complete ROS is negative.       2023    11:26 AM    ENT ROS   Constitutional Problems with sleep   Ears, Nose, Throat Ear pain    Nasal congestion or drainage   Cardiopulmonary Cough        Physical Exam:   BP (!) 169/94 (BP Location: Right arm, Patient Position: Sitting, Cuff Size: Adult Regular)   Pulse 82   Ht 1.791 m (5' 10.5\")   Wt 71.7 kg (158 lb)   SpO2 100%   BMI " 22.35 kg/m       PHYSICAL EXAMINATION:    Constitutional:  The patient was unaccompanied, well-groomed, and in no acute distress.    Skin:  Warm and pink.    Neurologic:  Alert and oriented x 3.  CN's III-XII within normal limits.  Voice normal.   Psychiatric:  The patient's affect was calm, cooperative, and appropriate.    Respiratory:  Breathing comfortably without stridor or exertion of accessory muscles.    Eyes: Extraocular movement intact.    Head:  Normocephalic and atraumatic.  No lesions or scars.    Ears:  Pinnae and tragus non-tender.  EAC's and TM's were clear.    Nose:  Sinuses were non-tender.  Anterior rhinoscopy revealed midline septum and absence of purulence or polyps.    OC/OP:  Normal tongue, floor of mouth, buccal mucosa, and palate.  No lesions or masses on inspection or palpation.  No abnormal lymph tissue in the oropharynx.  The pterygoid region is non-tender.    Neck:  Supple with normal laryngeal and tracheal landmarks.  The parotid beds were without masses.  No palpable thyroid.  Lymphatic:  There is no palpable lymphadenopathy in the neck.      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 On exam, he is well appearing. For his increased mucus production, I recommended Mucinex, nasal irrigations, and club soda gargles. I will refill his Percocet prescription. He will follow up in 6 months.      Again, thank you for allowing me to participate in the care of your patient.      Sincerely,    Connor Torres MD

## 2023-10-24 NOTE — PATIENT INSTRUCTIONS
"You were seen in the clinic today by Dr. Torres. If you have any questions or concerns after your appointment, please call the clinic at [phone]. Press \"1\" for scheduling, press \"3\" for nurse advice.      2.   Plan to return to the clinic in 6 months    Jackie FAUSTIN, RN  [facility]  Department of Otolaryngology  [phone]     "

## 2023-11-16 ENCOUNTER — TELEPHONE (OUTPATIENT)
Dept: FAMILY MEDICINE | Facility: CLINIC | Age: 78
End: 2023-11-16
Payer: COMMERCIAL

## 2023-11-16 NOTE — COMMUNITY RESOURCES LIST (ENGLISH)
11/16/2023   [facility]  N/A  For questions about this resource list or additional care needs, please contact your primary care clinic or care manager.  Phone: [phone]   Email: N/A   Address: [address] [place] [address]   Hours: N/A        Financial Stability       Utility payment assistance  1  [employer] Distance: 3.1 miles      In-Person   [address] [place] [address]  Language: English  Hours: Mon - Fri 8:00 AM - 3:00 PM  Fees: Free   Phone: (592) 166-3413 Ext.14 Email: [email or web address] Website: [email or web address]     2  [facility] - Utility payment assistance Distance: 3.8 miles      In-Person, Phone/Virtual   [address] [place] [address]  Language: English  Hours: Mon - Thu 9:00 AM - 3:00 PM  Fees: Free   Phone: (454) 509-2626 Email: [email or web address] Website: [email or web address]          Important Numbers & Websites       Emergency Services   911  [facility] Services   311  Poison Control   (387) 216-3091  Suicide Prevention Lifeline   (841) 513-1640 (TALK)  Child Abuse Hotline   (637) 137-9005 (4-A-Child)  Sexual Assault Hotline   (644) 287-3100 (HOPE)  National Runaway Safeline   (640) 528-3134 (RUNAWAY)  All-Options Talkline   (554) 827-4969  Substance Abuse Referral   (963) 843-7772 (HELP)

## 2023-11-16 NOTE — TELEPHONE ENCOUNTER
5 days headache on Rt temple. And Rt temple   Exertion and having a pain in head lasts all day, not really bad and stabbing.   Pt is taking tylenol prn but does not resolve.   High blood pressure is normal high, 135/88 I general.   No extra water wt. And works out every day is able to do hand stands and is able to get though workout,     No vison issues, no numbness, some tingling in hand due to old shoulder problems and is still having strength.     No N/V/D eating well, staying hydrated.  Pt would like to be seen.   Scheduled for tomorrow 820 visit.   Thanks,   Sushil Fernando RN  [facility]

## 2023-11-17 ENCOUNTER — OFFICE VISIT (OUTPATIENT)
Dept: FAMILY MEDICINE | Facility: CLINIC | Age: 78
End: 2023-11-17
Payer: COMMERCIAL

## 2023-11-17 VITALS
DIASTOLIC BLOOD PRESSURE: 88 MMHG | OXYGEN SATURATION: 99 % | HEART RATE: 77 BPM | WEIGHT: 160 LBS | TEMPERATURE: 98.1 F | HEIGHT: 71 IN | BODY MASS INDEX: 22.4 KG/M2 | SYSTOLIC BLOOD PRESSURE: 140 MMHG

## 2023-11-17 DIAGNOSIS — R51.9 SCALP PAIN: Primary | ICD-10-CM

## 2023-11-17 DIAGNOSIS — R51.9 TEMPLE TENDERNESS: ICD-10-CM

## 2023-11-17 DIAGNOSIS — I10 HYPERTENSION GOAL BP (BLOOD PRESSURE) < 140/90: ICD-10-CM

## 2023-11-17 DIAGNOSIS — M62.838 MUSCLE SPASM: ICD-10-CM

## 2023-11-17 PROCEDURE — 99214 OFFICE O/P EST MOD 30 MIN: CPT | Performed by: FAMILY MEDICINE

## 2023-11-17 ASSESSMENT — PAIN SCALES - GENERAL: PAINLEVEL: SEVERE PAIN (6)

## 2023-11-17 ASSESSMENT — ENCOUNTER SYMPTOMS: HEADACHES: 1

## 2023-11-17 NOTE — PROGRESS NOTES
"  Assessment & Plan     Scalp pain  muscle strain was bathroom episode   Very tender over temple muscle  Ice/ rest/ reassurance  Follow up in 1 month.     Muscle spasm  ice    Hypertension goal BP (blood pressure) < 140/90  Has been Well controlled                    Lamine Franco MD  [facility] MALU Haynes is a 78 year old, presenting for the following health issues:  Headache (Going on the last 5-6 days, started with straining going to the bathroom-sharp  pain in side of head, now brought on with exercise-does hurt eye, took b/p last evening and \"was incredibly high\")      11/17/2023     8:29 AM   Additional Questions   Roomed by Vanessa       Headache     History of Present Illness       Headaches:   Since the patient's last clinic visit, headaches are: worsened  The patient is getting headaches:  The last 5 days  He is able to do normal daily activities when he has a migraine.  The patient is taking the following rescue/relief medications:  Tylenol   Patient states \"I get no relief\" from the rescue/relief medications.   The patient is taking the following medications to prevent migraines:  No medications to prevent migraines  In the past 4 weeks, the patient has gone to an Urgent Care or Emergency Room 0 times times due to headaches.    He eats 2-3 servings of fruits and vegetables daily.He consumes 1 sweetened beverage(s) daily.He exercises with enough effort to increase his heart rate 60 or more minutes per day.  He exercises with enough effort to increase his heart rate 6 days per week.   He is taking medications regularly.           Hypertension Follow-up    Do you check your blood pressure regularly outside of the clinic? Yes   Are you following a low salt diet? Yes  Are your blood pressures ever more than 140 on the top number (systolic) OR more   than 90 on the bottom number (diastolic), for example 140/90? No      Review of Systems   Neurological:  Positive " "for headaches.      Constitutional, HEENT, cardiovascular, pulmonary, gi and gu systems are negative, except as otherwise noted.      Objective    BP (!) 140/88 (BP Location: Left arm, Patient Position: Sitting, Cuff Size: Adult Regular)   Pulse 77   Temp 98.1  F (36.7  C) (Temporal)   Ht 1.796 m (5' 10.71\")   Wt 72.6 kg (160 lb)   SpO2 99%   BMI 22.50 kg/m    Body mass index is 22.5 kg/m .  Physical Exam   GENERAL: alert, no distress, and fit  HENT: ear canals and TM's normal, nose and mouth without ulcers or lesions  NECK: no adenopathy, no asymmetry, masses, or scars and thyroid normal to palpation  RESP: lungs clear to auscultation - no rales, rhonchi or wheezes  CV: regular rate and rhythm, normal S1 S2, no S3 or S4, no murmur, click or rub, no peripheral edema and peripheral pulses strong  MS: tenderness to palpation right upper temple muscles  SKIN: no suspicious lesions or rashes  NEURO: Normal strength and tone, mentation intact and speech normal    Office Visit on 09/22/2023   Component Date Value Ref Range Status    Sodium 09/22/2023 140  136 - 145 mmol/L Final    Potassium 09/22/2023 4.2  3.4 - 5.3 mmol/L Final    Chloride 09/22/2023 103  98 - 107 mmol/L Final    Carbon Dioxide (CO2) 09/22/2023 24  22 - 29 mmol/L Final    Anion Gap 09/22/2023 13  7 - 15 mmol/L Final    Urea Nitrogen 09/22/2023 11.4  8.0 - 23.0 mg/dL Final    Creatinine 09/22/2023 1.12  0.67 - 1.17 mg/dL Final    Calcium 09/22/2023 9.5  8.8 - 10.2 mg/dL Final    Glucose 09/22/2023 102 (H)  70 - 99 mg/dL Final    Alkaline Phosphatase 09/22/2023 132 (H)  40 - 129 U/L Final    AST 09/22/2023 28  0 - 45 U/L Final    Reference intervals for this test were updated on 6/12/2023 to more accurately reflect our healthy population. There may be differences in the flagging of prior results with similar values performed with this method. Interpretation of those prior results can be made in the context of the updated reference intervals.    ALT " 09/22/2023 11  0 - 70 U/L Final    Reference intervals for this test were updated on 6/12/2023 to more accurately reflect our healthy population. There may be differences in the flagging of prior results with similar values performed with this method. Interpretation of those prior results can be made in the context of the updated reference intervals.      Protein Total 09/22/2023 6.8  6.4 - 8.3 g/dL Final    Albumin 09/22/2023 4.1  3.5 - 5.2 g/dL Final    Bilirubin Total 09/22/2023 0.6  <=1.2 mg/dL Final    GFR Estimate 09/22/2023 68  >60 mL/min/1.73m2 Final    WBC Count 09/22/2023 2.6 (L)  4.0 - 11.0 10e3/uL Final    RBC Count 09/22/2023 4.48  4.40 - 5.90 10e6/uL Final    Hemoglobin 09/22/2023 14.8  13.3 - 17.7 g/dL Final    Hematocrit 09/22/2023 43.5  40.0 - 53.0 % Final    MCV 09/22/2023 97  78 - 100 fL Final    MCH 09/22/2023 33.0  26.5 - 33.0 pg Final    MCHC 09/22/2023 34.0  31.5 - 36.5 g/dL Final    RDW 09/22/2023 12.0  10.0 - 15.0 % Final    Platelet Count 09/22/2023 175  150 - 450 10e3/uL Final                     Answers submitted by the patient for this visit:  Migraine Visit Questionnaire (Submitted on 11/16/2023)  Chief Complaint: Chronic problems general questions HPI Form  Headache Symptoms are: worsened  How often are you getting headaches or migraines? : The last 5 days  Are you able to do normal daily activities when you have a migraine?: Yes  Migraine Rescue/Relief Medications:: Tylenol  Effectiveness of rescue/relief medications:: I get no relief  Migraine Preventative Medications:: no medications to prevent migraines  ER or UC Visits:: 0 times  General Questionnaire (Submitted on 11/16/2023)  Chief Complaint: Chronic problems general questions HPI Form  How many servings of fruits and vegetables do you eat daily?: 2-3  On average, how many sweetened beverages do you drink each day (Examples: soda, juice, sweet tea, etc.  Do NOT count diet or artificially sweetened beverages)?: 1  How many  minutes a day do you exercise enough to make your heart beat faster?: 60 or more  How many days a week do you exercise enough to make your heart beat faster?: 6  How many days per week do you miss taking your medication?: 0

## 2023-12-21 NOTE — TELEPHONE ENCOUNTER
Action 2024 jtv 5:00 PM    Action Taken CSS called patient. Patient did not . CSS LVM for patient to return call.      Action 2024 JTV 5:15 PM    Action Taken CSS received and resolved images from [facility] [place] [place].   MEDICAL RECORDS REQUEST   [facility]  Urology Clinic  [address] [place] [address]  PHONE: [phone]  Fax: [phone]        FUTURE VISIT INFORMATION                                                   Rayrayubenita Haynes, : 1945 scheduled for future visit at [facility] Urology Clinic    APPOINTMENT INFORMATION:  Date: 2024 - New Appt: 24  Provider:  Tyson Dawson MD  Reason for Visit/Diagnosis: Uretropelvic Junction (UPJ) Obstruction and hydronephrosis    REFERRAL INFORMATION:  Referring provider:  Irvin Warner MD      RECORDS REQUESTED FOR VISIT                                                     NOTES  STATUS/DETAILS   OFFICE NOTE from other specialist  YES   MEDICATION LIST  yes   LABS     URINALYSIS (UA)  yes   images  yes, [facility] [place] [place]  2023 -- US KIDNEY  2023 -- CT UROGRAM  2022 -- XR ABD  2023 -- XR RETROGRADE  2022 -- NM RENOGRAM  2022, 2022 -- CT ABD PELVIS  2022, 2019 -- US ABD  2019 -- MR ABD     PRE-VISIT CHECKLIST      Joint diagnostic appointment coordinated correctly          (ensure right order & amount of time) Yes   RECORD COLLECTION COMPLETE yes

## 2023-12-29 DIAGNOSIS — K21.00 GASTROESOPHAGEAL REFLUX DISEASE WITH ESOPHAGITIS WITHOUT HEMORRHAGE: ICD-10-CM

## 2024-01-08 ENCOUNTER — PRE VISIT (OUTPATIENT)
Dept: UROLOGY | Facility: CLINIC | Age: 79
End: 2024-01-08
Payer: COMMERCIAL

## 2024-01-08 NOTE — CONFIDENTIAL NOTE
Reason for visit: consult     Relevant information: UPJ obstruction and hydronephrosis    Records/imaging/labs/orders: in epic    At Rooming: adrián Perez  1/8/2024  3:38 PM

## 2024-01-12 ENCOUNTER — OFFICE VISIT (OUTPATIENT)
Dept: UROLOGY | Facility: CLINIC | Age: 79
End: 2024-01-12
Payer: COMMERCIAL

## 2024-01-12 VITALS — HEART RATE: 90 BPM | SYSTOLIC BLOOD PRESSURE: 156 MMHG | DIASTOLIC BLOOD PRESSURE: 82 MMHG

## 2024-01-12 DIAGNOSIS — N13.30 HYDRONEPHROSIS OF RIGHT KIDNEY: Primary | ICD-10-CM

## 2024-01-12 PROCEDURE — 99214 OFFICE O/P EST MOD 30 MIN: CPT | Performed by: UROLOGY

## 2024-01-12 RX ORDER — TAMSULOSIN HYDROCHLORIDE 0.4 MG/1
0.4 CAPSULE ORAL DAILY
Qty: 90 CAPSULE | Refills: 3 | Status: SHIPPED | OUTPATIENT
Start: 2024-01-12 | End: 2024-09-18 | Stop reason: SINTOL

## 2024-01-12 ASSESSMENT — PAIN SCALES - GENERAL: PAINLEVEL: NO PAIN (0)

## 2024-01-12 NOTE — NURSING NOTE
Chief Complaint   Patient presents with    Consult     hydrocele       Blood pressure (!) 156/82, pulse 90. There is no height or weight on file to calculate BMI.    Patient Active Problem List   Diagnosis    Hepatitis C    GERD (gastroesophageal reflux disease)    CARDIOVASCULAR SCREENING; LDL GOAL LESS THAN 160    Enchondroma of femur    Left knee DJD    Osteoarthritis, knee    S/P total knee replacement    Advanced directives, counseling/discussion    Hypertension goal BP (blood pressure) < 140/90    Chest pain    Tinea pedis    Rosacea    Retention of urine    Hepatic fibrosis    Groin pain    Dysuria    Dermatofibroma    Condyloma acuminatum    Backache       Allergies   Allergen Reactions    Sulfa Antibiotics        Current Outpatient Medications   Medication Sig Dispense Refill    amLODIPine (NORVASC) 2.5 MG tablet TAKE 1 TABLET(2.5 MG) BY MOUTH DAILY 90 tablet 3    omeprazole (PRILOSEC) 20 MG DR capsule TAKE 1 CAPSULE(20 MG) BY MOUTH TWICE DAILY 180 capsule 0    tamsulosin (FLOMAX) 0.4 MG capsule TAKE 1 CAPSULE BY MOUTH DAILY AFTER A MEAL. TAKE WITHIN 30 MINUTES AFTER THE SAME MEAL EACH DAY      benzoyl peroxide 5 % external liquid Wash on back of scalp (and areas of painful bumps) daily. Wash off thoroughly and dry off with an old, light colored towel, as this medication can bleach fabric (but not skin) (Patient not taking: Reported on 1/12/2024) 236 mL 11    clindamycin (CLEOCIN T) 1 % external lotion Apply twice daily to back of scalp/areas with painful bumps (Patient not taking: Reported on 1/12/2024) 60 mL 4    famotidine (PEPCID) 20 MG tablet Take 20 mg by mouth as needed (Patient not taking: Reported on 6/23/2023)      triamcinolone (KENALOG) 0.1 % external ointment Apply topically 2 times daily Apply twice daily as needed to painful bumps on back of scalp. Use for up to two weeks at a time in the same spot, then take one week off before restarting a two week cycle (Patient not taking: Reported on  "1/12/2024) 15 g 3       Social History     Tobacco Use    Smoking status: Former     Packs/day: 0.10     Years: 4.00     Additional pack years: 0.00     Total pack years: 0.40     Types: Cigarettes     Start date: 1/1/2011     Quit date: 1/1/2021     Years since quitting: 3.0     Passive exposure: Past    Smokeless tobacco: Never    Tobacco comments:     quit 1979 until 2013. started after trip to [place]   Vaping Use    Vaping Use: Never used   Substance Use Topics    Alcohol use: Yes     Comment: 2-3 beer a week    Drug use: Yes     Types: Marijuana     Comment: Every other month-\"just a puff of marijuana\"       Praveen Bain  1/12/2024  9:14 AM     "

## 2024-01-12 NOTE — PATIENT INSTRUCTIONS
Please follow up with Dr. Dawson in 4-6 weeks with a completed renal ultra sound. Orders are in     It was a pleasure meeting with you today.  Thank you for allowing me and my team the privilege of caring for you today.  YOU are the reason we are here, and I truly hope we provided you with the excellent service you deserve.  Please let us know if there is anything else we can do for you so that we can be sure you are leaving completely satisfied with your care experience.

## 2024-01-12 NOTE — LETTER
1/12/2024       RE: Ivy Haynes  [address]  [facility] [address]     Dear Colleague,    Thank you for referring your patient, Ivy Haynes, to the [facility] UROLOGY CLINIC [place] at [facility]. Please see a copy of my visit note below.      Urology Clinic  GILMER Dawson MD  Jan 12, 2024  78 year old male    Assessment and Plan    Right  ureteropelvic junction obstruction   12/5/22 Lasix Renogram (pre pyeloplasty) Left 67%. Right 33%.  No radiotracer excretion from the right kidney  12/2022 Right robot pyeloplasty.  Curtis Claudio, [facility].    BPH  2023 on tamsulosin  1/12/24 He is taking flomax and it is working well.    Erectile dysfunction  2023 On cialis  2024  Not interested in cialis prescription    Left hydrocele  5/4/21 scrotal ultrasound showed microlithiasis and small left hydrocele  1/12/24 exam shows minimal left hydrocele    History of bilateral inguinal hernia repairs  1/12/24 exam showed no recurrent hernia      Plan  Return to clinic in 1 month after Lasix Renogram.  Video visit is ok.  Renew flomax.  __________________________________________________    HPI  He is here today for follow-up of his pyeloplasty that he had done in 2022 at [facility].  He denies any symptoms from this.  He is changing his care here due to insert insurance.  He also has concerns about a small left hydrocele.  He notes that he has been taking his Flomax and this is working well for him and he denies any voiding complaints.  He has used Cialis in the past just briefly and does not desire any further medication.  On physical exam there is no evidence of any inguinal hernia.  There is minimal left hydrocele and the left testicle is only some slightly larger than the right.  There is no masses in either testicle.        I reviewed the radiologic images and reports from the radiologic exam (5/4/21 scrotal ultrasound) listed above.   My independent interpretation is listed there as well.        I reviewed the following laboratory data and went over findings with patient:  Recent Labs   Lab Test 09/22/23  1331 06/23/23  1448 05/11/23  1334 12/01/22  1700   WBC 2.6* 2.9* 2.6* 3.3*   HGB 14.8 15.0 14.4 15.5    180 175 215     Recent Labs   Lab Test 09/22/23  1331 06/23/23  1448 05/11/23  1334 12/01/22  1700 12/10/21  1752 04/30/21  1645 11/20/20  0754 09/25/20  1027 05/13/20  1930   CR 1.12 1.10 1.18* 1.58*   < > 1.02 1.07 1.17 0.97   GFRESTIMATED 68 70 64 45*   < > 72 68 61 77   GFRESTBLACK  --   --   --   --   --  83 79 71 89   * 115* 91 99   < > 115* 108* 94 104*    < > = values in this interval not displayed.         CC  Patient Care Team:  Lamine Franco MD as PCP - General (Family Practice)  Te Soriano MD as MD (Surgery)  Regine Ochoa MD as MD (Family Medicine - Sports Medicine)  Connor Torres MD as MD (Otolaryngology)  Lamine Franco MD as Assigned PCP  Marily Aldana MD as MD (Ophthalmology)  Connor Torres MD as Assigned Surgical Provider  Kevin Saha MD as Intern  Irvin Warner MD as Referring Physician (Emergency Medicine)  Tyson Dawson MD as MD (Urology)      Copy to patient  FARUQ FARUQ  [address]  [facility] [address]      Again, thank you for allowing me to participate in the care of your patient.      Sincerely,    Tyson Dawson MD

## 2024-01-12 NOTE — PROGRESS NOTES
Urology Clinic  GILMER Dawson MD  Jan 12, 2024  78 year old male    Assessment and Plan    Right  ureteropelvic junction obstruction   12/5/22 Lasix Renogram (pre pyeloplasty) Left 67%. Right 33%.  No radiotracer excretion from the right kidney  12/2022 Right robot pyeloplasty.  Curtis Claudio, [facility].    BPH  2023 on tamsulosin  1/12/24 He is taking flomax and it is working well.    Erectile dysfunction  2023 On cialis  2024  Not interested in cialis prescription    Left hydrocele  5/4/21 scrotal ultrasound showed microlithiasis and small left hydrocele  1/12/24 exam shows minimal left hydrocele    History of bilateral inguinal hernia repairs  1/12/24 exam showed no recurrent hernia      Plan  Return to clinic in 1 month after Lasix Renogram.  Video visit is ok.  Renew flomax.  __________________________________________________    HPI  He is here today for follow-up of his pyeloplasty that he had done in 2022 at [facility].  He denies any symptoms from this.  He is changing his care here due to insert insurance.  He also has concerns about a small left hydrocele.  He notes that he has been taking his Flomax and this is working well for him and he denies any voiding complaints.  He has used Cialis in the past just briefly and does not desire any further medication.  On physical exam there is no evidence of any inguinal hernia.  There is minimal left hydrocele and the left testicle is only some slightly larger than the right.  There is no masses in either testicle.        I reviewed the radiologic images and reports from the radiologic exam (5/4/21 scrotal ultrasound) listed above.  My independent interpretation is listed there as well.        I reviewed the following laboratory data and went over findings with patient:  Recent Labs   Lab Test 09/22/23  1331 06/23/23  1448 05/11/23  1334 12/01/22  1700   WBC 2.6* 2.9* 2.6* 3.3*   HGB 14.8 15.0 14.4 15.5    180 175 215     Recent  Labs   Lab Test 09/22/23  1331 06/23/23  1448 05/11/23  1334 12/01/22  1700 12/10/21  1752 04/30/21  1645 11/20/20  0754 09/25/20  1027 05/13/20  1930   CR 1.12 1.10 1.18* 1.58*   < > 1.02 1.07 1.17 0.97   GFRESTIMATED 68 70 64 45*   < > 72 68 61 77   GFRESTBLACK  --   --   --   --   --  83 79 71 89   * 115* 91 99   < > 115* 108* 94 104*    < > = values in this interval not displayed.         CC  Patient Care Team:  Lamine Franco MD as PCP - General (Family Practice)  Te Soriano MD as MD (Surgery)  Regine Ochoa MD as MD (Family Medicine - Sports Medicine)  Connor Torres MD as MD (Otolaryngology)  Lamine Franco MD as Assigned PCP  Marily Aldana MD as MD (Ophthalmology)  Connor Torres MD as Assigned Surgical Provider  Kevin Saha MD as Intern  Timmy, Irvin Romero MD as Referring Physician (Emergency Medicine)  Tyson Dawson MD as MD (Urology)      Copy to patient  FARUANETTE FARUQ  [address]  [facility] [address]

## 2024-01-18 ENCOUNTER — PRE VISIT (OUTPATIENT)
Dept: UROLOGY | Facility: CLINIC | Age: 79
End: 2024-01-18
Payer: COMMERCIAL

## 2024-01-18 NOTE — CONFIDENTIAL NOTE
Reason for visit: imaging review     Relevant information: hydronephrosis of right kidney    Records/imaging/labs/orders: renogram scheduled same day prior to visit    At Rooming: adrián Perez  1/18/2024  1:13 PM

## 2024-01-19 ENCOUNTER — PRE VISIT (OUTPATIENT)
Dept: UROLOGY | Facility: CLINIC | Age: 79
End: 2024-01-19

## 2024-02-09 ENCOUNTER — OFFICE VISIT (OUTPATIENT)
Dept: UROLOGY | Facility: CLINIC | Age: 79
End: 2024-02-09
Payer: COMMERCIAL

## 2024-02-09 ENCOUNTER — HOSPITAL ENCOUNTER (OUTPATIENT)
Dept: NUCLEAR MEDICINE | Facility: CLINIC | Age: 79
Setting detail: NUCLEAR MEDICINE
Discharge: HOME | End: 2024-02-09
Attending: UROLOGY | Admitting: UROLOGY
Payer: COMMERCIAL

## 2024-02-09 VITALS — HEART RATE: 94 BPM | DIASTOLIC BLOOD PRESSURE: 88 MMHG | SYSTOLIC BLOOD PRESSURE: 138 MMHG

## 2024-02-09 DIAGNOSIS — N13.30 HYDRONEPHROSIS OF RIGHT KIDNEY: ICD-10-CM

## 2024-02-09 DIAGNOSIS — Q62.39 CONGENITAL OBSTRUCTION OF URETEROPELVIC JUNCTION: Primary | ICD-10-CM

## 2024-02-09 PROCEDURE — 78708 K FLOW/FUNCT IMAGE W/DRUG: CPT | Mod: 26 | Performed by: RADIOLOGY

## 2024-02-09 PROCEDURE — 78708 K FLOW/FUNCT IMAGE W/DRUG: CPT

## 2024-02-09 PROCEDURE — 250N000011 HC RX IP 250 OP 636: Performed by: UROLOGY

## 2024-02-09 PROCEDURE — 343N000001 HC RX 343: Performed by: UROLOGY

## 2024-02-09 PROCEDURE — A9562 TC99M MERTIATIDE: HCPCS | Performed by: UROLOGY

## 2024-02-09 PROCEDURE — 99214 OFFICE O/P EST MOD 30 MIN: CPT | Performed by: UROLOGY

## 2024-02-09 RX ORDER — FUROSEMIDE 10 MG/ML
20 INJECTION INTRAMUSCULAR; INTRAVENOUS ONCE
Status: COMPLETED | OUTPATIENT
Start: 2024-02-09 | End: 2024-02-09

## 2024-02-09 RX ADMIN — TECHNESCAN TC 99M MERTIATIDE 10.5 MILLICURIE: 1 INJECTION, POWDER, LYOPHILIZED, FOR SOLUTION INTRAVENOUS at 07:23

## 2024-02-09 RX ADMIN — FUROSEMIDE 20 MG: 10 INJECTION, SOLUTION INTRAMUSCULAR; INTRAVENOUS at 07:43

## 2024-02-09 ASSESSMENT — PAIN SCALES - GENERAL: PAINLEVEL: NO PAIN (0)

## 2024-02-09 NOTE — PROGRESS NOTES
Urology Clinic  GILMER Dawson MD  Feb 9, 2024  78 year old male    Assessment and Plan    Right  ureteropelvic junction obstruction   12/5/22 Lasix Renogram (pre pyeloplasty) Left 67%. Right 33%.  No radiotracer excretion from the right kidney  12/2022 Right robot pyeloplasty.  Curtis Claudio, [facility].  2/9/24 Lasix Renogram  Left 58%  Right 42%.     BPH  2023 on tamsulosin  1/12/24 He is taking flomax and it is working well.    Erectile dysfunction  2023 On cialis  2024  Not interested in cialis prescription    Left hydrocele  5/4/21 scrotal ultrasound showed microlithiasis and small left hydrocele  1/12/24 exam shows minimal left hydrocele    History of bilateral inguinal hernia repairs  1/12/24 exam showed no recurrent hernia      Plan  Follow-up in 1-2 years for lasix renogram and PA visit.  Referral to Massimo Mccullough for BENIGN PROSTATIC HYPERPLASIA evaluation.  __________________________________________________    HPI  1/12/24  He is here today for follow-up of his pyeloplasty that he had done in 2022 at [facility].  He denies any symptoms from this.  He is changing his care here due to insert insurance.  He also has concerns about a small left hydrocele.  He notes that he has been taking his Flomax and this is working well for him and he denies any voiding complaints.  He has used Cialis in the past just briefly and does not desire any further medication.  On physical exam there is no evidence of any inguinal hernia.  There is minimal left hydrocele and the left testicle is only some slightly larger than the right.  There is no masses in either testicle.    2/9/24  Stable mild right flank pain.  No infections or hematuria.    I reviewed the radiologic images and reports from the radiologic exam (2/9/24 Lasix Renogram) listed above.  My independent interpretation is listed there as well.      I reviewed the following laboratory data and went over findings with patient:  Recent  Labs   Lab Test 09/22/23  1331 06/23/23  1448 05/11/23  1334 12/01/22  1700   WBC 2.6* 2.9* 2.6* 3.3*   HGB 14.8 15.0 14.4 15.5    180 175 215     Recent Labs   Lab Test 09/22/23  1331 06/23/23  1448 05/11/23  1334 12/01/22  1700 12/10/21  1752 04/30/21  1645 11/20/20  0754 09/25/20  1027 05/13/20  1930   CR 1.12 1.10 1.18* 1.58*   < > 1.02 1.07 1.17 0.97   GFRESTIMATED 68 70 64 45*   < > 72 68 61 77   GFRESTBLACK  --   --   --   --   --  83 79 71 89   * 115* 91 99   < > 115* 108* 94 104*    < > = values in this interval not displayed.           CC  Patient Care Team:  Lamine Franco MD as PCP - General (Family Practice)  Te Soriano MD as MD (Surgery)  Regine Ochoa MD as MD (Family Medicine - Sports Medicine)  Connor Torres MD as MD (Otolaryngology)  Lamine Franco MD as Assigned PCP  Marily Aldana MD as MD (Ophthalmology)  Connor Torres MD as Assigned Surgical Provider  Kevin Saha MD as Intern  Timmy, Irvin Romero MD as Referring Physician (Emergency Medicine)  Tyson Dawson MD as MD (Urology)      Copy to patient  FARUQ FARUQ  [address]  [facility] [address]

## 2024-02-09 NOTE — NURSING NOTE
Chief Complaint   Patient presents with    Follow Up       Blood pressure 138/88, pulse 94. There is no height or weight on file to calculate BMI.    Patient Active Problem List   Diagnosis    Hepatitis C    GERD (gastroesophageal reflux disease)    CARDIOVASCULAR SCREENING; LDL GOAL LESS THAN 160    Enchondroma of femur    Left knee DJD    Osteoarthritis, knee    S/P total knee replacement    Advanced directives, counseling/discussion    Hypertension goal BP (blood pressure) < 140/90    Chest pain    Tinea pedis    Rosacea    Retention of urine    Hepatic fibrosis    Groin pain    Dysuria    Dermatofibroma    Condyloma acuminatum    Backache       Allergies   Allergen Reactions    Sulfa Antibiotics        Current Outpatient Medications   Medication Sig Dispense Refill    amLODIPine (NORVASC) 2.5 MG tablet TAKE 1 TABLET(2.5 MG) BY MOUTH DAILY 90 tablet 3    omeprazole (PRILOSEC) 20 MG DR capsule TAKE 1 CAPSULE(20 MG) BY MOUTH TWICE DAILY 180 capsule 0    tamsulosin (FLOMAX) 0.4 MG capsule Take 1 capsule (0.4 mg) by mouth daily 90 capsule 3    benzoyl peroxide 5 % external liquid Wash on back of scalp (and areas of painful bumps) daily. Wash off thoroughly and dry off with an old, light colored towel, as this medication can bleach fabric (but not skin) (Patient not taking: Reported on 1/12/2024) 236 mL 11    clindamycin (CLEOCIN T) 1 % external lotion Apply twice daily to back of scalp/areas with painful bumps (Patient not taking: Reported on 1/12/2024) 60 mL 4    famotidine (PEPCID) 20 MG tablet Take 20 mg by mouth as needed (Patient not taking: Reported on 6/23/2023)      tamsulosin (FLOMAX) 0.4 MG capsule TAKE 1 CAPSULE BY MOUTH DAILY AFTER A MEAL. TAKE WITHIN 30 MINUTES AFTER THE SAME MEAL EACH DAY      triamcinolone (KENALOG) 0.1 % external ointment Apply topically 2 times daily Apply twice daily as needed to painful bumps on back of scalp. Use for up to two weeks at a time in the same spot, then take one week  "off before restarting a two week cycle (Patient not taking: Reported on 1/12/2024) 15 g 3       Social History     Tobacco Use    Smoking status: Former     Packs/day: 0.10     Years: 4.00     Additional pack years: 0.00     Total pack years: 0.40     Types: Cigarettes     Start date: 1/1/2011     Quit date: 1/1/2021     Years since quitting: 3.1     Passive exposure: Past    Smokeless tobacco: Never    Tobacco comments:     quit 1979 until 2013. started after trip to [place]   Vaping Use    Vaping Use: Never used   Substance Use Topics    Alcohol use: Yes     Comment: 2-3 beer a week    Drug use: Yes     Types: Marijuana     Comment: Every other month-\"just a puff of marijuana\"       Praveen Bain  2/9/2024  9:45 AM     "

## 2024-02-09 NOTE — LETTER
2/9/2024       RE: Ivy Haynes  [address]  [facility] [address]     Dear Colleague,    Thank you for referring your patient, Ivy Haynes, to the [facility] [place] at [facility]. Please see a copy of my visit note below.      Urology Clinic  GILMER Dawson MD  Feb 9, 2024  78 year old male    Assessment and Plan    Right  ureteropelvic junction obstruction   12/5/22 Lasix Renogram (pre pyeloplasty) Left 67%. Right 33%.  No radiotracer excretion from the right kidney  12/2022 Right robot pyeloplasty.  Curtis Claudio, [facility].  2/9/24 Lasix Renogram  Left 58%  Right 42%.     BPH  2023 on tamsulosin  1/12/24 He is taking flomax and it is working well.    Erectile dysfunction  2023 On cialis  2024  Not interested in cialis prescription    Left hydrocele  5/4/21 scrotal ultrasound showed microlithiasis and small left hydrocele  1/12/24 exam shows minimal left hydrocele    History of bilateral inguinal hernia repairs  1/12/24 exam showed no recurrent hernia      Plan  Follow-up in 1-2 years for lasix renogram and PA visit.  Referral to Massimo Mccullough for BENIGN PROSTATIC HYPERPLASIA evaluation.  __________________________________________________    HPI  1/12/24  He is here today for follow-up of his pyeloplasty that he had done in 2022 at [facility].  He denies any symptoms from this.  He is changing his care here due to insert insurance.  He also has concerns about a small left hydrocele.  He notes that he has been taking his Flomax and this is working well for him and he denies any voiding complaints.  He has used Cialis in the past just briefly and does not desire any further medication.  On physical exam there is no evidence of any inguinal hernia.  There is minimal left hydrocele and the left testicle is only some slightly larger than the right.  There is no masses in either testicle.    2/9/24  Stable mild  right flank pain.  No infections or hematuria.    I reviewed the radiologic images and reports from the radiologic exam (2/9/24 Lasix Renogram) listed above.  My independent interpretation is listed there as well.      I reviewed the following laboratory data and went over findings with patient:  Recent Labs   Lab Test 09/22/23  1331 06/23/23  1448 05/11/23  1334 12/01/22  1700   WBC 2.6* 2.9* 2.6* 3.3*   HGB 14.8 15.0 14.4 15.5    180 175 215     Recent Labs   Lab Test 09/22/23  1331 06/23/23  1448 05/11/23  1334 12/01/22  1700 12/10/21  1752 04/30/21  1645 11/20/20  0754 09/25/20  1027 05/13/20  1930   CR 1.12 1.10 1.18* 1.58*   < > 1.02 1.07 1.17 0.97   GFRESTIMATED 68 70 64 45*   < > 72 68 61 77   GFRESTBLACK  --   --   --   --   --  83 79 71 89   * 115* 91 99   < > 115* 108* 94 104*    < > = values in this interval not displayed.           CC  Patient Care Team:  Lamine Franco MD as PCP - General (Family Practice)  Te Soriano MD as MD (Surgery)  Regine Ochoa MD as MD (Family Medicine - Sports Medicine)  Connor Torres MD as MD (Otolaryngology)  Lamine Franco MD as Assigned PCP  Marily Aldana MD as MD (Ophthalmology)  Connor Torres MD as Assigned Surgical Provider  Kevin Saha MD as Intern  Irvin Warner MD as Referring Physician (Emergency Medicine)  Tyson Dawson MD as MD (Urology)      Copy to patient  LEYDA HOROWITZUQ  [address]  [facility] [address]      Again, thank you for allowing me to participate in the care of your patient.      Sincerely,    Tyson Dawson MD

## 2024-03-13 ENCOUNTER — IMMUNIZATION (OUTPATIENT)
Dept: FAMILY MEDICINE | Facility: CLINIC | Age: 79
End: 2024-03-13
Payer: COMMERCIAL

## 2024-03-13 DIAGNOSIS — Z23 ENCOUNTER FOR IMMUNIZATION: Primary | ICD-10-CM

## 2024-03-13 DIAGNOSIS — Z29.11 NEED FOR VACCINATION AGAINST RESPIRATORY SYNCYTIAL VIRUS: ICD-10-CM

## 2024-03-13 PROCEDURE — 99207 PR NO CHARGE NURSE ONLY: CPT

## 2024-03-13 PROCEDURE — 91320 SARSCV2 VAC 30MCG TRS-SUC IM: CPT

## 2024-03-13 PROCEDURE — 90480 ADMN SARSCOV2 VAC 1/ONLY CMP: CPT

## 2024-03-13 RX ORDER — RESPIRATORY SYNCYTIAL VIRUS VACCINE 120MCG/0.5
0.5 KIT INTRAMUSCULAR ONCE
Qty: 1 EACH | Refills: 0 | Status: CANCELLED | OUTPATIENT
Start: 2024-03-13 | End: 2024-03-13

## 2024-03-13 NOTE — PROGRESS NOTES
Prior to immunization administration, verified patients identity using patient s name and date of birth. Please see Immunization Activity for additional information.     Screening Questionnaire for Adult Immunization    Are you sick today?   No   Do you have allergies to medications, food, a vaccine component or latex?   No   Have you ever had a serious reaction after receiving a vaccination?   No   Do you have a long-term health problem with heart, lung, kidney, or metabolic disease (e.g., diabetes), asthma, a blood disorder, no spleen, complement component deficiency, a cochlear implant, or a spinal fluid leak?  Are you on long-term aspirin therapy?   No   Do you have cancer, leukemia, HIV/AIDS, or any other immune system problem?   No   Do you have a parent, brother, or sister with an immune system problem?   No   In the past 3 months, have you taken medications that affect  your immune system, such as prednisone, other steroids, or anticancer drugs; drugs for the treatment of rheumatoid arthritis, Crohn s disease, or psoriasis; or have you had radiation treatments?   No   Have you had a seizure, or a brain or other nervous system problem?   No   During the past year, have you received a transfusion of blood or blood    products, or been given immune (gamma) globulin or antiviral drug?   No   For women: Are you pregnant or is there a chance you could become       pregnant during the next month?   No   Have you received any vaccinations in the past 4 weeks?   No     Immunization questionnaire answers were all negative.    I have reviewed the following standing orders:   This patient is due and qualifies for the Covid-19 vaccine.     Click here for COVID-19 Standing Order    I have reviewed the vaccines inclusion and exclusion criteria; There were concerns regarding the following exclusions, patient not indicated for booster according to Epic. I have brought them to a provider who approved vaccination.   Dr. Miranda  Josefina approved this vaccination as the patient is immunocompromised.    Patient instructed to remain in clinic for 15 minutes afterwards, and to report any adverse reactions.     Screening performed by Talha Morillo RN on 3/13/2024 at 3:32 PM.

## 2024-03-29 DIAGNOSIS — K21.00 GASTROESOPHAGEAL REFLUX DISEASE WITH ESOPHAGITIS WITHOUT HEMORRHAGE: ICD-10-CM

## 2024-04-01 ENCOUNTER — TELEPHONE (OUTPATIENT)
Dept: OPHTHALMOLOGY | Facility: CLINIC | Age: 79
End: 2024-04-01
Payer: COMMERCIAL

## 2024-04-01 NOTE — TELEPHONE ENCOUNTER
Patient notes onset of several floaters about ten days ago in his Right eye.  Several days later he sees squiggly lines in the right eye.  He states otherwise his vision is stable.  He has a intermittent eye pain in the Right eye.  He is scheduled tomorrow for follow up.

## 2024-04-01 NOTE — TELEPHONE ENCOUNTER
[facility] Call Center    Phone Message    May a detailed message be left on voicemail: yes     Reason for Call: Symptoms or Concerns     If patient has red-flag symptoms, warm transfer to triage line    Current symptom or concern: Per pt is havinga problem a week and a half ago pt started seeing a lot of floaters in right eye perifial vision black dots and lines and there is a lot of them pt is worried about these pt advised he has a little throbbing that comes and goes, in the right eye    Symptoms have been present for:  1-2 weeks    Has patient previously been seen for this? Yes    By Dr Aldana:     Date: 4/18/23    Are there any new or worsening symptoms? Yes:     Action Taken: Message routed to:  [facility] eye    Travel Screening: Not Applicable

## 2024-04-02 ENCOUNTER — OFFICE VISIT (OUTPATIENT)
Dept: OPHTHALMOLOGY | Facility: CLINIC | Age: 79
End: 2024-04-02
Payer: COMMERCIAL

## 2024-04-02 DIAGNOSIS — H52.4 PRESBYOPIA OF BOTH EYES: ICD-10-CM

## 2024-04-02 DIAGNOSIS — H25.13 NUCLEAR SCLEROTIC CATARACT OF BOTH EYES: ICD-10-CM

## 2024-04-02 DIAGNOSIS — H43.391 VITREOUS FLOATERS OF RIGHT EYE: Primary | ICD-10-CM

## 2024-04-02 DIAGNOSIS — H52.00 HYPERMETROPIA, UNSPECIFIED LATERALITY: ICD-10-CM

## 2024-04-02 DIAGNOSIS — H47.239 LARGE PHYSIOLOGIC CUPPING OF OPTIC DISC: ICD-10-CM

## 2024-04-02 PROCEDURE — 92014 COMPRE OPH EXAM EST PT 1/>: CPT | Performed by: OPHTHALMOLOGY

## 2024-04-02 PROCEDURE — 92133 CPTRZD OPH DX IMG PST SGM ON: CPT | Performed by: OPHTHALMOLOGY

## 2024-04-02 PROCEDURE — 92015 DETERMINE REFRACTIVE STATE: CPT | Performed by: OPHTHALMOLOGY

## 2024-04-02 ASSESSMENT — REFRACTION_MANIFEST
OD_SPHERE: +2.75
OS_CYLINDER: +1.50
OD_CYLINDER: +0.75
OS_ADD: +2.50
OD_ADD: +2.50
OS_AXIS: 070
OS_SPHERE: +3.75
OD_AXIS: 073

## 2024-04-02 ASSESSMENT — VISUAL ACUITY
OD_SC: 20/30
OS_PH_SC+: -3
OS_CC: J3-1
OS_SC+: -2
OD_PH_SC: 20/25-1
CORRECTION_TYPE: GLASSES
OD_PH_SC+: +2
METHOD: SNELLEN - LINEAR
OD_CC: J5-2
OS_PH_SC: 20/20
OD_SC+: -1
OS_SC: 20/25

## 2024-04-02 ASSESSMENT — REFRACTION_WEARINGRX
OS_CYLINDER: +1.50
OS_SPHERE: +3.25
OS_AXIS: 078
OD_SPHERE: +2.25
OD_AXIS: 087
OD_ADD: +2.50
SPECS_TYPE: PAL
OD_CYLINDER: +1.00
OS_ADD: +2.50

## 2024-04-02 ASSESSMENT — CONF VISUAL FIELD
OS_INFERIOR_NASAL_RESTRICTION: 0
OS_NORMAL: 1
METHOD: COUNTING FINGERS
OS_INFERIOR_TEMPORAL_RESTRICTION: 0
OD_SUPERIOR_TEMPORAL_RESTRICTION: 0
OS_SUPERIOR_TEMPORAL_RESTRICTION: 0
OD_INFERIOR_TEMPORAL_RESTRICTION: 0
OS_SUPERIOR_NASAL_RESTRICTION: 0
OD_INFERIOR_NASAL_RESTRICTION: 0
OD_NORMAL: 1
OD_SUPERIOR_NASAL_RESTRICTION: 0

## 2024-04-02 ASSESSMENT — CUP TO DISC RATIO
OD_RATIO: 0.4
OS_RATIO: 0.45

## 2024-04-02 ASSESSMENT — EXTERNAL EXAM - LEFT EYE: OS_EXAM: NORMAL

## 2024-04-02 ASSESSMENT — SLIT LAMP EXAM - LIDS
COMMENTS: NORMAL
COMMENTS: NORMAL

## 2024-04-02 ASSESSMENT — EXTERNAL EXAM - RIGHT EYE: OD_EXAM: NORMAL

## 2024-04-02 ASSESSMENT — TONOMETRY
OS_IOP_MMHG: 10
OD_IOP_MMHG: 12
IOP_METHOD: TONOPEN

## 2024-04-02 NOTE — NURSING NOTE
Chief Complaints and History of Present Illnesses   Patient presents with    COMPREHENSIVE EYE EXAM     Patient present today requesting a Comprehensive exam/     Chief Complaint(s) and History of Present Illness(es)       COMPREHENSIVE EYE EXAM              Laterality: both eyes    Comments: Patient present today requesting a Comprehensive exam/              Comments    He reports a new Floater right eye began 1.5 weeks ago and this prompted coming in sooner today but does want to move up his exam he was having later this month now and feels this will be fine with insurance as well. He wants to update glasses Rx. The floaters in the periphery of right eye. There are many floater.     Today noticing many squiggly lines in vision along with some tiny floater right eye. No flashes noticed.   Vision seem to be stable with right eye.   Has also had some throbbing in the right eye. Not sure if this is new and just noticing this now due to symptoms.  Rates the pain/ Throbbing a 3 on pain scale.   Tearing has been an issues with each eye but may have increased tearing 1.5 weeks with no onset of floaters.     Patient reports he is not having any new floaters with left eye.    Vision seem to have remained stable with each eye otherwise.     Lianna Dawson, COT COT 9:13 AM April 2, 2024

## 2024-04-02 NOTE — PROGRESS NOTES
HPI  Ivy Haynes is a 78 year old male here for comprehensive eye exam.  Noticed new floaters right eye last week when he went outside in the sun.  Floaters have persisted, but no worsening or flashing lights.  No eye redness or photophobia.  No heavy lifting or eye trauma prior to floaters appearing.  Does work out vigorously often.      HPI       COMPREHENSIVE EYE EXAM    In both eyes. Additional comments: Patient present today requesting a Comprehensive exam/             Comments    He reports a new Floater right eye began 1.5 weeks ago and this prompted coming in sooner today but does want to move up his exam he was having later this month now and feels this will be fine with insurance as well. He wants to update glasses Rx. The floaters in the periphery of right eye. There are many floater.     Today noticing many squiggly lines in vision along with some tiny floater right eye. No flashes noticed.   Vision seem to be stable with right eye.   Has also had some throbbing in the right eye. Not sure if this is new and just noticing this now due to symptoms.  Rates the pain/ Throbbing a 3 on pain scale.   Tearing has been an issues with each eye but may have increased tearing 1.5 weeks with no onset of floaters.     Patient reports he is not having any new floaters with left eye.    Vision seem to have remained stable with each eye otherwise.     ELOISA Alonso COT 9:13 AM April 2, 2024                 Last edited by Lianna Dawson COT on 4/2/2024  9:13 AM.             PMH:   Past Medical History:   Diagnosis Date    Back pain     Chest pain     Gastro-oesophageal reflux disease     GERD (gastroesophageal reflux disease)     Hemorrhoid     Hepatitis C     Hypertension     Trigeminal neuralgia       POH: cataracts, glasses for hyperopia since age 10, no surgery, no trauma  Oc Meds: none  flowmax  FH: Denies any glaucoma, age related macular degeneration, or other known eye diseases         Assessment & Plan       1. Vitreous floaters of right eye - Right Eye    2. 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 Hypermetropia, unspecified laterality - Both Eyes    4. Presbyopia of both eyes - Both Eyes    5. Nuclear sclerotic cataract of both eyes      New vitreous floaters right eye-   Comment: new  No vitreous hemorrhage/pigment, retinal tears, or detachment seen on exam today.  No posterior vitreous detachment   Plan:  Natural history of  floaters and possible evolving posterior vitreous detachment as well as retinal tear/detachment symptoms discussed with patient.  Told to call for prompt dilated exam if these symptoms occur.  Recommend recheck in 1-2 weeks for dilated fundus exam, sooner as needed worsening symptoms.      Hypermetropia/presbyopia- Mild changes on refraction, good acuity both eyes.  Cataracts mild- will observe.  Larger cup-to-disc ratio for hyperope, but continues to have normal intraocular pressure, no known FH of glaucoma and, normal OCT retinal nerve fiber layer today (see report).  Discussed with patient ok to monitor for glaucoma annually due to low risk.     -----------------------------------------------------------------------------------       Patient disposition:   Return in about 9 days (around 4/11/2024) for followup PVD dilate OD only. Patient to call sooner as needed.    Complete documentation of historical and exam elements from today's encounter can be found in the full encounter summary report (not reduplicated in this progress note). I personally obtained the chief complaint(s) and history of present illness.  I have confirmed and edited as necessary the CC, HPI, PMH/PSH, social history, FMH, ROS, and exam/neuro findings as obtained by the technician or others. I have examined this patient myself and I personally viewed the image(s) and studies listed above and the documentation reflects my findings and interpretation.     Marily Aldana MD

## 2024-04-11 ENCOUNTER — OFFICE VISIT (OUTPATIENT)
Dept: OPHTHALMOLOGY | Facility: CLINIC | Age: 79
End: 2024-04-11
Payer: COMMERCIAL

## 2024-04-11 DIAGNOSIS — H43.391 VITREOUS FLOATERS OF RIGHT EYE: Primary | ICD-10-CM

## 2024-04-11 PROCEDURE — 92014 COMPRE OPH EXAM EST PT 1/>: CPT | Performed by: OPHTHALMOLOGY

## 2024-04-11 ASSESSMENT — VISUAL ACUITY
OS_CC+: -1
OD_PH_CC: 20/25
OD_CC: 20/25
CORRECTION_TYPE: GLASSES
OS_PH_CC: 20/20
OD_CC+: -2
OS_CC: 20/25
METHOD: SNELLEN - LINEAR
OD_PH_CC+: +2

## 2024-04-11 ASSESSMENT — TONOMETRY
IOP_METHOD: ICARE
OD_IOP_MMHG: 8
OS_IOP_MMHG: 8

## 2024-04-11 ASSESSMENT — EXTERNAL EXAM - LEFT EYE: OS_EXAM: NORMAL

## 2024-04-11 ASSESSMENT — CUP TO DISC RATIO: OD_RATIO: 0.4

## 2024-04-11 ASSESSMENT — REFRACTION_WEARINGRX
OD_CYLINDER: +1.00
OD_ADD: +2.50
OS_CYLINDER: +1.50
OS_AXIS: 078
OD_SPHERE: +2.25
SPECS_TYPE: PAL
OD_AXIS: 087
OS_SPHERE: +3.25
OS_ADD: +2.50

## 2024-04-11 ASSESSMENT — EXTERNAL EXAM - RIGHT EYE: OD_EXAM: NORMAL

## 2024-04-11 ASSESSMENT — SLIT LAMP EXAM - LIDS
COMMENTS: NORMAL
COMMENTS: NORMAL

## 2024-04-11 NOTE — NURSING NOTE
Chief Complaints and History of Present Illnesses   Patient presents with    Posterior Vitreous Detachment Follow Up     Chief Complaint(s) and History of Present Illness(es)       Posterior Vitreous Detachment Follow Up              Laterality: right eye    Duration: 9 days    Characteristics: constant    Course: stable              Comments    Patient states floaters are stable since last visit.  Denies flashes.      Jackie Chan on 4/11/2024 at 10:36 AM

## 2024-04-11 NOTE — PROGRESS NOTES
HPI  Ivy Haynes is a 79 year old male here for follow-up of vitreous floaters right eye.  Floaters have persisted, but no worsening or flashing lights.    HPI       Posterior Vitreous Detachment Follow Up    In right eye.  Duration of 9 days.  Characterized as constant.  Since onset it is stable.             Comments    Patient states floaters are stable since last visit.  Denies flashes.      Jackie Chan on 4/11/2024 at 10:36 AM            Last edited by Jackie Chan on 4/11/2024 10:36 AM.             PMH:   Past Medical History:   Diagnosis Date    Back pain     Chest pain     Gastro-oesophageal reflux disease     GERD (gastroesophageal reflux disease)     Hemorrhoid     Hepatitis C     Hypertension     Trigeminal neuralgia       POH: cataracts, glasses for hyperopia since age 10, no surgery, no trauma, vitreous floater right eye   Oc Meds: none  flowmax  FH: Denies any glaucoma, age related macular degeneration, or other known eye diseases         Assessment & Plan      1. Vitreous floaters of right eye - Right Eye      Comment: stable  No vitreous hemorrhage/pigment, retinal tears, or detachment seen on exam today.  No posterior vitreous detachment   Plan:  Natural history of  floaters and possible evolving posterior vitreous detachment as well as retinal tear/detachment symptoms discussed with patient.  Told to call for prompt dilated exam if these symptoms occur.  Call with any worsening symptoms.     -----------------------------------------------------------------------------------       Patient disposition:   Return in about 1 year (around 4/11/2025) for Comprehensive Exam, OCT nerve (RNFL) OU. Patient to call sooner as needed.    Complete documentation of historical and exam elements from today's encounter can be found in the full encounter summary report (not reduplicated in this progress note). I personally obtained the chief complaint(s) and history of present illness.  I have confirmed and  edited as necessary the CC, HPI, PMH/PSH, social history, FMH, ROS, and exam/neuro findings as obtained by the technician or others. I have examined this patient myself and I personally viewed the image(s) and studies listed above and the documentation reflects my findings and interpretation.     Marily Aldana MD

## 2024-04-23 ENCOUNTER — OFFICE VISIT (OUTPATIENT)
Dept: OTOLARYNGOLOGY | Facility: CLINIC | Age: 79
End: 2024-04-23
Payer: COMMERCIAL

## 2024-04-23 VITALS
OXYGEN SATURATION: 100 % | BODY MASS INDEX: 22.43 KG/M2 | WEIGHT: 159.5 LBS | SYSTOLIC BLOOD PRESSURE: 166 MMHG | DIASTOLIC BLOOD PRESSURE: 96 MMHG | HEART RATE: 86 BPM

## 2024-04-23 DIAGNOSIS — M54.2 CERVICALGIA: Primary | ICD-10-CM

## 2024-04-23 DIAGNOSIS — M54.2 NECK PAIN: ICD-10-CM

## 2024-04-23 PROCEDURE — 99213 OFFICE O/P EST LOW 20 MIN: CPT | Performed by: OTOLARYNGOLOGY

## 2024-04-23 RX ORDER — OXYCODONE AND ACETAMINOPHEN 5; 325 MG/1; MG/1
1 TABLET ORAL EVERY 6 HOURS PRN
Qty: 20 TABLET | Refills: 0 | Status: SHIPPED | OUTPATIENT
Start: 2024-04-23 | End: 2024-04-28

## 2024-04-23 ASSESSMENT — PAIN SCALES - GENERAL: PAINLEVEL: MODERATE PAIN (5)

## 2024-04-23 NOTE — LETTER
2024       RE: Ivy Haynes  [address]  [facility] [address]     Dear Colleague,    Thank you for referring your patient, Ivy Haynes, to the [facility] [place] [facility]. Please see a copy of my visit note below.      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 The patient appeared to have an intraoral infection (difficult to tell whether this was sublingual or submandibular infection) in 2021 and we decided against treatments and patient reported resolution 10/26/2021.     Today he reports keeping up with his intensive exercises. He does not that he may have pulled some muscles. He is doing well today and makes no major complaints.      Review of Systems:   Pertinent items are noted in HPI or as in patient entered ROS below, remainder of complete ROS is negative.       2023    11:26 AM    ENT ROS   Constitutional Problems with sleep   Ears, Nose, Throat Ear pain    Nasal congestion or drainage   Cardiopulmonary Cough        Physical Exam:   BP (!) 166/96   Pulse 86   Wt 72.3 kg (159 lb 8 oz)   SpO2 100%   BMI 22.43 kg/m       PHYSICAL EXAMINATION:    Constitutional:  The patient was unaccompanied, well-groomed, and in no acute distress.    Skin:  Warm and pink.    Neurologic:  Alert and oriented x 3.  CN's III-XII within normal limits.  Voice normal.   Psychiatric:  The patient's affect was calm, cooperative, and appropriate.    Respiratory:  Breathing comfortably without stridor or exertion of accessory muscles.    Eyes: Extraocular movement  intact.    Head:  Normocephalic and atraumatic.  No lesions or scars.    Ears:  Pinnae and tragus non-tender.  EAC's and TM's were clear.    Nose:  Sinuses were non-tender.  Anterior rhinoscopy revealed midline septum and absence of purulence or polyps.    OC/OP:  Normal tongue, floor of mouth, buccal mucosa, and palate.  No lesions or masses on inspection or palpation.  No abnormal lymph tissue in the oropharynx.  The pterygoid region is non-tender.    Neck:  Supple with normal laryngeal and tracheal landmarks.  The parotid beds were without masses.  No palpable thyroid.  Lymphatic:  There is no palpable lymphadenopathy in the neck.      Assessment and Plan:    ICD-10-CM    1. Cervicalgia  M54.2 oxyCODONE-acetaminophen (PERCOCET) 5-325 MG tablet      2. 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 Doing well overall. Will prescribed him Oxycodone-acetaminophen for his cervicalgia and neck pain. Will follow up in 6 months.     Follow-up: No follow-ups on file.      Scribe Disclosure:   I, Belkis Bernal, am serving as a scribe; to document services personally performed by Connor Torres MD -based on data collection and the provider's statements to me.     Provider Disclosure:  I agree with above History, Review of Systems, Physical exam and Plan.  I have reviewed the content of the documentation and have edited it as needed. I have personally performed the services documented here and the documentation accurately represents those services and the decisions I have made.      Electronically signed by:      Again, thank you for allowing me to participate in the care of your patient.      Sincerely,    Connor Torres MD

## 2024-04-23 NOTE — PROGRESS NOTES
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 Orion Mccormack many years ago who presents for follow up. The patient appeared to have an intraoral infection (difficult to tell whether this was sublingual or submandibular infection) in 2021 and we decided against treatments and patient reported resolution 10/26/2021.     Today he reports keeping up with his intensive exercises. He does not that he may have pulled some muscles. He is doing well today and makes no major complaints.      Review of Systems:   Pertinent items are noted in HPI or as in patient entered ROS below, remainder of complete ROS is negative.       2023    11:26 AM    ENT ROS   Constitutional Problems with sleep   Ears, Nose, Throat Ear pain    Nasal congestion or drainage   Cardiopulmonary Cough        Physical Exam:   BP (!) 166/96   Pulse 86   Wt 72.3 kg (159 lb 8 oz)   SpO2 100%   BMI 22.43 kg/m       PHYSICAL EXAMINATION:    Constitutional:  The patient was unaccompanied, well-groomed, and in no acute distress.    Skin:  Warm and pink.    Neurologic:  Alert and oriented x 3.  CN's III-XII within normal limits.  Voice normal.   Psychiatric:  The patient's affect was calm, cooperative, and appropriate.    Respiratory:  Breathing comfortably without stridor or exertion of accessory muscles.    Eyes: Extraocular movement intact.    Head:  Normocephalic and atraumatic.  No lesions or scars.    Ears:  Pinnae and tragus non-tender.  EAC's and TM's were clear.    Nose:  Sinuses were non-tender.  Anterior rhinoscopy revealed midline septum and absence of purulence or polyps.    OC/OP:  Normal tongue, floor of mouth, buccal mucosa, and palate.   No lesions or masses on inspection or palpation.  No abnormal lymph tissue in the oropharynx.  The pterygoid region is non-tender.    Neck:  Supple with normal laryngeal and tracheal landmarks.  The parotid beds were without masses.  No palpable thyroid.  Lymphatic:  There is no palpable lymphadenopathy in the neck.      Assessment and Plan:    ICD-10-CM    1. Cervicalgia  M54.2 oxyCODONE-acetaminophen (PERCOCET) 5-325 MG tablet      2. 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 Doing well overall. Will prescribed him Oxycodone-acetaminophen for his cervicalgia and neck pain. Will follow up in 6 months.     Follow-up: No follow-ups on file.      Scribe Disclosure:   I, Belkis Bernal, am serving as a scribe; to document services personally performed by Connor Torres MD -based on data collection and the provider's statements to me.     Provider Disclosure:  I agree with above History, Review of Systems, Physical exam and Plan.  I have reviewed the content of the documentation and have edited it as needed. I have personally performed the services documented here and the documentation accurately represents those services and the decisions I have made.      Electronically signed by:

## 2024-04-23 NOTE — PATIENT INSTRUCTIONS
You were seen in the ENT Clinic today by Dr. Torres. If you have any questions or concerns after your appointment, please contact us (see below)       2.   Plan to return to the ENT clinic in 6 months.           How to Contact Us:  Send a [employer] message to your provider. Our team will respond to you via [employer]. Occasionally, we will need to call you to get further information.  For urgent matters (Monday-Friday), call the ENT Clinic: [phone] and speak with a call center team member - they will route your call appropriately.   If you'd like to speak directly with a nurse, please find our contact information below. We do our best to check voicemail frequently throughout the day, and will work to call you back within 1-2 days. For urgent matters, please use the general clinic phone numbers listed above.     BRAYAN Mejia  Direct: [phone]  Kyra SHEIKH LPN  Direct: [phone]         [facility]  Department of Otolaryngology

## 2024-05-09 ENCOUNTER — LAB (OUTPATIENT)
Dept: LAB | Facility: CLINIC | Age: 79
End: 2024-05-09
Payer: COMMERCIAL

## 2024-05-09 ENCOUNTER — VIRTUAL VISIT (OUTPATIENT)
Dept: INTERNAL MEDICINE | Facility: CLINIC | Age: 79
End: 2024-05-09
Payer: COMMERCIAL

## 2024-05-09 DIAGNOSIS — R30.0 DYSURIA: Primary | ICD-10-CM

## 2024-05-09 DIAGNOSIS — R30.0 DYSURIA: ICD-10-CM

## 2024-05-09 PROBLEM — N40.1 BENIGN PROSTATIC HYPERPLASIA WITH WEAK URINARY STREAM: Status: ACTIVE | Noted: 2020-10-20

## 2024-05-09 PROBLEM — R39.12 BENIGN PROSTATIC HYPERPLASIA WITH WEAK URINARY STREAM: Status: ACTIVE | Noted: 2020-10-20

## 2024-05-09 LAB
ALBUMIN UR-MCNC: NEGATIVE MG/DL
APPEARANCE UR: CLEAR
BACTERIA #/AREA URNS HPF: NORMAL /HPF
BILIRUB UR QL STRIP: NEGATIVE
COLOR UR AUTO: YELLOW
GLUCOSE UR STRIP-MCNC: NEGATIVE MG/DL
HGB UR QL STRIP: ABNORMAL
KETONES UR STRIP-MCNC: NEGATIVE MG/DL
LEUKOCYTE ESTERASE UR QL STRIP: NEGATIVE
NITRATE UR QL: NEGATIVE
PH UR STRIP: 5.5 [PH] (ref 5–7)
RBC #/AREA URNS AUTO: NORMAL /HPF
SP GR UR STRIP: 1.02 (ref 1–1.03)
UROBILINOGEN UR STRIP-ACNC: 0.2 E.U./DL
WBC #/AREA URNS AUTO: NORMAL /HPF

## 2024-05-09 PROCEDURE — 81001 URINALYSIS AUTO W/SCOPE: CPT

## 2024-05-09 PROCEDURE — 99213 OFFICE O/P EST LOW 20 MIN: CPT | Mod: 95 | Performed by: INTERNAL MEDICINE

## 2024-05-09 NOTE — PROGRESS NOTES
OFFICE VISIT--Video    Ivy is a 79 year old male contacting the clinic today via video, who will use the platform: [employer] for the visit.  Phone # for [employer], or if [employer] drops:   Telephone Information:   Mobile [phone]          ASSESSMENT and PLAN:  1. Dysuria  With mild malaise of duration less than 24 hours.  Check urine.  Lab appointment later today  - UA Macroscopic with reflex to Microscopic and Culture; Future    2.  Hypertension.  Stable.  No new medicines     Patient Instructions   UA UC    Sulfa allergy noted    Follow-up based on results            Return in 12 days (on 5/21/2024) for With PCP already scheduled.       CHIEF COMPLAINT:  Chief Complaint   Patient presents with    Urinary Problem       HISTORY OF PRESENT ILLNESS:  Ivy is a 79 year old male contacting the clinic today via video for complaints of dysuria.  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  He has never had a kidney stone or a prostate infection but yesterday developed lower pelvic pain and general malaise.  Chronic urinary frequency but no change, or increase in frequency.  No urinary pain, pressure, hematuria, or cloudiness.  Feels better today.  Worried about prostate or bladder infection.  Has lab appointment scheduled for later today and needs order for urinalysis.  Allergic to sulfa    Has appointment with PCP May 21    History of Present Illness       Reason for visit:  UTI  Symptom onset:  1-3 days ago  Symptoms include:  Pain in bladder area  Symptom intensity:  Mild  Symptom progression:  Staying the same  Had these symptoms before:  No  What makes it worse:  No  What makes it better:  No    He eats 2-3 servings of fruits and vegetables daily.He consumes 0 sweetened beverage(s) daily.He exercises with enough effort to increase his heart rate 60 or more minutes per day.  He exercises with enough effort to increase his heart rate 7 days per week.   He is  "taking medications regularly.      REVIEW OF SYSTEMS:   Chronic urinary frequency    Today's PHQ-2 Score:       4/2/2024     9:27 AM   PHQ-2 ( 1999 [employer])   Q1: Little interest or pleasure in doing things 0   Q2: Feeling down, depressed or hopeless 0   PHQ-2 Score 0       PFSH:  Social History     Social History Narrative    Not on file       TOBACCO USE:  History   Smoking Status    Former    Types: Cigarettes   Smokeless Tobacco    Never       VITALS:  There were no vitals filed for this visit.  There were no vitals taken for this visit. Estimated body mass index is 22.43 kg/m  as calculated from the following:    Height as of 11/17/23: 1.796 m (5' 10.71\").    Weight as of 4/23/24: 72.3 kg (159 lb 8 oz).    PHYSICAL EXAM:  (observations via Video)  Alert and oriented.  Bearded.    MEDICATIONS:   Current Outpatient Medications   Medication Sig Dispense Refill    amLODIPine (NORVASC) 2.5 MG tablet TAKE 1 TABLET(2.5 MG) BY MOUTH DAILY 90 tablet 3    benzoyl peroxide 5 % external liquid Wash on back of scalp (and areas of painful bumps) daily. Wash off thoroughly and dry off with an old, light colored towel, as this medication can bleach fabric (but not skin) 236 mL 11    tamsulosin (FLOMAX) 0.4 MG capsule Take 1 capsule (0.4 mg) by mouth daily 90 capsule 3    triamcinolone (KENALOG) 0.1 % external ointment Apply topically 2 times daily Apply twice daily as needed to painful bumps on back of scalp. Use for up to two weeks at a time in the same spot, then take one week off before restarting a two week cycle 15 g 3       Outside Notes summarized:   Labs, x-rays, cardiology, GI tests reviewed: CT abdomen and CT urogram from last year  Recent Labs   Lab Test 09/22/23  1331 06/23/23  1448 05/11/23  1334   HGB 14.8 15.0 14.4   WBC 2.6* 2.9* 2.6*    138 142   POTASSIUM 4.2 4.1 4.4   CR 1.12 1.10 1.18*   PSA  --   --  4.11     Lab Results   Component Value Date    [id number] Not Detected 11/20/2020     Lab Results "   Component Value Date    CHOL 179 05/11/2023    CHOL 175 09/25/2020     New orders:   Orders Placed This Encounter   Procedures    UA Macroscopic with reflex to Microscopic and Culture       Independent review of:         11/17/2023     8:29 AM   Additional Questions   Roomed by Vanessa       Video-Visit Details  Type of service:  Video Visit  Patient has given verbal consent to a Video visit?  Yes  How would you like to obtain your AVS?  MyChart  Will anyone else be joining your video visit, giving supplemental history? No    Originating location (pt location): Home    Distant Location (provider location):  Off-site    Video Start Time: 11:32 AM  Video End time:  11:44 AM  Face to face plus orders: 12 minutes  Documentation time:  3 minutes     The visit lasted a total of 15 minutes    Lizandro Marquis MD  Internal Medicine  [facility]

## 2024-05-09 NOTE — PROGRESS NOTES
"OFFICE VISIT--Phone    Ivy is a 79 year old male being evaluated via a billable phone visit, and would like to be contacted via the following  {Phone Numbers:[id number]}    ASSESSMENT and PLAN:  {DX:[id number]::\"***\",\" \",\" \"}      CHIEF COMPLAINT:  No chief complaint on file.          11/17/2023     8:29 AM   Additional Questions   Roomed by Vanessa       HISTORY OF PRESENT ILLNESS:  Ivy is a 79 year old male contacting the clinic today via phone for ***    HPI    REVIEW OF SYSTEMS:  ***      Today's PHQ-2 Score:       4/2/2024     9:27 AM   PHQ-2 ( 1999 [employer])   Q1: Little interest or pleasure in doing things 0   Q2: Feeling down, depressed or hopeless 0   PHQ-2 Score 0       PFSH:  Social History     Social History Narrative    Not on file     ***    TOBACCO USE:  History   Smoking Status    Former    Types: Cigarettes   Smokeless Tobacco    Never       VITALS:  There were no vitals filed for this visit.  There were no vitals taken for this visit.   Estimated body mass index is 22.43 kg/m  as calculated from the following:    Height as of 11/17/23: 1.796 m (5' 10.71\").    Weight as of 4/23/24: 72.3 kg (159 lb 8 oz).    PHYSICAL EXAM:  (observations via Phone)  ***    MEDICATIONS  Current Outpatient Medications   Medication Sig Dispense Refill    amLODIPine (NORVASC) 2.5 MG tablet TAKE 1 TABLET(2.5 MG) BY MOUTH DAILY 90 tablet 3    benzoyl peroxide 5 % external liquid Wash on back of scalp (and areas of painful bumps) daily. Wash off thoroughly and dry off with an old, light colored towel, as this medication can bleach fabric (but not skin) 236 mL 11    tamsulosin (FLOMAX) 0.4 MG capsule Take 1 capsule (0.4 mg) by mouth daily 90 capsule 3    triamcinolone (KENALOG) 0.1 % external ointment Apply topically 2 times daily Apply twice daily as needed to painful bumps on back of scalp. Use for up to two weeks at a time in the same spot, then take one week off before restarting a two week cycle 15 g 3       Notes summarized: " "***  Labs, x-rays, cardiology, GI tests reviewed: ***  Recent Labs   Lab Test 09/22/23  1331 06/23/23  1448 05/11/23  1334   HGB 14.8 15.0 14.4   WBC 2.6* 2.9* 2.6*    138 142   POTASSIUM 4.2 4.1 4.4   CR 1.12 1.10 1.18*   PSA  --   --  4.11     Lab Results   Component Value Date    [id number] Not Detected 11/20/2020     Lab Results   Component Value Date    CHOL 179 05/11/2023    CHOL 175 09/25/2020     New orders: No orders of the defined types were placed in this encounter.      Independent review of:    Patient would like to receive their AVS by {AVS Preference:[id number]}    Phone-Visit Details  Type of service:  Phone Visit  Patient has given verbal consent to a Phone visit?  Yes  How would you like to obtain your AVS?  MyChart  Will anyone else be joining your phone visit, giving supplemental history? No  ***  Originating location (pt location): {patient location:073732::\"Home\"}    Distant Location (provider location):  Off-site    Phone Start Time: 11:31 AM  Phone End time:  {Time now:945856}  Conversation plus orders:  *** minutes  Dictation time:  3 minutes    The visit lasted a total of *** minutes     Lizandro Marquis MD  Internal Medicine  [facility]  "

## 2024-05-14 SDOH — HEALTH STABILITY: PHYSICAL HEALTH: ON AVERAGE, HOW MANY MINUTES DO YOU ENGAGE IN EXERCISE AT THIS LEVEL?: 120 MIN

## 2024-05-14 SDOH — HEALTH STABILITY: PHYSICAL HEALTH: ON AVERAGE, HOW MANY DAYS PER WEEK DO YOU ENGAGE IN MODERATE TO STRENUOUS EXERCISE (LIKE A BRISK WALK)?: 6 DAYS

## 2024-05-14 ASSESSMENT — SOCIAL DETERMINANTS OF HEALTH (SDOH): HOW OFTEN DO YOU GET TOGETHER WITH FRIENDS OR RELATIVES?: ONCE A WEEK

## 2024-05-21 ENCOUNTER — OFFICE VISIT (OUTPATIENT)
Dept: FAMILY MEDICINE | Facility: CLINIC | Age: 79
End: 2024-05-21
Attending: FAMILY MEDICINE
Payer: COMMERCIAL

## 2024-05-21 VITALS
BODY MASS INDEX: 22.62 KG/M2 | HEIGHT: 70 IN | WEIGHT: 158 LBS | RESPIRATION RATE: 13 BRPM | DIASTOLIC BLOOD PRESSURE: 82 MMHG | SYSTOLIC BLOOD PRESSURE: 136 MMHG | HEART RATE: 70 BPM | TEMPERATURE: 97.8 F | OXYGEN SATURATION: 100 %

## 2024-05-21 DIAGNOSIS — Z12.5 SCREENING FOR PROSTATE CANCER: ICD-10-CM

## 2024-05-21 DIAGNOSIS — Z00.00 ENCOUNTER FOR MEDICARE ANNUAL WELLNESS EXAM: Primary | ICD-10-CM

## 2024-05-21 DIAGNOSIS — R39.12 BENIGN PROSTATIC HYPERPLASIA WITH WEAK URINARY STREAM: ICD-10-CM

## 2024-05-21 DIAGNOSIS — N40.1 BENIGN PROSTATIC HYPERPLASIA WITH WEAK URINARY STREAM: ICD-10-CM

## 2024-05-21 DIAGNOSIS — R53.83 FATIGUE, UNSPECIFIED TYPE: ICD-10-CM

## 2024-05-21 DIAGNOSIS — I10 HYPERTENSION GOAL BP (BLOOD PRESSURE) < 140/90: ICD-10-CM

## 2024-05-21 LAB
ERYTHROCYTE [DISTWIDTH] IN BLOOD BY AUTOMATED COUNT: 12.2 % (ref 10–15)
HCT VFR BLD AUTO: 44.1 % (ref 40–53)
HGB BLD-MCNC: 14.7 G/DL (ref 13.3–17.7)
MCH RBC QN AUTO: 33.3 PG (ref 26.5–33)
MCHC RBC AUTO-ENTMCNC: 33.3 G/DL (ref 31.5–36.5)
MCV RBC AUTO: 100 FL (ref 78–100)
PLATELET # BLD AUTO: 165 10E3/UL (ref 150–450)
RBC # BLD AUTO: 4.41 10E6/UL (ref 4.4–5.9)
WBC # BLD AUTO: 2.9 10E3/UL (ref 4–11)

## 2024-05-21 PROCEDURE — 80061 LIPID PANEL: CPT | Performed by: FAMILY MEDICINE

## 2024-05-21 PROCEDURE — 80053 COMPREHEN METABOLIC PANEL: CPT | Performed by: FAMILY MEDICINE

## 2024-05-21 PROCEDURE — 82570 ASSAY OF URINE CREATININE: CPT | Performed by: FAMILY MEDICINE

## 2024-05-21 PROCEDURE — 84443 ASSAY THYROID STIM HORMONE: CPT | Performed by: FAMILY MEDICINE

## 2024-05-21 PROCEDURE — 82043 UR ALBUMIN QUANTITATIVE: CPT | Performed by: FAMILY MEDICINE

## 2024-05-21 PROCEDURE — G0439 PPPS, SUBSEQ VISIT: HCPCS | Performed by: FAMILY MEDICINE

## 2024-05-21 PROCEDURE — G0103 PSA SCREENING: HCPCS | Performed by: FAMILY MEDICINE

## 2024-05-21 PROCEDURE — 36415 COLL VENOUS BLD VENIPUNCTURE: CPT | Performed by: FAMILY MEDICINE

## 2024-05-21 PROCEDURE — 85027 COMPLETE CBC AUTOMATED: CPT | Performed by: FAMILY MEDICINE

## 2024-05-21 PROCEDURE — 99214 OFFICE O/P EST MOD 30 MIN: CPT | Mod: 25 | Performed by: FAMILY MEDICINE

## 2024-05-21 ASSESSMENT — PAIN SCALES - GENERAL
PAINLEVEL: MILD PAIN (2)
PAINLEVEL: NO PAIN (0)

## 2024-05-21 NOTE — PROGRESS NOTES
Preventive Care Visit  [facility] PRIMARY CARE  Lamine Franco MD, Family Medicine  May 21, 2024      Assessment & Plan     Encounter for Medicare annual wellness exam  In [facility]  - PRIMARY CARE FOLLOW-UP SCHEDULING  - Lipid panel reflex to direct LDL Fasting; Future  - Lipid panel reflex to direct LDL Fasting    Hypertension goal BP (blood pressure) < 140/90  Well controlled   - Comprehensive metabolic panel (BMP + Alb, Alk Phos, ALT, AST, Total. Bili, TP); Future  - Comprehensive metabolic panel (BMP + Alb, Alk Phos, ALT, AST, Total. Bili, TP)    Fatigue, unspecified type  Better, working on finding a balance  - Albumin Random Urine Quantitative with Creat Ratio; Future  - TSH with free T4 reflex; Future  - CBC with platelets; Future  - Albumin Random Urine Quantitative with Creat Ratio  - TSH with free T4 reflex  - CBC with platelets    Benign prostatic hyperplasia with weak urinary stream  Worse   Follow up with consultant as planned.     Screening for prostate cancer  sent  - PSA, screen; Future  - PSA, screen            Counseling  Appropriate preventive services were discussed with this patient, including applicable screening as appropriate for fall prevention, nutrition, physical activity, Tobacco-use cessation, weight loss and cognition.  Checklist reviewing preventive services available has been given to the patient.  Reviewed patient's diet, addressing concerns and/or questions.   He is at risk for psychosocial distress and has been provided with information to reduce risk.   I have reviewed Opioid Use Disorder and Substance Use Disorder risk factors and made any needed referrals.       Regular exercise  See Patient Instructions    Merline Haynes is a 79 year old, presenting for the following:  Wellness Visit        5/21/2024     1:35 PM   Additional Questions   Roomed by Fab CLAIRE         Health Care Directive  Patient does not have a Health Care Directive  or Living Will:     HPI  No controlled medication use       Hypertension Follow-up    Do you check your blood pressure regularly outside of the clinic? Yes   Are you following a low salt diet? Yes  Are your blood pressures ever more than 140 on the top number (systolic) OR more   than 90 on the bottom number (diastolic), for example 140/90? No      5/14/2024   General Health   How would you rate your overall physical health? Good   Feel stress (tense, anxious, or unable to sleep) Only a little   (!) STRESS CONCERN      5/14/2024   Nutrition   Diet: Low salt    Low fat/cholesterol    Vegetarian/vegan    Breakfast skipped         5/14/2024   Exercise   Days per week of moderate/strenuous exercise 6 days   Average minutes spent exercising at this level 120 min         5/14/2024   Social Factors   Frequency of gathering with friends or relatives Once a week   Worry food won't last until get money to buy more No   Food not last or not have enough money for food? No   Do you have housing?  Yes   Are you worried about losing your housing? No   Lack of transportation? No   Unable to get utilities (heat,electricity)? No         5/18/2024   Fall Risk   Fallen 2 or more times in the past year? No   Trouble with walking or balance? No          5/14/2024   Activities of Daily Living- Home Safety   Needs help with the following daily activities None of the above   Safety concerns in the home None of the above         5/14/2024   Dental   Dentist two times every year? Yes         5/14/2024   Hearing Screening   Hearing concerns? None of the above         5/14/2024   Driving Risk Screening   Patient/family members have concerns about driving No         5/14/2024   General Alertness/Fatigue Screening   Have you been more tired than usual lately? No         5/14/2024   Urinary Incontinence Screening   Bothered by leaking urine in past 6 months No         5/14/2024   TB Screening   Were you born outside of the [place]? No         Today's  "PHQ-2 Score:       5/20/2024     2:54 PM   PHQ-2 ( 1999 [employer])   Q1: Little interest or pleasure in doing things 0   Q2: Feeling down, depressed or hopeless 0   PHQ-2 Score 0   Q1: Little interest or pleasure in doing things Not at all   Q2: Feeling down, depressed or hopeless Not at all   PHQ-2 Score 0           5/14/2024   Substance Use   Alcohol more than 3/day or more than 7/wk No   Do you have a current opioid prescription? (!) YES   How severe/bad is pain from 1 to 10? 3/10   Do you use any other substances recreationally? No       Social History     Tobacco Use    Smoking status: Former     Current packs/day: 0.00     Average packs/day: 0.1 packs/day for 10.0 years (1.0 ttl pk-yrs)     Types: Cigarettes     Start date: 1/1/2011     Quit date: 1/1/2021     Years since quitting: 3.3     Passive exposure: Past    Smokeless tobacco: Never    Tobacco comments:     quit 1979 until 2013. started after trip to [place]   Vaping Use    Vaping status: Never Used   Substance Use Topics    Alcohol use: Yes     Comment: 2-3 beer a week    Drug use: Yes     Types: Marijuana     Comment: Every other month-\"just a puff of marijuana\"       ASCVD Risk   The 10-year ASCVD risk score (Kushal CARUSO, et al., 2019) is: 22.3%    Values used to calculate the score:      Age: 79 years      Sex: Male      Is Non- : Yes      Diabetic: No      Tobacco smoker: No      Systolic Blood Pressure: 136 mmHg      Is BP treated: Yes      HDL Cholesterol: 78 mg/dL      Total Cholesterol: 179 mg/dL            Reviewed and updated as needed this visit by Provider                    Past Medical History:   Diagnosis Date    Back pain     Chest pain     Gastro-oesophageal reflux disease     GERD (gastroesophageal reflux disease)     Hemorrhoid     Hepatitis C     Hypertension     Trigeminal neuralgia      Current providers sharing in care for this patient include:  Patient Care Team:  Lamine Franco MD as PCP - " "General (Family Practice)  Te Soriano MD as MD (Surgery)  Regine Ochoa MD as MD (Family Medicine - Sports Medicine)  Connor Torres MD as MD (Otolaryngology)  Lamine Franco MD as Assigned PCP  Marily Aldana MD as MD (Ophthalmology)  Connor Torres MD as Assigned Surgical Provider  Kevin Saha MD as Intern  Irvin Warner MD as Referring Physician (Emergency Medicine)  Tyson Dawson MD as MD (Urology)  Dusty Mccullough MD as MD (Urology)    The following health maintenance items are reviewed in Epic and correct as of today:  Health Maintenance   Topic Date Due    URINE DRUG SCREEN  Never done    ANNUAL REVIEW OF  ORDERS  Never done    HEPATITIS C SCREENING  Never done    ZOSTER IMMUNIZATION (1 of 2) Never done    LUNG CANCER SCREENING  Never done    RSV VACCINE (Pregnancy & 60+) (1 - 1-dose 60+ series) Never done    DTAP/TDAP/TD IMMUNIZATION (3 - Td or Tdap) 01/10/2022    MEDICARE ANNUAL WELLNESS VISIT  05/11/2024    FALL RISK ASSESSMENT  05/21/2025    GLUCOSE  09/22/2026    LIPID  05/11/2028    ADVANCE CARE PLANNING  05/09/2029    PHQ-2 (once per calendar year)  Completed    INFLUENZA VACCINE  Completed    Pneumococcal Vaccine: 65+ Years  Completed    COVID-19 Vaccine  Completed    IPV IMMUNIZATION  Aged Out    HPV IMMUNIZATION  Aged Out    MENINGITIS IMMUNIZATION  Aged Out    RSV MONOCLONAL ANTIBODY  Aged Out    COLORECTAL CANCER SCREENING  Discontinued         Review of Systems  Constitutional, HEENT, cardiovascular, pulmonary, gi and gu systems are negative, except as otherwise noted.     Objective    Exam  /82   Pulse 70   Temp 97.8  F (36.6  C) (Temporal)   Resp 13   Ht 1.78 m (5' 10.08\")   Wt 71.7 kg (158 lb)   SpO2 100%   BMI 22.62 kg/m     Estimated body mass index is 22.62 kg/m  as calculated from the following:    Height as of this encounter: 1.78 m (5' 10.08\").    Weight as of this encounter: " 71.7 kg (158 lb).    Physical Exam  GENERAL: alert and no distress  EYES: Eyes grossly normal to inspection, PERRL and conjunctivae and sclerae normal  HENT: ear canals and TM's normal, nose and mouth without ulcers or lesions  NECK: no adenopathy, no asymmetry, masses, or scars  RESP: lungs clear to auscultation - no rales, rhonchi or wheezes  CV: regular rate and rhythm, normal S1 S2, no S3 or S4, no murmur, click or rub, no peripheral edema  ABDOMEN: soft, nontender, no hepatosplenomegaly, no masses and bowel sounds normal  MS: no gross musculoskeletal defects noted, no edema  SKIN: no suspicious lesions or rashes  NEURO: Normal strength and tone, mentation intact and speech normal  PSYCH: mentation appears normal, affect normal/bright  LYMPH: no cervical, supraclavicular, axillary, or inguinal adenopathy  Diabetic foot exam: normal DP and PT pulses, no trophic changes or ulcerative lesions, and normal sensory exam        5/21/2024   Mini Cog   Clock Draw Score 2 Normal   3 Item Recall 3 objects recalled   Mini Cog Total Score 5              Signed Electronically by: Lamine Franco MD

## 2024-05-22 LAB
ALBUMIN SERPL BCG-MCNC: 4.4 G/DL (ref 3.5–5.2)
ALP SERPL-CCNC: 144 U/L (ref 40–150)
ALT SERPL W P-5'-P-CCNC: 16 U/L (ref 0–70)
ANION GAP SERPL CALCULATED.3IONS-SCNC: 10 MMOL/L (ref 7–15)
AST SERPL W P-5'-P-CCNC: 31 U/L (ref 0–45)
BILIRUB SERPL-MCNC: 0.4 MG/DL
BUN SERPL-MCNC: 10.8 MG/DL (ref 8–23)
CALCIUM SERPL-MCNC: 9.5 MG/DL (ref 8.8–10.2)
CHLORIDE SERPL-SCNC: 105 MMOL/L (ref 98–107)
CHOLEST SERPL-MCNC: 169 MG/DL
CREAT SERPL-MCNC: 1.2 MG/DL (ref 0.67–1.17)
CREAT UR-MCNC: 35.3 MG/DL
DEPRECATED HCO3 PLAS-SCNC: 26 MMOL/L (ref 22–29)
EGFRCR SERPLBLD CKD-EPI 2021: 62 ML/MIN/1.73M2
FASTING STATUS PATIENT QL REPORTED: ABNORMAL
FASTING STATUS PATIENT QL REPORTED: NORMAL
GLUCOSE SERPL-MCNC: 87 MG/DL (ref 70–99)
HDLC SERPL-MCNC: 68 MG/DL
LDLC SERPL CALC-MCNC: 87 MG/DL
MICROALBUMIN UR-MCNC: <12 MG/L
MICROALBUMIN/CREAT UR: NORMAL MG/G{CREAT}
NONHDLC SERPL-MCNC: 101 MG/DL
POTASSIUM SERPL-SCNC: 4 MMOL/L (ref 3.4–5.3)
PROT SERPL-MCNC: 6.8 G/DL (ref 6.4–8.3)
PSA SERPL DL<=0.01 NG/ML-MCNC: 5.16 NG/ML (ref 0–6.5)
SODIUM SERPL-SCNC: 141 MMOL/L (ref 135–145)
TRIGL SERPL-MCNC: 71 MG/DL
TSH SERPL DL<=0.005 MIU/L-ACNC: 1.62 UIU/ML (ref 0.3–4.2)

## 2024-06-06 ENCOUNTER — PRE VISIT (OUTPATIENT)
Dept: UROLOGY | Facility: CLINIC | Age: 79
End: 2024-06-06
Payer: COMMERCIAL

## 2024-06-06 NOTE — TELEPHONE ENCOUNTER
Reason for visit: BPH consult     Relevant information: Referred by GUILLERMO Vivar 02/2024. On tamsulosin    Records/imaging/labs/orders: PSA 5/21 in EPIC    Pt called: No need for a call    At Rooming: gregory LEE/PVR    Alem Lovett RN  6/6/2024  11:04 AM

## 2024-06-18 ENCOUNTER — OFFICE VISIT (OUTPATIENT)
Dept: UROLOGY | Facility: CLINIC | Age: 79
End: 2024-06-18
Payer: COMMERCIAL

## 2024-06-18 VITALS — SYSTOLIC BLOOD PRESSURE: 148 MMHG | HEART RATE: 67 BPM | DIASTOLIC BLOOD PRESSURE: 81 MMHG

## 2024-06-18 DIAGNOSIS — N40.1 BENIGN LOCALIZED PROSTATIC HYPERPLASIA WITH LOWER URINARY TRACT SYMPTOMS (LUTS): Primary | ICD-10-CM

## 2024-06-18 PROCEDURE — 99214 OFFICE O/P EST MOD 30 MIN: CPT | Performed by: UROLOGY

## 2024-06-18 RX ORDER — TADALAFIL 5 MG/1
5 TABLET ORAL EVERY 24 HOURS
Qty: 30 TABLET | Refills: 11 | Status: SHIPPED | OUTPATIENT
Start: 2024-06-18 | End: 2024-07-02

## 2024-06-18 ASSESSMENT — PAIN SCALES - GENERAL: PAINLEVEL: MILD PAIN (3)

## 2024-06-18 NOTE — PROGRESS NOTES
UROLOGY OUTPATIENT VISIT      Chief Complaint:   LUTS, erectile dysfunction      Synopsis    Ivy Haynes is a very pleasant AGE: 79 year old year old person    He has a 10 year history of BPH managed with Flomax 0.4mg every other day. He  experiences nocturia, significantly impacting his sleep. No hematuria or incontinence  and moderate urinary stream. He underwent a pyleoplasty a year ago. In waking hours,   he experiences pressure near his right iliopsoas muscle before urinating. He has a history  of hernia repair and performs calisthenics daily. He also reports retraction of his penis   during the day and a decrease in rigidity of his erections, but is able to achieve   and maintain erections. Previously tried cialis with no benefit.    Physical exam does not reveal a hernia, but the inguinal ring is felt more prominently  which may be due  to patient's thin build. Testes bilaterally descended, no masses, left hydrocele         Medications     Current Outpatient Medications   Medication Sig Dispense Refill    tadalafil (CIALIS) 5 MG tablet Take 1 tablet (5 mg) by mouth every 24 hours 30 tablet 11    amLODIPine (NORVASC) 2.5 MG tablet TAKE 1 TABLET(2.5 MG) BY MOUTH DAILY 90 tablet 3    benzoyl peroxide 5 % external liquid Wash on back of scalp (and areas of painful bumps) daily. Wash off thoroughly and dry off with an old, light colored towel, as this medication can bleach fabric (but not skin) (Patient not taking: Reported on 5/21/2024) 236 mL 11    tamsulosin (FLOMAX) 0.4 MG capsule Take 1 capsule (0.4 mg) by mouth daily 90 capsule 3    triamcinolone (KENALOG) 0.1 % external ointment Apply topically 2 times daily Apply twice daily as needed to painful bumps on back of scalp. Use for up to two weeks at a time in the same spot, then take one week off before restarting a two week cycle (Patient not taking: Reported on 5/21/2024) 15 g 3     No current facility-administered medications for this visit.          The following  distinct labs were reviewed    I personally reviewed all applicable laboratory data and went over findings with patient  Significant for:    CBC RESULTS:  Recent Labs   Lab Test 05/21/24  1412 09/22/23  1331 06/23/23  1448 05/11/23  1334   WBC 2.9* 2.6* 2.9* 2.6*   HGB 14.7 14.8 15.0 14.4    175 180 175        BMP RESULTS:  Recent Labs   Lab Test 05/21/24  1412 09/22/23  1331 06/23/23  1448 05/11/23  1334 12/10/21  1752 04/30/21  1645 11/20/20  0754 09/25/20  1027 05/13/20  1930    140 138 142   < > 138 141 137 139   POTASSIUM 4.0 4.2 4.1 4.4   < > 4.2 3.9 4.1 3.7   CHLORIDE 105 103 103 105   < > 107 109 108 106   CO2 26 24 25 23   < > 26 28 25 28   ANIONGAP 10 13 10 14   < > 5 4 4 5   GLC 87 102* 115* 91   < > 115* 108* 94 104*   BUN 10.8 11.4 8.5 7.4*   < > 11 8 8 11   CR 1.20* 1.12 1.10 1.18*   < > 1.02 1.07 1.17 0.97   GFRESTIMATED 62 68 70 64   < > 72 68 61 77   GFRESTBLACK  --   --   --   --   --  83 79 71 89    < > = values in this interval not displayed.       CALCIUM RESULTS:  Recent Labs   Lab Test 05/21/24  1412 09/22/23  1331 06/23/23  1448 05/11/23  1334   LORRI 9.5 9.5 9.3 9.4       UA RESULTS:   Recent Labs   Lab Test 05/09/24  1239 11/19/22  2115 04/15/22  1500   SG 1.025 1.007 <=1.005   URINEPH 5.5 7.0 5.5   NITRITE Negative Negative Negative   RBCU 0-2 0 0-2   WBCU 0-5 0 0-5       PSA RESULTS  PSA   Date Value Ref Range Status   06/08/2016 3.17 0 - 4 ug/L Final   05/12/2010 0.85 0 - 4 ug/L Final   04/04/2005 0.4 ng/mL      Prostate Specific Antigen Screen   Date Value Ref Range Status   05/21/2024 5.16 0.00 - 6.50 ng/mL Final   05/11/2023 4.11 0.00 - 6.50 ng/mL Final         Recent Imaging Report    I personally reviewed all applicable imaging and went over the below findings with patient.    Results for orders placed or performed during the hospital encounter of 02/09/24   NM Renogram with Lasix    Narrative    EXAMINATION: NM RENOGRAM WITH LASIX    DATE: 2/9/2024 8:16  AM    INDICATION: Hydronephrosis of right kidney    COMPARISON: 12/5/2022    TECHNIQUE: 10.5 mCi of [id number] MAG3 was administered intravenously.  Dynamic images were obtained over 60 seconds. Subsequent images were  obtained up to 40 minutes. 20 mg of Lasix was injected at 20 minute.  Time activity curves were generated.    FINDINGS:     Renal angiogram demonstrates normal flow to both kidneys. Renal size  is normal bilaterally.    Renogram images demonstrate:    Right kidney: There is normal cortical uptake. Cortical transit is  noted at 3 minutes. Ureter is visualized at 6 minutes. There is normal  wash out from the right kidney prior to Lasix administration.  Following Lasix administration, there is complete wash out.     Left kidney: There is normal cortical uptake. Cortical transit is  noted at 3 minutes. Ureter is visualized at 6 minutes. There is normal  wash out from the left kidney prior to Lasix administration. Following  Lasix administration, there is complete wash out.     Renogram curves demonstrate:    Right kidney: The radiotracer reaches the peak activity at 8 minutes  (time to peak). There is normal wash out prior to Lasix, complete wash  out following Lasix. T1/2 with Lasix is: 27.6 minutes. The right  kidney has an extrarenal pelvis.    Left kidney: The radiotracer reaches the peak activity at 5.5 minutes  (time to peak). There is normal wash out prior to Lasix,  complete  wash out following Lasix. T1/2 with Lasix is: 28.9 minutes.    Split function: R: 42%; L: 58%.       Impression    IMPRESSION:    Right kidney: 42 function. Normal excretion. No obstruction. The right  kidney has an extrarenal pelvis.    Left kidney: 58 function. Normal excretion. No obstruction.    OK PEACOCK MD         SYSTEM ID:  [id number]            Assessment/Plan   79 year old year old person with BPH and erectile dysfunction. Discussion included   lifestyle modifications to reduce nocturia, including limiting of  evening fluid intake.  Patient does not wish to add a bladder relaxant to help with nocturia symptoms at  this time. Recommend 24-hour urine diary to better understand fluid balance and nocturia patterns.    Physical exam does not reveal hernia, however, cannot be ruled out.   Hernia repair history. Patient does not wish to undergo an ultrasound at this time  to rule out a hernia.    For his ED symptoms, recommend beginning trial of tadalafil and to evaluate for any  underlying vascular or hormonal causes if symptoms persist. Patient concerned about cost  of medication and encouraged to call office if it is not affordable. Discussed that cialis can also be beneficial for LUTS.      CC:  Lamine Franco

## 2024-06-18 NOTE — LETTER
6/18/2024       RE: Ivy Haynes  [address]  [facility] [address]     Dear Colleague,    Thank you for referring your patient, Ivy Haynes, to the [facility] [place] at [facility]. Please see a copy of my visit note below.          UROLOGY OUTPATIENT VISIT      Chief Complaint:   LUTS, erectile dysfunction      Synopsis    Ivy Haynes is a very pleasant AGE: 79 year old year old person    He has a 10 year history of BPH managed with Flomax 0.4mg every other day. He  experiences nocturia, significantly impacting his sleep. No hematuria or incontinence  and moderate urinary stream. He underwent a pyleoplasty a year ago. In waking hours,   he experiences pressure near his right iliopsoas muscle before urinating. He has a history  of hernia repair and performs calisthenics daily. He also reports retraction of his penis   during the day and a decrease in rigidity of his erections, but is able to achieve   and maintain erections. Previously tried cialis with no benefit.    Physical exam does not reveal a hernia, but the inguinal ring is felt more prominently  which may be due  to patient's thin build. Testes bilaterally descended, no masses, left hydrocele         Medications     Current Outpatient Medications   Medication Sig Dispense Refill    tadalafil (CIALIS) 5 MG tablet Take 1 tablet (5 mg) by mouth every 24 hours 30 tablet 11    amLODIPine (NORVASC) 2.5 MG tablet TAKE 1 TABLET(2.5 MG) BY MOUTH DAILY 90 tablet 3    benzoyl peroxide 5 % external liquid Wash on back of scalp (and areas of painful bumps) daily. Wash off thoroughly and dry off with an old, light colored towel, as this medication can bleach fabric (but not skin) (Patient not taking: Reported on 5/21/2024) 236 mL 11    tamsulosin (FLOMAX) 0.4 MG capsule Take 1 capsule (0.4 mg) by mouth daily 90 capsule 3    triamcinolone (KENALOG) 0.1 % external ointment Apply topically 2  times daily Apply twice daily as needed to painful bumps on back of scalp. Use for up to two weeks at a time in the same spot, then take one week off before restarting a two week cycle (Patient not taking: Reported on 5/21/2024) 15 g 3     No current facility-administered medications for this visit.         The following  distinct labs were reviewed    I personally reviewed all applicable laboratory data and went over findings with patient  Significant for:    CBC RESULTS:  Recent Labs   Lab Test 05/21/24  1412 09/22/23  1331 06/23/23  1448 05/11/23  1334   WBC 2.9* 2.6* 2.9* 2.6*   HGB 14.7 14.8 15.0 14.4    175 180 175        BMP RESULTS:  Recent Labs   Lab Test 05/21/24  1412 09/22/23  1331 06/23/23  1448 05/11/23  1334 12/10/21  1752 04/30/21  1645 11/20/20  0754 09/25/20  1027 05/13/20  1930    140 138 142   < > 138 141 137 139   POTASSIUM 4.0 4.2 4.1 4.4   < > 4.2 3.9 4.1 3.7   CHLORIDE 105 103 103 105   < > 107 109 108 106   CO2 26 24 25 23   < > 26 28 25 28   ANIONGAP 10 13 10 14   < > 5 4 4 5   GLC 87 102* 115* 91   < > 115* 108* 94 104*   BUN 10.8 11.4 8.5 7.4*   < > 11 8 8 11   CR 1.20* 1.12 1.10 1.18*   < > 1.02 1.07 1.17 0.97   GFRESTIMATED 62 68 70 64   < > 72 68 61 77   GFRESTBLACK  --   --   --   --   --  83 79 71 89    < > = values in this interval not displayed.       CALCIUM RESULTS:  Recent Labs   Lab Test 05/21/24  1412 09/22/23  1331 06/23/23  1448 05/11/23  1334   LORRI 9.5 9.5 9.3 9.4       UA RESULTS:   Recent Labs   Lab Test 05/09/24  1239 11/19/22  2115 04/15/22  1500   SG 1.025 1.007 <=1.005   URINEPH 5.5 7.0 5.5   NITRITE Negative Negative Negative   RBCU 0-2 0 0-2   WBCU 0-5 0 0-5       PSA RESULTS  PSA   Date Value Ref Range Status   06/08/2016 3.17 0 - 4 ug/L Final   05/12/2010 0.85 0 - 4 ug/L Final   04/04/2005 0.4 ng/mL      Prostate Specific Antigen Screen   Date Value Ref Range Status   05/21/2024 5.16 0.00 - 6.50 ng/mL Final   05/11/2023 4.11 0.00 - 6.50 ng/mL Final          Recent Imaging Report    I personally reviewed all applicable imaging and went over the below findings with patient.    Results for orders placed or performed during the hospital encounter of 02/09/24   NM Renogram with Lasix    Narrative    EXAMINATION: NM RENOGRAM WITH LASIX    DATE: 2/9/2024 8:16 AM    INDICATION: Hydronephrosis of right kidney    COMPARISON: 12/5/2022    TECHNIQUE: 10.5 mCi of [id number] MAG3 was administered intravenously.  Dynamic images were obtained over 60 seconds. Subsequent images were  obtained up to 40 minutes. 20 mg of Lasix was injected at 20 minute.  Time activity curves were generated.    FINDINGS:     Renal angiogram demonstrates normal flow to both kidneys. Renal size  is normal bilaterally.    Renogram images demonstrate:    Right kidney: There is normal cortical uptake. Cortical transit is  noted at 3 minutes. Ureter is visualized at 6 minutes. There is normal  wash out from the right kidney prior to Lasix administration.  Following Lasix administration, there is complete wash out.     Left kidney: There is normal cortical uptake. Cortical transit is  noted at 3 minutes. Ureter is visualized at 6 minutes. There is normal  wash out from the left kidney prior to Lasix administration. Following  Lasix administration, there is complete wash out.     Renogram curves demonstrate:    Right kidney: The radiotracer reaches the peak activity at 8 minutes  (time to peak). There is normal wash out prior to Lasix, complete wash  out following Lasix. T1/2 with Lasix is: 27.6 minutes. The right  kidney has an extrarenal pelvis.    Left kidney: The radiotracer reaches the peak activity at 5.5 minutes  (time to peak). There is normal wash out prior to Lasix,  complete  wash out following Lasix. T1/2 with Lasix is: 28.9 minutes.    Split function: R: 42%; L: 58%.       Impression    IMPRESSION:    Right kidney: 42 function. Normal excretion. No obstruction. The right  kidney has an  extrarenal pelvis.    Left kidney: 58 function. Normal excretion. No obstruction.    OK PEACOCK MD         SYSTEM ID:  [id number]            Assessment/Plan   79 year old year old person with BPH and erectile dysfunction. Discussion included   lifestyle modifications to reduce nocturia, including limiting of evening fluid intake.  Patient does not wish to add a bladder relaxant to help with nocturia symptoms at  this time. Recommend 24-hour urine diary to better understand fluid balance and nocturia patterns.    Physical exam does not reveal hernia, however, cannot be ruled out.   Hernia repair history. Patient does not wish to undergo an ultrasound at this time  to rule out a hernia.    For his ED symptoms, recommend beginning trial of tadalafil and to evaluate for any  underlying vascular or hormonal causes if symptoms persist. Patient concerned about cost  of medication and encouraged to call office if it is not affordable. Discussed that cialis can also be beneficial for LUTS.      CC:  Lamine Franco       Sincerely,    Dusty Mccullough MD

## 2024-06-18 NOTE — NURSING NOTE
Chief Complaint   Patient presents with    Benign Prostatic Hypertrophy       Blood pressure (!) 148/81, pulse 67. There is no height or weight on file to calculate BMI.    Patient Active Problem List   Diagnosis    GERD (gastroesophageal reflux disease)    S/P total knee replacement    Hypertension goal BP (blood pressure) < 140/90    Hepatic fibrosis    Congenital obstruction of ureteropelvic junction    Benign prostatic hyperplasia with weak urinary stream    Diverticular disease of large intestine       Allergies   Allergen Reactions    Sulfa Antibiotics        Current Outpatient Medications   Medication Sig Dispense Refill    amLODIPine (NORVASC) 2.5 MG tablet TAKE 1 TABLET(2.5 MG) BY MOUTH DAILY 90 tablet 3    benzoyl peroxide 5 % external liquid Wash on back of scalp (and areas of painful bumps) daily. Wash off thoroughly and dry off with an old, light colored towel, as this medication can bleach fabric (but not skin) (Patient not taking: Reported on 5/21/2024) 236 mL 11    tamsulosin (FLOMAX) 0.4 MG capsule Take 1 capsule (0.4 mg) by mouth daily 90 capsule 3    triamcinolone (KENALOG) 0.1 % external ointment Apply topically 2 times daily Apply twice daily as needed to painful bumps on back of scalp. Use for up to two weeks at a time in the same spot, then take one week off before restarting a two week cycle (Patient not taking: Reported on 5/21/2024) 15 g 3       Social History     Tobacco Use    Smoking status: Former     Current packs/day: 0.00     Average packs/day: 0.1 packs/day for 10.0 years (1.0 ttl pk-yrs)     Types: Cigarettes     Start date: 1/1/2011     Quit date: 1/1/2021     Years since quitting: 3.4     Passive exposure: Past    Smokeless tobacco: Never    Tobacco comments:     quit 1979 until 2013. started after trip to [place]   Vaping Use    Vaping status: Never Used   Substance Use Topics    Alcohol use: Yes     Comment: 2-3 beer a week    Drug use: Yes     Types: Marijuana     Comment: Every  "other month-\"just a puff of marijuana\"       Shelley Martinez  6/18/2024  1:24 PM     "

## 2024-06-20 ENCOUNTER — TELEPHONE (OUTPATIENT)
Dept: UROLOGY | Facility: CLINIC | Age: 79
End: 2024-06-20
Payer: COMMERCIAL

## 2024-06-20 NOTE — TELEPHONE ENCOUNTER
Retail Pharmacy Prior Authorization Team   Phone: [phone]    Note: Due to record-high volumes, our turn-around time is taking longer than usual . We are currently 8 days behind in the pools.   We are working diligently to submit all requests in a timely manner and in the order they are received. Please only flag TRUE URGENT requests as high priority to the pool at this time.   If you have questions on status of PA's,  please send a note/message in the active PA encounter and send back to the [facility] PA pool [[phone]].    If you have questions about the turn-around time or about our process, please reach out to our supervisor Maureen Horne.   Thank you!   RPPA (Retail Pharmacy Prior Authorization) team

## 2024-06-26 NOTE — TELEPHONE ENCOUNTER
Central Prior Authorization Team  Phone: [phone]    PA Initiation    Medication: TADALAFIL 5 MG PO TABS  Insurance Company: [employer] - Phone [phone] Fax [phone]  Pharmacy Filling the Rx: [id number] [place], [place] - [facility] AT [facility] & [address] [place] [address]  Filling Pharmacy Phone: [phone]  Filling Pharmacy Fax:    Start Date: 6/26/2024

## 2024-06-27 NOTE — TELEPHONE ENCOUNTER
Central Prior Authorization Team  Phone: [phone]    PRIOR AUTHORIZATION DENIED    Medication: TADALAFIL 5 MG PO TABS  Insurance Company: [employer] - Phone [phone] Fax [phone]  Denial Date: 6/26/2024  Denial Reason(s):         Appeal Information:         Patient Notified: no

## 2024-07-01 NOTE — TELEPHONE ENCOUNTER
Sent message to pt to see if he would like to obtain rx via [facility] pharmacy.    BRAYAN Ferrara  Care Coordinator- Urology   [phone]

## 2024-07-02 RX ORDER — TADALAFIL 5 MG/1
5 TABLET ORAL EVERY 24 HOURS
Qty: 30 TABLET | Refills: 11 | Status: SHIPPED | OUTPATIENT
Start: 2024-07-02

## 2024-07-29 DIAGNOSIS — I10 HYPERTENSION GOAL BP (BLOOD PRESSURE) < 140/90: ICD-10-CM

## 2024-07-29 RX ORDER — AMLODIPINE BESYLATE 2.5 MG/1
2.5 TABLET ORAL DAILY
Qty: 90 TABLET | Refills: 3 | Status: SHIPPED | OUTPATIENT
Start: 2024-07-29

## 2024-08-20 ENCOUNTER — HOSPITAL ENCOUNTER (EMERGENCY)
Facility: CLINIC | Age: 79
Discharge: HOME | End: 2024-08-20
Attending: EMERGENCY MEDICINE | Admitting: EMERGENCY MEDICINE
Payer: COMMERCIAL

## 2024-08-20 ENCOUNTER — NURSE TRIAGE (OUTPATIENT)
Dept: FAMILY MEDICINE | Facility: CLINIC | Age: 79
End: 2024-08-20
Payer: COMMERCIAL

## 2024-08-20 ENCOUNTER — APPOINTMENT (OUTPATIENT)
Dept: GENERAL RADIOLOGY | Facility: CLINIC | Age: 79
End: 2024-08-20
Attending: EMERGENCY MEDICINE
Payer: COMMERCIAL

## 2024-08-20 VITALS
OXYGEN SATURATION: 98 % | HEART RATE: 66 BPM | RESPIRATION RATE: 16 BRPM | HEIGHT: 70 IN | WEIGHT: 155 LBS | BODY MASS INDEX: 22.19 KG/M2 | DIASTOLIC BLOOD PRESSURE: 84 MMHG | SYSTOLIC BLOOD PRESSURE: 131 MMHG | TEMPERATURE: 97.7 F

## 2024-08-20 DIAGNOSIS — K21.00 GASTROESOPHAGEAL REFLUX DISEASE WITH ESOPHAGITIS WITHOUT HEMORRHAGE: ICD-10-CM

## 2024-08-20 LAB
ALBUMIN SERPL BCG-MCNC: 4.3 G/DL (ref 3.5–5.2)
ALP SERPL-CCNC: 145 U/L (ref 40–150)
ALT SERPL W P-5'-P-CCNC: 16 U/L (ref 0–70)
ANION GAP SERPL CALCULATED.3IONS-SCNC: 10 MMOL/L (ref 7–15)
AST SERPL W P-5'-P-CCNC: 28 U/L (ref 0–45)
ATRIAL RATE - MUSE: 79 BPM
BASOPHILS # BLD AUTO: 0 10E3/UL (ref 0–0.2)
BASOPHILS NFR BLD AUTO: 0 %
BILIRUB SERPL-MCNC: 0.5 MG/DL
BUN SERPL-MCNC: 9.6 MG/DL (ref 8–23)
CALCIUM SERPL-MCNC: 9.3 MG/DL (ref 8.8–10.4)
CHLORIDE SERPL-SCNC: 104 MMOL/L (ref 98–107)
CREAT SERPL-MCNC: 1.3 MG/DL (ref 0.67–1.17)
DIASTOLIC BLOOD PRESSURE - MUSE: NORMAL MMHG
EGFRCR SERPLBLD CKD-EPI 2021: 57 ML/MIN/1.73M2
EOSINOPHIL # BLD AUTO: 0.1 10E3/UL (ref 0–0.7)
EOSINOPHIL NFR BLD AUTO: 2 %
ERYTHROCYTE [DISTWIDTH] IN BLOOD BY AUTOMATED COUNT: 12 % (ref 10–15)
GLUCOSE SERPL-MCNC: 106 MG/DL (ref 70–99)
HCO3 SERPL-SCNC: 28 MMOL/L (ref 22–29)
HCT VFR BLD AUTO: 43 % (ref 40–53)
HGB BLD-MCNC: 15.2 G/DL (ref 13.3–17.7)
HOLD SPECIMEN: NORMAL
IMM GRANULOCYTES # BLD: 0 10E3/UL
IMM GRANULOCYTES NFR BLD: 0 %
INTERPRETATION ECG - MUSE: NORMAL
LIPASE SERPL-CCNC: 31 U/L (ref 13–60)
LYMPHOCYTES # BLD AUTO: 1.6 10E3/UL (ref 0.8–5.3)
LYMPHOCYTES NFR BLD AUTO: 42 %
MCH RBC QN AUTO: 34.1 PG (ref 26.5–33)
MCHC RBC AUTO-ENTMCNC: 35.3 G/DL (ref 31.5–36.5)
MCV RBC AUTO: 96 FL (ref 78–100)
MONOCYTES # BLD AUTO: 0.4 10E3/UL (ref 0–1.3)
MONOCYTES NFR BLD AUTO: 10 %
NEUTROPHILS # BLD AUTO: 1.8 10E3/UL (ref 1.6–8.3)
NEUTROPHILS NFR BLD AUTO: 46 %
NRBC # BLD AUTO: 0 10E3/UL
NRBC BLD AUTO-RTO: 0 /100
P AXIS - MUSE: 67 DEGREES
PLATELET # BLD AUTO: 173 10E3/UL (ref 150–450)
POTASSIUM SERPL-SCNC: 3.9 MMOL/L (ref 3.4–5.3)
PR INTERVAL - MUSE: 168 MS
PROT SERPL-MCNC: 7.2 G/DL (ref 6.4–8.3)
QRS DURATION - MUSE: 80 MS
QT - MUSE: 374 MS
QTC - MUSE: 428 MS
R AXIS - MUSE: 18 DEGREES
RBC # BLD AUTO: 4.46 10E6/UL (ref 4.4–5.9)
SODIUM SERPL-SCNC: 142 MMOL/L (ref 135–145)
SYSTOLIC BLOOD PRESSURE - MUSE: NORMAL MMHG
T AXIS - MUSE: 46 DEGREES
TROPONIN T SERPL HS-MCNC: 7 NG/L
VENTRICULAR RATE- MUSE: 79 BPM
WBC # BLD AUTO: 3.8 10E3/UL (ref 4–11)

## 2024-08-20 PROCEDURE — 93010 ELECTROCARDIOGRAM REPORT: CPT | Performed by: EMERGENCY MEDICINE

## 2024-08-20 PROCEDURE — 83690 ASSAY OF LIPASE: CPT | Performed by: EMERGENCY MEDICINE

## 2024-08-20 PROCEDURE — 82374 ASSAY BLOOD CARBON DIOXIDE: CPT | Performed by: EMERGENCY MEDICINE

## 2024-08-20 PROCEDURE — 71046 X-RAY EXAM CHEST 2 VIEWS: CPT

## 2024-08-20 PROCEDURE — 93005 ELECTROCARDIOGRAM TRACING: CPT | Performed by: EMERGENCY MEDICINE

## 2024-08-20 PROCEDURE — 85025 COMPLETE CBC W/AUTO DIFF WBC: CPT | Performed by: EMERGENCY MEDICINE

## 2024-08-20 PROCEDURE — 84484 ASSAY OF TROPONIN QUANT: CPT | Performed by: EMERGENCY MEDICINE

## 2024-08-20 PROCEDURE — 99285 EMERGENCY DEPT VISIT HI MDM: CPT | Performed by: EMERGENCY MEDICINE

## 2024-08-20 PROCEDURE — 99285 EMERGENCY DEPT VISIT HI MDM: CPT | Mod: 25 | Performed by: EMERGENCY MEDICINE

## 2024-08-20 PROCEDURE — 36415 COLL VENOUS BLD VENIPUNCTURE: CPT | Performed by: EMERGENCY MEDICINE

## 2024-08-20 ASSESSMENT — ACTIVITIES OF DAILY LIVING (ADL)
ADLS_ACUITY_SCORE: 35
ADLS_ACUITY_SCORE: 35

## 2024-08-20 ASSESSMENT — COLUMBIA-SUICIDE SEVERITY RATING SCALE - C-SSRS
1. IN THE PAST MONTH, HAVE YOU WISHED YOU WERE DEAD OR WISHED YOU COULD GO TO SLEEP AND NOT WAKE UP?: NO
2. HAVE YOU ACTUALLY HAD ANY THOUGHTS OF KILLING YOURSELF IN THE PAST MONTH?: NO
6. HAVE YOU EVER DONE ANYTHING, STARTED TO DO ANYTHING, OR PREPARED TO DO ANYTHING TO END YOUR LIFE?: NO

## 2024-08-20 NOTE — TELEPHONE ENCOUNTER
"Nurse Triage SBAR    Situation: Pain in chest 3 days ago at 4AM that kept him awake for 2 hr. Pain returned last night at 9PM and has lasted since then.    Background: Hx hypertension (takes amlodipine), GERD (takes omeprazole).    Assessment:   -Pain located in middle of chest, mostly on R side. Also feels it in upper abdomen as well. Feels achy, tight. 6/10 pain. \"It feels like I need to belch but nothing comes.\" Omeprazole didn't help.  -Works out intensely daily and it doesn't bother him when he does that.   -Denies radiation of pain, dizziness, nausea, vomiting, sweating, fever, difficulty breathing, cough, sour taste in mouth.    Recommendation: Call  Now. Patient declined ambulance. Patient has ride to ED and will leave now. Routing to provider as FYI.    Is this a 2nd Level Triage? YES, LICENSED PRACTITIONER REVIEW IS REQUIRED  Routed to provider    Reason for Disposition   Chest pain lasting longer than 5 minutes and ANY of the following:         Pain is crushing, pressure-like, or heavy         Over 44 years old          Over 30 years old and one cardiac risk factor (e.g diabetes, high blood pressure, high cholesterol, smoker, or family history of heart disease)         History of heart disease (e.g. angina, heart attack, heart failure, bypass surgery, takes nitroglycerin)    Additional Information   Negative: SEVERE difficulty breathing (e.g., struggling for each breath, speaks in single words)   Negative: Difficult to awaken or acting confused (e.g., disoriented, slurred speech)   Negative: Shock suspected (e.g., cold/pale/clammy skin, too weak to stand, low BP, rapid pulse)   Negative: Passed out (i.e., lost consciousness, collapsed and was not responding)    Protocols used: Chest Pain-A-OH    "

## 2024-08-20 NOTE — DISCHARGE INSTRUCTIONS
Thank you for coming to the [facility] Emergency Department.     Please start taking omeprazole daily for 2 weeks. This will help decrease acid more consistently, and give your stomach and esophagus time to heal any irritation or inflammation that has occurred.  Add either TUMS or Mylanta/Maalox according to package instructions if having heartburn sensation or pain.     Please follow up with your primary care care provider in 1-2 weeks to recheck your response to this treatment, and to have a recheck of your Creatinine level.  Increase water intake until that follow up appointment.

## 2024-08-20 NOTE — ED TRIAGE NOTES
Pt has been taking omeprazole without relief.      Triage Assessment (Adult)       Row Name 08/20/24 0883          Triage Assessment    Airway WDL WDL        Respiratory WDL    Respiratory WDL WDL        Skin Circulation/Temperature WDL    Skin Circulation/Temperature WDL WDL        Cardiac WDL    Cardiac WDL X  HTN        Peripheral/Neurovascular WDL    Peripheral Neurovascular WDL WDL        Cognitive/Neuro/Behavioral WDL    Cognitive/Neuro/Behavioral WDL WDL

## 2024-08-20 NOTE — ED PROVIDER NOTES
ED Provider Note  [facility]      History     Chief Complaint   Patient presents with    Chest Pain     Chest pain started 3 days ago on and off intermittently. 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  He presents today for epigastric discomfort.  He says that he has a history of GERD, he intermittently uses omeprazole when symptoms are present.  He has had intermittent symptoms since Sunday, early morning.  He woke around 3:00 in the morning with discomfort in his chest and a feeling like he needed to belch that radiated up into his throat.  He took omeprazole, he did eventually have improvement in his symptoms, but then again last night around 9 PM while sitting on the couch had return of symptoms and then again this morning woke around 1:00 in the morning with symptoms. He again took omeprazole this morning and last night he used baking soda and water.  He does not take omeprazole daily & he is not using any other over-the-counter treatments.  No prior history of biliary colic, pancreatitis, gastritis or hiatal hernia that he is aware of.  He has not noted any urine symptoms, no trouble eating or drinking, no change in bowel or bladder function.  He does have a history of prior cardiac cath in 2017 that showed no evidence of coronary artery disease.  He reports exercising daily 1-1/2 to 2 hours and has not noted any trouble with his exercise tolerance.  Specifically, no dyspnea with activity, no near syncope or chest pain triggered with activity.      This part of the medical record was transcribed by LARRY DENTON Medical Scribe, from a dictation done by Guerline Perez MD.     Past Medical History  Past Medical History:   Diagnosis Date    Back pain     Chest pain     Gastro-oesophageal reflux disease     GERD  (gastroesophageal reflux disease)     Hemorrhoid     Hepatitis C     Hypertension     Trigeminal neuralgia      Past Surgical History:   Procedure Laterality Date    ARTHROPLASTY KNEE  11/9/2012    Procedure: ARTHROPLASTY KNEE;;  Surgeon: Ang Arellano MD;  Location: [facility] OR    ENT SURGERY  2009    Styloid process broken off    HERNIA REPAIR  12/10    RESECT TUMOR LOWER EXTREMITY  11/9/2012    Procedure: RESECT TUMOR LOWER EXTREMITY;  Excision of Tumor Left Distal Femur, Left Total Knee Replacement ;  Surgeon: Ang Arellano MD;  Location: [facility] OR    styloid process temporal bone surg  02/10    TONSILLECTOMY  1954     amLODIPine (NORVASC) 2.5 MG tablet  tadalafil (CIALIS) 5 MG tablet  tamsulosin (FLOMAX) 0.4 MG capsule  benzoyl peroxide 5 % external liquid  triamcinolone (KENALOG) 0.1 % external ointment      Allergies   Allergen Reactions    Sulfa Antibiotics      Family History  Family History   Problem Relation Age of Onset    Hypertension Mother     Cerebrovascular Disease Father 69    Cancer Paternal Grandfather     Cancer Son     No Known Problems Half-Brother     Kidney failure Half-Brother     No Known Problems Half-Sister     No Known Problems Half-Sister     No Known Problems Half-Sister     No Known Problems Half-Sister     Deep Vein Thrombosis No family hx of     Anesthesia Reaction No family hx of     Glaucoma No family hx of      Social History   Social History     Tobacco Use    Smoking status: Former     Current packs/day: 0.00     Average packs/day: 0.1 packs/day for 10.0 years (1.0 ttl pk-yrs)     Types: Cigarettes     Start date: 1/1/2011     Quit date: 1/1/2021     Years since quitting: 3.6     Passive exposure: Past    Smokeless tobacco: Never    Tobacco comments:     quit 1979 until 2013. started after trip to [place]   Vaping Use    Vaping status: Never Used   Substance Use Topics    Alcohol use: Yes     Comment: 2-3 beer a week    Drug use: Yes     Types: Marijuana     Comment: Every other  "month-\"just a puff of marijuana\"      A medically appropriate review of systems was performed with pertinent positives and negatives noted in the HPI, and all other systems negative.    Physical Exam   BP: (!) 158/94  Pulse: 86  Temp: 97.7  F (36.5  C)  Resp: 18  Height: 177.8 cm (5' 10\")  Weight: 70.3 kg (155 lb)  SpO2: 98 %    Physical Exam  Gen:A&Ox3, no acute distress  HEENT:PERRL, no facial tenderness or wounds, head atraumatic, oropharynx clear, mucous membranes moist, TMs clear bilaterally  Neck:no bony tenderness or step offs, no JVD, trachea midline  Back: no CVA tenderness, no midline bony tenderness  CV:RRR without murmurs  PULM:Clear to auscultation bilaterally, no chest wall bony tenderness or pain reproducibility   Abd:soft, nontender, nondistended. Bowel sounds present and normal  UE:No traumatic injuries, skin normal  LE:no traumatic injuries, skin normal, no LE edema.   Neuro:CN II-XII intact, strength 5/5 throughout  Skin: no rashes or ecchymoses    ED Course, Procedures, & Data      Procedures            EKG Interpretation:      Interpreted by Guerline Perez MD  Time reviewed: 8:36AM  Symptoms at time of EKG: chest pain  Rhythm: normal sinus   Rate: 79  Axis: normal  Ectopy: none  Conduction: normal  ST Segments/ T Waves: No ST-T wave changes  Q Waves: none  Comparison to prior: Unchanged    Clinical Impression: normal EKG         Results for orders placed or performed during the hospital encounter of 08/20/24   Chest XR,  PA & LAT     Status: None    Narrative    CHEST TWO VIEWS 8/20/2024 9:50 AM     HISTORY: chest pain    COMPARISON: Chest radiograph 5/13/2020       Impression    IMPRESSION: No focal consolidation, pleural effusion or pneumothorax.  Cardiomediastinal silhouette is unremarkable. Nodular densities  overlying the fifth/sixth anterolateral intercostal spaces likely  reflect nipple shadows.    JENNY PENA MD         SYSTEM ID:  [id number]   [place] Draw     Status: " None    Narrative    The following orders were created for panel order [place] Draw.  Procedure                               Abnormality         Status                     ---------                               -----------         ------                     Extra Blue Top Tube[[id number]]                              Final result               Extra Red Top Tube[[id number]]                               Final result               Extra Green Top (Lithium...[[id number]]                      Final result               Extra Purple Top Tube[[id number]]                            Final result                 Please view results for these tests on the individual orders.   Extra Blue Top Tube     Status: None   Result Value Ref Range    Hold Specimen JIC    Extra Red Top Tube     Status: None   Result Value Ref Range    Hold Specimen JIC    Extra Green Top (Lithium Heparin) Tube     Status: None   Result Value Ref Range    Hold Specimen JIC    Extra Purple Top Tube     Status: None   Result Value Ref Range    Hold Specimen jic    Comprehensive metabolic panel     Status: Abnormal   Result Value Ref Range    Sodium 142 135 - 145 mmol/L    Potassium 3.9 3.4 - 5.3 mmol/L    Carbon Dioxide (CO2) 28 22 - 29 mmol/L    Anion Gap 10 7 - 15 mmol/L    Urea Nitrogen 9.6 8.0 - 23.0 mg/dL    Creatinine 1.30 (H) 0.67 - 1.17 mg/dL    GFR Estimate 57 (L) >60 mL/min/1.73m2    Calcium 9.3 8.8 - 10.4 mg/dL    Chloride 104 98 - 107 mmol/L    Glucose 106 (H) 70 - 99 mg/dL    Alkaline Phosphatase 145 40 - 150 U/L    AST 28 0 - 45 U/L    ALT 16 0 - 70 U/L    Protein Total 7.2 6.4 - 8.3 g/dL    Albumin 4.3 3.5 - 5.2 g/dL    Bilirubin Total 0.5 <=1.2 mg/dL   Troponin T, High Sensitivity     Status: Normal   Result Value Ref Range    Troponin T, High Sensitivity 7 <=22 ng/L   CBC with platelets and differential     Status: Abnormal   Result Value Ref Range    WBC Count 3.8 (L) 4.0 - 11.0 10e3/uL    RBC Count 4.46 4.40 - 5.90 10e6/uL    Hemoglobin  15.2 13.3 - 17.7 g/dL    Hematocrit 43.0 40.0 - 53.0 %    MCV 96 78 - 100 fL    MCH 34.1 (H) 26.5 - 33.0 pg    MCHC 35.3 31.5 - 36.5 g/dL    RDW 12.0 10.0 - 15.0 %    Platelet Count 173 150 - 450 10e3/uL    % Neutrophils 46 %    % Lymphocytes 42 %    % Monocytes 10 %    % Eosinophils 2 %    % Basophils 0 %    % Immature Granulocytes 0 %    NRBCs per 100 WBC 0 <1 /100    Absolute Neutrophils 1.8 1.6 - 8.3 10e3/uL    Absolute Lymphocytes 1.6 0.8 - 5.3 10e3/uL    Absolute Monocytes 0.4 0.0 - 1.3 10e3/uL    Absolute Eosinophils 0.1 0.0 - 0.7 10e3/uL    Absolute Basophils 0.0 0.0 - 0.2 10e3/uL    Absolute Immature Granulocytes 0.0 <=0.4 10e3/uL    Absolute NRBCs 0.0 10e3/uL   Lipase     Status: Normal   Result Value Ref Range    Lipase 31 13 - 60 U/L   EKG 12 lead     Status: None (Preliminary result)   Result Value Ref Range    Systolic Blood Pressure  mmHg    Diastolic Blood Pressure  mmHg    Ventricular Rate 79 BPM    Atrial Rate 79 BPM    AZ Interval 168 ms    QRS Duration 80 ms     ms    QTc 428 ms    P Axis 67 degrees    R AXIS 18 degrees    T Axis 46 degrees    Interpretation ECG Sinus rhythm  Normal ECG      CBC with platelets differential     Status: Abnormal    Narrative    The following orders were created for panel order CBC with platelets differential.  Procedure                               Abnormality         Status                     ---------                               -----------         ------                     CBC with platelets and d...[[id number]]  Abnormal            Final result                 Please view results for these tests on the individual orders.     Medications - No data to display  Labs Ordered and Resulted from Time of ED Arrival to Time of ED Departure   COMPREHENSIVE METABOLIC PANEL - Abnormal       Result Value    Sodium 142      Potassium 3.9      Carbon Dioxide (CO2) 28      Anion Gap 10      Urea Nitrogen 9.6      Creatinine 1.30 (*)     GFR Estimate 57 (*)      Calcium 9.3      Chloride 104      Glucose 106 (*)     Alkaline Phosphatase 145      AST 28      ALT 16      Protein Total 7.2      Albumin 4.3      Bilirubin Total 0.5     CBC WITH PLATELETS AND DIFFERENTIAL - Abnormal    WBC Count 3.8 (*)     RBC Count 4.46      Hemoglobin 15.2      Hematocrit 43.0      MCV 96      MCH 34.1 (*)     MCHC 35.3      RDW 12.0      Platelet Count 173      % Neutrophils 46      % Lymphocytes 42      % Monocytes 10      % Eosinophils 2      % Basophils 0      % Immature Granulocytes 0      NRBCs per 100 WBC 0      Absolute Neutrophils 1.8      Absolute Lymphocytes 1.6      Absolute Monocytes 0.4      Absolute Eosinophils 0.1      Absolute Basophils 0.0      Absolute Immature Granulocytes 0.0      Absolute NRBCs 0.0     TROPONIN T, HIGH SENSITIVITY - Normal    Troponin T, High Sensitivity 7     LIPASE - Normal    Lipase 31       Chest XR,  PA & LAT   Final Result   IMPRESSION: No focal consolidation, pleural effusion or pneumothorax.   Cardiomediastinal silhouette is unremarkable. Nodular densities   overlying the fifth/sixth anterolateral intercostal spaces likely   reflect nipple shadows.      JENNY PENA MD            SYSTEM ID:  [id number]             Critical care was not performed.     Medical Decision Making  The patient's presentation was of high complexity (an acute health issue posing potential threat to life or bodily function).    The patient's evaluation involved:  review of 2 test result(s) ordered prior to this encounter (prior EKG and cardiac cath 2017)  ordering and/or review of 3+ test(s) in this encounter (see separate area of note for details)    The patient's management necessitated moderate risk (prescription drug management including medications given in the ED).    Assessment & Plan    79-year-old male presenting with episodic discomfort in the chest which has a burning character as well as sensation of need to belch concerning for GERD, gastritis,  esophagitis.  I have a lower suspicion for acute coronary syndrome as he has no exertional symptoms or other signs of obvious angina.  Vitals are stable.  I did access obtained laboratory testing included CBC, Comprehensive metabolic panel, lipase and a troponin. EKG was done that shows normal sinus rhythm with a rate of 79 and no acute ischemic abnormalities is unchanged from his baseline EKG.  Chest x-ray was independently reviewed by me and showed clear lungs, normal heart size and mediastinum, no bony abnormalities.  We will plan for treatment for esophagitis or gastritis with starting daily PPI for at least 2 weeks and adding Maalox or Tums as needed.  Will have him follow-up with primary care in 2 weeks for reassessment of symptoms.      Labs resulted with negative troponin. Normal CBC and CMP other than Cr of 1.3.   Pt to increase water intake, and follow up with Primary Care in 1-2 weeks for Creatinine recheck. Discussed this with the patient.        This part of the medical record was transcribed by LARRY DENTON Medical Scribe, from a dictation done by Guerline Perez MD.     I have reviewed the nursing notes. I have reviewed the findings, diagnosis, plan and need for follow up with the patient.    New Prescriptions    No medications on file       Final diagnoses:   Gastroesophageal reflux disease with esophagitis without hemorrhage       Guerline Perez MD  [facility] EMERGENCY DEPARTMENT  8/20/2024        Guerline Perez MD  08/20/24 1022

## 2024-08-21 ENCOUNTER — TELEPHONE (OUTPATIENT)
Dept: OTOLARYNGOLOGY | Facility: CLINIC | Age: 79
End: 2024-08-21
Payer: COMMERCIAL

## 2024-08-22 ENCOUNTER — OFFICE VISIT (OUTPATIENT)
Dept: FAMILY MEDICINE | Facility: CLINIC | Age: 79
End: 2024-08-22
Payer: COMMERCIAL

## 2024-08-22 VITALS
RESPIRATION RATE: 15 BRPM | WEIGHT: 153 LBS | SYSTOLIC BLOOD PRESSURE: 145 MMHG | HEART RATE: 84 BPM | HEIGHT: 70 IN | BODY MASS INDEX: 21.9 KG/M2 | OXYGEN SATURATION: 99 % | DIASTOLIC BLOOD PRESSURE: 89 MMHG | TEMPERATURE: 98.1 F

## 2024-08-22 DIAGNOSIS — R07.89 CHEST WALL PAIN: ICD-10-CM

## 2024-08-22 DIAGNOSIS — K21.00 GASTROESOPHAGEAL REFLUX DISEASE WITH ESOPHAGITIS WITHOUT HEMORRHAGE: ICD-10-CM

## 2024-08-22 DIAGNOSIS — Z09 HOSPITAL DISCHARGE FOLLOW-UP: Primary | ICD-10-CM

## 2024-08-22 PROCEDURE — 99213 OFFICE O/P EST LOW 20 MIN: CPT | Performed by: FAMILY MEDICINE

## 2024-08-22 ASSESSMENT — PAIN SCALES - GENERAL: PAINLEVEL: NO PAIN (0)

## 2024-08-22 NOTE — PROGRESS NOTES
"  Assessment & Plan     Hospital discharge follow-up  negative workup    Gastroesophageal reflux disease with esophagitis without hemorrhage  Farzana williamson PPI bid and more water/ eating more slowly   Take PPI for 4-8 weeks up only if unimproved.     Chest wall pain  resolved        MED REC REQUIRED  Post Medication Reconciliation Status:     Regular exercise  See Patient Instructions    Subjective   Ivy is a 79 year old, presenting for the following health issues:  ER F/U (Esophagitis )      8/22/2024     3:55 PM   Additional Questions   Roomed by Jovita DELGADO     [facility]       ED/UC Followup:    Facility:  [facility] ED  Date of visit: 8/20/24  Reason for visit: esophagitis   Current Status: No pain  GERD/Heartburn  Onset/Duration: 1 week  Description: brunign substernal chest pain  Intensity: was moderate  Progression of Symptoms: improving  Accompanying Signs & Symptoms:  Does it feel like food gets stuck or trouble swallowing: No  Nausea: No  Vomiting (bloody?): No  Abdominal Pain: No  Black-Tarry stools: No  Bloody stools: No  History:  Previous similar episodes: YES  Previous ulcers: No  Precipitating factors:   Caffeine use: No  Alcohol use: No  NSAID/Aspirin use: No  Tobacco use: No  Worse with no particular food or drink.         Review of Systems  Constitutional, HEENT, cardiovascular, pulmonary, gi and gu systems are negative, except as otherwise noted.      Objective    BP (!) 145/89   Pulse 84   Temp 98.1  F (36.7  C) (Temporal)   Resp 15   Ht 1.775 m (5' 9.88\")   Wt 69.4 kg (153 lb)   SpO2 99%   BMI 22.03 kg/m    Body mass index is 22.03 kg/m .  Physical Exam   GENERAL: alert and no distress  NECK: no adenopathy, no asymmetry, masses, or scars  RESP: lungs clear to auscultation - no rales, rhonchi or wheezes  CV: regular rate and rhythm, normal S1 S2, no S3 or S4, no murmur, click or rub, no peripheral edema  SKIN: no suspicious lesions or rashes  NEURO: Normal strength and tone, mentation intact and speech " normal  PSYCH: mentation appears normal, affect normal/bright    Admission on 08/20/2024, Discharged on 08/20/2024   Component Date Value Ref Range Status    Ventricular Rate 08/20/2024 79  BPM Final    Atrial Rate 08/20/2024 79  BPM Final    ME Interval 08/20/2024 168  ms Final    QRS Duration 08/20/2024 80  ms Final    QT 08/20/2024 374  ms Final    QTc 08/20/2024 428  ms Final    P Axis 08/20/2024 67  degrees Final    R AXIS 08/20/2024 18  degrees Final    T Axis 08/20/2024 46  degrees Final    Interpretation ECG 08/20/2024    Final                    Value:Sinus rhythm  Normal ECG  Unconfirmed report - interpretation of this ECG is computer generated - see medical record for final interpretation  Confirmed by - EMERGENCY ROOM, PHYSICIAN ([id number]),  RUSLAN GOMEZ ([id number]) on 8/20/2024 2:37:30 PM      Hold Specimen 08/20/2024 JIC   Final    Hold Specimen 08/20/2024 JIC   Final    Hold Specimen 08/20/2024 JIC   Final    Hold Specimen 08/20/2024 jic   Final    Sodium 08/20/2024 142  135 - 145 mmol/L Final    Potassium 08/20/2024 3.9  3.4 - 5.3 mmol/L Final    Carbon Dioxide (CO2) 08/20/2024 28  22 - 29 mmol/L Final    Anion Gap 08/20/2024 10  7 - 15 mmol/L Final    Urea Nitrogen 08/20/2024 9.6  8.0 - 23.0 mg/dL Final    Creatinine 08/20/2024 1.30 (H)  0.67 - 1.17 mg/dL Final    GFR Estimate 08/20/2024 57 (L)  >60 mL/min/1.73m2 Final    eGFR calculated using 2021 CKD-EPI equation.    Calcium 08/20/2024 9.3  8.8 - 10.4 mg/dL Final    Reference intervals for this test were updated on 7/16/2024 to reflect our healthy population more accurately. There may be differences in the flagging of prior results with similar values performed with this method. Those prior results can be interpreted in the context of the updated reference intervals.    Chloride 08/20/2024 104  98 - 107 mmol/L Final    Glucose 08/20/2024 106 (H)  70 - 99 mg/dL Final    Alkaline Phosphatase 08/20/2024 145  40 - 150 U/L Final    AST 08/20/2024 28   0 - 45 U/L Final    ALT 08/20/2024 16  0 - 70 U/L Final    Protein Total 08/20/2024 7.2  6.4 - 8.3 g/dL Final    Albumin 08/20/2024 4.3  3.5 - 5.2 g/dL Final    Bilirubin Total 08/20/2024 0.5  <=1.2 mg/dL Final    Troponin T, High Sensitivity 08/20/2024 7  <=22 ng/L Final    Either a High Sensitivity Troponin T baseline (0 hours) value = 100 ng/L, or an increase in High Sensitivity Troponin T = 7 ng/L at 2 hours compared to 0 hours (2-0 hours), suggests myocardial injury, and urgent clinical attention is required.    If the 2-0 hours increase is <7 ng/L, a High Sensitivity Troponin T result above gender-specific reference ranges warrants further evaluation.   Recommendations for further evaluation include correlation with clinical decision-making tool (e.g., HEART), a 3rd High Sensitivity Troponin T test 2 hours after the 2nd (a 20% change from baseline would represent concern), admission for observation, close PCC/cardiology follow-up, or urgent outpatient provocative testing.    WBC Count 08/20/2024 3.8 (L)  4.0 - 11.0 10e3/uL Final    RBC Count 08/20/2024 4.46  4.40 - 5.90 10e6/uL Final    Hemoglobin 08/20/2024 15.2  13.3 - 17.7 g/dL Final    Hematocrit 08/20/2024 43.0  40.0 - 53.0 % Final    MCV 08/20/2024 96  78 - 100 fL Final    MCH 08/20/2024 34.1 (H)  26.5 - 33.0 pg Final    MCHC 08/20/2024 35.3  31.5 - 36.5 g/dL Final    RDW 08/20/2024 12.0  10.0 - 15.0 % Final    Platelet Count 08/20/2024 173  150 - 450 10e3/uL Final    % Neutrophils 08/20/2024 46  % Final    % Lymphocytes 08/20/2024 42  % Final    % Monocytes 08/20/2024 10  % Final    % Eosinophils 08/20/2024 2  % Final    % Basophils 08/20/2024 0  % Final    % Immature Granulocytes 08/20/2024 0  % Final    NRBCs per 100 WBC 08/20/2024 0  <1 /100 Final    Absolute Neutrophils 08/20/2024 1.8  1.6 - 8.3 10e3/uL Final    Absolute Lymphocytes 08/20/2024 1.6  0.8 - 5.3 10e3/uL Final    Absolute Monocytes 08/20/2024 0.4  0.0 - 1.3 10e3/uL Final    Absolute  Eosinophils 08/20/2024 0.1  0.0 - 0.7 10e3/uL Final    Absolute Basophils 08/20/2024 0.0  0.0 - 0.2 10e3/uL Final    Absolute Immature Granulocytes 08/20/2024 0.0  <=0.4 10e3/uL Final    Absolute NRBCs 08/20/2024 0.0  10e3/uL Final    Lipase 08/20/2024 31  13 - 60 U/L Final    On 02/07/2023 the assay method at [facility] laboratory was changed to the Roche Lipase method on the Jannie [id number]. Results obtained with different assay methods or kits cannot be used interchangeably, and therefore, direct comparison to results obtained from this laboratory prior to 02/07/2023 should be interpreted with caution, with each result interpreted in the context of its own reference interval.           Signed Electronically by: Lamine Franco MD

## 2024-08-27 ENCOUNTER — PRE VISIT (OUTPATIENT)
Dept: UROLOGY | Facility: CLINIC | Age: 79
End: 2024-08-27

## 2024-08-27 ENCOUNTER — PRE VISIT (OUTPATIENT)
Dept: UROLOGY | Facility: CLINIC | Age: 79
End: 2024-08-27
Payer: COMMERCIAL

## 2024-08-27 NOTE — TELEPHONE ENCOUNTER
Reason for visit: 3 month follow up     Relevant information: some hx of BPH with LUTS, congenital obstruction of ureteropelvic junction, hydronephrosis of right kidney    Records/imaging/labs/orders: all records available    Pt called: no need for a call    At Rooming: have pt empty bladder/pvr,  Standard rooming      Dave Granger  8/27/2024  3:16 PM

## 2024-09-16 NOTE — PROGRESS NOTES
Subjective     REASON FOR VISIT  Erectile dysfunction and nocturia follow-up     HISTORY OF PRESENT ILLNESS  Mr. Haynes is a pleasant 79 year old male whom speaking with today in follow-up for his bothersome erectile dysfunction and nocturia.  He was seen in the office on 6/18/2024, at which time lifestyle modifications were discussed as well as a possible fluid diary and a trial of tadalafil.  He follows up today for discussion of efficacy.    Urologic history is also significant for right UPJ obstruction status post right robotic pyeloplasty in December 2022 with most recent renogram from February 2024 showing 42% function on the right with no obstruction and 58% function on the left with no obstruction.  Also notable for right extrarenal pelvis.    Today:  Feels the tadalafil has improved his nocturia - down to 1-2 times per night  Questions if he can take more of the tadalafil he would like to improve his erections  Notes that he has had a hydrocele on his left testicle that he noticed recently has gotten worse - questions what his options would be for treatment  Felt that the Flomax was causing problems with reflux, so he has stopped taking this and is only taking the tadalafil    Objective     PHYSICAL EXAMINATION  General: Alert, oriented, no acute distress    Assessment & Plan    Erectile dysfunction   Nocturia   Hydrocele     It was my pleasure to meet with Ivy in follow-up for his history of nocturia as well as his hydrocele and mild issues with erections.  After reviewing his clinical history, I am happy to hear that the tadalafil seems to have improved his nocturia so much.  We reviewed his voiding diary and did not see any significant issues, so we will just have him continue taking tadalafil 5 mg daily.  I am glad he was able to isolate the fact that the Flomax seem to be the medication worsening his reflux and so we have taken this off his medication list.    In regards to his question around taking  additional tadalafil for erections, I reviewed with him that he can take an additional 5 to 15 mg as needed at least 2 to 3 hours prior to anticipated sexual activity.    In regards to his hydrocele, we reviewed that this is a completely benign entity, specifically a encapsulated fluid collection around the testicle.  We discussed different interventions for this including needle aspiration and sclerosis versus hydrocelectomy.  We also reviewed different things that could potentially affect getting bigger or smaller, though there is no definitive evidence that these interventions distinctly affect the hydrocele.  Ultimately he is okay with observing this for now.    I will plan to follow-up with him in 6-12 months, at which time we will also plan to discuss when to have a repeat Lasix renogram.    PLAN  Continue taking tadalafil 5 mg daily with potentially as needed dosing for improved erections  Observation of his hydrocele  Follow-up office visit with me in 6 to 12 months    SIGNED:    Hawk Dawson PA-C    I spent a total of 43 minutes spent on the date of the encounter doing chart review, history and exam, documentation, and further activities as noted above.

## 2024-09-18 ENCOUNTER — OFFICE VISIT (OUTPATIENT)
Dept: UROLOGY | Facility: CLINIC | Age: 79
End: 2024-09-18
Payer: COMMERCIAL

## 2024-09-18 VITALS
HEIGHT: 70 IN | WEIGHT: 152 LBS | DIASTOLIC BLOOD PRESSURE: 82 MMHG | HEART RATE: 80 BPM | SYSTOLIC BLOOD PRESSURE: 146 MMHG | BODY MASS INDEX: 21.76 KG/M2 | OXYGEN SATURATION: 99 %

## 2024-09-18 DIAGNOSIS — Q62.39 CONGENITAL OBSTRUCTION OF URETEROPELVIC JUNCTION: ICD-10-CM

## 2024-09-18 DIAGNOSIS — N52.9 ERECTILE DYSFUNCTION, UNSPECIFIED ERECTILE DYSFUNCTION TYPE: ICD-10-CM

## 2024-09-18 DIAGNOSIS — R35.1 NOCTURIA: Primary | ICD-10-CM

## 2024-09-18 DIAGNOSIS — N43.0 ENCYSTED HYDROCELE: ICD-10-CM

## 2024-09-18 PROCEDURE — 99215 OFFICE O/P EST HI 40 MIN: CPT | Performed by: STUDENT IN AN ORGANIZED HEALTH CARE EDUCATION/TRAINING PROGRAM

## 2024-09-18 ASSESSMENT — PAIN SCALES - GENERAL: PAINLEVEL: NO PAIN (0)

## 2024-09-18 NOTE — NURSING NOTE
"Chief Complaint   Patient presents with    Consult     Here for a 3month follow up       Blood pressure (!) 146/82, pulse 80, height 1.783 m (5' 10.2\"), weight 68.9 kg (152 lb), SpO2 99%. Body mass index is 21.69 kg/m .    Patient Active Problem List   Diagnosis    GERD (gastroesophageal reflux disease)    S/P total knee replacement    Hypertension goal BP (blood pressure) < 140/90    Hepatic fibrosis    Congenital obstruction of ureteropelvic junction    Benign prostatic hyperplasia with weak urinary stream    Diverticular disease of large intestine       Allergies   Allergen Reactions    Sulfa Antibiotics        Current Outpatient Medications   Medication Sig Dispense Refill    amLODIPine (NORVASC) 2.5 MG tablet TAKE 1 TABLET(2.5 MG) BY MOUTH DAILY 90 tablet 3    benzoyl peroxide 5 % external liquid Wash on back of scalp (and areas of painful bumps) daily. Wash off thoroughly and dry off with an old, light colored towel, as this medication can bleach fabric (but not skin) (Patient not taking: Reported on 5/21/2024) 236 mL 11    tadalafil (CIALIS) 5 MG tablet Take 1 tablet (5 mg) by mouth every 24 hours 30 tablet 11    tamsulosin (FLOMAX) 0.4 MG capsule Take 1 capsule (0.4 mg) by mouth daily 90 capsule 3    triamcinolone (KENALOG) 0.1 % external ointment Apply topically 2 times daily Apply twice daily as needed to painful bumps on back of scalp. Use for up to two weeks at a time in the same spot, then take one week off before restarting a two week cycle (Patient not taking: Reported on 5/21/2024) 15 g 3       Social History     Tobacco Use    Smoking status: Former     Current packs/day: 0.00     Average packs/day: 0.1 packs/day for 10.0 years (1.0 ttl pk-yrs)     Types: Cigarettes     Start date: 1/1/2011     Quit date: 1/1/2021     Years since quitting: 3.7     Passive exposure: Past    Smokeless tobacco: Never    Tobacco comments:     quit 1979 until 2013. started after trip to [place]   Vaping Use    Vaping " "status: Never Used   Substance Use Topics    Alcohol use: Yes     Comment: 2-3 beer a week    Drug use: Yes     Types: Marijuana     Comment: Every other month-\"just a puff of marijuana\"       Dave Granger  9/18/2024  1:09 PM     "

## 2024-09-18 NOTE — LETTER
9/18/2024       RE: Ivy Haynes  [address]  [facility] [address]     Dear Colleague,    Thank you for referring your patient, Ivy Haynes, to the [facility] [place] at [facility]. Please see a copy of my visit note below.    Subjective    REASON FOR VISIT  Erectile dysfunction and nocturia follow-up     HISTORY OF PRESENT ILLNESS  Mr. Haynes is a pleasant 79 year old male whom speaking with today in follow-up for his bothersome erectile dysfunction and nocturia.  He was seen in the office on 6/18/2024, at which time lifestyle modifications were discussed as well as a possible fluid diary and a trial of tadalafil.  He follows up today for discussion of efficacy.    Urologic history is also significant for right UPJ obstruction status post right robotic pyeloplasty in December 2022 with most recent renogram from February 2024 showing 42% function on the right with no obstruction and 58% function on the left with no obstruction.  Also notable for right extrarenal pelvis.    Today:  Feels the tadalafil has improved his nocturia - down to 1-2 times per night  Questions if he can take more of the tadalafil he would like to improve his erections  Notes that he has had a hydrocele on his left testicle that he noticed recently has gotten worse - questions what his options would be for treatment  Felt that the Flomax was causing problems with reflux, so he has stopped taking this and is only taking the tadalafil    Objective    PHYSICAL EXAMINATION  General: Alert, oriented, no acute distress    Assessment & Plan   Erectile dysfunction   Nocturia   Hydrocele     It was my pleasure to meet with Ivy in follow-up for his history of nocturia as well as his hydrocele and mild issues with erections.  After reviewing his clinical history, I am happy to hear that the tadalafil seems to have improved his nocturia so much.  We reviewed his voiding diary  and did not see any significant issues, so we will just have him continue taking tadalafil 5 mg daily.  I am glad he was able to isolate the fact that the Flomax seem to be the medication worsening his reflux and so we have taken this off his medication list.    In regards to his question around taking additional tadalafil for erections, I reviewed with him that he can take an additional 5 to 15 mg as needed at least 2 to 3 hours prior to anticipated sexual activity.    In regards to his hydrocele, we reviewed that this is a completely benign entity, specifically a encapsulated fluid collection around the testicle.  We discussed different interventions for this including needle aspiration and sclerosis versus hydrocelectomy.  We also reviewed different things that could potentially affect getting bigger or smaller, though there is no definitive evidence that these interventions distinctly affect the hydrocele.  Ultimately he is okay with observing this for now.    I will plan to follow-up with him in 6-12 months, at which time we will also plan to discuss when to have a repeat Lasix renogram.    PLAN  Continue taking tadalafil 5 mg daily with potentially as needed dosing for improved erections  Observation of his hydrocele  Follow-up office visit with me in 6 to 12 months    SIGNED:    Hawk Dawson PA-C    I spent a total of 43 minutes spent on the date of the encounter doing chart review, history and exam, documentation, and further activities as noted above.       Again, thank you for allowing me to participate in the care of your patient.      Sincerely,    Hawk Dawson PA-C

## 2024-10-01 ENCOUNTER — MYC MEDICAL ADVICE (OUTPATIENT)
Dept: UROLOGY | Facility: CLINIC | Age: 79
End: 2024-10-01
Payer: COMMERCIAL

## 2024-10-01 DIAGNOSIS — N40.1 BENIGN LOCALIZED PROSTATIC HYPERPLASIA WITH LOWER URINARY TRACT SYMPTOMS (LUTS): Primary | ICD-10-CM

## 2024-10-02 RX ORDER — TAMSULOSIN HYDROCHLORIDE 0.4 MG/1
0.4 CAPSULE ORAL DAILY
Qty: 90 CAPSULE | Refills: 3 | Status: SHIPPED | OUTPATIENT
Start: 2024-10-02

## 2024-10-15 ENCOUNTER — OFFICE VISIT (OUTPATIENT)
Dept: OTOLARYNGOLOGY | Facility: CLINIC | Age: 79
End: 2024-10-15
Payer: COMMERCIAL

## 2024-10-15 VITALS
SYSTOLIC BLOOD PRESSURE: 176 MMHG | HEIGHT: 70 IN | HEART RATE: 72 BPM | BODY MASS INDEX: 22.99 KG/M2 | WEIGHT: 160.6 LBS | OXYGEN SATURATION: 100 % | DIASTOLIC BLOOD PRESSURE: 91 MMHG

## 2024-10-15 DIAGNOSIS — K13.70 ORAL LESION: ICD-10-CM

## 2024-10-15 PROCEDURE — 99213 OFFICE O/P EST LOW 20 MIN: CPT | Performed by: OTOLARYNGOLOGY

## 2024-10-15 RX ORDER — OXYCODONE AND ACETAMINOPHEN 5; 325 MG/1; MG/1
1 TABLET ORAL EVERY 6 HOURS PRN
Qty: 20 TABLET | Refills: 0 | Status: SHIPPED | OUTPATIENT
Start: 2024-10-15 | End: 2024-10-20

## 2024-10-15 ASSESSMENT — PAIN SCALES - GENERAL: PAINLEVEL: NO PAIN (0)

## 2024-10-15 NOTE — LETTER
"10/15/2024       RE: Ivy Haynes  [address]  [facility] [address]     Dear Colleague,    Thank you for referring your patient, Ivy Haynes, to the [facility] E[facility]ROAT [facility] [place] at [facility]. Please see a copy of my visit note below.      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     Review of Systems:   Pertinent items are noted in HPI or as in patient entered ROS below, remainder of complete ROS is negative.       2023    11:26 AM    ENT ROS   Constitutional Problems with sleep   Ears, Nose, Throat Ear pain    Nasal congestion or drainage   Cardiopulmonary Cough        Physical Exam:   BP (!) 176/91   Pulse 72   Ht 1.775 m (5' 9.88\")   Wt 72.8 kg (160 lb 9.6 oz)   SpO2 100%   BMI 23.12 kg/m       PHYSICAL EXAMINATION:    Constitutional:  The patient was unaccompanied, well-groomed, and in no acute distress.    Skin:  Warm and pink.    Neurologic:  Alert and oriented x 3.  CN's III-XII within normal limits.  Voice normal.   Psychiatric:  The patient's affect was calm, cooperative, and appropriate.    Respiratory:  Breathing comfortably without stridor or exertion of accessory muscles.    Eyes: Extraocular movement intact.    Head:  Normocephalic and atraumatic.  No lesions or scars.    Ears:  Pinnae and tragus non-tender.  EAC's and TM's were clear.    Nose:  Sinuses were non-tender.  Anterior rhinoscopy revealed midline septum and absence of purulence or polyps.    OC/OP:  Normal tongue, floor of mouth, buccal mucosa, and palate.  No lesions or masses on inspection " or palpation.  No abnormal lymph tissue in the oropharynx.  The pterygoid region is non-tender.    Neck:  Supple with normal laryngeal and tracheal landmarks.  The parotid beds were without masses.  No palpable thyroid.  Lymphatic:  There is no palpable lymphadenopathy in the neck.      Assessment and Plan:    ICD-10-CM    1. Oral lesion  K13.70 Adult ENT  Referral     oxyCODONE-acetaminophen (PERCOCET) 5-325 MG tablet        Remains stavble,. Some new GERD thatis well controlled. Omeprazole taper and then follow up6 months.     Follow-up: No follow-ups on file.         Scribe Disclosure:  Heaven WEBER, am serving as a scribe to document services personally performed by Connor Torres MD at this visit, based upon the provider's statements to me. All documentation has been reviewed by the aforementioned provider prior to being entered into the official medical record.          Again, thank you for allowing me to participate in the care of your patient.      Sincerely,    Connor Torres MD

## 2024-10-15 NOTE — PROGRESS NOTES
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     Review of Systems:   Pertinent items are noted in HPI or as in patient entered ROS below, remainder of complete ROS is negative.       2023    11:26 AM    ENT ROS   Constitutional Problems with sleep   Ears, Nose, Throat Ear pain    Nasal congestion or drainage   Cardiopulmonary Cough        Physical Exam:   BP (!) 176/91   Pulse 72   Ht 1.775 m (5' 9.88\")   Wt 72.8 kg (160 lb 9.6 oz)   SpO2 100%   BMI 23.12 kg/m       PHYSICAL EXAMINATION:    Constitutional:  The patient was unaccompanied, well-groomed, and in no acute distress.    Skin:  Warm and pink.    Neurologic:  Alert and oriented x 3.  CN's III-XII within normal limits.  Voice normal.   Psychiatric:  The patient's affect was calm, cooperative, and appropriate.    Respiratory:  Breathing comfortably without stridor or exertion of accessory muscles.    Eyes: Extraocular movement intact.    Head:  Normocephalic and atraumatic.  No lesions or scars.    Ears:  Pinnae and tragus non-tender.  EAC's and TM's were clear.    Nose:  Sinuses were non-tender.  Anterior rhinoscopy revealed midline septum and absence of purulence or polyps.    OC/OP:  Normal tongue, floor of mouth, buccal mucosa, and palate.  No lesions or masses on inspection or palpation.  No abnormal lymph tissue in the oropharynx.  The pterygoid region is non-tender.    Neck:  Supple with normal laryngeal and tracheal landmarks.  The parotid beds were without masses.  No palpable thyroid.  Lymphatic:  There is no palpable lymphadenopathy in the neck.      Assessment and Plan:    ICD-10-CM "    1. Oral lesion  K13.70 Adult ENT  Referral     oxyCODONE-acetaminophen (PERCOCET) 5-325 MG tablet        Remains stavble,. Some new GERD thatis well controlled. Omeprazole taper and then follow up6 months.     Follow-up: No follow-ups on file.         Scribe Disclosure:  Heaven WEBER, am serving as a scribe to document services personally performed by Connor Torres MD at this visit, based upon the provider's statements to me. All documentation has been reviewed by the aforementioned provider prior to being entered into the official medical record.

## 2025-01-07 ENCOUNTER — MYC MEDICAL ADVICE (OUTPATIENT)
Dept: FAMILY MEDICINE | Facility: CLINIC | Age: 80
End: 2025-01-07
Payer: COMMERCIAL

## 2025-01-07 DIAGNOSIS — K21.00 GASTROESOPHAGEAL REFLUX DISEASE WITH ESOPHAGITIS WITHOUT HEMORRHAGE: ICD-10-CM

## 2025-01-09 NOTE — TELEPHONE ENCOUNTER
Patient called.   Following up on [employer] message below.   Informed patient LM sent omeprazole to pharmacy on 1/7/25.   Patient will check w/ [facility] to see if prescription was received.     Josefina LERMA

## 2025-01-16 ENCOUNTER — OFFICE VISIT (OUTPATIENT)
Dept: FAMILY MEDICINE | Facility: CLINIC | Age: 80
End: 2025-01-16
Payer: COMMERCIAL

## 2025-01-16 VITALS
WEIGHT: 163 LBS | HEART RATE: 78 BPM | SYSTOLIC BLOOD PRESSURE: 158 MMHG | TEMPERATURE: 97.8 F | OXYGEN SATURATION: 98 % | RESPIRATION RATE: 15 BRPM | BODY MASS INDEX: 23.34 KG/M2 | DIASTOLIC BLOOD PRESSURE: 86 MMHG | HEIGHT: 70 IN

## 2025-01-16 DIAGNOSIS — I10 HYPERTENSION GOAL BP (BLOOD PRESSURE) < 140/90: ICD-10-CM

## 2025-01-16 DIAGNOSIS — K12.30 STOMATITIS AND MUCOSITIS: ICD-10-CM

## 2025-01-16 DIAGNOSIS — K12.1 STOMATITIS AND MUCOSITIS: ICD-10-CM

## 2025-01-16 DIAGNOSIS — B34.9 VIRAL ILLNESS: Primary | ICD-10-CM

## 2025-01-16 ASSESSMENT — PAIN SCALES - GENERAL: PAINLEVEL_OUTOF10: MODERATE PAIN (4)

## 2025-01-16 NOTE — PROGRESS NOTES
"  Assessment & Plan     Viral illness  resolving    Stomatitis and mucositis  Looks like typical viral apothous ulcers healing  Follow up with consultant if not resolving a month  - Oral Surgery Referral; Future    Hypertension goal BP (blood pressure) < 140/90  Elevated today  :\" but very anxious\"   Monitor and call us if remains elevated            See Patient Instructions    Merline Haynes is a 79 year old, presenting for the following health issues:  Sore (Sore in mouth)        1/16/2025     1:57 PM   Additional Questions   Roomed by Yaritza RAYMUNDO     History of Present Illness       Reason for visit:  White patches underneath tongue  Symptom onset:  1-2 weeks ago  Symptoms include:  Pain  Symptom intensity:  Moderate  Symptom progression:  Worsening  Had these symptoms before:  No  What makes it worse:  No  What makes it better:  No   He is taking medications regularly.         Acute Illness  Acute illness concerns: URI 2 weeks ago \" tingling on tongue  \" Then formed several shallow painful white spots\"  Onset/Duration: 2 week  Symptoms:  Fever: No  Chills/Sweats: No  Headache (location?): No  Sinus Pressure: No  Conjunctivitis:  No  Ear Pain: no  Rhinorrhea: YES  Congestion: YES  Sore Throat: YES  Cough: no  Wheeze: No    Fatigue/Achiness: YES  Sick/Strep Exposure: No  Therapies tried and outcome: None  Hypertension Follow-up    Do you check your blood pressure regularly outside of the clinic? Yes   Are you following a low salt diet? Yes  Are your blood pressures ever more than 140 on the top number (systolic) OR more   than 90 on the bottom number (diastolic), for example 140/90? No      Review of Systems  Constitutional, HEENT, cardiovascular, pulmonary, gi and gu systems are negative, except as otherwise noted.      Objective    BP (!) 158/86 (BP Location: Left arm, Patient Position: Sitting, Cuff Size: Adult Regular)   Pulse 78   Temp 97.8  F (36.6  C) (Temporal)   Resp 15   Ht 1.775 m (5' 9.88\")   Wt " 73.9 kg (163 lb)   SpO2 98%   BMI 23.47 kg/m    Body mass index is 23.47 kg/m .  Physical Exam   GENERAL: alert, fit, and anxious  HENT: normal cephalic/atraumatic, ear canals and TM's normal,  mouth with several shallow ulcers  oral mucous membranes moist, and oropharynx crowded  NECK: no adenopathy, no asymmetry, masses, or scars  RESP: lungs clear to auscultation - no rales, rhonchi or wheezes  SKIN: no suspicious lesions or rashes    Admission on 08/20/2024, Discharged on 08/20/2024   Component Date Value Ref Range Status    Ventricular Rate 08/20/2024 79  BPM Final    Atrial Rate 08/20/2024 79  BPM Final    IA Interval 08/20/2024 168  ms Final    QRS Duration 08/20/2024 80  ms Final    QT 08/20/2024 374  ms Final    QTc 08/20/2024 428  ms Final    P Axis 08/20/2024 67  degrees Final    R AXIS 08/20/2024 18  degrees Final    T Axis 08/20/2024 46  degrees Final    Interpretation ECG 08/20/2024    Final                    Value:Sinus rhythm  Normal ECG  Unconfirmed report - interpretation of this ECG is computer generated - see medical record for final interpretation  Confirmed by - EMERGENCY ROOM, PHYSICIAN ([id number]),  RUSLAN GOMEZ ([id number]) on 8/20/2024 2:37:30 PM      Hold Specimen 08/20/2024 JIC   Final    Hold Specimen 08/20/2024 JIC   Final    Hold Specimen 08/20/2024 JIC   Final    Hold Specimen 08/20/2024 ji   Final    Sodium 08/20/2024 142  135 - 145 mmol/L Final    Potassium 08/20/2024 3.9  3.4 - 5.3 mmol/L Final    Carbon Dioxide (CO2) 08/20/2024 28  22 - 29 mmol/L Final    Anion Gap 08/20/2024 10  7 - 15 mmol/L Final    Urea Nitrogen 08/20/2024 9.6  8.0 - 23.0 mg/dL Final    Creatinine 08/20/2024 1.30 (H)  0.67 - 1.17 mg/dL Final    GFR Estimate 08/20/2024 57 (L)  >60 mL/min/1.73m2 Final    eGFR calculated using 2021 CKD-EPI equation.    Calcium 08/20/2024 9.3  8.8 - 10.4 mg/dL Final    Reference intervals for this test were updated on 7/16/2024 to reflect our healthy population more accurately.  There may be differences in the flagging of prior results with similar values performed with this method. Those prior results can be interpreted in the context of the updated reference intervals.    Chloride 08/20/2024 104  98 - 107 mmol/L Final    Glucose 08/20/2024 106 (H)  70 - 99 mg/dL Final    Alkaline Phosphatase 08/20/2024 145  40 - 150 U/L Final    AST 08/20/2024 28  0 - 45 U/L Final    ALT 08/20/2024 16  0 - 70 U/L Final    Protein Total 08/20/2024 7.2  6.4 - 8.3 g/dL Final    Albumin 08/20/2024 4.3  3.5 - 5.2 g/dL Final    Bilirubin Total 08/20/2024 0.5  <=1.2 mg/dL Final    Troponin T, High Sensitivity 08/20/2024 7  <=22 ng/L Final    Either a High Sensitivity Troponin T baseline (0 hours) value = 100 ng/L, or an increase in High Sensitivity Troponin T = 7 ng/L at 2 hours compared to 0 hours (2-0 hours), suggests myocardial injury, and urgent clinical attention is required.    If the 2-0 hours increase is <7 ng/L, a High Sensitivity Troponin T result above gender-specific reference ranges warrants further evaluation.   Recommendations for further evaluation include correlation with clinical decision-making tool (e.g., HEART), a 3rd High Sensitivity Troponin T test 2 hours after the 2nd (a 20% change from baseline would represent concern), admission for observation, close PCC/cardiology follow-up, or urgent outpatient provocative testing.    WBC Count 08/20/2024 3.8 (L)  4.0 - 11.0 10e3/uL Final    RBC Count 08/20/2024 4.46  4.40 - 5.90 10e6/uL Final    Hemoglobin 08/20/2024 15.2  13.3 - 17.7 g/dL Final    Hematocrit 08/20/2024 43.0  40.0 - 53.0 % Final    MCV 08/20/2024 96  78 - 100 fL Final    MCH 08/20/2024 34.1 (H)  26.5 - 33.0 pg Final    MCHC 08/20/2024 35.3  31.5 - 36.5 g/dL Final    RDW 08/20/2024 12.0  10.0 - 15.0 % Final    Platelet Count 08/20/2024 173  150 - 450 10e3/uL Final    % Neutrophils 08/20/2024 46  % Final    % Lymphocytes 08/20/2024 42  % Final    % Monocytes 08/20/2024 10  % Final     % Eosinophils 08/20/2024 2  % Final    % Basophils 08/20/2024 0  % Final    % Immature Granulocytes 08/20/2024 0  % Final    NRBCs per 100 WBC 08/20/2024 0  <1 /100 Final    Absolute Neutrophils 08/20/2024 1.8  1.6 - 8.3 10e3/uL Final    Absolute Lymphocytes 08/20/2024 1.6  0.8 - 5.3 10e3/uL Final    Absolute Monocytes 08/20/2024 0.4  0.0 - 1.3 10e3/uL Final    Absolute Eosinophils 08/20/2024 0.1  0.0 - 0.7 10e3/uL Final    Absolute Basophils 08/20/2024 0.0  0.0 - 0.2 10e3/uL Final    Absolute Immature Granulocytes 08/20/2024 0.0  <=0.4 10e3/uL Final    Absolute NRBCs 08/20/2024 0.0  10e3/uL Final    Lipase 08/20/2024 31  13 - 60 U/L Final    On 02/07/2023 the assay method at [facility] laboratory was changed to the Roche Lipase method on the Jannie [id number]. Results obtained with different assay methods or kits cannot be used interchangeably, and therefore, direct comparison to results obtained from this laboratory prior to 02/07/2023 should be interpreted with caution, with each result interpreted in the context of its own reference interval.           Signed Electronically by: Lamine Franco MD

## 2025-01-20 ENCOUNTER — TELEPHONE (OUTPATIENT)
Dept: FAMILY MEDICINE | Facility: CLINIC | Age: 80
End: 2025-01-20
Payer: COMMERCIAL

## 2025-01-20 NOTE — TELEPHONE ENCOUNTER
Pt requesting Oral surgery referral to be sent to him.   Printed and sent to home address.   Thanks,   Sushil Fernando RN  [facility]

## 2025-03-13 ENCOUNTER — IMMUNIZATION (OUTPATIENT)
Dept: FAMILY MEDICINE | Facility: CLINIC | Age: 80
End: 2025-03-13
Payer: COMMERCIAL

## 2025-03-13 DIAGNOSIS — Z23 ENCOUNTER FOR IMMUNIZATION: Primary | ICD-10-CM

## 2025-03-13 NOTE — PROGRESS NOTES
Prior to immunization administration, verified patients identity using patient s name and date of birth. Please see Immunization Activity for additional information.     Is the patient's temperature normal (100.5 or less)? Yes     Patient MEETS CRITERIA. PROCEED with vaccine administration.      Patient instructed to remain in clinic for 15 minutes afterwards, and to report any adverse reactions.      Link to Ancillary Visit Immunization Standing Orders SmartSet     Screening performed by Kylah Luo CMA on 3/13/2025 at 2:02 PM.

## 2025-04-11 NOTE — PROGRESS NOTES
Subjective     REASON FOR VISIT  Erectile dysfunction, nocturia, and hydrocele follow-up     HISTORY OF PRESENT ILLNESS  Mr. Haynes is a pleasant 80 year old male when speaking with today in follow-up for his bothersome erectile dysfunction and nocturia and bothersome hydrocele.  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  Also notable for right extrarenal pelvis.     I first met with this patient on 9/18/2024, though he has historically followed with multiple other providers in our department for the above-mentioned issues with his right UPJ obstruction.  Previously we have attempted to control his nocturia and erectile dysfunction with daily doses of tadalafil, which has had a good effect on his nocturia as well as his erections.  At that visit with me he also noted a left-sided hydrocele which should be getting larger, though at that time it did not appear that this was particularly bothersome.  Ultimately the plan was to continue taking daily 5 mg of tadalafil with additional as needed dosing to improve erections, observation of his hydrocele, and follow-up with me 6 to 12 months later.    On 3/27/2025, he reached out stating that he was now having more discomfort associated with the hydrocele.    Today:  New pain in the left groin for the last 2-3 months ago after workouts  Less bothersome in the last week  Notes that he does have a history of a left inguinal hernia repair a couple years ago, wonders if this is a recurrent hernia  Also endorses some intermittent pain in his right flank that does not appear to have any specific triggers including drinking or physical activity  States that he would like to get renewals of both his tadalafil at a 90-day supply and his Flomax    Objective      PHYSICAL EXAMINATION  General: Alert, oriented, no acute distress    Assessment & Plan    Erectile dysfunction  Nocturia  Hydrocele, left     It was my pleasure to meet with Ivy in follow-up for his erectile dysfunction, nocturia, and left hydrocele.  After reviewing his clinical history, I discussed with him that it is possible that the hydrocele or a left inguinal hernia could be causing the discomfort he is experiencing, so getting updated imaging of both his testicle and an inguinal hernia ultrasound evaluation would be reasonable.  In addition to this, I would also like to add on a renal ultrasound to rule out any hydronephrosis and that right kidney where he previously had the pyeloplasty as well as a kidney function test.     We did discuss the different interventions again for hydrocele management including aspiration and sclerosis versus hydrocelectomy.  For now he would prefer to observe as long as his symptoms are mild and intermittent.    Mr. Haynes expressed understanding and agreement to the above discussion and plan and all of his questions were answered to his satisfaction.     PLAN  Renewal of Flomax and tadalafil  First available BMP, renal ultrasound, testicular ultrasound, and hernia ultrasound with [employer] message to discuss results    SIGNED:    Hawk Dawson PA-C    I spent a total of 20 minutes spent on the date of the encounter doing chart review, history and exam, documentation, and further activities as noted above.

## 2025-04-15 ENCOUNTER — TELEPHONE (OUTPATIENT)
Dept: OTOLARYNGOLOGY | Facility: CLINIC | Age: 80
End: 2025-04-15

## 2025-04-15 ENCOUNTER — OFFICE VISIT (OUTPATIENT)
Dept: OTOLARYNGOLOGY | Facility: CLINIC | Age: 80
End: 2025-04-15
Payer: COMMERCIAL

## 2025-04-15 VITALS — WEIGHT: 160 LBS | HEIGHT: 70 IN | BODY MASS INDEX: 22.9 KG/M2

## 2025-04-15 DIAGNOSIS — R51.9 CRANIOFACIAL PAIN: ICD-10-CM

## 2025-04-15 DIAGNOSIS — K22.10 EROSIVE ESOPHAGITIS: Primary | ICD-10-CM

## 2025-04-15 RX ORDER — OXYCODONE AND ACETAMINOPHEN 5; 325 MG/1; MG/1
1 TABLET ORAL EVERY 6 HOURS PRN
Qty: 20 TABLET | Refills: 0 | Status: SHIPPED | OUTPATIENT
Start: 2025-04-15 | End: 2025-04-18

## 2025-04-15 ASSESSMENT — PAIN SCALES - GENERAL: PAINLEVEL_OUTOF10: NO PAIN (0)

## 2025-04-15 NOTE — TELEPHONE ENCOUNTER
Spoke with patient regarding scheduling a Return to the ENT clinic in 6 months. Scheduled patient accordingly and patient will see appointment in [facility].-Appt Per PT

## 2025-04-15 NOTE — LETTER
4/15/2025       RE: Ivy Haynes  [address]  [facility] [address]     Dear Colleague,    Thank you for referring your patient, Ivy Haynes, to the [facility] [place] at [facility]. Please see a copy of my visit note below.      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 He was last seen in ENT by me on 10/15/24. Today, he reports doing about the same but some sever esophagitis at times.         Review of Systems:   Pertinent items are noted in HPI or as in patient entered ROS below, remainder of complete ROS is negative.       4/10/2025    11:30 AM    ENT ROS   Gastrointestinal/Genitourinary Heartburn/indigestion   Endocrine Frequent urination        Physical Exam:   There were no vitals taken for this visit.     PHYSICAL EXAMINATION:    Constitutional:  The patient was unaccompanied, well-groomed, and in no acute distress.    Skin:  Warm and pink.    Neurologic:  Alert and oriented x 3.  CN's III-XII within normal limits.  Voice normal.   Psychiatric:  The patient's affect was calm, cooperative, and appropriate.    Respiratory:  Breathing comfortably without stridor or exertion of accessory muscles.    Eyes: Extraocular movement intact.    Head:  Normocephalic and atraumatic.  No lesions or scars.    Ears:  Pinnae and tragus non-tender.  EAC's and TM's were clear.    Nose:  Sinuses were non-tender.  Anterior rhinoscopy revealed midline septum and absence of purulence or polyps.    OC/OP:  Normal tongue, floor of mouth, buccal mucosa, and palate.  No lesions or masses  on inspection or palpation.  No abnormal lymph tissue in the oropharynx.  The pterygoid region is non-tender.    Neck:  Supple with normal laryngeal and tracheal landmarks.  The parotid beds were without masses.  No palpable thyroid.  Lymphatic:  There is no palpable lymphadenopathy in the neck.      Assessment and Plan:  No diagnosis found.  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 St[place] with intermittent neck pain follow up in 6 months     Follow-up: No follow-ups on file.         Scribe Disclosure:  I, Vanda Sweeney, am serving as a scribe to document services personally performed by Connor Torres MD at this visit, based upon the provider's statements to me. All documentation has been reviewed by the aforementioned provider prior to being entered into the official medical record.         Again, thank you for allowing me to participate in the care of your patient.      Sincerely,    Connor Torres MD

## 2025-04-15 NOTE — PROGRESS NOTES
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 He was last seen in ENT by me on 10/15/24. Today, he reports doing about the same but some sever esophagitis at times.         Review of Systems:   Pertinent items are noted in HPI or as in patient entered ROS below, remainder of complete ROS is negative.       4/10/2025    11:30 AM    ENT ROS   Gastrointestinal/Genitourinary Heartburn/indigestion   Endocrine Frequent urination        Physical Exam:   There were no vitals taken for this visit.     PHYSICAL EXAMINATION:    Constitutional:  The patient was unaccompanied, well-groomed, and in no acute distress.    Skin:  Warm and pink.    Neurologic:  Alert and oriented x 3.  CN's III-XII within normal limits.  Voice normal.   Psychiatric:  The patient's affect was calm, cooperative, and appropriate.    Respiratory:  Breathing comfortably without stridor or exertion of accessory muscles.    Eyes: Extraocular movement intact.    Head:  Normocephalic and atraumatic.  No lesions or scars.    Ears:  Pinnae and tragus non-tender.  EAC's and TM's were clear.    Nose:  Sinuses were non-tender.  Anterior rhinoscopy revealed midline septum and absence of purulence or polyps.    OC/OP:  Normal tongue, floor of mouth, buccal mucosa, and palate.  No lesions or masses on inspection or palpation.  No abnormal lymph tissue in the oropharynx.  The pterygoid region is non-tender.    Neck:  Supple with normal laryngeal and tracheal landmarks.  The parotid beds were without masses.  No palpable thyroid.  Lymphatic:  There is no palpable lymphadenopathy in the neck.      Assessment and  Plan:  No diagnosis found.  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 [place] with intermittent neck pain follow up in 6 months     Follow-up: No follow-ups on file.         Scribe Disclosure:  I, Vanda Sweeney, am serving as a scribe to document services personally performed by Connor Torres MD at this visit, based upon the provider's statements to me. All documentation has been reviewed by the aforementioned provider prior to being entered into the official medical record.

## 2025-04-15 NOTE — PATIENT INSTRUCTIONS
You were seen in the ENT Clinic today by Dr. Torres. If you have any questions or concerns after your appointment, please contact us (see below)       2.   The following recommendations have been made based upon your appointment today:  We have placed a referral to GI. They will contact you to set up that appointment. If you don t hear from a representative within 2 business days, please call [phone].   A refill of your pain medication will be sent to your pharmacy.    3.   Plan to return to the ENT clinic in 6 months.         How to Contact Us:  Send a [employer] message to your provider. Our team will respond to you via [employer]. Occasionally, we will need to call you to get further information.  For urgent matters (Monday-Friday), call the ENT Clinic: [phone] and speak with a call center team member - they will route your call appropriately.   If you'd like to speak directly with a nurse, please find our contact information below. We do our best to check voicemail frequently throughout the day, and will work to call you back within 1-2 days. For urgent matters, please use the general clinic phone numbers listed above.     BRAYAN Mejia  Direct: [phone]  Kyra SHEIKH LPN  Direct: [phone]         [facility]  Department of Otolaryngology

## 2025-04-16 ENCOUNTER — OFFICE VISIT (OUTPATIENT)
Dept: UROLOGY | Facility: CLINIC | Age: 80
End: 2025-04-16
Attending: STUDENT IN AN ORGANIZED HEALTH CARE EDUCATION/TRAINING PROGRAM
Payer: COMMERCIAL

## 2025-04-16 VITALS
BODY MASS INDEX: 22.9 KG/M2 | SYSTOLIC BLOOD PRESSURE: 138 MMHG | OXYGEN SATURATION: 100 % | HEIGHT: 70 IN | HEART RATE: 82 BPM | WEIGHT: 160 LBS | DIASTOLIC BLOOD PRESSURE: 83 MMHG

## 2025-04-16 DIAGNOSIS — N40.1 BENIGN LOCALIZED PROSTATIC HYPERPLASIA WITH LOWER URINARY TRACT SYMPTOMS (LUTS): ICD-10-CM

## 2025-04-16 DIAGNOSIS — N40.1 BENIGN LOCALIZED PROSTATIC HYPERPLASIA WITH LOWER URINARY TRACT SYMPTOMS (LUTS): Primary | ICD-10-CM

## 2025-04-16 DIAGNOSIS — R10.9 FLANK PAIN: ICD-10-CM

## 2025-04-16 DIAGNOSIS — N50.812 LEFT TESTICULAR PAIN: Primary | ICD-10-CM

## 2025-04-16 PROCEDURE — 3075F SYST BP GE 130 - 139MM HG: CPT | Performed by: STUDENT IN AN ORGANIZED HEALTH CARE EDUCATION/TRAINING PROGRAM

## 2025-04-16 PROCEDURE — 99214 OFFICE O/P EST MOD 30 MIN: CPT | Performed by: STUDENT IN AN ORGANIZED HEALTH CARE EDUCATION/TRAINING PROGRAM

## 2025-04-16 PROCEDURE — 1126F AMNT PAIN NOTED NONE PRSNT: CPT | Performed by: STUDENT IN AN ORGANIZED HEALTH CARE EDUCATION/TRAINING PROGRAM

## 2025-04-16 PROCEDURE — 3079F DIAST BP 80-89 MM HG: CPT | Performed by: STUDENT IN AN ORGANIZED HEALTH CARE EDUCATION/TRAINING PROGRAM

## 2025-04-16 RX ORDER — TAMSULOSIN HYDROCHLORIDE 0.4 MG/1
0.4 CAPSULE ORAL DAILY
Qty: 90 CAPSULE | Refills: 3 | Status: SHIPPED | OUTPATIENT
Start: 2025-04-16

## 2025-04-16 RX ORDER — TADALAFIL 5 MG/1
5 TABLET ORAL EVERY 24 HOURS
Qty: 90 TABLET | Refills: 3 | Status: SHIPPED | OUTPATIENT
Start: 2025-04-16

## 2025-04-16 ASSESSMENT — PAIN SCALES - GENERAL: PAINLEVEL_OUTOF10: NO PAIN (0)

## 2025-04-16 NOTE — LETTER
4/16/2025       RE: Ivy Haynes  [address]  [facility] [address]     Dear Colleague,    Thank you for referring your patient, Ivy Haynes, to the [facility] UROLOGY CLIN[facility] [place] at [facility]. Please see a copy of my visit note below.    Subjective    REASON FOR VISIT  Erectile dysfunction, nocturia, and hydrocele follow-up     HISTORY OF PRESENT ILLNESS  Mr. Haynes is a pleasant 80 year old male when speaking with today in follow-up for his bothersome erectile dysfunction and nocturia and bothersome hydrocele.  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  Also notable for right extrarenal pelvis.     I first met with this patient on 9/18/2024, though he has historically followed with multiple other providers in our department for the above-mentioned issues with his right UPJ obstruction.  Previously we have attempted to control his nocturia and erectile dysfunction with daily doses of tadalafil, which has had a good effect on his nocturia as well as his erections.  At that visit with me he also noted a left-sided hydrocele which should be getting larger, though at that time it did not appear that this was particularly bothersome.  Ultimately the plan was to continue taking daily 5 mg of tadalafil with additional as needed dosing to improve erections, observation of his hydrocele, and follow-up with me 6 to 12 months later.    On 3/27/2025, he reached out stating that he was now having more discomfort associated with the hydrocele.    Today:  New pain in the left groin for the last 2-3 months ago after workouts  Less bothersome in the last week  Notes that he does have a history of a left inguinal hernia repair a couple years ago,  wonders if this is a recurrent hernia  Also endorses some intermittent pain in his right flank that does not appear to have any specific triggers including drinking or physical activity  States that he would like to get renewals of both his tadalafil at a 90-day supply and his Flomax    Objective    PHYSICAL EXAMINATION  General: Alert, oriented, no acute distress    Assessment & Plan   Erectile dysfunction  Nocturia  Hydrocele, left     It was my pleasure to meet with Ivy in follow-up for his erectile dysfunction, nocturia, and left hydrocele.  After reviewing his clinical history, I discussed with him that it is possible that the hydrocele or a left inguinal hernia could be causing the discomfort he is experiencing, so getting updated imaging of both his testicle and an inguinal hernia ultrasound evaluation would be reasonable.  In addition to this, I would also like to add on a renal ultrasound to rule out any hydronephrosis and that right kidney where he previously had the pyeloplasty as well as a kidney function test.     We did discuss the different interventions again for hydrocele management including aspiration and sclerosis versus hydrocelectomy.  For now he would prefer to observe as long as his symptoms are mild and intermittent.    Mr. Haynes expressed understanding and agreement to the above discussion and plan and all of his questions were answered to his satisfaction.     PLAN  Renewal of Flomax and tadalafil  First available BMP, renal ultrasound, testicular ultrasound, and hernia ultrasound with [employer] message to discuss results    SIGNED:    Hawk Dawson PA-C    I spent a total of 20 minutes spent on the date of the encounter doing chart review, history and exam, documentation, and further activities as noted above.       Again, thank you for allowing me to participate in the care of your patient.      Sincerely,    Hawk Dawson PA-C

## 2025-04-16 NOTE — NURSING NOTE
"Chief Complaint   Patient presents with    Consult     Pt states here for a follow up on ED and nocturia and hydrocele      Some pain with hydrocele, kind of feels like a hernia. Especially after a work out, thinks the pain is radiating from left side testicle. Stays local. Comes and goes on its own, pt states hasn't bothered him in a week. When it happens its a \"nagging ache\" per pt, that is a like a 6 on pain scale that can last all day. Is off and on all day in that respect.    The ED medication helps with nocturia, he gets up maybe 3 times. Without it and the flomax he gets up 5 times.    Has some questions about his pyeloplasty because it feels like the pain is starting to come back and feels like before the surgery, and wants to know if its muscular or kidney. States he works out a lot and notices this when sitting or laying (not when working out)    There were no vitals taken for this visit. There is no height or weight on file to calculate BMI.    Patient Active Problem List   Diagnosis    GERD (gastroesophageal reflux disease)    S/P total knee replacement    Hypertension goal BP (blood pressure) < 140/90    Hepatic fibrosis    Congenital obstruction of ureteropelvic junction    Benign prostatic hyperplasia with weak urinary stream    Diverticular disease of large intestine    Encysted hydrocele       Allergies   Allergen Reactions    Sulfa Antibiotics        Current Outpatient Medications   Medication Sig Dispense Refill    amLODIPine (NORVASC) 2.5 MG tablet TAKE 1 TABLET(2.5 MG) BY MOUTH DAILY 90 tablet 3    omeprazole (PRILOSEC) 20 MG DR capsule Take 1 capsule (20 mg) by mouth 2 times daily. 180 capsule 1    tadalafil (CIALIS) 5 MG tablet Take 1 tablet (5 mg) by mouth every 24 hours 30 tablet 11    tamsulosin (FLOMAX) 0.4 MG capsule Take 1 capsule (0.4 mg) by mouth daily. 90 capsule 3    oxyCODONE-acetaminophen (PERCOCET) 5-325 MG tablet Take 1 tablet by mouth every 6 hours as needed for pain. (Patient " "not taking: Reported on 2025) 20 tablet 0    triamcinolone (KENALOG) 0.1 % external ointment Apply topically 2 times daily Apply twice daily as needed to painful bumps on back of scalp. Use for up to two weeks at a time in the same spot, then take one week off before restarting a two week cycle (Patient not taking: Reported on 2025) 15 g 3       Social History     Tobacco Use    Smoking status: Former     Current packs/day: 0.00     Average packs/day: 0.1 packs/day for 10.0 years (1.0 ttl pk-yrs)     Types: Cigarettes     Start date: 2011     Quit date: 2021     Years since quittin.2     Passive exposure: Past    Smokeless tobacco: Never    Tobacco comments:     quit  until . started after trip to [place]   Vaping Use    Vaping status: Never Used   Substance Use Topics    Alcohol use: Yes     Comment: 2-3 beer a week    Drug use: Yes     Types: Marijuana     Comment: Every other month-\"just a puff of marijuana\"       Dave Granger  2025  9:42 AM     "

## 2025-04-21 ENCOUNTER — OFFICE VISIT (OUTPATIENT)
Dept: OPHTHALMOLOGY | Facility: CLINIC | Age: 80
End: 2025-04-21
Payer: COMMERCIAL

## 2025-04-21 ENCOUNTER — PATIENT OUTREACH (OUTPATIENT)
Dept: CARE COORDINATION | Facility: CLINIC | Age: 80
End: 2025-04-21

## 2025-04-21 DIAGNOSIS — H52.00 HYPERMETROPIA, UNSPECIFIED LATERALITY: ICD-10-CM

## 2025-04-21 DIAGNOSIS — H52.4 PRESBYOPIA OF BOTH EYES: ICD-10-CM

## 2025-04-21 DIAGNOSIS — H02.88A MEIBOMIAN GLAND DYSFUNCTION (MGD) OF UPPER AND LOWER LIDS OF BOTH EYES: ICD-10-CM

## 2025-04-21 DIAGNOSIS — H47.239 LARGE PHYSIOLOGIC CUPPING OF OPTIC DISC: ICD-10-CM

## 2025-04-21 DIAGNOSIS — H25.13 NUCLEAR SCLEROTIC CATARACT OF BOTH EYES: Primary | ICD-10-CM

## 2025-04-21 DIAGNOSIS — H02.88B MEIBOMIAN GLAND DYSFUNCTION (MGD) OF UPPER AND LOWER LIDS OF BOTH EYES: ICD-10-CM

## 2025-04-21 PROCEDURE — 92014 COMPRE OPH EXAM EST PT 1/>: CPT | Performed by: OPHTHALMOLOGY

## 2025-04-21 PROCEDURE — 92133 CPTRZD OPH DX IMG PST SGM ON: CPT | Mod: GZ | Performed by: OPHTHALMOLOGY

## 2025-04-21 PROCEDURE — 92015 DETERMINE REFRACTIVE STATE: CPT | Performed by: OPHTHALMOLOGY

## 2025-04-21 ASSESSMENT — REFRACTION_MANIFEST
OS_SPHERE: +3.50
OS_ADD: +2.75
OD_AXIS: 065
OD_SPHERE: +3.00
OD_ADD: +2.75
OS_CYLINDER: +0.75
OS_AXIS: 050
OD_CYLINDER: +0.25

## 2025-04-21 ASSESSMENT — CONF VISUAL FIELD
OD_SUPERIOR_TEMPORAL_RESTRICTION: 0
OD_INFERIOR_NASAL_RESTRICTION: 0
OD_SUPERIOR_NASAL_RESTRICTION: 0
METHOD: COUNTING FINGERS
OS_SUPERIOR_TEMPORAL_RESTRICTION: 0
OD_INFERIOR_TEMPORAL_RESTRICTION: 0
OS_SUPERIOR_NASAL_RESTRICTION: 0
OS_INFERIOR_NASAL_RESTRICTION: 0
OS_NORMAL: 1
OD_NORMAL: 1
OS_INFERIOR_TEMPORAL_RESTRICTION: 0

## 2025-04-21 ASSESSMENT — REFRACTION_WEARINGRX
OD_CYLINDER: +0.75
OD_AXIS: 073
OS_AXIS: 070
OS_SPHERE: +3.75
SPECS_TYPE: PAL
OS_CYLINDER: +1.50
OS_ADD: +2.50
OD_ADD: +2.50
OD_SPHERE: +2.75

## 2025-04-21 ASSESSMENT — CUP TO DISC RATIO
OS_RATIO: 0.55
OD_RATIO: 0.4

## 2025-04-21 ASSESSMENT — EXTERNAL EXAM - RIGHT EYE: OD_EXAM: NORMAL

## 2025-04-21 ASSESSMENT — EXTERNAL EXAM - LEFT EYE: OS_EXAM: NORMAL

## 2025-04-21 ASSESSMENT — VISUAL ACUITY
OS_CC: 20/30
CORRECTION_TYPE: GLASSES
OD_CC+: -3
METHOD: SNELLEN - LINEAR
OS_CC+: -1
OD_CC: 20/25

## 2025-04-21 ASSESSMENT — TONOMETRY
IOP_METHOD: TONOPEN
OD_IOP_MMHG: 13
OS_IOP_MMHG: 13

## 2025-04-21 ASSESSMENT — SLIT LAMP EXAM - LIDS: COMMENTS: TRACE MGD

## 2025-04-21 NOTE — NURSING NOTE
Chief Complaints and History of Present Illnesses   Patient presents with    COMPREHENSIVE EYE EXAM     Pt here for comprehensive eye exam.     Chief Complaint(s) and History of Present Illness(es)       COMPREHENSIVE EYE EXAM              Laterality: both eyes    Comments: Pt here for comprehensive eye exam.              Comments    Pt has largely unchanged vision since last exam. Pt notes he feels he might be getting a stye WILMER x2 days. He states he has gotten them before and they don't really bother him. Denies using warm compresses or ATs.     ELOISA Meyer on 4/21/2025 at 9:07 AM

## 2025-04-21 NOTE — PROGRESS NOTES
CC:   comprehensive eye exam      HPI  Rayrayubenita Haynes is a 80 year old male here for comprehensive eye exam.  Has had some starts of a stye in the past but it goes away without any compresses or ointment.  He notes a little irritation on his left upper lid but has not done anything and feels that it is likely to go away on its own.  HPI       COMPREHENSIVE EYE EXAM    In both eyes. Additional comments: Pt here for comprehensive eye exam.             Comments    Pt has largely unchanged vision since last exam. Pt notes he feels he might be getting a stye WILMER x2 days. He states he has gotten them before and they don't really bother him. Denies using warm compresses or ATs.     ELOISA Meyer on 4/21/2025 at 9:07 AM            Last edited by Radha Murillo COT on 4/21/2025  9:08 AM.             PMH:   Past Medical History:   Diagnosis Date    Back pain     Chest pain     Gastro-oesophageal reflux disease     GERD (gastroesophageal reflux disease)     Hemorrhoid     Hepatitis C     Hypertension     Trigeminal neuralgia       POH: cataracts, glasses for hyperopia since age 10, no surgery, no trauma, vitreous floater right eye meibomian gland dysfunction  Oc Meds: none  flowmax  FH: Denies any glaucoma, age related macular degeneration, or other known eye diseases         Assessment & Plan      1. Nuclear sclerotic cataract of both eyes - Both Eyes    2. 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 Hypermetropia, unspecified laterality - Both Eyes    4. Presbyopia of both eyes - Both Eyes    5. 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   Plan: Discussed with patient ok to monitor for glaucoma annually due to low risk.    Hypermetropia both eyes/presbyopia both eyes  Comment: Mild changes on refraction, good acuity both eyes.  Plan:manifest refraction done and prescription for glasses given     Meibomian gland dysfunction of upper and lower lids of both eyes   Comment: Cause of fluctuating symptoms no hordeolum noted at this point   Plan: warm compresses at night  Artificial tears twice daily as needed for foreign body sensation  Call if worsening  -----------------------------------------------------------------------------------       Patient disposition:   Return in about 1 year (around 4/21/2026) for Comprehensive Exam, OCT nerve (RNFL) OU. Patient to call sooner as needed.    Complete documentation of historical and exam elements from today's encounter can be found in the full encounter summary report (not reduplicated in this progress note). I personally obtained the chief complaint(s) and history of present illness.  I have confirmed and edited as necessary the CC, HPI, PMH/PSH, social history, FMH, ROS, and exam/neuro findings as obtained by the technician or others. I have examined this patient myself and I personally viewed the image(s) and studies listed above and the documentation reflects my findings and interpretation.     Marily Aldana MD

## 2025-04-23 ENCOUNTER — ANCILLARY PROCEDURE (OUTPATIENT)
Dept: ULTRASOUND IMAGING | Facility: CLINIC | Age: 80
End: 2025-04-23
Attending: STUDENT IN AN ORGANIZED HEALTH CARE EDUCATION/TRAINING PROGRAM
Payer: COMMERCIAL

## 2025-04-23 ENCOUNTER — LAB (OUTPATIENT)
Dept: LAB | Facility: CLINIC | Age: 80
End: 2025-04-23
Payer: COMMERCIAL

## 2025-04-23 DIAGNOSIS — N50.812 LEFT TESTICULAR PAIN: ICD-10-CM

## 2025-04-23 DIAGNOSIS — R10.9 FLANK PAIN: ICD-10-CM

## 2025-04-23 LAB
ANION GAP SERPL CALCULATED.3IONS-SCNC: 7 MMOL/L (ref 7–15)
BUN SERPL-MCNC: 10.7 MG/DL (ref 8–23)
CALCIUM SERPL-MCNC: 9.7 MG/DL (ref 8.8–10.4)
CHLORIDE SERPL-SCNC: 106 MMOL/L (ref 98–107)
CREAT SERPL-MCNC: 1.29 MG/DL (ref 0.67–1.17)
EGFRCR SERPLBLD CKD-EPI 2021: 57 ML/MIN/1.73M2
GLUCOSE SERPL-MCNC: 91 MG/DL (ref 70–99)
HCO3 SERPL-SCNC: 28 MMOL/L (ref 22–29)
POTASSIUM SERPL-SCNC: 4.1 MMOL/L (ref 3.4–5.3)
SODIUM SERPL-SCNC: 141 MMOL/L (ref 135–145)

## 2025-04-23 PROCEDURE — 76870 US EXAM SCROTUM: CPT | Mod: GC | Performed by: RADIOLOGY

## 2025-04-23 PROCEDURE — 80048 BASIC METABOLIC PNL TOTAL CA: CPT | Performed by: PATHOLOGY

## 2025-04-23 PROCEDURE — 36415 COLL VENOUS BLD VENIPUNCTURE: CPT | Performed by: PATHOLOGY

## 2025-04-23 PROCEDURE — 76705 ECHO EXAM OF ABDOMEN: CPT | Performed by: RADIOLOGY

## 2025-05-05 ENCOUNTER — PATIENT OUTREACH (OUTPATIENT)
Dept: CARE COORDINATION | Facility: CLINIC | Age: 80
End: 2025-05-05
Payer: COMMERCIAL

## 2025-05-22 ENCOUNTER — OFFICE VISIT (OUTPATIENT)
Dept: GASTROENTEROLOGY | Facility: CLINIC | Age: 80
End: 2025-05-22
Attending: OTOLARYNGOLOGY
Payer: COMMERCIAL

## 2025-05-22 ENCOUNTER — TELEPHONE (OUTPATIENT)
Dept: GASTROENTEROLOGY | Facility: CLINIC | Age: 80
End: 2025-05-22

## 2025-05-22 VITALS
HEART RATE: 87 BPM | OXYGEN SATURATION: 96 % | BODY MASS INDEX: 22.4 KG/M2 | DIASTOLIC BLOOD PRESSURE: 85 MMHG | HEIGHT: 71 IN | SYSTOLIC BLOOD PRESSURE: 149 MMHG | WEIGHT: 160 LBS

## 2025-05-22 DIAGNOSIS — R11.10 REGURGITATION OF FOOD: ICD-10-CM

## 2025-05-22 DIAGNOSIS — R53.81 MALAISE: ICD-10-CM

## 2025-05-22 DIAGNOSIS — R12 HEARTBURN: Primary | ICD-10-CM

## 2025-05-22 SDOH — HEALTH STABILITY: PHYSICAL HEALTH: ON AVERAGE, HOW MANY DAYS PER WEEK DO YOU ENGAGE IN MODERATE TO STRENUOUS EXERCISE (LIKE A BRISK WALK)?: 6 DAYS

## 2025-05-22 SDOH — HEALTH STABILITY: PHYSICAL HEALTH: ON AVERAGE, HOW MANY MINUTES DO YOU ENGAGE IN EXERCISE AT THIS LEVEL?: 60 MIN

## 2025-05-22 ASSESSMENT — PAIN SCALES - GENERAL: PAINLEVEL_OUTOF10: NO PAIN (0)

## 2025-05-22 ASSESSMENT — SOCIAL DETERMINANTS OF HEALTH (SDOH): HOW OFTEN DO YOU GET TOGETHER WITH FRIENDS OR RELATIVES?: ONCE A WEEK

## 2025-05-22 NOTE — Clinical Note
Hi team  Can someone call below to get his 2022 colonoscopy records Thanks  COLON & RECTAL SURGERY [facility] [address] [facility] [address] Phone: [phone]

## 2025-05-22 NOTE — TELEPHONE ENCOUNTER
"Endoscopy Scheduling Screen    Caller: patient    Have you had any respiratory illness or flu-like symptoms in the last 10 days?  No    What is your communication preference for Instructions and/or Bowel Prep?   MyChart    What insurance is in the chart?  Other:  [employer]    Ordering/Referring Provider:     SUSAN KEITH      (If ordering provider performs procedure, schedule with ordering provider unless otherwise instructed. )    BMI: Estimated body mass index is 22.63 kg/m  as calculated from the following:    Height as of an earlier encounter on 5/22/25: 1.791 m (5' 10.5\").    Weight as of an earlier encounter on 5/22/25: 72.6 kg (160 lb).     Sedation Ordered  MAC/deep sedation.   BMI<= 45 45 < BMI <= 48 48 < BMI < = 50  BMI > 50   No Restrictions No [facility]  No [facility]  [facility] with exceptions Hospital Only OR Only       Do you have a history of malignant hyperthermia?  No    (Females) Are you currently pregnant?        Have you been diagnosed or told you have pulmonary hypertension?   No    Do you have an LVAD?  No    Have you been told you have moderate to severe sleep apnea?  No.    Have you been told you have COPD, asthma, or any other lung disease?  No    Has your doctor ordered any cardiac tests like echo, angiogram, stress test, ablation, or EKG, that you have not completed yet?  No    Do you  have a history of any heart conditions?  No     Have you ever had or are you waiting for an organ transplant?  No. Continue scheduling, no site restrictions.    Have you had a stroke or transient ischemic attack (TIA aka \"mini stroke\") in the last 2 years?   No.    Have you been diagnosed with or been told you have cirrhosis of the liver?   No.    Are you currently on dialysis?   No    Do you need assistance transferring?   No    BMI: Estimated body mass index is 22.63 kg/m  as calculated from the following:    Height as of an earlier encounter on 5/22/25: 1.791 m (5' 10.5\").    Weight as of an earlier encounter on " 5/22/25: 72.6 kg (160 lb).     Is patients BMI > 40 and scheduling location [facility]?  No    Do you take an injectable or oral medication for weight loss or diabetes (excluding insulin)?  No    Do you take the medication Naltrexone?  No    Do you take blood thinners?  No       Prep   Are you currently on dialysis or do you have chronic kidney disease?  No    Do you have a diagnosis of diabetes?  No    Do you have a diagnosis of cystic fibrosis (CF)?  No    On a regular basis do you go 3 -5 days between bowel movements?  No    BMI > 40?  No    Preferred Pharmacy:    [id number] - [place], [place] - [address] AT [facility] & [address] [place] [address]  [address] [place], [place] [address]  [facility] [address]  Phone: [phone] Fax: [phone]    [facility] [place], [place] - [address]  [address]  [address] [address]  Phone: [phone] Fax: [phone] Alternate Fax: [phone], [phone], [phone]    [id number] - [place], [place] - [address] AT [facility] [address]  [address]  [place] [address]  Phone: [phone] Fax: [phone]    [facility] [address] [address]      [facility] - [place], [place] - [address]  [address] [facility] [address]  Phone: [phone] Fax: [phone]      Final Scheduling Details     Procedure scheduled  Upper endoscopy with BRAVO capsule placement    Surgeon:       Date of procedure: SENT TO BRAVO LIST       Pre-OP / PAC:       Location       Sedation   MAC/Deep Sedation - Per order.      Patient Reminders:   You will receive a call from a Nurse to review instructions and health history.  This assessment must be completed prior to your procedure.  Failure to complete the Nurse assessment may result in the procedure being cancelled.      On the day of your procedure, please designate an  adult(s) who can drive you home stay with you for the next 24 hours. The medicines used in the exam will make you sleepy. You will not be able to drive.      You cannot take public transportation, ride share services, or non-medical taxi service without a responsible caregiver.  Medical transport services are allowed with the requirement that a responsible caregiver will receive you at your destination.  We require that drivers and caregivers are confirmed prior to your procedure.

## 2025-05-22 NOTE — PATIENT INSTRUCTIONS
It was a pleasure taking care of you today.  I've included a brief summary of our discussion and care plan from today's visit below.  Please review this information with your primary care provider.  _______________________________________________________________________    My recommendations are summarized as follows:    Upper endoscopy ordered with possible BRAVO capsule   -2 weeks before upper endoscopy + during test, hold the omeprazole and Pepcid  -2 days before upper endoscopy + during test, hold Rolaids,tums   For some constipation, you can take a bit of miralax 0.5 cap a day to help soften stools     Gastroesophageal Reflux Disease (GERD) Lifestyle Modifications:   If taking acid suppression therapy (PPI ie Pantoprazole, Lansoprazole, Omeprazole, Esomeprazole, Rabeprazole, Dexlansoprazole) it should be taken 30 - 60 minutes prior to meals on an empty stomach to have maximum effect  Avoid triggers for reflux such as coffee, chocolate, carbonated beverages, spicy foods, acidic foods (tomato based/citrus and foods with high fat content   Abstinence from alcohol and cessation of all tobacco products is recommended   Studies have shown that weight loss, exercise and maintaining a healthy BMI significantly reduce GERD symptoms   Lose weight if you need to. Losing just 5 to 10 pounds can help.  Remain upright while eating and immediately after meals  It's best to eat several small meals instead of two or three large meals (to avoid overloading the stomach and GI tract)  Do not eat or drink at least 3 hours prior to laying down supine/laying down for bed   Avoid late night/middle of the night snacking    Consider obtaining a wedge pillow or elevating the head end of the bed while sleeping - Rise head of bed by 6 in. (15 cm) to 8 in. (20 cm) by putting the frame on blocks or placing a foam wedge under the head of your mattress. (Adding extra pillows does not work.)  Avoid sleeping right side down as this can place the  lower part of the esophagus/lower esophageal sphincter in a dependent position that favors reflux   Attempting to eat smaller more frequent meals may improve symptoms       Return to GI Clinic in 2 week after EGD to review your progress.     Please see below for any additional questions and scheduling guidelines.    Sign up for [employer]: [employer] patient portal serves as a secure platform for accessing your medical records from the [facility]. Additionally, [employer] facilitates easy, timely, and secure messaging with your care team. If you have not signed up, you may do so by using the provided code or calling [phone].    Coordinating your care after your visit:  There are multiple options for scheduling your follow-up care based on your provider's recommendation.    How do I schedule a follow-up clinic appointment:   After your appointment, you may receive scheduling assistance with the Clinic Coordinators by having a seat in the waiting room and a Clinic Coordinator will call you up to schedule.  Virtual visits or after you leave the clinic:  Your provider has placed a follow-up order in the [employer] portal for scheduling your return appointment. A member of the scheduling team will contact you to schedule.  [employer] Scheduling: Timely scheduling through [employer] is advised to ensure appointment availability.   Call to schedule: You may schedule your follow-up appointment(s) by calling [phone], option 1.    How do I schedule my endoscopy or colonoscopy procedure:  If a procedure, such as a colonoscopy or upper endoscopy was ordered by your provider, the scheduling team will contact you to schedule this procedure. Or you may choose to call to schedule at   [phone], option 2.  Please allow 20-30 minutes when scheduling a procedure.    How do I get my blood work done? To get your blood work done, you need to schedule a lab appointment at an [facility]. There are multiple ways  to schedule:   At the clinic: The Clinic Coordinator you meet after your visit can help you schedule a lab appointment.   [employer] scheduling: [employer] offers online lab scheduling at all [facility] laboratory locations.   Call to schedule: You can call [phone] to schedule your lab appointment.    How do I schedule my imaging study: To schedule imaging studies, such as CT scans, ultrasounds, MRIs, or X-rays, contact Imaging Services at [phone].    How do I schedule a referral to another doctor: If your provider recommended a referral to another specialist(s), the referral order was placed by your provider. You will receive a phone call to schedule this referral, or you may choose to call the number attached to the referral to self-schedule.    For Post-Visit Question(s):  For any inquiries following today's visit:  Please utilize [employer] messaging and allow 48 hours for reply or contact the Call Center during normal business hours at [phone], option 3.  For Emergent After-hours questions, contact the On-Call GI Fellow through the [facility]  at [phone].  In addition, you may contact your Nurse directly using the provided contact information.    Test Results:  Test results will be accessible via [employer] in compliance with the 21st Century Cures Act. This means that your results will be available to you at the same time as your provider. Often you may see your results before your provider does. Results are reviewed by staff within two weeks with communication follow-up. Results may be released in the patient portal prior to your care team review.    Prescription Refill(s):  Medication prescribed by your provider will be addressed during your visit. For future refills, please coordinate with your pharmacy. If you have not had a recent clinic visit or routine labs, for your safety, your provider may not be able to refill your prescription.     Sincerely,    Neema Grande  PA-NIKKI  Gastroenterology

## 2025-05-22 NOTE — PROGRESS NOTES
GI CLINIC VISIT    CC/REFERRING MD:  Connor Torres  REASON FOR CONSULTATION: possible GERD    ASSESSMENT/PLAN:  Ivy Haynes is a 80 year old year old male with PMHx significant for CKD 3a,h/o HCV s/p DAA x 16 week (2019 PCR not detected) who presents seeing the  [facility] team for possible GERD.     #possible GERD  25 year history of malaise, heartburn, midsternal chest pain, regurgitation and mucus in back of his throat. He went to the ED 8/2024 for this chest pain, work up was negative (CXR, labs, lipase, troponin, EKG with NSR without ischemic changes) and his symptoms got better on omeprazole. His primary put him on omeprazole 20 mg BID x 3 months then afterwards, he went on 20 mg once daily which he continues to this day. He remained asymptomatic until a month ago when he started with malaise and increased throat clearing and occasional regurgitation. No heartburn or chest pain. No alarm symptoms. He he has had 2 EGDs in the system, 2006 and 2019 - both without evidence of erosive GERD or Akhn esophagus. There was no no hiatal hernia mentioned either. There is no known Fhx of esophageal cancer. Avoids NSAIDs. He would like to get off PPI d/t their side effect profile (off and on use for 25 years). We discussed that there are certain clinical indications for long-term use and briefly reviewed safety profile of Rx. Avoids     Plan  -recommend EGD with consideration of BRAVO capsule, denied nickel allergy. Pt agreeable.   --advised hold PPI / H2B x 14d and antacids x 48h prior to procedure  -GERD friendly diet/lifestyle per AVS  -OK for daily miralax PRN constipation       Future consideration  -if he requires long - term acid suppression, plan to discuss PPI safety profile and RvB of use   -further objective evaluation can include HRM w/pH impendence off therapy     #h/o tubular adenoma   #CRC screening  Reportedly had Cscope 2022 - need records.   States he was told to complete q3y screening  Cscopes.     [facility]  [address]  [address]  [facility] [address]  Phone: [phone]     Orders Placed This Encounter   Procedures    Adult GI  Referral - Procedure Only     RTC 2 wk after EGD    Thank you for this consultation.  It was a pleasure to participate in the care of this patient; please contact me with any further questions.     Neema Grande PA-C    Follow up: As planned above. Today, I personally spent 55  minutes spent on the date of the encounter doing chart review, history and exam, documentation and further activities per the note.    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     1. Long-standing trouble w/ reflux x 25 years  Had EGDs and been on omeprazole for 25 years  August 2024 - had chest pain (achy, mid sternum spreading bi directionally to both sides of his chest), went to ED and negative work up (CXR, labs, lipase, troponin, EKG with NSR without ischemic changes). Sx improved with omeprazole prior to arrival. Went to FM afterwards and they thought it was d/t acid reflux and went on omeprazole 20 mg BID x 3 mom- felt a lot better, sx resolved.   After 3 mo of finishing it, went back to omeprazole 20 mg daily- remained asx   A month ago, started with malaise,  Regurgitation of stomach contents w/o retching - occurring infrequently   Does have a lot of mucus in back of throat   No nausea, vomiting, no dysphagia, odynphagia , regular nocturnal coughing, acid brash, no chest pain  No heartburn    Stop eating by 6pm   If he ate too late, he could have abd epigastric pain waking him up and was severe with radiation up to his throat. Last episode was 1 year ago.   Works out daily - active     He's concerned about long-standing PPI use and wants to get off PPI if possible.     Bowel  movements  2-3 BM a day, variable consistency, good evacuation   Can have some constipation.   No bloody or black stools     No unintentional weight loss  Good appetite     Family Hx  Grand father - tracheotomy for unclear reasons     No other known family history or GI related malignancy (esophageal, gastric, pancreatic, liver or colon) or family history of IBD/celiac disease.     Social Hx   Avoids use of NSAIDs or Tylenol.     A beer every few weeks -  ETOH  Occasional cigarette every several weeks to months   No recreational drug use     PERTINENT PAST MEDICAL HISTORY:  Past Medical History:   Diagnosis Date    Back pain     Chest pain     Gastro-oesophageal reflux disease     GERD (gastroesophageal reflux disease)     Hemorrhoid     Hepatitis C     Hypertension     Trigeminal neuralgia        PREVIOUS SURGERIES:  Past Surgical History:   Procedure Laterality Date    ARTHROPLASTY KNEE  11/9/2012    Procedure: ARTHROPLASTY KNEE;;  Surgeon: Ang Arellano MD;  Location: [facility]    ENT SURGERY  2009    Styloid process broken off    HERNIA REPAIR  12/10    RESECT TUMOR LOWER EXTREMITY  11/9/2012    Procedure: RESECT TUMOR LOWER EXTREMITY;  Excision of Tumor Left Distal Femur, Left Total Knee Replacement ;  Surgeon: Ang Arellano MD;  Location: [facility] OR    styloid process temporal bone surg  02/10    TONSILLECTOMY  1954       ALLERGIES:     Allergies   Allergen Reactions    Sulfa Antibiotics        PERTINENT MEDICATIONS:    Current Outpatient Medications:     amLODIPine (NORVASC) 2.5 MG tablet, TAKE 1 TABLET(2.5 MG) BY MOUTH DAILY, Disp: 90 tablet, Rfl: 3    omeprazole (PRILOSEC) 20 MG DR capsule, Take 1 capsule (20 mg) by mouth 2 times daily., Disp: 180 capsule, Rfl: 1    tadalafil (CIALIS) 5 MG tablet, Take 1 tablet (5 mg) by mouth every 24 hours., Disp: 90 tablet, Rfl: 3    tadalafil (CIALIS) 5 MG tablet, Take 1 tablet (5 mg) by mouth every 24 hours., Disp: 90 tablet, Rfl: 3    tamsulosin (FLOMAX) 0.4 MG capsule,  "Take 1 capsule (0.4 mg) by mouth daily., Disp: 90 capsule, Rfl: 3    triamcinolone (KENALOG) 0.1 % external ointment, Apply topically 2 times daily Apply twice daily as needed to painful bumps on back of scalp. Use for up to two weeks at a time in the same spot, then take one week off before restarting a two week cycle (Patient not taking: Reported on 4/16/2025), Disp: 15 g, Rfl: 3    FAMILY HISTORY:  Family History   Problem Relation Age of Onset    Hypertension Mother     Cerebrovascular Disease Father 69    Cancer Paternal Grandfather     Cancer Son     No Known Problems Half-Brother     Kidney failure Half-Brother     No Known Problems Half-Sister     No Known Problems Half-Sister     No Known Problems Half-Sister     No Known Problems Half-Sister     Deep Vein Thrombosis No family hx of     Anesthesia Reaction No family hx of     Glaucoma No family hx of        Past/family/social history reviewed and no changes    PHYSICAL EXAMINATION:  Vitals reviewed: BP (!) 149/85   Pulse 87   Ht 1.791 m (5' 10.5\")   Wt 72.6 kg (160 lb)   SpO2 96%   BMI 22.63 kg/m    Constitutional: aaox3, cooperative, pleasant, not dyspneic/diaphoretic, no acute distress  Eyes: Sclera anicteric/injected  Ears/nose/mouth/throat: hearing intact  Neck: supple, active ROM w/o limitation or pain   CV: No edema  Respiratory: Unlabored breathing  Abd:  Nondistended, hypertympanic throughout on percussion, nontender (particularly to upper abdomen), no peritoneal signs, neg Hopson's sign  Skin: warm, perfused, no jaundice  Psych: Normal affect  MSK: Normal gait     PERTINENT STUDIES:    Lab on 04/23/2025   Component Date Value Ref Range Status    Sodium 04/23/2025 141  135 - 145 mmol/L Final    Potassium 04/23/2025 4.1  3.4 - 5.3 mmol/L Final    Chloride 04/23/2025 106  98 - 107 mmol/L Final    Carbon Dioxide (CO2) 04/23/2025 28  22 - 29 mmol/L Final    Anion Gap 04/23/2025 7  7 - 15 mmol/L Final    Urea Nitrogen 04/23/2025 10.7  8.0 - 23.0 " mg/dL Final    Creatinine 04/23/2025 1.29 (H)  0.67 - 1.17 mg/dL Final    GFR Estimate 04/23/2025 57 (L)  >60 mL/min/1.73m2 Final    Calcium 04/23/2025 9.7  8.8 - 10.4 mg/dL Final    Glucose 04/23/2025 91  70 - 99 mg/dL Final       Last Comprehensive Metabolic Panel:  Lab Results   Component Value Date     04/23/2025    POTASSIUM 4.1 04/23/2025    CHLORIDE 106 04/23/2025    CO2 28 04/23/2025    ANIONGAP 7 04/23/2025    GLC 91 04/23/2025    BUN 10.7 04/23/2025    CR 1.29 (H) 04/23/2025    GFRESTIMATED 57 (L) 04/23/2025    LORRI 9.7 04/23/2025       TSH   Date Value Ref Range Status   05/21/2024 1.62 0.30 - 4.20 uIU/mL Final   11/10/2014 3.37 0.40 - 4.00 mU/L Final     Comment:     Effective 7/30/2014, the reference range for this assay has changed to reflect   new instrumentation/methodology.          PREVIOUS ENDOSCOPY      EGD in 3/2006 for reflux that showed a normal esophagus. Patchy erytehmatous mucosa w/o bleeding in gastric antrum. Few localized erosions without bleedign in gastric antrum. Duoduneum was normal . Pathology report not included.     EGD 8/12/2019 for epigastric abd pain showed a normal esophagus, erythemateous mucosa in gastric fundus and normal duodenum. (Bx - gastric body with minimal subacute and chronic inflammation. Negative for H. pylori by immunohistochemistry. Negative for intestinal metaplasia and dysplasia.     8/12/2019 - EGD/colo    EGD  Impression:        - Normal esophagus. No esophageal varices.   - Erythematous mucosa in the gastric fundus. Biopsied. No   gastric varices.   - Normal duodenal bulb and second portion of the duodenum.   Recommendation:    - Await pathology results.   - Proceed with the colonoscopy.     Colonoscopy   Impression:        - Hemorrhoids found on perianal exam.   - Diverticulosis in the sigmoid colon, in the transverse   colon and in the ascending colon.   - One 3 mm polyp in the cecum, removed with a jumbo cold   forceps. Resected and retrieved.   -  One 5 mm polyp in the ascending colon, removed with a   cold snare. Resected and retrieved.   - One 3 mm polyp in the transverse colon, removed with a   jumbo cold forceps. Resected and retrieved.   - Internal hemorrhoids.   - The examination was otherwise normal on direct and   retroflexion views.     Specimen Type:   A. Stomach, fundus, biopsy   B, Colon, polyp, cecum, ascending, and transverse, biopsy     Final Diagnosis:   A. Stomach, fundus, biopsy - Gastric body with minimal subacute and chronic inflammation.    Negative for H. pylori by immunohistochemistry. Negative for intestinal metaplasia and    dysplasia.     B. Colon, polyp, cecum, ascending, and transverse, biopsy - Fragments of     tubular adenoma and   non- dysplastic colonic mucosa.   *  Report Electronically Signed By  *      Rosalba Camara M.D.      08.13.2019 7:10     Tubular adenomas: Repeat colonoscopy in 3 years

## 2025-05-22 NOTE — NURSING NOTE
"Do you have a history of colon cancer in your immediate family? NO    If yes who: negative     And what age  were they diagnosed: n.a      Chief Complaint   Patient presents with    New Patient       Vitals:    05/22/25 0900   BP: (!) 149/85   Pulse: 87   SpO2: 96%   Weight: 72.6 kg (160 lb)   Height: 1.791 m (5' 10.5\")       Body mass index is 22.63 kg/m .    Jorgito Max MA    "

## 2025-05-22 NOTE — LETTER
5/22/2025      Ivy Haynes  [address]  [facility] [address]      Dear Colleague,    Thank you for referring your patient, Ivy Haynes, to the [facility] [place]. Please see a copy of my visit note below.      GI CLINIC VISIT    CC/REFERRING MD:  Connor Torres  REASON FOR CONSULTATION: possible GERD    ASSESSMENT/PLAN:  Ivy Haynes is a 80 year old year old male with PMHx significant for CKD 3a,h/o HCV s/p DAA x 16 week (2019 PCR not detected) who presents seeing the  [facility] team for possible GERD.     #possible GERD  25 year history of malaise, heartburn, midsternal chest pain, regurgitation and mucus in back of his throat. He went to the ED 8/2024 for this chest pain, work up was negative (CXR, labs, lipase, troponin, EKG with NSR without ischemic changes) and his symptoms got better on omeprazole. His primary put him on omeprazole 20 mg BID x 3 months then afterwards, he went on 20 mg once daily which he continues to this day. He remained asymptomatic until a month ago when he started with malaise and increased throat clearing and occasional regurgitation. No heartburn or chest pain. No alarm symptoms. He he has had 2 EGDs in the system, 2006 and 2019 - both without evidence of erosive GERD or Kahn esophagus. There was no no hiatal hernia mentioned either. There is no known Fhx of esophageal cancer. Avoids NSAIDs. He would like to get off PPI d/t their side effect profile (off and on use for 25 years). We discussed that there are certain clinical indications for long-term use and briefly reviewed safety profile of Rx. Avoids     Plan  -recommend EGD with consideration of BRAVO capsule, denied nickel allergy. Pt agreeable.   --advised hold PPI / H2B x 14d and antacids x 48h prior to procedure  -GERD friendly diet/lifestyle per AVS  -OK for daily miralax PRN constipation       Future consideration  -if he requires long - term acid suppression, plan to  discuss PPI safety profile and RvB of use   -further objective evaluation can include HRM w/pH impendence off therapy     #h/o tubular adenoma   #CRC screening  Reportedly had Cscope 2022 - need records.   States he was told to complete q3y screening Cscopes.     [facility]  [address]  [facility] [address]  Phone: [phone]     Orders Placed This Encounter   Procedures     Adult GI  Referral - Procedure Only     RTC 2 wk after EGD    Thank you for this consultation.  It was a pleasure to participate in the care of this patient; please contact me with any further questions.     Neema Grande PA-C    Follow up: As planned above. Today, I personally spent 55  minutes spent on the date of the encounter doing chart review, history and exam, documentation and further activities per the note.    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     1. Long-standing trouble w/ reflux x 25 years  Had EGDs and been on omeprazole for 25 years  August 2024 - had chest pain (achy, mid sternum spreading bi directionally to both sides of his chest), went to ED and negative work up (CXR, labs, lipase, troponin, EKG with NSR without ischemic changes). Sx improved with omeprazole prior to arrival. Went to FM afterwards and they thought it was d/t acid reflux and went on omeprazole 20 mg BID x 3 mom- felt a lot better, sx resolved.   After 3 mo of finishing it, went back to omeprazole 20 mg daily- remained asx   A month ago, started with malaise,  Regurgitation of stomach contents w/o retching - occurring infrequently   Does have a lot of mucus in back of throat   No nausea, vomiting, no dysphagia, odynphagia , regular nocturnal coughing, acid brash, no chest pain  No heartburn    Stop eating by 6pm   If he ate too late, he  could have abd epigastric pain waking him up and was severe with radiation up to his throat. Last episode was 1 year ago.   Works out daily - active     He's concerned about long-standing PPI use and wants to get off PPI if possible.     Bowel movements  2-3 BM a day, variable consistency, good evacuation   Can have some constipation.   No bloody or black stools     No unintentional weight loss  Good appetite     Family Hx  Grand father - tracheotomy for unclear reasons     No other known family history or GI related malignancy (esophageal, gastric, pancreatic, liver or colon) or family history of IBD/celiac disease.     Social Hx   Avoids use of NSAIDs or Tylenol.     A beer every few weeks -  ETOH  Occasional cigarette every several weeks to months   No recreational drug use     PERTINENT PAST MEDICAL HISTORY:  Past Medical History:   Diagnosis Date     Back pain      Chest pain      Gastro-oesophageal reflux disease      GERD (gastroesophageal reflux disease)      Hemorrhoid      Hepatitis C      Hypertension      Trigeminal neuralgia        PREVIOUS SURGERIES:  Past Surgical History:   Procedure Laterality Date     ARTHROPLASTY KNEE  11/9/2012    Procedure: ARTHROPLASTY KNEE;;  Surgeon: Ang Arellano MD;  [facility]     ENT SURGERY  2009    Styloid process broken off     HERNIA REPAIR  12/10     RESECT TUMOR LOWER EXTREMITY  11/9/2012    Procedure: RESECT TUMOR LOWER EXTREMITY;  Excision of Tumor Left Distal Femur, Left Total Knee Replacement ;  Surgeon: Ang Arellano MD;  Location: [facility] OR     styloid process temporal bone surg  02/10     TONSILLECTOMY  1954       ALLERGIES:     Allergies   Allergen Reactions     Sulfa Antibiotics        PERTINENT MEDICATIONS:    Current Outpatient Medications:      amLODIPine (NORVASC) 2.5 MG tablet, TAKE 1 TABLET(2.5 MG) BY MOUTH DAILY, Disp: 90 tablet, Rfl: 3     omeprazole (PRILOSEC) 20 MG DR capsule, Take 1 capsule (20 mg) by mouth 2 times daily., Disp: 180  "capsule, Rfl: 1     tadalafil (CIALIS) 5 MG tablet, Take 1 tablet (5 mg) by mouth every 24 hours., Disp: 90 tablet, Rfl: 3     tadalafil (CIALIS) 5 MG tablet, Take 1 tablet (5 mg) by mouth every 24 hours., Disp: 90 tablet, Rfl: 3     tamsulosin (FLOMAX) 0.4 MG capsule, Take 1 capsule (0.4 mg) by mouth daily., Disp: 90 capsule, Rfl: 3     triamcinolone (KENALOG) 0.1 % external ointment, Apply topically 2 times daily Apply twice daily as needed to painful bumps on back of scalp. Use for up to two weeks at a time in the same spot, then take one week off before restarting a two week cycle (Patient not taking: Reported on 4/16/2025), Disp: 15 g, Rfl: 3    FAMILY HISTORY:  Family History   Problem Relation Age of Onset     Hypertension Mother      Cerebrovascular Disease Father 69     Cancer Paternal Grandfather      Cancer Son      No Known Problems Half-Brother      Kidney failure Half-Brother      No Known Problems Half-Sister      No Known Problems Half-Sister      No Known Problems Half-Sister      No Known Problems Half-Sister      Deep Vein Thrombosis No family hx of      Anesthesia Reaction No family hx of      Glaucoma No family hx of        Past/family/social history reviewed and no changes    PHYSICAL EXAMINATION:  Vitals reviewed: BP (!) 149/85   Pulse 87   Ht 1.791 m (5' 10.5\")   Wt 72.6 kg (160 lb)   SpO2 96%   BMI 22.63 kg/m    Constitutional: aaox3, cooperative, pleasant, not dyspneic/diaphoretic, no acute distress  Eyes: Sclera anicteric/injected  Ears/nose/mouth/throat: hearing intact  Neck: supple, active ROM w/o limitation or pain   CV: No edema  Respiratory: Unlabored breathing  Abd:  Nondistended, hypertympanic throughout on percussion, nontender (particularly to upper abdomen), no peritoneal signs, neg Hopson's sign  Skin: warm, perfused, no jaundice  Psych: Normal affect  MSK: Normal gait     PERTINENT STUDIES:    Lab on 04/23/2025   Component Date Value Ref Range Status     Sodium 04/23/2025 " 141  135 - 145 mmol/L Final     Potassium 04/23/2025 4.1  3.4 - 5.3 mmol/L Final     Chloride 04/23/2025 106  98 - 107 mmol/L Final     Carbon Dioxide (CO2) 04/23/2025 28  22 - 29 mmol/L Final     Anion Gap 04/23/2025 7  7 - 15 mmol/L Final     Urea Nitrogen 04/23/2025 10.7  8.0 - 23.0 mg/dL Final     Creatinine 04/23/2025 1.29 (H)  0.67 - 1.17 mg/dL Final     GFR Estimate 04/23/2025 57 (L)  >60 mL/min/1.73m2 Final     Calcium 04/23/2025 9.7  8.8 - 10.4 mg/dL Final     Glucose 04/23/2025 91  70 - 99 mg/dL Final       Last Comprehensive Metabolic Panel:  Lab Results   Component Value Date     04/23/2025    POTASSIUM 4.1 04/23/2025    CHLORIDE 106 04/23/2025    CO2 28 04/23/2025    ANIONGAP 7 04/23/2025    GLC 91 04/23/2025    BUN 10.7 04/23/2025    CR 1.29 (H) 04/23/2025    GFRESTIMATED 57 (L) 04/23/2025    LORRI 9.7 04/23/2025       TSH   Date Value Ref Range Status   05/21/2024 1.62 0.30 - 4.20 uIU/mL Final   11/10/2014 3.37 0.40 - 4.00 mU/L Final     Comment:     Effective 7/30/2014, the reference range for this assay has changed to reflect   new instrumentation/methodology.          PREVIOUS ENDOSCOPY      EGD in 3/2006 for reflux that showed a normal esophagus. Patchy erytehmatous mucosa w/o bleeding in gastric antrum. Few localized erosions without bleedign in gastric antrum. Duoduneum was normal . Pathology report not included.     EGD 8/12/2019 for epigastric abd pain showed a normal esophagus, erythemateous mucosa in gastric fundus and normal duodenum. (Bx - gastric body with minimal subacute and chronic inflammation. Negative for H. pylori by immunohistochemistry. Negative for intestinal metaplasia and dysplasia.     8/12/2019 - EGD/colo    EGD  Impression:        - Normal esophagus. No esophageal varices.   - Erythematous mucosa in the gastric fundus. Biopsied. No   gastric varices.   - Normal duodenal bulb and second portion of the duodenum.   Recommendation:    - Await pathology results.   - Proceed  with the colonoscopy.     Colonoscopy   Impression:        - Hemorrhoids found on perianal exam.   - Diverticulosis in the sigmoid colon, in the transverse   colon and in the ascending colon.   - One 3 mm polyp in the cecum, removed with a jumbo cold   forceps. Resected and retrieved.   - One 5 mm polyp in the ascending colon, removed with a   cold snare. Resected and retrieved.   - One 3 mm polyp in the transverse colon, removed with a   jumbo cold forceps. Resected and retrieved.   - Internal hemorrhoids.   - The examination was otherwise normal on direct and   retroflexion views.     Specimen Type:   A. Stomach, fundus, biopsy   B, Colon, polyp, cecum, ascending, and transverse, biopsy     Final Diagnosis:   A. Stomach, fundus, biopsy - Gastric body with minimal subacute and chronic inflammation.    Negative for H. pylori by immunohistochemistry. Negative for intestinal metaplasia and    dysplasia.     B. Colon, polyp, cecum, ascending, and transverse, biopsy - Fragments of     tubular adenoma and   non- dysplastic colonic mucosa.   *  Report Electronically Signed By  *      Rosalba Camara M.D.      08.13.2019 7:10     Tubular adenomas: Repeat colonoscopy in 3 years       Again, thank you for allowing me to participate in the care of your patient.        Sincerely,        Neema Grande PA-C    Electronically signed

## 2025-05-27 ENCOUNTER — OFFICE VISIT (OUTPATIENT)
Dept: FAMILY MEDICINE | Facility: CLINIC | Age: 80
End: 2025-05-27
Payer: COMMERCIAL

## 2025-05-27 VITALS
RESPIRATION RATE: 12 BRPM | OXYGEN SATURATION: 96 % | BODY MASS INDEX: 23.1 KG/M2 | HEART RATE: 71 BPM | TEMPERATURE: 97.3 F | WEIGHT: 165 LBS | HEIGHT: 71 IN | DIASTOLIC BLOOD PRESSURE: 84 MMHG | SYSTOLIC BLOOD PRESSURE: 136 MMHG

## 2025-05-27 DIAGNOSIS — R79.89 LOW VITAMIN B12 LEVEL: ICD-10-CM

## 2025-05-27 DIAGNOSIS — Z12.5 SCREENING FOR PROSTATE CANCER: ICD-10-CM

## 2025-05-27 DIAGNOSIS — N18.31 CHRONIC KIDNEY DISEASE, STAGE 3A (H): ICD-10-CM

## 2025-05-27 DIAGNOSIS — I10 HYPERTENSION GOAL BP (BLOOD PRESSURE) < 140/90: ICD-10-CM

## 2025-05-27 DIAGNOSIS — R39.12 BENIGN PROSTATIC HYPERPLASIA WITH WEAK URINARY STREAM: ICD-10-CM

## 2025-05-27 DIAGNOSIS — F51.02 ADJUSTMENT INSOMNIA: ICD-10-CM

## 2025-05-27 DIAGNOSIS — Z00.00 ENCOUNTER FOR MEDICARE ANNUAL WELLNESS EXAM: Primary | ICD-10-CM

## 2025-05-27 DIAGNOSIS — K21.00 GASTROESOPHAGEAL REFLUX DISEASE WITH ESOPHAGITIS WITHOUT HEMORRHAGE: ICD-10-CM

## 2025-05-27 DIAGNOSIS — N40.1 BENIGN PROSTATIC HYPERPLASIA WITH WEAK URINARY STREAM: ICD-10-CM

## 2025-05-27 LAB
ERYTHROCYTE [DISTWIDTH] IN BLOOD BY AUTOMATED COUNT: 11.7 % (ref 10–15)
HCT VFR BLD AUTO: 45.9 % (ref 40–53)
HGB BLD-MCNC: 15.1 G/DL (ref 13.3–17.7)
MCH RBC QN AUTO: 32 PG (ref 26.5–33)
MCHC RBC AUTO-ENTMCNC: 32.9 G/DL (ref 31.5–36.5)
MCV RBC AUTO: 97 FL (ref 78–100)
PLATELET # BLD AUTO: 144 10E3/UL (ref 150–450)
RBC # BLD AUTO: 4.72 10E6/UL (ref 4.4–5.9)
WBC # BLD AUTO: 3.5 10E3/UL (ref 4–11)

## 2025-05-27 ASSESSMENT — PAIN SCALES - GENERAL: PAINLEVEL_OUTOF10: NO PAIN (0)

## 2025-05-27 NOTE — PROGRESS NOTES
Preventive Care Visit  [facility]  Lamine Franco MD, Family Medicine  May 27, 2025      Assessment & Plan     Encounter for Medicare annual wellness exam  Overall in great shape  - CBC with platelets; Future  - CBC with platelets    Hypertension goal BP (blood pressure) < 140/90  Well controlled   - Comprehensive metabolic panel (BMP + Alb, Alk Phos, ALT, AST, Total. Bili, TP); Future  - Albumin Random Urine Quantitative with Creat Ratio; Future  - Lipid panel reflex to direct LDL Fasting; Future  - Comprehensive metabolic panel (BMP + Alb, Alk Phos, ALT, AST, Total. Bili, TP)  - Albumin Random Urine Quantitative with Creat Ratio  - Lipid panel reflex to direct LDL Fasting    Gastroesophageal reflux disease with esophagitis without hemorrhage  Still needing PPI daily   Planned Upper endoscopy  Follow up with consultant as planned.   - Magnesium; Future  - Magnesium    Benign prostatic hyperplasia with weak urinary stream  recheck  - PSA, screen; Future  - PSA, screen    Screening for prostate cancer  Recheck  Follow up with consultant as planned.   - PSA, screen; Future  - PSA, screen    Low vitamin B12 level  On strict diet   recheck  - Vitamin B12; Future  - Vitamin B12    Adjustment insomnia  Ok to try  - Magnesium; Future  - Magnesium    Chronic kidney disease, stage 3a (H)  BP Well controlled    Avoid NSAIDs            Counseling  Appropriate preventive services were addressed with this patient via screening, questionnaire, or discussion as appropriate for fall prevention, nutrition, physical activity, Tobacco-use cessation, social engagement, weight loss and cognition.  Checklist reviewing preventive services available has been given to the patient.  Reviewed patient's diet, addressing concerns and/or questions.   I have reviewed Opioid Use Disorder and Substance Use Disorder risk factors and made any needed referrals.       Follow-up       Subjective   Faruq is  a 80 year old, presenting for the following:  Wellness Visit (B12, magnesium levels)        5/27/2025     1:15 PM   Additional Questions   Roomed by Talha SCHMIDT        Wellness Visit Notes:  - Colon cancer screening-aged out  - PSA lab work last performed 05/2024 (value of 5.16 micrograms/L). Discussed risks/benefits of PSA testing today in detail, including possibility of false positive results and/or possibility of biopsy recommended as next step. Pt would like to follow MD directions.     - Immunizations: Pt is due for TDAP, ZOSTER, RSV vaccine(s). Pt wondering about PNEUMONIA vaccine booster and if needed.    - ACP:Pt not interested in discussion today.      HPI     Encounter Diagnoses   Name Primary?    Encounter for Medicare annual wellness exam Yes    Hypertension goal BP (blood pressure) < 140/90     Gastroesophageal reflux disease with esophagitis without hemorrhage     Benign prostatic hyperplasia with weak urinary stream     Screening for prostate cancer     Low vitamin B12 level     Adjustment insomnia     Chronic kidney disease, stage 3a (H)        Hypertension Follow-up    Do you check your blood pressure regularly outside of the clinic? Yes   Are you following a low salt diet? Yes  Are your blood pressures ever more than 140 on the top number (systolic) OR more   than 90 on the bottom number (diastolic), for example 140/90? No    BP Readings from Last 2 Encounters:   05/27/25 136/84   05/22/25 (!) 149/85       Advance Care Planning    Discussed advance care planning with patient; however, patient declined at this time.        5/22/2025   General Health   How would you rate your overall physical health? Good   Feel stress (tense, anxious, or unable to sleep) Not at all         5/22/2025   Nutrition   Diet: Regular (no restrictions)    Vegetarian/vegan       Multiple values from one day are sorted in reverse-chronological order         5/22/2025   Exercise   Days per week of moderate/strenous exercise 6  days   Average minutes spent exercising at this level 60 min         5/22/2025   Social Factors   Frequency of gathering with friends or relatives Once a week   Worry food won't last until get money to buy more No   Food not last or not have enough money for food? No   Do you have housing? (Housing is defined as stable permanent housing and does not include staying outside in a car, in a tent, in an abandoned building, in an overnight shelter, or couch-surfing.) Yes   Are you worried about losing your housing? No   Lack of transportation? No   Unable to get utilities (heat,electricity)? No         5/22/2025   Fall Risk   Fallen 2 or more times in the past year? No   Trouble with walking or balance? No          5/22/2025   Activities of Daily Living- Home Safety   Needs help with the following daily activites None of the above   Safety concerns in the home None of the above         5/22/2025   Dental   Dentist two times every year? Yes         5/22/2025   Hearing Screening   Hearing concerns? None of the above         5/22/2025   Driving Risk Screening   Patient/family members have concerns about driving No         5/22/2025   General Alertness/Fatigue Screening   Have you been more tired than usual lately? No         5/22/2025   Urinary Incontinence Screening   Bothered by leaking urine in past 6 months No         Today's PHQ-2 Score:       5/26/2025     1:57 PM   PHQ-2 ( 1999 [employer])   Q1: Little interest or pleasure in doing things 0   Q2: Feeling down, depressed or hopeless 0   PHQ-2 Score 0    Q1: Little interest or pleasure in doing things Not at all   Q2: Feeling down, depressed or hopeless Not at all   PHQ-2 Score 0       Patient-reported           5/22/2025   Substance Use   Alcohol more than 3/day or more than 7/wk No   Do you have a current opioid prescription? (!) YES   How severe/bad is pain from 1 to 10? 1/10   Do you use any other substances recreationally? (!) CANNABIS PRODUCTS          No data to  "display              Low Risk (0-3)  Moderate Risk (4-7)  High Risk (>8)  Social History     Tobacco Use    Smoking status: Former     Current packs/day: 0.00     Average packs/day: 0.1 packs/day for 10.0 years (1.0 ttl pk-yrs)     Types: Cigarettes     Start date: 2011     Quit date: 2021     Years since quittin.4     Passive exposure: Past    Smokeless tobacco: Never    Tobacco comments:     quit  until . started after trip to [place]   Vaping Use    Vaping status: Never Used   Substance Use Topics    Alcohol use: Yes     Comment: 2-3 beer a week    Drug use: Yes     Types: Marijuana     Comment: Every other month-\"just a puff of marijuana\"       ASCVD Risk   The 10-year ASCVD risk score (Kushal CARUSO, et al., 2019) is: 23.4%    Values used to calculate the score:      Age: 80 years      Sex: Male      Is Non- : Yes      Diabetic: No      Tobacco smoker: No      Systolic Blood Pressure: 136 mmHg      Is BP treated: Yes      HDL Cholesterol: 68 mg/dL      Total Cholesterol: 169 mg/dL            Reviewed and updated as needed this visit by Provider                    Past Medical History:   Diagnosis Date    Back pain     Chest pain     Gastro-oesophageal reflux disease     GERD (gastroesophageal reflux disease)     Hemorrhoid     Hepatitis C     Hypertension     Trigeminal neuralgia      Current providers sharing in care for this patient include:  Patient Care Team:  Lamine Franco MD as PCP - General (Family Practice)  Te Soriano MD as MD (Surgery)  Regine Ochoa MD as MD (Family Medicine - Sports Medicine)  Connor Torres MD as MD (Otolaryngology)  Lamine Franco MD as Assigned PCP  Marily Aldana MD as MD (Ophthalmology)  Kevin Saha MD as Intern  Irvin Warner MD as Referring Physician (Emergency Medicine)  Tyson Dawson MD as MD (Urology)  Dusty Mccullough MD as " "MD (Urology)  Neema Grande PA-C as Physician Assistant (Physician Assistant - Medical)  Marily Aldana MD as Assigned Surgical Provider    The following health maintenance items are reviewed in Epic and correct as of today:  Health Maintenance   Topic Date Due    URINE DRUG SCREEN  Never done    ANNUAL REVIEW OF HM ORDERS  Never done    ZOSTER VACCINE (1 of 2) Never done    LUNG CANCER SCREENING  Never done    DTAP/TDAP/TD VACCINE (3 - Td or Tdap) 01/10/2022    RSV VACCINE (1 - 1-dose 75+ series) Never done    MEDICARE ANNUAL WELLNESS VISIT  05/21/2025    BMP  04/23/2026    FALL RISK ASSESSMENT  05/27/2026    DIABETES SCREENING  04/23/2028    ADVANCE CARE PLANNING  05/09/2029    LIPID  05/21/2029    HEPATITIS C SCREENING  Completed    PHQ-2 (once per calendar year)  Completed    INFLUENZA VACCINE  Completed    PNEUMOCOCCAL VACCINE 50+ YEARS  Completed    COVID-19 VACCINE  Completed    HPV VACCINE  Aged Out    MENINGITIS VACCINE  Aged Out    COLORECTAL CANCER SCREENING  Discontinued         Review of Systems  Constitutional, HEENT, cardiovascular, pulmonary, gi and gu systems are negative, except as otherwise noted.     Objective    Exam  /84   Pulse 71   Temp 97.3  F (36.3  C) (Temporal)   Resp 12   Ht 1.791 m (5' 10.5\")   Wt 74.8 kg (165 lb)   SpO2 96%   BMI 23.34 kg/m     Estimated body mass index is 23.34 kg/m  as calculated from the following:    Height as of this encounter: 1.791 m (5' 10.5\").    Weight as of this encounter: 74.8 kg (165 lb).    Physical Exam  GENERAL: alert and no distress  EYES: Eyes grossly normal to inspection, PERRL and conjunctivae and sclerae normal  HENT: ear canals and TM's normal, nose and mouth without ulcers or lesions  NECK: no adenopathy, no asymmetry, masses, or scars  RESP: lungs clear to auscultation - no rales, rhonchi or wheezes  CV: regular rate and rhythm, normal S1 S2, no S3 or S4, no murmur, click or rub, no peripheral edema  ABDOMEN: soft, " nontender, no hepatosplenomegaly, no masses and bowel sounds normal  MS: no gross musculoskeletal defects noted, no edema  SKIN: no suspicious lesions or rashes  NEURO: Normal strength and tone, mentation intact and speech normal  PSYCH: mentation appears normal, affect normal/bright  LYMPH: no cervical, supraclavicular, axillary, or inguinal adenopathy  Diabetic foot exam: normal DP and PT pulses, no trophic changes or ulcerative lesions, and normal sensory exam         5/27/2025   Mini Cog   Clock Draw Score 2 Normal   3 Item Recall 3 objects recalled   Mini Cog Total Score 5              Signed Electronically by: Lamine Franco MD

## 2025-05-28 ENCOUNTER — RESULTS FOLLOW-UP (OUTPATIENT)
Dept: FAMILY MEDICINE | Facility: CLINIC | Age: 80
End: 2025-05-28

## 2025-05-28 LAB
ALBUMIN SERPL BCG-MCNC: 4.4 G/DL (ref 3.5–5.2)
ALP SERPL-CCNC: 163 U/L (ref 40–150)
ALT SERPL W P-5'-P-CCNC: 20 U/L (ref 0–70)
ANION GAP SERPL CALCULATED.3IONS-SCNC: 12 MMOL/L (ref 7–15)
AST SERPL W P-5'-P-CCNC: 40 U/L (ref 0–45)
BILIRUB SERPL-MCNC: 0.5 MG/DL
BUN SERPL-MCNC: 10.5 MG/DL (ref 8–23)
CALCIUM SERPL-MCNC: 9.5 MG/DL (ref 8.8–10.4)
CHLORIDE SERPL-SCNC: 102 MMOL/L (ref 98–107)
CHOLEST SERPL-MCNC: 163 MG/DL
CREAT SERPL-MCNC: 1.4 MG/DL (ref 0.67–1.17)
CREAT UR-MCNC: 111 MG/DL
EGFRCR SERPLBLD CKD-EPI 2021: 51 ML/MIN/1.73M2
FASTING STATUS PATIENT QL REPORTED: ABNORMAL
FASTING STATUS PATIENT QL REPORTED: NORMAL
GLUCOSE SERPL-MCNC: 93 MG/DL (ref 70–99)
HCO3 SERPL-SCNC: 26 MMOL/L (ref 22–29)
HDLC SERPL-MCNC: 68 MG/DL
LDLC SERPL CALC-MCNC: 81 MG/DL
MAGNESIUM SERPL-MCNC: 2.2 MG/DL (ref 1.7–2.3)
MICROALBUMIN UR-MCNC: <12 MG/L
MICROALBUMIN/CREAT UR: NORMAL MG/G{CREAT}
NONHDLC SERPL-MCNC: 95 MG/DL
POTASSIUM SERPL-SCNC: 4 MMOL/L (ref 3.4–5.3)
PROT SERPL-MCNC: 7 G/DL (ref 6.4–8.3)
PSA SERPL DL<=0.01 NG/ML-MCNC: 6.37 NG/ML (ref 0–6.5)
SODIUM SERPL-SCNC: 140 MMOL/L (ref 135–145)
TRIGL SERPL-MCNC: 69 MG/DL
VIT B12 SERPL-MCNC: 666 PG/ML (ref 232–1245)

## 2025-05-29 ENCOUNTER — DOCUMENTATION ONLY (OUTPATIENT)
Dept: GASTROENTEROLOGY | Facility: CLINIC | Age: 80
End: 2025-05-29
Payer: COMMERCIAL

## 2025-05-29 NOTE — CONFIDENTIAL NOTE
Requested colonoscopy records from [facility] .  Fax: [phone]    Shannon HARDY MA

## 2025-06-08 ENCOUNTER — APPOINTMENT (OUTPATIENT)
Dept: CT IMAGING | Facility: CLINIC | Age: 80
End: 2025-06-08
Attending: EMERGENCY MEDICINE
Payer: COMMERCIAL

## 2025-06-08 ENCOUNTER — HOSPITAL ENCOUNTER (EMERGENCY)
Facility: CLINIC | Age: 80
Discharge: HOME | End: 2025-06-08
Attending: EMERGENCY MEDICINE | Admitting: EMERGENCY MEDICINE
Payer: COMMERCIAL

## 2025-06-08 ENCOUNTER — APPOINTMENT (OUTPATIENT)
Dept: GENERAL RADIOLOGY | Facility: CLINIC | Age: 80
End: 2025-06-08
Attending: EMERGENCY MEDICINE
Payer: COMMERCIAL

## 2025-06-08 VITALS
TEMPERATURE: 98 F | WEIGHT: 160 LBS | HEART RATE: 73 BPM | SYSTOLIC BLOOD PRESSURE: 156 MMHG | BODY MASS INDEX: 22.4 KG/M2 | DIASTOLIC BLOOD PRESSURE: 87 MMHG | RESPIRATION RATE: 17 BRPM | HEIGHT: 71 IN | OXYGEN SATURATION: 94 %

## 2025-06-08 DIAGNOSIS — M54.16 LUMBAR RADICULOPATHY: ICD-10-CM

## 2025-06-08 DIAGNOSIS — M48.061 NEURAL FORAMINAL STENOSIS OF LUMBAR SPINE: ICD-10-CM

## 2025-06-08 DIAGNOSIS — M48.061 SPINAL STENOSIS OF LUMBAR REGION, UNSPECIFIED WHETHER NEUROGENIC CLAUDICATION PRESENT: ICD-10-CM

## 2025-06-08 PROCEDURE — 99284 EMERGENCY DEPT VISIT MOD MDM: CPT | Performed by: EMERGENCY MEDICINE

## 2025-06-08 PROCEDURE — 99284 EMERGENCY DEPT VISIT MOD MDM: CPT | Mod: 25 | Performed by: EMERGENCY MEDICINE

## 2025-06-08 PROCEDURE — 73502 X-RAY EXAM HIP UNI 2-3 VIEWS: CPT

## 2025-06-08 PROCEDURE — 250N000013 HC RX MED GY IP 250 OP 250 PS 637: Performed by: EMERGENCY MEDICINE

## 2025-06-08 PROCEDURE — 72131 CT LUMBAR SPINE W/O DYE: CPT

## 2025-06-08 RX ORDER — OXYCODONE HYDROCHLORIDE 5 MG/1
5 TABLET ORAL ONCE
Refills: 0 | Status: COMPLETED | OUTPATIENT
Start: 2025-06-08 | End: 2025-06-08

## 2025-06-08 RX ORDER — CYCLOBENZAPRINE HCL 10 MG
10 TABLET ORAL 3 TIMES DAILY PRN
Qty: 20 TABLET | Refills: 0 | Status: SHIPPED | OUTPATIENT
Start: 2025-06-08 | End: 2025-06-14

## 2025-06-08 RX ORDER — OXYCODONE HYDROCHLORIDE 5 MG/1
5 TABLET ORAL EVERY 6 HOURS PRN
Qty: 12 TABLET | Refills: 0 | Status: SHIPPED | OUTPATIENT
Start: 2025-06-08 | End: 2025-06-11

## 2025-06-08 RX ADMIN — OXYCODONE HYDROCHLORIDE 5 MG: 5 TABLET ORAL at 13:23

## 2025-06-08 ASSESSMENT — COLUMBIA-SUICIDE SEVERITY RATING SCALE - C-SSRS
2. HAVE YOU ACTUALLY HAD ANY THOUGHTS OF KILLING YOURSELF IN THE PAST MONTH?: NO
6. HAVE YOU EVER DONE ANYTHING, STARTED TO DO ANYTHING, OR PREPARED TO DO ANYTHING TO END YOUR LIFE?: NO
1. IN THE PAST MONTH, HAVE YOU WISHED YOU WERE DEAD OR WISHED YOU COULD GO TO SLEEP AND NOT WAKE UP?: NO

## 2025-06-08 ASSESSMENT — ACTIVITIES OF DAILY LIVING (ADL)
ADLS_ACUITY_SCORE: 41

## 2025-06-08 NOTE — DISCHARGE INSTRUCTIONS
TODAY'S VISIT:  You were seen today for back pain   -   - If you had any labs or imaging/radiology tests performed today, you should also discuss these tests with your usual provider.     FOLLOW-UP:  Please make an appointment to follow up with:  - Your Primary Care Provider. If you do not have a PCP, please call the Primary Care Center (phone: [phone] for an appointment    - Have your provider review the results from today's visit with you again to make sure no further follow-up or additional testing is needed based on those results.     RETURN TO THE EMERGENCY DEPARTMENT  Return to the Emergency Department at any time for any new or worsening symptoms or any concerns.

## 2025-06-08 NOTE — ED PROVIDER NOTES
History     Chief Complaint   Patient presents with    Back Pain    Hip Pain     HPI  Ivy Haynes is a 80 year old male with a past medical history of GERD, total knee replacement, hypertension, hepatic fibrosis, congenital UPJ obstruction, BPH, diverticular disease, CKD stage III who presents to the emergency department with a chief complaint of back and hip pain.  Started Tuesday at 5 PM.  Located in the right lower back and radiates down the right leg.  Unable to sleep secondary to pain.  No associated swelling to the legs.  Patient is afebrile.  No recent trauma.  Patient is very active and exercises daily.  He has had to adjust his activities due to his pain.  He notes that the only activity that makes his pain better is recumbent bicycling.    I have reviewed the Medications, Allergies, Past Medical and Surgical History, and Social History in the [employer] system.    Past Medical History:   Diagnosis Date    Back pain     Chest pain     Gastro-oesophageal reflux disease     GERD (gastroesophageal reflux disease)     Hemorrhoid     Hepatitis C     Hypertension     Trigeminal neuralgia      Past Surgical History:   Procedure Laterality Date    ARTHROPLASTY KNEE  11/9/2012    Procedure: ARTHROPLASTY KNEE;;  Surgeon: Ang Arellano MD;  Location: [facility] OR    ENT SURGERY  2009    Styloid process broken off    HERNIA REPAIR  12/10    RESECT TUMOR LOWER EXTREMITY  11/9/2012    Procedure: RESECT TUMOR LOWER EXTREMITY;  Excision of Tumor Left Distal Femur, Left Total Knee Replacement ;  Surgeon: Ang Arellano MD;  Location: [facility] OR    styloid process temporal bone surg  02/10    TONSILLECTOMY  1954     No current facility-administered medications for this encounter.     Current Outpatient Medications   Medication Sig Dispense Refill    amLODIPine (NORVASC) 2.5 MG tablet TAKE 1 TABLET(2.5 MG) BY MOUTH DAILY 90 tablet 3    omeprazole (PRILOSEC) 20 MG DR capsule Take 1 capsule (20 mg) by mouth 2 times daily. 180 capsule 1     "tadalafil (CIALIS) 5 MG tablet Take 1 tablet (5 mg) by mouth every 24 hours. 90 tablet 3    tadalafil (CIALIS) 5 MG tablet Take 1 tablet (5 mg) by mouth every 24 hours. 90 tablet 3    tamsulosin (FLOMAX) 0.4 MG capsule Take 1 capsule (0.4 mg) by mouth daily. 90 capsule 3    triamcinolone (KENALOG) 0.1 % external ointment Apply topically 2 times daily Apply twice daily as needed to painful bumps on back of scalp. Use for up to two weeks at a time in the same spot, then take one week off before restarting a two week cycle (Patient not taking: Reported on 2025) 15 g 3     Allergies   Allergen Reactions    Sulfa Antibiotics      Past medical history, past surgical history, medications, and allergies were reviewed with the patient. Additional pertinent items: None    Social History     Socioeconomic History    Marital status:      Spouse name: Not on file    Number of children: 3    Years of education: Not on file    Highest education level: Not on file   Occupational History    Occupation: retired   Tobacco Use    Smoking status: Former     Current packs/day: 0.00     Average packs/day: 0.1 packs/day for 10.0 years (1.0 ttl pk-yrs)     Types: Cigarettes     Start date: 2011     Quit date: 2021     Years since quittin.4     Passive exposure: Past    Smokeless tobacco: Never    Tobacco comments:     quit  until . started after trip to [place]   Vaping Use    Vaping status: Never Used   Substance and Sexual Activity    Alcohol use: Not Currently     Comment: 2-3 beer a week    Drug use: Yes     Types: Marijuana     Comment: Every other month-\"just a puff of marijuana\"    Sexual activity: Yes     Partners: Female   Other Topics Concern    Parent/sibling w/ CABG, MI or angioplasty before 65F 55M? No   Social History Narrative    Not on file     Social Drivers of Health     Financial Resource Strain: Low Risk  (2025)    Financial Resource Strain     Within the past 12 months, have you or " your family members you live with been unable to get utilities (heat, electricity) when it was really needed?: No   Food Insecurity: Low Risk  (5/22/2025)    Food Insecurity     Within the past 12 months, did you worry that your food would run out before you got money to buy more?: No     Within the past 12 months, did the food you bought just not last and you didn t have money to get more?: No   Transportation Needs: Low Risk  (5/22/2025)    Transportation Needs     Within the past 12 months, has lack of transportation kept you from medical appointments, getting your medicines, non-medical meetings or appointments, work, or from getting things that you need?: No   Physical Activity: Sufficiently Active (5/22/2025)    Exercise Vital Sign     Days of Exercise per Week: 6 days     Minutes of Exercise per Session: 60 min   Stress: No Stress Concern Present (5/22/2025)    [place] [employer] - Occupational Stress Questionnaire     Feeling of Stress : Not at all   Social Connections: Unknown (5/22/2025)    Social Connection and Isolation Panel [NHANES]     Frequency of Communication with Friends and Family: Not on file     Frequency of Social Gatherings with Friends and Family: Once a week     Attends Faith Services: Not on file     Active Member of Clubs or Organizations: Not on file     Attends Club or Organization Meetings: Not on file     Marital Status: Not on file   Interpersonal Safety: Low Risk  (1/16/2025)    Interpersonal Safety     Do you feel physically and emotionally safe where you currently live?: Yes     Within the past 12 months, have you been hit, slapped, kicked or otherwise physically hurt by someone?: No     Within the past 12 months, have you been humiliated or emotionally abused in other ways by your partner or ex-partner?: No   Housing Stability: Low Risk  (5/22/2025)    Housing Stability     Do you have housing? : Yes     Are you worried about losing your housing?: No  "    Social history was reviewed with the patient. Additional pertinent items: None    Review of Systems  A medically appropriate review of systems was performed with pertinent positives and negatives noted in the HPI, and all other systems negative.    Physical Exam   BP: (!) 156/87  Pulse: 73  Temp: 98  F (36.7  C)  Resp: 17  Height: 179.1 cm (5' 10.5\")  Weight: 72.6 kg (160 lb)  SpO2: 94 %      General: Well nourished, well developed, NAD  HEENT: EOMI, anicteric. NCAT, MMM  Neck: no jugular venous distension, supple, nl ROM  Cardiac: Regular rate and rhythm.  Intact peripheral pulses  Pulm: Nonlabored breathing, normal respiratory rate  Back: No midline tenderness or step-off, mild tenderness over right SI joint/piriformis muscle  Skin: Warm and dry to the touch.  Palpable soft/mobile mass overlying lumbar spine consistent with lipoma  Extremities: No LE edema, no cyanosis, w/w/p, mild tenderness overlying right hip and pain with range of motion, no tenderness overlying knee, the extremity is distally neurovascularly intact, no swelling  Neuro: A&Ox3, no gross focal deficits, antalgic gait    ED Course        Procedures                        Labs Ordered and Resulted from Time of ED Arrival to Time of ED Departure - No data to display         Results for orders placed or performed during the hospital encounter of 06/08/25 (from the past 24 hours)   XR Pelvis w Hip Right G/E 2 Views    Narrative    EXAM: XR PELVIS AND HIP RIGHT 2 VIEWS  LOCATION: [facility]  DATE: 6/8/2025    INDICATION: Right hip pain.  COMPARISON: None.      Impression    IMPRESSION: Moderate degenerative arthritic changes in both hips with axial joint space narrowing, subchondral sclerosis, and cystic change. Normal joint alignment. No fracture deformity or concerning bone lesion. Advanced degenerative facet arthropathy   in the lower lumbar spine and at the lumbosacral junction. Findings are " progressed from November 2022.   CT Lumbar Spine w/o Contrast    Narrative    EXAM: CT LUMBAR SPINE W/O CONTRAST  LOCATION: [facility]  DATE: 6/8/2025    INDICATION: pain, radiculoapthy  COMPARISON: 8/20/2024 chest radiograph demonstrating 12 rib-bearing thoracic vertebrae.  TECHNIQUE: Routine CT Lumbar Spine without IV contrast. Multiplanar reformats. Dose reduction techniques were used.     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    No acute fracture or posttraumatic subluxation. Mild degenerative grade 1 anterolisthesis at L3-L4, L4-L5 and L5-S1, measuring approximately 3 mm at each level. Incompletely imaged thoracolumbar dextrocurvature.    No displaced fracture. Vertebral body heights are maintained. No aggressive sclerotic or lytic osseous lesion. Multilevel lumbar spondylosis.    CANAL/FORAMINA: Moderate spinal canal stenosis at L3-L4. Moderate neural foraminal stenosis on the left at L3-L4. At L4-5, severe right and moderate left neural foraminal stenosis. At L5-S1, moderate right neural foraminal stenosis    PARASPINAL: No extraspinal abnormality.      Impression    IMPRESSION:  1.  No acute fracture or posttraumatic subluxation.  2.  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  3.  At L3-L4, moderate spinal canal stenosis.  4.  At L4-L5, severe right neural foraminal stenosis.       Labs, vital signs, and imaging studies were reviewed by me.    Medications   oxyCODONE (ROXICODONE) tablet 5 mg (5 mg Oral $Given 6/8/25 1323)       Assessments & Plan (with Medical Decision Making)   Ivy Haynes is a 80 year old male who presents to the emergency department with acute onset of back pain.  Differential diagnosis includes lumbar radiculopathy, herniated disc, vertebral fracture, muscle strain, right hip arthritis, less likely fracture or dislocation.  No urinary symptoms to suggest UTI.  Right hip  x-ray and lumbar spine CT ordered to further evaluate the patient in the emergency department.  No red flag symptoms of cauda equina to warrant emergent MRI.  No recent trauma.  Medications ordered for symptomatic relief in the emergency department.  Will avoid NSAIDs due to patient's history of CKD.    [place] prescription drug monitoring report was reviewed.  Patient has a an unintentional overdose risk score of 90 with 2 prescriptions for controlled substances within the past year, both for Percocet 5-325 mg tablets, 20 tablets filled on 10/15/2024 and 20 tablets filled on 4/15/2025.  Each of these for a 5-day supply.    CT shows spinal stenosis/foraminal stenosis.  No acute process    Right hip x-ray degenerative changes, no acute fracture or dislocation    Critical care was not performed.     Medical Decision Making  The patient's presentation was of moderate complexity (an undiagnosed new problem with uncertain prognosis).    The patient's evaluation involved:  review of external note(s) from 1 sources (see separate area of note for details)  ordering and/or review of 2 test(s) in this encounter (see separate area of note for details)  independent interpretation of testing performed by another health professional (see separate area of note for details)    The patient's management necessitated moderate risk (prescription drug management including medications given in the ED).    CT images were personally reviewed by me, I agree with the radiology reads.    I have reviewed the nursing notes.    I have reviewed the findings, diagnosis, plan and need for follow up with the patient.    Patient to be discharged home. Advised to follow up with PCP within 1 week. To return to ER immediately with any new/worsening symptoms. Plan of care discussed with patient who expresses understanding and agrees with plan of care.    New Prescriptions    CYCLOBENZAPRINE (FLEXERIL) 10 MG TABLET    Take 1 tablet (10 mg) by mouth 3  times daily as needed.    OXYCODONE (ROXICODONE) 5 MG TABLET    Take 1 tablet (5 mg) by mouth every 6 hours as needed for pain.       Final diagnoses:   Lumbar radiculopathy   Spinal stenosis of lumbar region, unspecified whether neurogenic claudication present   Neural foraminal stenosis of lumbar spine       INDIO PINO MD  6/8/2025   [facility] EMERGENCY DEPARTMENT       Indio Pino MD  06/08/25 [id number]

## 2025-06-08 NOTE — ED TRIAGE NOTES
On Tuesday at 5pm, got a pain in right lower back and pain is radiating down the right side of his leg. Unable to sleep. Denies any swelling to the lower legs.

## 2025-06-17 ENCOUNTER — MYC MEDICAL ADVICE (OUTPATIENT)
Dept: FAMILY MEDICINE | Facility: CLINIC | Age: 80
End: 2025-06-17

## 2025-06-17 ENCOUNTER — OFFICE VISIT (OUTPATIENT)
Dept: FAMILY MEDICINE | Facility: CLINIC | Age: 80
End: 2025-06-17
Payer: COMMERCIAL

## 2025-06-17 VITALS
OXYGEN SATURATION: 99 % | DIASTOLIC BLOOD PRESSURE: 87 MMHG | HEART RATE: 81 BPM | TEMPERATURE: 97.4 F | SYSTOLIC BLOOD PRESSURE: 158 MMHG

## 2025-06-17 DIAGNOSIS — M48.061 SPINAL STENOSIS OF LUMBAR REGION WITHOUT NEUROGENIC CLAUDICATION: Primary | ICD-10-CM

## 2025-06-17 DIAGNOSIS — N40.1 URINARY HESITANCY DUE TO BENIGN PROSTATIC HYPERPLASIA: Primary | ICD-10-CM

## 2025-06-17 DIAGNOSIS — I10 HYPERTENSION GOAL BP (BLOOD PRESSURE) < 140/90: ICD-10-CM

## 2025-06-17 DIAGNOSIS — R39.11 URINARY HESITANCY DUE TO BENIGN PROSTATIC HYPERPLASIA: Primary | ICD-10-CM

## 2025-06-17 DIAGNOSIS — M54.16 ACUTE RADICULAR LOW BACK PAIN: ICD-10-CM

## 2025-06-17 PROCEDURE — 1125F AMNT PAIN NOTED PAIN PRSNT: CPT | Performed by: FAMILY MEDICINE

## 2025-06-17 PROCEDURE — 3077F SYST BP >= 140 MM HG: CPT | Performed by: FAMILY MEDICINE

## 2025-06-17 PROCEDURE — 3079F DIAST BP 80-89 MM HG: CPT | Performed by: FAMILY MEDICINE

## 2025-06-17 PROCEDURE — 99214 OFFICE O/P EST MOD 30 MIN: CPT | Performed by: FAMILY MEDICINE

## 2025-06-17 PROCEDURE — G2211 COMPLEX E/M VISIT ADD ON: HCPCS | Performed by: FAMILY MEDICINE

## 2025-06-17 RX ORDER — OXYCODONE AND ACETAMINOPHEN 5; 325 MG/1; MG/1
1 TABLET ORAL EVERY 6 HOURS PRN
Qty: 15 TABLET | Refills: 0 | Status: SHIPPED | OUTPATIENT
Start: 2025-06-17 | End: 2025-06-20

## 2025-06-17 ASSESSMENT — PAIN SCALES - GENERAL: PAINLEVEL_OUTOF10: SEVERE PAIN (10)

## 2025-06-17 NOTE — PROGRESS NOTES
Assessment & Plan     Spinal stenosis of lumbar region without neurogenic claudication  Rest ollow up with consultant as planned.     Acute radicular low back pain  As above  - Spine  Referral; Future  - Physical Therapy  Referral; Future  - oxyCODONE-acetaminophen (PERCOCET) 5-325 MG tablet; Take 1 tablet by mouth every 6 hours as needed for pain.    Hypertension goal BP (blood pressure) < 140/90  High today ( unable to exercise and in pain)  Follow up in 1 month.         MED REC REQUIRED  Post Medication Reconciliation Status:     Follow-up       Subjective   Ivy is a 80 year old, presenting for the following health issues:  Leg Pain (Entire right leg excluding foot, and right hip)        6/17/2025     2:40 PM   Additional Questions   Roomed by Yaritza RAYMUNDO     [facility]          Hospital Follow-up Visit:    Hospital/Nursing Home/ Rehab Facility: [facility]  Most Recent Admission Date: 6/8/2025   Most Recent Admission Diagnosis:      Most Recent Discharge Date: 6/8/2025   Most Recent Discharge Diagnosis: Lumbar radiculopathy - M54.16  Spinal stenosis of lumbar region, unspecified whether neurogenic claudication present - M48.061  Neural foraminal stenosis of lumbar spine - M48.061   Do you have any other stressors you would like to discuss with your provider? No    Problems taking medications regularly:  None  Medication changes since discharge: None  Problems adhering to non-medication therapy:  None    Summary of hospitalization:  [facility] discharge summary reviewed  Diagnostic Tests/Treatments reviewed.  Follow up needed: spine  Other Healthcare Providers Involved in Patient s Care:         Specialist appointment - spine  Update since discharge: fluctuating course.         Plan of care communicated with patient                   Review of Systems  Constitutional, HEENT, cardiovascular, pulmonary, gi and gu systems are negative, except  as otherwise noted.      Objective    BP (!) 158/87 (BP Location: Left arm, Patient Position: Sitting, Cuff Size: Adult Regular)   Pulse 81   Temp 97.4  F (36.3  C) (Temporal)   SpO2 99%   There is no height or weight on file to calculate BMI.  Physical Exam   GENERAL: alert, mild distress, and fit  NECK: no adenopathy, no asymmetry, masses, or scars  RESP: lungs clear to auscultation - no rales, rhonchi or wheezes  CV: regular rate and rhythm, normal S1 S2, no S3 or S4, no murmur, click or rub, no peripheral edema  ABDOMEN: soft, nontender, no hepatosplenomegaly, no masses and bowel sounds normal  NEURO: Normal strength and tone, mentation intact and speech normal  PSYCH: mentation appears normal, affect normal/bright    Office Visit on 05/27/2025   Component Date Value Ref Range Status    Vitamin B12 05/27/2025 666  232 - 1,245 pg/mL Final    Magnesium 05/27/2025 2.2  1.7 - 2.3 mg/dL Final    Prostate Specific Antigen Screen 05/27/2025 6.37  0.00 - 6.50 ng/mL Final    WBC Count 05/27/2025 3.5 (L)  4.0 - 11.0 10e3/uL Final    RBC Count 05/27/2025 4.72  4.40 - 5.90 10e6/uL Final    Hemoglobin 05/27/2025 15.1  13.3 - 17.7 g/dL Final    Hematocrit 05/27/2025 45.9  40.0 - 53.0 % Final    MCV 05/27/2025 97  78 - 100 fL Final    MCH 05/27/2025 32.0  26.5 - 33.0 pg Final    MCHC 05/27/2025 32.9  31.5 - 36.5 g/dL Final    RDW 05/27/2025 11.7  10.0 - 15.0 % Final    Platelet Count 05/27/2025 144 (L)  150 - 450 10e3/uL Final    Sodium 05/27/2025 140  135 - 145 mmol/L Final    Potassium 05/27/2025 4.0  3.4 - 5.3 mmol/L Final    Carbon Dioxide (CO2) 05/27/2025 26  22 - 29 mmol/L Final    Anion Gap 05/27/2025 12  7 - 15 mmol/L Final    Urea Nitrogen 05/27/2025 10.5  8.0 - 23.0 mg/dL Final    Creatinine 05/27/2025 1.40 (H)  0.67 - 1.17 mg/dL Final    GFR Estimate 05/27/2025 51 (L)  >60 mL/min/1.73m2 Final    eGFR calculated using 2021 CKD-EPI equation.    Calcium 05/27/2025 9.5  8.8 - 10.4 mg/dL Final    Chloride 05/27/2025  102  98 - 107 mmol/L Final    Glucose 05/27/2025 93  70 - 99 mg/dL Final    Alkaline Phosphatase 05/27/2025 163 (H)  40 - 150 U/L Final    AST 05/27/2025 40  0 - 45 U/L Final    ALT 05/27/2025 20  0 - 70 U/L Final    Protein Total 05/27/2025 7.0  6.4 - 8.3 g/dL Final    Albumin 05/27/2025 4.4  3.5 - 5.2 g/dL Final    Bilirubin Total 05/27/2025 0.5  <=1.2 mg/dL Final    Patient Fasting > 8hrs? 05/27/2025 Unknown   Final    Creatinine Urine mg/dL 05/27/2025 111.0  mg/dL Final    The reference ranges have not been established in urine creatinine. The results should be integrated into the clinical context for interpretation.    Albumin Urine mg/L 05/27/2025 <12.0  mg/L Final    The reference ranges have not been established in urine albumin. The results should be integrated into the clinical context for interpretation.    Albumin Urine mg/g Cr 05/27/2025    Final    Unable to calculate, urine albumin and/or urine creatinine is outside detectable limits.  Microalbuminuria is defined as an albumin:creatinine ratio of 17 to 299 for males and 25 to 299 for females. A ratio of albumin:creatinine of 300 or higher is indicative of overt proteinuria.  Due to biologic variability, positive results should be confirmed by a second, first-morning random or 24-hour timed urine specimen. If there is discrepancy, a third specimen is recommended. When 2 out of 3 results are in the microalbuminuria range, this is evidence for incipient nephropathy and warrants increased efforts at glucose control, blood pressure control, and institution of therapy with an angiotensin-converting-enzyme (ACE) inhibitor (if the patient can tolerate it).      Cholesterol 05/27/2025 163  <200 mg/dL Final    Triglycerides 05/27/2025 69  <150 mg/dL Final    Direct Measure HDL 05/27/2025 68  >=40 mg/dL Final    LDL Cholesterol Calculated 05/27/2025 81  <100 mg/dL Final    Non HDL Cholesterol 05/27/2025 95  <130 mg/dL Final    Patient Fasting > 8hrs? 05/27/2025  Unknown   Final           Signed Electronically by: Lamine Franco MD

## 2025-06-18 ENCOUNTER — LAB (OUTPATIENT)
Dept: LAB | Facility: CLINIC | Age: 80
End: 2025-06-18
Payer: COMMERCIAL

## 2025-06-18 ENCOUNTER — RESULTS FOLLOW-UP (OUTPATIENT)
Dept: FAMILY MEDICINE | Facility: CLINIC | Age: 80
End: 2025-06-18

## 2025-06-18 DIAGNOSIS — N40.1 URINARY HESITANCY DUE TO BENIGN PROSTATIC HYPERPLASIA: ICD-10-CM

## 2025-06-18 DIAGNOSIS — R39.11 URINARY HESITANCY DUE TO BENIGN PROSTATIC HYPERPLASIA: ICD-10-CM

## 2025-06-18 LAB
ALBUMIN UR-MCNC: NEGATIVE MG/DL
APPEARANCE UR: CLEAR
BACTERIA #/AREA URNS HPF: ABNORMAL /HPF
BILIRUB UR QL STRIP: NEGATIVE
COLOR UR AUTO: YELLOW
GLUCOSE UR STRIP-MCNC: NEGATIVE MG/DL
HGB UR QL STRIP: ABNORMAL
KETONES UR STRIP-MCNC: NEGATIVE MG/DL
LEUKOCYTE ESTERASE UR QL STRIP: NEGATIVE
NITRATE UR QL: NEGATIVE
PH UR STRIP: 6 [PH] (ref 5–7)
RBC #/AREA URNS AUTO: ABNORMAL /HPF
SP GR UR STRIP: 1.02 (ref 1–1.03)
SQUAMOUS #/AREA URNS AUTO: ABNORMAL /LPF
UROBILINOGEN UR STRIP-ACNC: 0.2 E.U./DL
WBC #/AREA URNS AUTO: ABNORMAL /HPF

## 2025-06-18 PROCEDURE — 87086 URINE CULTURE/COLONY COUNT: CPT

## 2025-06-18 PROCEDURE — 81001 URINALYSIS AUTO W/SCOPE: CPT

## 2025-06-19 ENCOUNTER — OFFICE VISIT (OUTPATIENT)
Dept: PHYSICAL MEDICINE AND REHAB | Facility: CLINIC | Age: 80
End: 2025-06-19
Attending: FAMILY MEDICINE
Payer: COMMERCIAL

## 2025-06-19 VITALS — OXYGEN SATURATION: 99 % | HEART RATE: 89 BPM | SYSTOLIC BLOOD PRESSURE: 136 MMHG | DIASTOLIC BLOOD PRESSURE: 70 MMHG

## 2025-06-19 DIAGNOSIS — M54.16 LUMBAR RADICULOPATHY: Primary | ICD-10-CM

## 2025-06-19 DIAGNOSIS — M48.061 SPINAL STENOSIS OF LUMBAR REGION, UNSPECIFIED WHETHER NEUROGENIC CLAUDICATION PRESENT: ICD-10-CM

## 2025-06-19 DIAGNOSIS — M48.061 NEUROFORAMINAL STENOSIS OF LUMBAR SPINE: ICD-10-CM

## 2025-06-19 DIAGNOSIS — M54.41 ACUTE RIGHT-SIDED LOW BACK PAIN WITH RIGHT-SIDED SCIATICA: ICD-10-CM

## 2025-06-19 LAB — BACTERIA UR CULT: NO GROWTH

## 2025-06-19 NOTE — PATIENT INSTRUCTIONS
It was a pleasure seeing you in the clinic today.  As discussed, we made the following updates to your plan of care:     I reentered the order for PT as an urgent referral to try to get you in for PT within 3 to 5 days.   Physical therapy schedulers should call you to schedule an appointment. You may also call [phone] to arrange an appointment at any of the [facility] outpatient physical therapy locations.     Limit lifting to 10 pounds. Avoid bending and twisting.     -If you develop new/severe pain, numbness, or tingling, lose control of bowel or bladder, numbness between the legs/groin area, falls, or new weakness, go to the emergency department right away     Lets plan to follow-up in 2 to 3 weeks, or sooner if needed    Thank you,  Galilea Kim NP  Physical Medicine and Rehabilitation, Medical Spine  [facility] clinic Phone: [phone]  [facility] Fax: [phone]

## 2025-06-19 NOTE — LETTER
"6/19/2025       RE: Ivy Haynes  [address]  [facility] [address]     Dear Colleague,    Thank you for referring your patient, Ivy Haynes, to the [facility] [place] [facility]. Please see a copy of my visit note below.    Today's Date: 6/19/2025     Patient seen at the request of Lamine Franco for an opinion and evaluation of Acute radicular low back pain.      HISTORY OF PRESENT ILLNESS:  Ivy Haynes is a 80 year old male who presents with a chief complaint of low back and right leg pain.      He was seen today in the clinic. He describes pain which started suddenly about 2.5 weeks ago (6/3/25 per review of ED note) after standing up.  He cannot recall any particular injury.    He was having severe pain with walking and sleeping.  He was seen in the ED 6/8/2025.  CT of the lumbar spine and x-rays of the right hip were done at that time.  CT of the lumbar spine showed spinal canal stenosis and foraminal stenosis.  He was discharged with cyclobenzaprine and oxycodone.    He notes about 50% improvement in his symptoms since the pain began.  Initially, he was having pain after walking a few steps.  Now he is able to walk about a 1/2 block before having pain.    He denies any history of chronic low back pain.  He does note that a few months ago he had 1-2 days of \"immobilizing\" left-sided, nonradiating low back pain which resolved on its own.    He does a regular daily calisthenics program which includes body weight exercises and stretching.  The program includes pull-ups and push-ups.  He occasionally uses a belt around his waist with weights attached while doing pull-ups.      Today, he describes  - Right-sided low back pain radiating to the lateral right leg, anterior right knee, anterolateral lower leg (stops at the shin)    He denies lower extremity numbness, tingling, or " weakness.    Aggravating factors include: Lying down, standing, walking, exercise  Relieving factors include: Sitting, medication    Today, he rates the pain 8/10.  Patient states sleep is no longer affected.    He denies falls, weakness, bowel or bladder incontinence, saddle anesthesia, unintentional weight loss, fevers/chills, or night sweats.       PRIOR INJURIES/TREATMENT:   Ice/Heat: none  Brace: none  Physical Therapy:   Has referral for PT from his primary, but has not yet started/scheduled  PT for right hip pain in 2022     - Current Pain Medications -   Percocet -rarely, prefers to avoid    - Prior/Trialed Pain Medications -   Ibuprofen PRN  Cyclobenzaprine      Prior Procedures:  Date    Procedure   Improvement (%)  none              Prior Related Surgery: none   Left knee replacement      Other (acupuncture, OMT, CMM, TENS, DME, etc.): none    Specialists Seen - (with most recent, available notes and clinic visits reviewed)   1. Neurosurgery-cervicalgia 2018 -states he had referral for DENI but was able to cancel the pain resolved with a stretching program      IMAGING - reviewed   CT LUMBAR SPINE W/O CONTRAST  LOCATION: [facility]  DATE: 6/8/2025     INDICATION: pain, radiculoapthy  COMPARISON: 8/20/2024 chest radiograph demonstrating 12 rib-bearing thoracic vertebrae.  TECHNIQUE: Routine CT Lumbar Spine without IV contrast. Multiplanar reformats. Dose reduction techniques were used.      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     No acute fracture or posttraumatic subluxation. Mild degenerative grade 1 anterolisthesis at L3-L4, L4-L5 and L5-S1, measuring approximately 3 mm at each level. Incompletely imaged thoracolumbar dextrocurvature.     No displaced fracture. Vertebral body heights are maintained. No aggressive sclerotic or lytic osseous lesion. Multilevel lumbar spondylosis.     CANAL/FORAMINA: Moderate  spinal canal stenosis at L3-L4. Moderate neural foraminal stenosis on the left at L3-L4. At L4-5, severe right and moderate left neural foraminal stenosis. At L5-S1, moderate right neural foraminal stenosis     PARASPINAL: No extraspinal abnormality.                                                                      IMPRESSION:  1.  No acute fracture or posttraumatic subluxation.  2.  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  3.  At L3-L4, moderate spinal canal stenosis.  4.  At L4-L5, severe right neural foraminal stenosis.      XR PELVIS AND HIP RIGHT 2 VIEWS  LOCATION: [facility]  DATE: 6/8/2025     INDICATION: Right hip pain.  COMPARISON: None.                                                                      IMPRESSION: Moderate degenerative arthritic changes in both hips with axial joint space narrowing, subchondral sclerosis, and cystic change. Normal joint alignment. No fracture deformity or concerning bone lesion. Advanced degenerative facet arthropathy   in the lower lumbar spine and at the lumbosacral junction. Findings are progressed from November 2022.        Review Of Systems:  I am responding to those symptoms which are directly relevant to the specific indication for my consultation. I recommend that the patient follow up with their primary or referring provider to pursue any other symptoms which may be of concern.       Medical History:  He  has a past medical history of Back pain, Chest pain, Gastro-oesophageal reflux disease, GERD (gastroesophageal reflux disease), Hemorrhoid, Hepatitis C, Hypertension, and Trigeminal neuralgia.     He  has a past surgical history that includes hernia repair (12/10); styloid process temporal bone surg (02/10); Resect tumor lower extremity (11/9/2012); Arthroplasty knee (11/9/2012); Tonsillectomy (1954); and ENT surgery (2009).    Family History  His family history includes Cancer in his  paternal grandfather and son; Cerebrovascular Disease (age of onset: 69) in his father; Hypertension in his mother; Kidney failure in his half-brother; No Known Problems in his half-brother, half-sister, half-sister, half-sister, and half-sister.       Social History:  Work: retired  and [employer]  Current living situation: Lives with wife. Performs ADLs/IADLs independently.  He  reports that he quit smoking about 4 years ago. His smoking use included cigarettes. He started smoking about 14 years ago. He has a 1 pack-year smoking history. He has been exposed to tobacco smoke. He has never used smokeless tobacco. He reports that he does not currently use alcohol. He reports current drug use. Drug: Marijuana.        Current Medications:   He has a current medication list which includes the following prescription(s): amlodipine, omeprazole, oxycodone-acetaminophen, tadalafil, tamsulosin, and triamcinolone.     Allergies:    -- Sulfa Antibiotics     PHYSICAL EXAMINATION:  /70 (BP Location: Right arm, Patient Position: Sitting, Cuff Size: Adult Regular)   Pulse 89   SpO2 99%   --CONSTITUTIONAL: Vital signs as above. No acute distress. The patient is well nourished and well groomed.  --PSYCHIATRIC: The patient is awake, alert, oriented to person, place, time and answering questions appropriately with clear speech. Appropriate mood and affect   --SKIN:  Posterior torso is clean, dry, intact without rashes.   --RESPIRATORY: Normal rhythm and effort. No abnormal accessory muscle breathing patterns noted.   --GROSS MOTOR: Easily arises from a seated position. Toe walking and heel walking are normal.  Standing at the counter for stability, is able to stand on one leg and perform 5 reps lifting onto toes on both sides  --STANDING EXAMINATION: Gait is non-antalgic. Normal lumbar lordosis noted, no lateral shift.  --LOWER EXTREMITY MOTOR TESTING:  Plantar flexion left 5/5, right 5/5   Dorsiflexion left 5/5,  right 5/5   Great toe MTP extension left 5/5, right 5/5  Knee flexion left 5/5, right 5/5  Knee extension left 5/5, right 5/5   Hip flexion left 5/5, right 5/5  --MUSCULOSKELETAL: Lumbar spine inspection reveals no evidence of deformity. Range of motion is not limited in lumbar flexion, extension, lateral rotation but elicits pain. No tenderness to palpation lumbar spine but he indicates pain at the right lumbosacral junction. Straight leg raise negative bilaterally.  Seated slump test is negative bilaterally.  Facet loading maneuvers are positive bilaterally.  --SACROILIAC JOINT: No pain with palpation of SI joint. Komal negative.  --HIPS: Full range of motion bilaterally. FABERs positive on the right, negative on the left.  No pain with logrolling. No pain with palpation of the greater trochanters.   --NEUROLOGIC: 2/4 patellar and achilles reflexes bilaterally.  Sensation to light touch is intact in the bilateral L4, L5, and S1 dermatomes.   --VASCULAR:  Warm lower limbs bilaterally. There is no pitting edema of the bilateral lower extremities.       ASSESSMENT:  Ivy Haynes is a pleasant 80 year old male who presents with right-sided low back and right leg pain which appears consistent with lumbar radiculopathy.  CT of the lumbar spine from 6/8/2025 shows severe right L4-5 NFS and moderate right L5-S1 NFS.  There is also a moderate level of spinal canal stenosis at L3-4 and facet arthropathy, as well as moderate arthritis of both hips which may be contributing.      Complicating comorbities include:  #  hepatic fibrosis  # CKD stage III       PLAN:  -CT of the lumbar spine reviewed in detail with the patient today  -Reentered the order for PT as an urgent referral to try to get the PT started within 3 to 5 days  -We did discuss treatment options for pain including Medrol Dosepak, gabapentin, and an epidural steroid injection.  His preference is to start with PT and home exercise program initially and have a  follow-up in a couple weeks  -Limit lifting to 10 pounds. Avoid bending and twisting.  -Red flags reviewed in detail with patient  - RTC 2-3 weeks      Ready to learn, no apparent learning barriers.  Education provided on treatment plan according to patient's preferred learning style.  Patient verbalizes understanding.   __________________________________  Galilea Kim NP  Physical Medicine & Rehabilitation        50 minutes spent by me on the date of the encounter doing chart review, history and exam, documentation and further activities per the note       Again, thank you for allowing me to participate in the care of your patient.      Sincerely,    TAMERA Lorenzana CNP

## 2025-06-19 NOTE — PROGRESS NOTES
"Today's Date: 6/19/2025     Patient seen at the request of Lamine Franco for an opinion and evaluation of Acute radicular low back pain.      HISTORY OF PRESENT ILLNESS:  Ivy Haynes is a 80 year old male who presents with a chief complaint of low back and right leg pain.      He was seen today in the clinic. He describes pain which started suddenly about 2.5 weeks ago (6/3/25 per review of ED note) after standing up.  He cannot recall any particular injury.    He was having severe pain with walking and sleeping.  He was seen in the ED 6/8/2025.  CT of the lumbar spine and x-rays of the right hip were done at that time.  CT of the lumbar spine showed spinal canal stenosis and foraminal stenosis.  He was discharged with cyclobenzaprine and oxycodone.    He notes about 50% improvement in his symptoms since the pain began.  Initially, he was having pain after walking a few steps.  Now he is able to walk about a 1/2 block before having pain.    He denies any history of chronic low back pain.  He does note that a few months ago he had 1-2 days of \"immobilizing\" left-sided, nonradiating low back pain which resolved on its own.    He does a regular daily calisthenics program which includes body weight exercises and stretching.  The program includes pull-ups and push-ups.  He occasionally uses a belt around his waist with weights attached while doing pull-ups.      Today, he describes  - Right-sided low back pain radiating to the lateral right leg, anterior right knee, anterolateral lower leg (stops at the shin)    He denies lower extremity numbness, tingling, or weakness.    Aggravating factors include: Lying down, standing, walking, exercise  Relieving factors include: Sitting, medication    Today, he rates the pain 8/10.  Patient states sleep is no longer affected.    He denies falls, weakness, bowel or bladder incontinence, saddle anesthesia, unintentional weight loss, fevers/chills, or night sweats. "       PRIOR INJURIES/TREATMENT:   Ice/Heat: none  Brace: none  Physical Therapy:   Has referral for PT from his primary, but has not yet started/scheduled  PT for right hip pain in 2022     - Current Pain Medications -   Percocet -rarely, prefers to avoid    - Prior/Trialed Pain Medications -   Ibuprofen PRN  Cyclobenzaprine      Prior Procedures:  Date    Procedure   Improvement (%)  none              Prior Related Surgery: none   Left knee replacement      Other (acupuncture, OMT, CMM, TENS, DME, etc.): none    Specialists Seen - (with most recent, available notes and clinic visits reviewed)   1. Neurosurgery-cervicalgia 2018 -states he had referral for DENI but was able to cancel the pain resolved with a stretching program      IMAGING - reviewed   CT LUMBAR SPINE W/O CONTRAST  LOCATION: [facility]  DATE: 6/8/2025     INDICATION: pain, radiculoapthy  COMPARISON: 8/20/2024 chest radiograph demonstrating 12 rib-bearing thoracic vertebrae.  TECHNIQUE: Routine CT Lumbar Spine without IV contrast. Multiplanar reformats. Dose reduction techniques were used.      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     No acute fracture or posttraumatic subluxation. Mild degenerative grade 1 anterolisthesis at L3-L4, L4-L5 and L5-S1, measuring approximately 3 mm at each level. Incompletely imaged thoracolumbar dextrocurvature.     No displaced fracture. Vertebral body heights are maintained. No aggressive sclerotic or lytic osseous lesion. Multilevel lumbar spondylosis.     CANAL/FORAMINA: Moderate spinal canal stenosis at L3-L4. Moderate neural foraminal stenosis on the left at L3-L4. At L4-5, severe right and moderate left neural foraminal stenosis. At L5-S1, moderate right neural foraminal stenosis     PARASPINAL: No extraspinal abnormality.                                                                      IMPRESSION:  1.  No acute  fracture or posttraumatic subluxation.  2.  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  3.  At L3-L4, moderate spinal canal stenosis.  4.  At L4-L5, severe right neural foraminal stenosis.      XR PELVIS AND HIP RIGHT 2 VIEWS  LOCATION: [facility]  DATE: 6/8/2025     INDICATION: Right hip pain.  COMPARISON: None.                                                                      IMPRESSION: Moderate degenerative arthritic changes in both hips with axial joint space narrowing, subchondral sclerosis, and cystic change. Normal joint alignment. No fracture deformity or concerning bone lesion. Advanced degenerative facet arthropathy   in the lower lumbar spine and at the lumbosacral junction. Findings are progressed from November 2022.        Review Of Systems:  I am responding to those symptoms which are directly relevant to the specific indication for my consultation. I recommend that the patient follow up with their primary or referring provider to pursue any other symptoms which may be of concern.       Medical History:  He  has a past medical history of Back pain, Chest pain, Gastro-oesophageal reflux disease, GERD (gastroesophageal reflux disease), Hemorrhoid, Hepatitis C, Hypertension, and Trigeminal neuralgia.     He  has a past surgical history that includes hernia repair (12/10); styloid process temporal bone surg (02/10); Resect tumor lower extremity (11/9/2012); Arthroplasty knee (11/9/2012); Tonsillectomy (1954); and ENT surgery (2009).    Family History  His family history includes Cancer in his paternal grandfather and son; Cerebrovascular Disease (age of onset: 69) in his father; Hypertension in his mother; Kidney failure in his half-brother; No Known Problems in his half-brother, half-sister, half-sister, half-sister, and half-sister.       Social History:  Work: retired  and [employer]  Current living situation: Lives with  wife. Performs ADLs/IADLs independently.  He  reports that he quit smoking about 4 years ago. His smoking use included cigarettes. He started smoking about 14 years ago. He has a 1 pack-year smoking history. He has been exposed to tobacco smoke. He has never used smokeless tobacco. He reports that he does not currently use alcohol. He reports current drug use. Drug: Marijuana.        Current Medications:   He has a current medication list which includes the following prescription(s): amlodipine, omeprazole, oxycodone-acetaminophen, tadalafil, tamsulosin, and triamcinolone.     Allergies:    -- Sulfa Antibiotics     PHYSICAL EXAMINATION:  /70 (BP Location: Right arm, Patient Position: Sitting, Cuff Size: Adult Regular)   Pulse 89   SpO2 99%   --CONSTITUTIONAL: Vital signs as above. No acute distress. The patient is well nourished and well groomed.  --PSYCHIATRIC: The patient is awake, alert, oriented to person, place, time and answering questions appropriately with clear speech. Appropriate mood and affect   --SKIN:  Posterior torso is clean, dry, intact without rashes.   --RESPIRATORY: Normal rhythm and effort. No abnormal accessory muscle breathing patterns noted.   --GROSS MOTOR: Easily arises from a seated position. Toe walking and heel walking are normal.  Standing at the counter for stability, is able to stand on one leg and perform 5 reps lifting onto toes on both sides  --STANDING EXAMINATION: Gait is non-antalgic. Normal lumbar lordosis noted, no lateral shift.  --LOWER EXTREMITY MOTOR TESTING:  Plantar flexion left 5/5, right 5/5   Dorsiflexion left 5/5, right 5/5   Great toe MTP extension left 5/5, right 5/5  Knee flexion left 5/5, right 5/5  Knee extension left 5/5, right 5/5   Hip flexion left 5/5, right 5/5  --MUSCULOSKELETAL: Lumbar spine inspection reveals no evidence of deformity. Range of motion is not limited in lumbar flexion, extension, lateral rotation but elicits pain. No tenderness to  palpation lumbar spine but he indicates pain at the right lumbosacral junction. Straight leg raise negative bilaterally.  Seated slump test is negative bilaterally.  Facet loading maneuvers are positive bilaterally.  --SACROILIAC JOINT: No pain with palpation of SI joint. Komal negative.  --HIPS: Full range of motion bilaterally. FABERs positive on the right, negative on the left.  No pain with logrolling. No pain with palpation of the greater trochanters.   --NEUROLOGIC: 2/4 patellar and achilles reflexes bilaterally.  Sensation to light touch is intact in the bilateral L4, L5, and S1 dermatomes.   --VASCULAR:  Warm lower limbs bilaterally. There is no pitting edema of the bilateral lower extremities.       ASSESSMENT:  Ivy Haynes is a pleasant 80 year old male who presents with right-sided low back and right leg pain which appears consistent with lumbar radiculopathy.  CT of the lumbar spine from 6/8/2025 shows severe right L4-5 NFS and moderate right L5-S1 NFS.  There is also a moderate level of spinal canal stenosis at L3-4 and facet arthropathy, as well as moderate arthritis of both hips which may be contributing.      Complicating comorbities include:  #  hepatic fibrosis  # CKD stage III       PLAN:  -CT of the lumbar spine reviewed in detail with the patient today  -Reentered the order for PT as an urgent referral to try to get the PT started within 3 to 5 days  -We did discuss treatment options for pain including Medrol Dosepak, gabapentin, and an epidural steroid injection.  His preference is to start with PT and home exercise program initially and have a follow-up in a couple weeks  -Limit lifting to 10 pounds. Avoid bending and twisting.  -Red flags reviewed in detail with patient  - RTC 2-3 weeks      Ready to learn, no apparent learning barriers.  Education provided on treatment plan according to patient's preferred learning style.  Patient verbalizes understanding.    __________________________________  Galilea Kim NP  Physical Medicine & Rehabilitation        50 minutes spent by me on the date of the encounter doing chart review, history and exam, documentation and further activities per the note

## 2025-06-23 ENCOUNTER — THERAPY VISIT (OUTPATIENT)
Dept: PHYSICAL THERAPY | Facility: CLINIC | Age: 80
End: 2025-06-23
Payer: COMMERCIAL

## 2025-06-23 DIAGNOSIS — M54.41 ACUTE RIGHT-SIDED LOW BACK PAIN WITH RIGHT-SIDED SCIATICA: ICD-10-CM

## 2025-06-23 DIAGNOSIS — M54.16 LUMBAR RADICULOPATHY: ICD-10-CM

## 2025-06-23 DIAGNOSIS — M48.061 NEUROFORAMINAL STENOSIS OF LUMBAR SPINE: ICD-10-CM

## 2025-06-23 DIAGNOSIS — M48.061 SPINAL STENOSIS OF LUMBAR REGION, UNSPECIFIED WHETHER NEUROGENIC CLAUDICATION PRESENT: ICD-10-CM

## 2025-06-23 PROCEDURE — 97161 PT EVAL LOW COMPLEX 20 MIN: CPT | Mod: GP | Performed by: PHYSICAL THERAPIST

## 2025-06-23 PROCEDURE — 97530 THERAPEUTIC ACTIVITIES: CPT | Mod: GP | Performed by: PHYSICAL THERAPIST

## 2025-06-23 PROCEDURE — 97110 THERAPEUTIC EXERCISES: CPT | Mod: GP | Performed by: PHYSICAL THERAPIST

## 2025-06-23 NOTE — PROGRESS NOTES
PHYSICAL THERAPY EVALUATION  Type of Visit: Evaluation       Fall Risk Screen:  Have you fallen 2 or more times in the past year?: No  Have you fallen and had an injury in the past year?: No    Subjective         Presenting condition or subjective complaint: Spinal stenosis  Date of onset: 06/19/25 (PT referral date)    Relevant medical history:     Dates & types of surgery:      Prior diagnostic imaging/testing results: CT scan; X-ray     Prior therapy history for the same diagnosis, illness or injury: No      Living Environment  Social support: With a significant other or spouse   Type of home: House   Stairs to enter the home: Yes   Is there a railing: Yes     Ramp: No   Stairs inside the home: Yes 13 Is there a railing: Yes     Help at home: None  Equipment owned:       Employment: No    Hobbies/Interests: [employer]delrahman    Patient goals for therapy: Xuan Haynes is a 80 year old male with a lumbar condition. Mechanism of injury: Unknown cause. Where: (home, work, MVA, community, recreation/sport, unknown, other): NA. Location of symptoms: right low back, right buttock, right lateral hip, right lateral thigh and lower leg. Pain level on number scale: 8/10. Quality of pain: achy. Associated symptoms: denies numbness and tingling. Pain frequency (constant/intermittent): constant. Symptoms are exacerbated by: walking, sleeping, sitting. Symptoms are relieved by: sitting. Progression of symptoms since onset (same/better/worse): better. Special tests (x-ray, MRI, CT scan, EMG, bone scan): x-ray. Previous treatment: None. Improvement with previous treatment: NA. Pertinent medical history includes:  see Epic. Medical allergies includes: see Epic. Surgical history includes: see Epic. Current medications include: see Epic. Occupation: None. Patient is (working in normal job without restrictions/working in normal job with restrictions/working in an alternate job/not working due to present treatment problem):  NA. Primary job tasks: NA. Barriers at home/work: None reported by patient. Red flags: None reported by patient.     Objective   Posture    Sitting (good/fair/poor): fair  Standing (good/fair/poor):  fair  Lordosis (red/acc/normal):  normal  Lateral shift (right/left/nil):  nil  Relevant lateral shift (yes/no):  no  Correction of posture (better/worse/no effect): better    Neurological    Myotomes L R   L1-2 (hip flexion) 5/5 5/5   L3 (knee extension) 5/5 5/5   L4 (ankle DF) 5/5 5/5   L5 (g. toe ext) 5/5 5/5   S1 (ankle PF or knee flex) 5/5 5/5     Sensory Deficit, Reflexes: lumbar dermatomes WNL    Lumbar Movement Loss Wilfrido Mod Min Nil Pain   Flexion    x +   Extension  x   +   Side Gliding L    x +   Side Gliding R   x  +     Assessment & Plan   CLINICAL IMPRESSIONS  Medical Diagnosis: Lumbar radiculopathy, Spinal stenosis of lumbar region, unspecified whether neurogenic claudication present    Treatment Diagnosis: Lumbar radiculopathy, Spinal stenosis of lumbar region, unspecified whether neurogenic claudication present   Impression/Assessment: Patient is a 80 year old male with lumbar complaints.  The following significant findings have been identified: Pain, Decreased ROM/flexibility, Decreased joint mobility, Decreased strength, Impaired muscle performance, Decreased activity tolerance, and Impaired posture. These impairments interfere with their ability to perform None2 as compared to previous level of function.     Clinical Decision Making (Complexity):  Clinical Presentation: Stable/Uncomplicated  Clinical Presentation Rationale: based on medical and personal factors listed in PT evaluation  Clinical Decision Making (Complexity): Low complexity    PLAN OF CARE  Treatment Interventions:  Interventions: Manual Therapy, Neuromuscular Re-education, Therapeutic Activity, Therapeutic Exercise, Self-Care/Home Management    Long Term Goals     PT Goal 1  Goal Description: Patient will be able to walk 30  minutes.  Rationale: to maximize safety and independence within the community  Target Date: 09/15/25      Frequency of Treatment: 1x per week  Duration of Treatment: for 4 weeks tapering down to 2x per month for 8 weeks    Recommended Referrals to Other Professionals: None  Education Assessment:   Learner/Method: Patient;No Barriers to Learning    Risks and benefits of evaluation/treatment have been explained.   Patient/Family/caregiver agrees with Plan of Care.     Evaluation Time:     PT Eval, Low Complexity Minutes (72560): 15  Signing Clinician: YAJAIRA Gant [facility]                                                                                   OUTPATIENT PHYSICAL THERAPY      PLAN OF TREATMENT FOR OUTPATIENT REHABILITATION   Patient's Last Name, First Name, NARGIS Renteriaq,Rayrayuq    YOB: 1945   Provider's Name   [facility]   Medical Record No.  [id number]     Onset Date: 06/19/25 (PT referral date)  Start of Care Date: 06/23/25     Medical Diagnosis:  Lumbar radiculopathy, Spinal stenosis of lumbar region, unspecified whether neurogenic claudication present      PT Treatment Diagnosis:  Lumbar radiculopathy, Spinal stenosis of lumbar region, unspecified whether neurogenic claudication present Plan of Treatment  Frequency/Duration: 1x per week/ for 4 weeks tapering down to 2x per month for 8 weeks    Certification date from 06/23/25 to 09/15/25         See note for plan of treatment details and functional goals     Stewart Aviles PT                         I CERTIFY THE NEED FOR THESE SERVICES FURNISHED UNDER        THIS PLAN OF TREATMENT AND WHILE UNDER MY CARE     (Physician attestation of this document indicates review and certification of the therapy plan).              Referring Provider:  Galilea Kim    Initial Assessment  See Epic Evaluation- Start of Care Date: 06/23/25

## 2025-06-25 DIAGNOSIS — I10 HYPERTENSION GOAL BP (BLOOD PRESSURE) < 140/90: ICD-10-CM

## 2025-06-26 RX ORDER — AMLODIPINE BESYLATE 2.5 MG/1
2.5 TABLET ORAL DAILY
Qty: 90 TABLET | Refills: 3 | Status: SHIPPED | OUTPATIENT
Start: 2025-06-26

## 2025-07-02 ENCOUNTER — THERAPY VISIT (OUTPATIENT)
Dept: PHYSICAL THERAPY | Facility: CLINIC | Age: 80
End: 2025-07-02
Payer: COMMERCIAL

## 2025-07-02 DIAGNOSIS — M48.061 SPINAL STENOSIS OF LUMBAR REGION, UNSPECIFIED WHETHER NEUROGENIC CLAUDICATION PRESENT: Primary | ICD-10-CM

## 2025-07-02 DIAGNOSIS — M54.16 LUMBAR RADICULOPATHY: ICD-10-CM

## 2025-07-02 PROCEDURE — 97530 THERAPEUTIC ACTIVITIES: CPT | Mod: GP | Performed by: PHYSICAL THERAPIST

## 2025-07-02 PROCEDURE — 97110 THERAPEUTIC EXERCISES: CPT | Mod: GP | Performed by: PHYSICAL THERAPIST

## 2025-07-14 NOTE — PROGRESS NOTES
"PM&R Follow-Up Visit -     Date of Initial Visit: 6/19/2025  LOV: 6/19/2025  TD: 7/15/2025     Recall: Ivy Haynes is a 80 year old male who presents with a chief complaint of atraumatic low back and right leg pain which started around 6/3/2025.       INTERVAL HISTORY:  Patient was last seen in clinic 6/19/2025. At that visit, the plan was to begin PT with home exercise program and follow-up in 2 to 3 weeks.    Since last time, he started PT 6/23/2025 and has done 2 sessions thus far.    He was seen today in the clinic for follow-up.    At the last visit he described:  - Right-sided low back pain radiating to the lateral right leg, anterior right knee, anterolateral lower leg (stops at the shin)       Today, he reports that the pain has significantly improved.  He feels \"75-80%\" back to baseline.  He continues to have some discomfort in the right shin which comes and goes and feels like a \"vibration\" sensation.    He is no longer requiring any medication for pain.    He has been doing PT and has another session scheduled tomorrow.  He is not certain how much it helped.    He has been able to resume some of his calisthenics routine but says he is being cautious.  He feels that his prior calisthenics routine was too heavily focused on forward folding exercises and says he is trying to pay more attention on including more lumbar extension exercises as well.    He denies lower extremity weakness, falls, or bowel or bladder incontinence          RECALL HISTORY OF PRESENT ILLNESS:    6/19/2025  Ivy Haynes is a 80 year old male who presents with a chief complaint of low back and right leg pain.      He was seen today in the clinic. He describes pain which started suddenly about 2.5 weeks ago (6/3/25 per review of ED note) after standing up.  He cannot recall any particular injury.    He was having severe pain with walking and sleeping.  He was seen in the ED 6/8/2025.  CT of the lumbar spine and x-rays of the right hip " "were done at that time.  CT of the lumbar spine showed spinal canal stenosis and foraminal stenosis.  He was discharged with cyclobenzaprine and oxycodone.    He notes about 50% improvement in his symptoms since the pain began.  Initially, he was having pain after walking a few steps.  Now he is able to walk about a 1/2 block before having pain.    He denies any history of chronic low back pain.  He does note that a few months ago he had 1-2 days of \"immobilizing\" left-sided, nonradiating low back pain which resolved on its own.    He does a regular daily calisthenics program which includes body weight exercises and stretching.  The program includes pull-ups and push-ups.  He occasionally uses a belt around his waist with weights attached while doing pull-ups.      Today, he describes  - Right-sided low back pain radiating to the lateral right leg, anterior right knee, anterolateral lower leg (stops at the shin)    He denies lower extremity numbness, tingling, or weakness.    Aggravating factors include: Lying down, standing, walking, exercise  Relieving factors include: Sitting, medication    Today, he rates the pain 8/10.  Patient states sleep is no longer affected.    He denies falls, weakness, bowel or bladder incontinence, saddle anesthesia, unintentional weight loss, fevers/chills, or night sweats.       PRIOR INJURIES/TREATMENT:   Ice/Heat: none  Brace: none  Physical Therapy:   Current PT since 6/23/2025  PT for right hip pain in 2022     - Current Pain Medications -   None    - Prior/Trialed Pain Medications -   Ibuprofen PRN  Cyclobenzaprine  Percocet -prefers to avoid, used 3 tablets only      Prior Procedures:  Date    Procedure   Improvement (%)  none              Prior Related Surgery: none   Left knee replacement      Other (acupuncture, OMT, CMM, TENS, DME, etc.): none    Specialists Seen - (with most recent, available notes and clinic visits reviewed)   1. Neurosurgery-cervicalgia 2018 -states he " had referral for DENI but was able to cancel the pain resolved with a stretching program      IMAGING - reviewed   CT LUMBAR SPINE W/O CONTRAST  LOCATION: [facility]  DATE: 6/8/2025     INDICATION: pain, radiculoapthy  COMPARISON: 8/20/2024 chest radiograph demonstrating 12 rib-bearing thoracic vertebrae.  TECHNIQUE: Routine CT Lumbar Spine without IV contrast. Multiplanar reformats. Dose reduction techniques were used.      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     No acute fracture or posttraumatic subluxation. Mild degenerative grade 1 anterolisthesis at L3-L4, L4-L5 and L5-S1, measuring approximately 3 mm at each level. Incompletely imaged thoracolumbar dextrocurvature.     No displaced fracture. Vertebral body heights are maintained. No aggressive sclerotic or lytic osseous lesion. Multilevel lumbar spondylosis.     CANAL/FORAMINA: Moderate spinal canal stenosis at L3-L4. Moderate neural foraminal stenosis on the left at L3-L4. At L4-5, severe right and moderate left neural foraminal stenosis. At L5-S1, moderate right neural foraminal stenosis     PARASPINAL: No extraspinal abnormality.                                                                      IMPRESSION:  1.  No acute fracture or posttraumatic subluxation.  2.  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  3.  At L3-L4, moderate spinal canal stenosis.  4.  At L4-L5, severe right neural foraminal stenosis.      XR PELVIS AND HIP RIGHT 2 VIEWS  LOCATION: [facility]  DATE: 6/8/2025     INDICATION: Right hip pain.  COMPARISON: None.                                                                      IMPRESSION: Moderate degenerative arthritic changes in both hips with axial joint space narrowing, subchondral sclerosis, and cystic change. Normal joint alignment. No fracture deformity or concerning  bone lesion. Advanced degenerative facet arthropathy   in the lower lumbar spine and at the lumbosacral junction. Findings are progressed from November 2022.        Review Of Systems:  I am responding to those symptoms which are directly relevant to the specific indication for my consultation. I recommend that the patient follow up with their primary or referring provider to pursue any other symptoms which may be of concern.       Medical History:  He  has a past medical history of Back pain, Chest pain, Gastro-oesophageal reflux disease, GERD (gastroesophageal reflux disease), Hemorrhoid, Hepatitis C, Hypertension, and Trigeminal neuralgia.     He  has a past surgical history that includes hernia repair (12/10); styloid process temporal bone surg (02/10); Resect tumor lower extremity (11/9/2012); Arthroplasty knee (11/9/2012); Tonsillectomy (1954); and ENT surgery (2009).    Family History  His family history includes Cancer in his paternal grandfather and son; Cerebrovascular Disease (age of onset: 69) in his father; Hypertension in his mother; Kidney failure in his half-brother; No Known Problems in his half-brother, half-sister, half-sister, half-sister, and half-sister.       Social History:  Work: retired  and [employer]  Current living situation: Lives with wife. Performs ADLs/IADLs independently.  He  reports that he quit smoking about 4 years ago. His smoking use included cigarettes. He started smoking about 14 years ago. He has a 1 pack-year smoking history. He has been exposed to tobacco smoke. He has never used smokeless tobacco. He reports that he does not currently use alcohol. He reports current drug use. Drug: Marijuana.        Current Medications:   He has a current medication list which includes the following prescription(s): amlodipine, omeprazole, oxycodone-acetaminophen, tadalafil, tamsulosin, and triamcinolone.     Allergies:    -- Sulfa Antibiotics     PHYSICAL EXAMINATION:  BP  129/69 (BP Location: Right arm, Patient Position: Sitting, Cuff Size: Adult Regular)   Pulse 92   SpO2 97%   --CONSTITUTIONAL: Vital signs as above. No acute distress. The patient is well nourished and well groomed.  --PSYCHIATRIC: The patient is awake, alert, oriented to person, place, time and answering questions appropriately with clear speech. Appropriate mood and affect   --SKIN:  Posterior torso is clean, dry, intact without rashes.   --RESPIRATORY: Normal rhythm and effort. No abnormal accessory muscle breathing patterns noted.   --GROSS MOTOR: Easily arises from a seated position.   --STANDING EXAMINATION: Gait is non-antalgic.         ASSESSMENT:  Ivy Haynes is a pleasant 80 year old male who presented with right-sided low back and right leg pain which appears consistent with lumbar radiculopathy.  CT of the lumbar spine from 6/8/2025 shows severe right L4-5 NFS and moderate right L5-S1 NFS.  There is also a moderate level of spinal canal stenosis at L3-4 and facet arthropathy, as well as moderate arthritis of both hips which may be contributing.      Complicating comorbities include:  #  hepatic fibrosis  # CKD stage III       PLAN:  -He reports significant improvement in level of pain/paresthesias, up to 75-80% improvement.  He does not feel that any additional interventions are needed at this point.  We discussed that we are is happy to see him again in the spine clinic if he has new, worsening, or persistent symptoms  -He has 1 additional session with PT scheduled tomorrow and has been slowly resuming his calisthenics routine.  Congratulated him on his ongoing diligence with home exercise program  - No medication changes made today  - RTC as needed           Ready to learn, no apparent learning barriers.  Education provided on treatment plan according to patient's preferred learning style.  Patient verbalizes understanding.   __________________________________  Galilea Kim NP  Physical Medicine &  Rehabilitation        17 minutes spent by me on the date of the encounter doing chart review, history and exam, documentation and further activities per the note

## 2025-07-15 ENCOUNTER — OFFICE VISIT (OUTPATIENT)
Dept: PHYSICAL MEDICINE AND REHAB | Facility: CLINIC | Age: 80
End: 2025-07-15
Payer: COMMERCIAL

## 2025-07-15 VITALS — SYSTOLIC BLOOD PRESSURE: 129 MMHG | DIASTOLIC BLOOD PRESSURE: 69 MMHG | HEART RATE: 92 BPM | OXYGEN SATURATION: 97 %

## 2025-07-15 DIAGNOSIS — M48.061 SPINAL STENOSIS OF LUMBAR REGION, UNSPECIFIED WHETHER NEUROGENIC CLAUDICATION PRESENT: ICD-10-CM

## 2025-07-15 DIAGNOSIS — M54.16 LUMBAR RADICULOPATHY: ICD-10-CM

## 2025-07-15 DIAGNOSIS — M48.061 NEUROFORAMINAL STENOSIS OF LUMBAR SPINE: ICD-10-CM

## 2025-07-15 DIAGNOSIS — M54.41 ACUTE RIGHT-SIDED LOW BACK PAIN WITH RIGHT-SIDED SCIATICA: Primary | ICD-10-CM

## 2025-07-15 NOTE — LETTER
"7/15/2025       RE: Ivy Haynes  [address]  [facility] [address]     Dear Colleague,    Thank you for referring your patient, Ivy Haynes, to the [facility]ICAL [facility]ICINE AND REH[facility]ILIT[facility] CLIN[facility] [place] at [facility]. Please see a copy of my visit note below.    PM&R Follow-Up Visit -     Date of Initial Visit: 6/19/2025  LOV: 6/19/2025  TD: 7/15/2025     Recall: Ivy Haynes is a 80 year old male who presents with a chief complaint of atraumatic low back and right leg pain which started around 6/3/2025.       INTERVAL HISTORY:  Patient was last seen in clinic 6/19/2025. At that visit, the plan was to begin PT with home exercise program and follow-up in 2 to 3 weeks.    Since last time, he started PT 6/23/2025 and has done 2 sessions thus far.    He was seen today in the clinic for follow-up.    At the last visit he described:  - Right-sided low back pain radiating to the lateral right leg, anterior right knee, anterolateral lower leg (stops at the shin)       Today, he reports that the pain has significantly improved.  He feels \"75-80%\" back to baseline.  He continues to have some discomfort in the right shin which comes and goes and feels like a \"vibration\" sensation.    He is no longer requiring any medication for pain.    He has been doing PT and has another session scheduled tomorrow.  He is not certain how much it helped.    He has been able to resume some of his calisthenics routine but says he is being cautious.  He feels that his prior calisthenics routine was too heavily focused on forward folding exercises and says he is trying to pay more attention on including more lumbar extension exercises as well.    He denies lower extremity weakness, falls, or bowel or bladder incontinence          RECALL HISTORY OF PRESENT ILLNESS:    6/19/2025  Ivy Haynes is a 80 year old male who presents with a chief complaint of low back and right leg " "pain.      He was seen today in the clinic. He describes pain which started suddenly about 2.5 weeks ago (6/3/25 per review of ED note) after standing up.  He cannot recall any particular injury.    He was having severe pain with walking and sleeping.  He was seen in the ED 6/8/2025.  CT of the lumbar spine and x-rays of the right hip were done at that time.  CT of the lumbar spine showed spinal canal stenosis and foraminal stenosis.  He was discharged with cyclobenzaprine and oxycodone.    He notes about 50% improvement in his symptoms since the pain began.  Initially, he was having pain after walking a few steps.  Now he is able to walk about a 1/2 block before having pain.    He denies any history of chronic low back pain.  He does note that a few months ago he had 1-2 days of \"immobilizing\" left-sided, nonradiating low back pain which resolved on its own.    He does a regular daily calisthenics program which includes body weight exercises and stretching.  The program includes pull-ups and push-ups.  He occasionally uses a belt around his waist with weights attached while doing pull-ups.      Today, he describes  - Right-sided low back pain radiating to the lateral right leg, anterior right knee, anterolateral lower leg (stops at the shin)    He denies lower extremity numbness, tingling, or weakness.    Aggravating factors include: Lying down, standing, walking, exercise  Relieving factors include: Sitting, medication    Today, he rates the pain 8/10.  Patient states sleep is no longer affected.    He denies falls, weakness, bowel or bladder incontinence, saddle anesthesia, unintentional weight loss, fevers/chills, or night sweats.       PRIOR INJURIES/TREATMENT:   Ice/Heat: none  Brace: none  Physical Therapy:   Current PT since 6/23/2025  PT for right hip pain in 2022     - Current Pain Medications -   None    - Prior/Trialed Pain Medications -   Ibuprofen PRN  Cyclobenzaprine  Percocet -prefers to avoid, used " 3 tablets only      Prior Procedures:  Date    Procedure   Improvement (%)  none              Prior Related Surgery: none   Left knee replacement      Other (acupuncture, OMT, CMM, TENS, DME, etc.): none    Specialists Seen - (with most recent, available notes and clinic visits reviewed)   1. Neurosurgery-cervicalgia 2018 -states he had referral for DENI but was able to cancel the pain resolved with a stretching program      IMAGING - reviewed   CT LUMBAR SPINE W/O CONTRAST  LOCATION: [facility]  DATE: 6/8/2025     INDICATION: pain, radiculoapthy  COMPARISON: 8/20/2024 chest radiograph demonstrating 12 rib-bearing thoracic vertebrae.  TECHNIQUE: Routine CT Lumbar Spine without IV contrast. Multiplanar reformats. Dose reduction techniques were used.      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     No acute fracture or posttraumatic subluxation. Mild degenerative grade 1 anterolisthesis at L3-L4, L4-L5 and L5-S1, measuring approximately 3 mm at each level. Incompletely imaged thoracolumbar dextrocurvature.     No displaced fracture. Vertebral body heights are maintained. No aggressive sclerotic or lytic osseous lesion. Multilevel lumbar spondylosis.     CANAL/FORAMINA: Moderate spinal canal stenosis at L3-L4. Moderate neural foraminal stenosis on the left at L3-L4. At L4-5, severe right and moderate left neural foraminal stenosis. At L5-S1, moderate right neural foraminal stenosis     PARASPINAL: No extraspinal abnormality.                                                                      IMPRESSION:  1.  No acute fracture or posttraumatic subluxation.  2.  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  3.  At L3-L4, moderate spinal canal stenosis.  4.  At L4-L5, severe right neural foraminal stenosis.      XR PELVIS AND HIP RIGHT 2 VIEWS  LOCATION: [facility]  DATE: 6/8/2025     INDICATION: Right hip pain.  COMPARISON: None.                                                                      IMPRESSION: Moderate degenerative arthritic changes in both hips with axial joint space narrowing, subchondral sclerosis, and cystic change. Normal joint alignment. No fracture deformity or concerning bone lesion. Advanced degenerative facet arthropathy   in the lower lumbar spine and at the lumbosacral junction. Findings are progressed from November 2022.        Review Of Systems:  I am responding to those symptoms which are directly relevant to the specific indication for my consultation. I recommend that the patient follow up with their primary or referring provider to pursue any other symptoms which may be of concern.       Medical History:  He  has a past medical history of Back pain, Chest pain, Gastro-oesophageal reflux disease, GERD (gastroesophageal reflux disease), Hemorrhoid, Hepatitis C, Hypertension, and Trigeminal neuralgia.     He  has a past surgical history that includes hernia repair (12/10); styloid process temporal bone surg (02/10); Resect tumor lower extremity (11/9/2012); Arthroplasty knee (11/9/2012); Tonsillectomy (1954); and ENT surgery (2009).    Family History  His family history includes Cancer in his paternal grandfather and son; Cerebrovascular Disease (age of onset: 69) in his father; Hypertension in his mother; Kidney failure in his half-brother; No Known Problems in his half-brother, half-sister, half-sister, half-sister, and half-sister.       Social History:  Work: retired  and [employer]  Current living situation: Lives with wife. Performs ADLs/IADLs independently.  He  reports that he quit smoking about 4 years ago. His smoking use included cigarettes. He started smoking about 14 years ago. He has a 1 pack-year smoking history. He has been exposed to tobacco smoke. He has never used smokeless tobacco. He reports that he does  not currently use alcohol. He reports current drug use. Drug: Marijuana.        Current Medications:   He has a current medication list which includes the following prescription(s): amlodipine, omeprazole, oxycodone-acetaminophen, tadalafil, tamsulosin, and triamcinolone.     Allergies:    -- Sulfa Antibiotics     PHYSICAL EXAMINATION:  /69 (BP Location: Right arm, Patient Position: Sitting, Cuff Size: Adult Regular)   Pulse 92   SpO2 97%   --CONSTITUTIONAL: Vital signs as above. No acute distress. The patient is well nourished and well groomed.  --PSYCHIATRIC: The patient is awake, alert, oriented to person, place, time and answering questions appropriately with clear speech. Appropriate mood and affect   --SKIN:  Posterior torso is clean, dry, intact without rashes.   --RESPIRATORY: Normal rhythm and effort. No abnormal accessory muscle breathing patterns noted.   --GROSS MOTOR: Easily arises from a seated position.   --STANDING EXAMINATION: Gait is non-antalgic.         ASSESSMENT:  Ivy Haynes is a pleasant 80 year old male who presented with right-sided low back and right leg pain which appears consistent with lumbar radiculopathy.  CT of the lumbar spine from 6/8/2025 shows severe right L4-5 NFS and moderate right L5-S1 NFS.  There is also a moderate level of spinal canal stenosis at L3-4 and facet arthropathy, as well as moderate arthritis of both hips which may be contributing.      Complicating comorbities include:  #  hepatic fibrosis  # CKD stage III       PLAN:  -He reports significant improvement in level of pain/paresthesias, up to 75-80% improvement.  He does not feel that any additional interventions are needed at this point.  We discussed that we are is happy to see him again in the spine clinic if he has new, worsening, or persistent symptoms  -He has 1 additional session with PT scheduled tomorrow and has been slowly resuming his calisthenics routine.  Congratulated him on his ongoing  diligence with home exercise program  - No medication changes made today  - RTC as needed           Ready to learn, no apparent learning barriers.  Education provided on treatment plan according to patient's preferred learning style.  Patient verbalizes understanding.   __________________________________  Galilea Kim NP  Physical Medicine & Rehabilitation        17 minutes spent by me on the date of the encounter doing chart review, history and exam, documentation and further activities per the note        Again, thank you for allowing me to participate in the care of your patient.      Sincerely,    TAMERA Lorenzana CNP

## 2025-07-16 ENCOUNTER — THERAPY VISIT (OUTPATIENT)
Dept: PHYSICAL THERAPY | Facility: CLINIC | Age: 80
End: 2025-07-16
Payer: COMMERCIAL

## 2025-07-16 DIAGNOSIS — M48.061 SPINAL STENOSIS OF LUMBAR REGION, UNSPECIFIED WHETHER NEUROGENIC CLAUDICATION PRESENT: Primary | ICD-10-CM

## 2025-07-16 DIAGNOSIS — M54.16 LUMBAR RADICULOPATHY: ICD-10-CM

## 2025-07-16 PROCEDURE — 97110 THERAPEUTIC EXERCISES: CPT | Mod: GP | Performed by: PHYSICAL THERAPIST

## 2025-07-16 PROCEDURE — 97112 NEUROMUSCULAR REEDUCATION: CPT | Mod: GP | Performed by: PHYSICAL THERAPIST

## 2025-07-21 ENCOUNTER — TELEPHONE (OUTPATIENT)
Dept: GASTROENTEROLOGY | Facility: CLINIC | Age: 80
End: 2025-07-21
Payer: COMMERCIAL

## 2025-07-21 NOTE — TELEPHONE ENCOUNTER
"Patient called stating he will not be able to hold PPI's for 14 days before BRAVO.  States that he goes one day without and feels like he is \"going to die\".  Tums do not work for patient.  Writer sent message to Dr. Rey to get suggestions for patient.      Kassie Aguila RN  "

## 2025-07-22 NOTE — TELEPHONE ENCOUNTER
Per Dr. Rey:    Hawk Rey DO Soldo, Jennifer, RN  If he cannot hold his PPI he should follow up with his ordering provider. I dont know if he has ever had erosive esophagitis etc.    Writer updated patient and he will try to experiment with holding PPI's for a couple days to see if he will be able to do with using Tums.  If he is unable he will reach out to Neema Grande and discuss next steps.     Kassie Aguila RN

## 2025-07-23 NOTE — TELEPHONE ENCOUNTER
Patient called in stating he needs to discuss his upcoming EGD with capsule and that he is not going to be able to hold his omeprazole. RN discussed with patient that another RN appears to have talked with him yesterday and sent a message to the scoping provider and he (Dr. Rey) advised to discuss this concern with the ordering provider. Patient states he tried to call Neema Grande PA-C but was on hold forever and never got through to anyone so that is why he called PA. RN let patient know will send a message to Neema Grande to let her know patient's concerns and to please call him to discuss. Patient asked to verify that his cell phone number is listed as his primary phone number to be called at. RN verified that his cell phone number is listed as the primary/preferred number. Patient will wait to hear from Neema Grande and had no further questions or concerns at this time.      Addendum in red:   Neema Grande PA-C Lepage, Arica, RN  Cc: Alta Robles RN Lynn - can you reach out to him.    If he cannot come off the meds, we will proceed with the testing on them however it makes interpretation of the results difficult.    For now, will plan to proceed on therapy, per pt request.    Thanks,  Alta Martínez RN Yang, Stacy, PA-C; Codie Ordonez RN  Hello  Spoke with [facility] and he stated that his symptoms are severe when he stops the omeprazole  He stated he will try to wean himself off the omeprazole for the procedure but not sure if it is possible    Alta Ordonez RN  Endoscopy Procedure Pre Assessment   [phone] option 3

## 2025-07-27 ENCOUNTER — OFFICE VISIT (OUTPATIENT)
Dept: URGENT CARE | Facility: URGENT CARE | Age: 80
End: 2025-07-27
Payer: COMMERCIAL

## 2025-07-27 ENCOUNTER — ANCILLARY PROCEDURE (OUTPATIENT)
Dept: GENERAL RADIOLOGY | Facility: CLINIC | Age: 80
End: 2025-07-27
Attending: NURSE PRACTITIONER
Payer: COMMERCIAL

## 2025-07-27 VITALS
TEMPERATURE: 97.8 F | BODY MASS INDEX: 22.82 KG/M2 | HEIGHT: 71 IN | WEIGHT: 163 LBS | DIASTOLIC BLOOD PRESSURE: 78 MMHG | RESPIRATION RATE: 18 BRPM | HEART RATE: 91 BPM | OXYGEN SATURATION: 96 % | SYSTOLIC BLOOD PRESSURE: 130 MMHG

## 2025-07-27 DIAGNOSIS — R05.1 ACUTE COUGH: ICD-10-CM

## 2025-07-27 DIAGNOSIS — J22 LOWER RESPIRATORY INFECTION: Primary | ICD-10-CM

## 2025-07-27 PROCEDURE — 99214 OFFICE O/P EST MOD 30 MIN: CPT | Performed by: NURSE PRACTITIONER

## 2025-07-27 PROCEDURE — 3078F DIAST BP <80 MM HG: CPT | Performed by: NURSE PRACTITIONER

## 2025-07-27 PROCEDURE — 3075F SYST BP GE 130 - 139MM HG: CPT | Performed by: NURSE PRACTITIONER

## 2025-07-27 PROCEDURE — 71046 X-RAY EXAM CHEST 2 VIEWS: CPT | Mod: TC | Performed by: RADIOLOGY

## 2025-07-27 RX ORDER — AZITHROMYCIN 250 MG/1
TABLET, FILM COATED ORAL
Qty: 6 TABLET | Refills: 0 | Status: SHIPPED | OUTPATIENT
Start: 2025-07-27 | End: 2025-08-01

## 2025-07-27 NOTE — PROGRESS NOTES
Chief Complaint   Patient presents with    Urgent Care     Sick with URI symptoms since Monday.      SUBJECTIVE:  Ivy Haynes is a 80 year old male presenting with cough cold congestion chest rattling headache white sputum over the last 6 days worsening.  History of bronchitis as well as pneumonia.  Usually receives empiric treatment with Z-Tien.  He does have a smoking history.  Declines chest pain or bloody sputum.    Past Medical History:   Diagnosis Date    Back pain     Chest pain     Gastro-oesophageal reflux disease     GERD (gastroesophageal reflux disease)     Hemorrhoid     Hepatitis C     Hypertension     Trigeminal neuralgia      Current Outpatient Medications   Medication Sig Dispense Refill    amLODIPine (NORVASC) 2.5 MG tablet TAKE 1 TABLET(2.5 MG) BY MOUTH DAILY 90 tablet 3    azithromycin (ZITHROMAX) 250 MG tablet Take 2 tablets (500 mg) by mouth daily for 1 day, THEN 1 tablet (250 mg) daily for 4 days. 6 tablet 0    omeprazole (PRILOSEC) 20 MG DR capsule Take 1 capsule (20 mg) by mouth 2 times daily. 180 capsule 1    tamsulosin (FLOMAX) 0.4 MG capsule Take 1 capsule (0.4 mg) by mouth daily. 90 capsule 3    triamcinolone (KENALOG) 0.1 % external ointment Apply topically 2 times daily Apply twice daily as needed to painful bumps on back of scalp. Use for up to two weeks at a time in the same spot, then take one week off before restarting a two week cycle 15 g 3    tadalafil (CIALIS) 5 MG tablet Take 1 tablet (5 mg) by mouth every 24 hours. (Patient not taking: Reported on 7/15/2025) 90 tablet 3     No current facility-administered medications for this visit.     Social History     Tobacco Use    Smoking status: Former     Current packs/day: 0.00     Average packs/day: 0.1 packs/day for 10.0 years (1.0 ttl pk-yrs)     Types: Cigarettes     Start date: 2011     Quit date: 2021     Years since quittin.5     Passive exposure: Past    Smokeless tobacco: Never    Tobacco comments:     quit   "until 2013. started after trip to [place]   Substance Use Topics    Alcohol use: Not Currently     Comment: 2-3 beer a week     Allergies   Allergen Reactions    Sulfa Antibiotics      Review of Systems   ROS: 10 point ROS neg other than the symptoms noted above in the HPI.    OBJECTIVE:   /78   Pulse 91   Temp 97.8  F (36.6  C) (Oral)   Resp 18   Ht 1.803 m (5' 11\")   Wt 73.9 kg (163 lb)   SpO2 96%   BMI 22.73 kg/m      Physical Exam  Vitals reviewed.   Constitutional:       General: He is not in acute distress.     Appearance: Normal appearance. He is not ill-appearing, toxic-appearing or diaphoretic.   HENT:      Head: Normocephalic and atraumatic.      Right Ear: Tympanic membrane and ear canal normal.      Left Ear: Tympanic membrane and ear canal normal.      Nose: No congestion or rhinorrhea.      Mouth/Throat:      Mouth: Mucous membranes are moist.      Pharynx: Oropharynx is clear.   Cardiovascular:      Rate and Rhythm: Normal rate.      Pulses: Normal pulses.   Pulmonary:      Effort: Respiratory distress present.      Breath sounds: Normal breath sounds. No stridor. No wheezing (mucus producing rattling cough) or rales. Rhonchi: crackles throughout.  Chest:      Chest wall: No tenderness.   Skin:     General: Skin is warm and dry.   Neurological:      General: No focal deficit present.      Mental Status: He is alert and oriented to person, place, and time.   Psychiatric:         Mood and Affect: Mood normal.         Behavior: Behavior normal.       X-ray done in clinic read by me as negative for pneumonia, unchanged since last.    ASSESSMENT:    ICD-10-CM    1. Lower respiratory infection  J22 azithromycin (ZITHROMAX) 250 MG tablet      2. Acute cough  R05.1 XR Chest 2 Views        PLAN:     Zpak for bronchitis given clinical history and appearance, crackles  Rest! Your body needs more rest to heal.  Drink plenty of fluids (warm fluids like tea or soup are soothing and reduce cough)  Sit in " the bathroom with a hot shower running and breathe in the steam.  Honey may soothe your sore throat and help manage your cough- may take straight or in warm water with lemon juice.  Avoid smoke (cigarettes, bonfires, fireplace, wood burning stoves).  Take Tylenol as needed for pain.  Delsym (dextromethorphan polistirex) is an over the counter cough medication that lasts 12 hours.   Mucinex or Robitussin (guiafenesin) thin mucus and may help it to loosen more quickly  Good handwashing is the best way to prevent spread of germs  Present to emergency room if you develop trouble breathing, swallowing or cough-up blood.  Follow up with your primary care provider if symptoms worsen or fail to improve as expected.    Follow up with primary care provider with any problems, questions or concerns or if symptoms worsen or fail to improve. Patient agreed to plan and verbalized understanding.    Cecelia Gómez, DAKOTA-BC  [facility]

## 2025-07-27 NOTE — PATIENT INSTRUCTIONS
Zpak for bronchitis given clinical history and appearance, crackles  Rest! Your body needs more rest to heal.  Drink plenty of fluids (warm fluids like tea or soup are soothing and reduce cough)  Sit in the bathroom with a hot shower running and breathe in the steam.  Honey may soothe your sore throat and help manage your cough- may take straight or in warm water with lemon juice.  Avoid smoke (cigarettes, bonfires, fireplace, wood burning stoves).  Take Tylenol as needed for pain.  Delsym (dextromethorphan polistirex) is an over the counter cough medication that lasts 12 hours.   Mucinex or Robitussin (guiafenesin) thin mucus and may help it to loosen more quickly  Good handwashing is the best way to prevent spread of germs  Present to emergency room if you develop trouble breathing, swallowing or cough-up blood.  Follow up with your primary care provider if symptoms worsen or fail to improve as expected.

## 2025-07-27 NOTE — PROGRESS NOTES
Urgent Care Clinic Visit    Chief Complaint   Patient presents with    Urgent Care     Sick with URI symptoms since Monday.                7/27/2025    11:49 AM   Additional Questions   Roomed by BINTA Fuller

## 2025-08-05 ENCOUNTER — MYC MEDICAL ADVICE (OUTPATIENT)
Dept: FAMILY MEDICINE | Facility: CLINIC | Age: 80
End: 2025-08-05
Payer: COMMERCIAL

## 2025-08-05 DIAGNOSIS — K21.00 GASTROESOPHAGEAL REFLUX DISEASE WITH ESOPHAGITIS WITHOUT HEMORRHAGE: ICD-10-CM

## 2025-08-06 ENCOUNTER — TELEPHONE (OUTPATIENT)
Dept: FAMILY MEDICINE | Facility: CLINIC | Age: 80
End: 2025-08-06
Payer: COMMERCIAL

## 2025-08-06 RX ORDER — OMEPRAZOLE 10 MG/1
10 CAPSULE, DELAYED RELEASE ORAL 2 TIMES DAILY
Qty: 90 CAPSULE | Refills: 0 | Status: SHIPPED | OUTPATIENT
Start: 2025-08-06

## 2025-08-12 ENCOUNTER — MYC MEDICAL ADVICE (OUTPATIENT)
Dept: FAMILY MEDICINE | Facility: CLINIC | Age: 80
End: 2025-08-12
Payer: COMMERCIAL

## 2025-08-13 ENCOUNTER — OFFICE VISIT (OUTPATIENT)
Dept: FAMILY MEDICINE | Facility: CLINIC | Age: 80
End: 2025-08-13
Payer: COMMERCIAL

## 2025-08-13 VITALS
OXYGEN SATURATION: 97 % | BODY MASS INDEX: 22.4 KG/M2 | SYSTOLIC BLOOD PRESSURE: 120 MMHG | HEART RATE: 64 BPM | TEMPERATURE: 98.1 F | HEIGHT: 71 IN | RESPIRATION RATE: 12 BRPM | WEIGHT: 160 LBS | DIASTOLIC BLOOD PRESSURE: 80 MMHG

## 2025-08-13 DIAGNOSIS — I10 HYPERTENSION GOAL BP (BLOOD PRESSURE) < 140/90: Primary | ICD-10-CM

## 2025-08-13 DIAGNOSIS — N18.31 CHRONIC KIDNEY DISEASE, STAGE 3A (H): ICD-10-CM

## 2025-08-13 LAB
ALBUMIN SERPL BCG-MCNC: 4.3 G/DL (ref 3.5–5.2)
ALP SERPL-CCNC: 151 U/L (ref 40–150)
ALT SERPL W P-5'-P-CCNC: 15 U/L (ref 0–70)
ANION GAP SERPL CALCULATED.3IONS-SCNC: 12 MMOL/L (ref 7–15)
AST SERPL W P-5'-P-CCNC: 37 U/L (ref 0–45)
BILIRUB SERPL-MCNC: 0.5 MG/DL
BUN SERPL-MCNC: 10.4 MG/DL (ref 8–23)
CALCIUM SERPL-MCNC: 9.4 MG/DL (ref 8.8–10.4)
CHLORIDE SERPL-SCNC: 99 MMOL/L (ref 98–107)
CREAT SERPL-MCNC: 1.26 MG/DL (ref 0.67–1.17)
EGFRCR SERPLBLD CKD-EPI 2021: 58 ML/MIN/1.73M2
ERYTHROCYTE [DISTWIDTH] IN BLOOD BY AUTOMATED COUNT: 11.8 % (ref 10–15)
GLUCOSE SERPL-MCNC: 95 MG/DL (ref 70–99)
HCO3 SERPL-SCNC: 25 MMOL/L (ref 22–29)
HCT VFR BLD AUTO: 45.6 % (ref 40–53)
HGB BLD-MCNC: 15.4 G/DL (ref 13.3–17.7)
MCH RBC QN AUTO: 32.5 PG (ref 26.5–33)
MCHC RBC AUTO-ENTMCNC: 33.8 G/DL (ref 31.5–36.5)
MCV RBC AUTO: 96.2 FL (ref 78–100)
PLATELET # BLD AUTO: 164 10E3/UL (ref 150–450)
POTASSIUM SERPL-SCNC: 4.3 MMOL/L (ref 3.4–5.3)
PROT SERPL-MCNC: 7.1 G/DL (ref 6.4–8.3)
RBC # BLD AUTO: 4.74 10E6/UL (ref 4.4–5.9)
SODIUM SERPL-SCNC: 136 MMOL/L (ref 135–145)
WBC # BLD AUTO: 3.38 10E3/UL (ref 4–11)

## 2025-08-13 PROCEDURE — 85027 COMPLETE CBC AUTOMATED: CPT | Performed by: NURSE PRACTITIONER

## 2025-08-13 PROCEDURE — 99214 OFFICE O/P EST MOD 30 MIN: CPT | Performed by: NURSE PRACTITIONER

## 2025-08-13 PROCEDURE — 80053 COMPREHEN METABOLIC PANEL: CPT | Performed by: NURSE PRACTITIONER

## 2025-08-13 PROCEDURE — 3079F DIAST BP 80-89 MM HG: CPT | Performed by: NURSE PRACTITIONER

## 2025-08-13 PROCEDURE — 36415 COLL VENOUS BLD VENIPUNCTURE: CPT | Performed by: NURSE PRACTITIONER

## 2025-08-13 PROCEDURE — 3074F SYST BP LT 130 MM HG: CPT | Performed by: NURSE PRACTITIONER

## 2025-08-13 RX ORDER — FAMOTIDINE 20 MG/1
20 TABLET, FILM COATED ORAL 2 TIMES DAILY
COMMUNITY
Start: 2025-08-13

## 2025-08-25 ENCOUNTER — ANESTHESIA EVENT (OUTPATIENT)
Dept: GASTROENTEROLOGY | Facility: CLINIC | Age: 80
End: 2025-08-25
Payer: COMMERCIAL

## 2025-08-25 RX ORDER — SODIUM CHLORIDE, SODIUM LACTATE, POTASSIUM CHLORIDE, CALCIUM CHLORIDE 600; 310; 30; 20 MG/100ML; MG/100ML; MG/100ML; MG/100ML
INJECTION, SOLUTION INTRAVENOUS CONTINUOUS
Status: CANCELLED | OUTPATIENT
Start: 2025-08-25

## 2025-08-26 ENCOUNTER — HOSPITAL ENCOUNTER (OUTPATIENT)
Facility: CLINIC | Age: 80
Discharge: HOME | End: 2025-08-26
Attending: INTERNAL MEDICINE | Admitting: INTERNAL MEDICINE
Payer: COMMERCIAL

## 2025-08-26 ENCOUNTER — ANESTHESIA (OUTPATIENT)
Dept: GASTROENTEROLOGY | Facility: CLINIC | Age: 80
End: 2025-08-26
Payer: COMMERCIAL

## 2025-08-26 VITALS
OXYGEN SATURATION: 98 % | SYSTOLIC BLOOD PRESSURE: 126 MMHG | RESPIRATION RATE: 16 BRPM | HEART RATE: 80 BPM | DIASTOLIC BLOOD PRESSURE: 77 MMHG

## 2025-08-26 LAB — UPPER GI ENDOSCOPY: NORMAL

## 2025-08-26 PROCEDURE — 250N000011 HC RX IP 250 OP 636

## 2025-08-26 PROCEDURE — 88305 TISSUE EXAM BY PATHOLOGIST: CPT | Mod: 26 | Performed by: PATHOLOGY

## 2025-08-26 PROCEDURE — 88305 TISSUE EXAM BY PATHOLOGIST: CPT | Mod: TC | Performed by: INTERNAL MEDICINE

## 2025-08-26 PROCEDURE — 250N000009 HC RX 250

## 2025-08-26 PROCEDURE — 258N000003 HC RX IP 258 OP 636

## 2025-08-26 PROCEDURE — 43239 EGD BIOPSY SINGLE/MULTIPLE: CPT | Performed by: INTERNAL MEDICINE

## 2025-08-26 PROCEDURE — 370N000017 HC ANESTHESIA TECHNICAL FEE, PER MIN: Performed by: INTERNAL MEDICINE

## 2025-08-26 RX ORDER — NALOXONE HYDROCHLORIDE 0.4 MG/ML
0.2 INJECTION, SOLUTION INTRAMUSCULAR; INTRAVENOUS; SUBCUTANEOUS
Status: DISCONTINUED | OUTPATIENT
Start: 2025-08-26 | End: 2025-08-26 | Stop reason: HOSPADM

## 2025-08-26 RX ORDER — LIDOCAINE 40 MG/G
CREAM TOPICAL
Status: DISCONTINUED | OUTPATIENT
Start: 2025-08-26 | End: 2025-08-26 | Stop reason: HOSPADM

## 2025-08-26 RX ORDER — ONDANSETRON 4 MG/1
4 TABLET, ORALLY DISINTEGRATING ORAL EVERY 30 MIN PRN
Status: DISCONTINUED | OUTPATIENT
Start: 2025-08-26 | End: 2025-08-26 | Stop reason: HOSPADM

## 2025-08-26 RX ORDER — ONDANSETRON 2 MG/ML
4 INJECTION INTRAMUSCULAR; INTRAVENOUS
Status: DISCONTINUED | OUTPATIENT
Start: 2025-08-26 | End: 2025-08-26 | Stop reason: HOSPADM

## 2025-08-26 RX ORDER — PROCHLORPERAZINE MALEATE 5 MG/1
5 TABLET ORAL EVERY 6 HOURS PRN
Status: DISCONTINUED | OUTPATIENT
Start: 2025-08-26 | End: 2025-08-26 | Stop reason: HOSPADM

## 2025-08-26 RX ORDER — LABETALOL HYDROCHLORIDE 5 MG/ML
10 INJECTION, SOLUTION INTRAVENOUS
Status: DISCONTINUED | OUTPATIENT
Start: 2025-08-26 | End: 2025-08-26 | Stop reason: HOSPADM

## 2025-08-26 RX ORDER — DEXAMETHASONE SODIUM PHOSPHATE 4 MG/ML
4 INJECTION, SOLUTION INTRA-ARTICULAR; INTRALESIONAL; INTRAMUSCULAR; INTRAVENOUS; SOFT TISSUE
Status: DISCONTINUED | OUTPATIENT
Start: 2025-08-26 | End: 2025-08-26 | Stop reason: HOSPADM

## 2025-08-26 RX ORDER — ONDANSETRON 2 MG/ML
4 INJECTION INTRAMUSCULAR; INTRAVENOUS EVERY 30 MIN PRN
Status: DISCONTINUED | OUTPATIENT
Start: 2025-08-26 | End: 2025-08-26 | Stop reason: HOSPADM

## 2025-08-26 RX ORDER — OXYCODONE HYDROCHLORIDE 10 MG/1
10 TABLET ORAL
Status: DISCONTINUED | OUTPATIENT
Start: 2025-08-26 | End: 2025-08-26 | Stop reason: HOSPADM

## 2025-08-26 RX ORDER — CALCIUM CARBONATE 500 MG/1
1 TABLET, CHEWABLE ORAL 2 TIMES DAILY
COMMUNITY

## 2025-08-26 RX ORDER — SODIUM CHLORIDE, SODIUM LACTATE, POTASSIUM CHLORIDE, CALCIUM CHLORIDE 600; 310; 30; 20 MG/100ML; MG/100ML; MG/100ML; MG/100ML
INJECTION, SOLUTION INTRAVENOUS CONTINUOUS PRN
Status: DISCONTINUED | OUTPATIENT
Start: 2025-08-26 | End: 2025-08-26

## 2025-08-26 RX ORDER — SODIUM CHLORIDE, SODIUM LACTATE, POTASSIUM CHLORIDE, CALCIUM CHLORIDE 600; 310; 30; 20 MG/100ML; MG/100ML; MG/100ML; MG/100ML
INJECTION, SOLUTION INTRAVENOUS CONTINUOUS
Status: DISCONTINUED | OUTPATIENT
Start: 2025-08-26 | End: 2025-08-26 | Stop reason: HOSPADM

## 2025-08-26 RX ORDER — NALOXONE HYDROCHLORIDE 0.4 MG/ML
0.4 INJECTION, SOLUTION INTRAMUSCULAR; INTRAVENOUS; SUBCUTANEOUS
Status: DISCONTINUED | OUTPATIENT
Start: 2025-08-26 | End: 2025-08-26 | Stop reason: HOSPADM

## 2025-08-26 RX ORDER — ONDANSETRON 4 MG/1
4 TABLET, ORALLY DISINTEGRATING ORAL EVERY 6 HOURS PRN
Status: DISCONTINUED | OUTPATIENT
Start: 2025-08-26 | End: 2025-08-26 | Stop reason: HOSPADM

## 2025-08-26 RX ORDER — ONDANSETRON 2 MG/ML
4 INJECTION INTRAMUSCULAR; INTRAVENOUS EVERY 6 HOURS PRN
Status: DISCONTINUED | OUTPATIENT
Start: 2025-08-26 | End: 2025-08-26 | Stop reason: HOSPADM

## 2025-08-26 RX ORDER — NALOXONE HYDROCHLORIDE 0.4 MG/ML
0.1 INJECTION, SOLUTION INTRAMUSCULAR; INTRAVENOUS; SUBCUTANEOUS
Status: DISCONTINUED | OUTPATIENT
Start: 2025-08-26 | End: 2025-08-26 | Stop reason: HOSPADM

## 2025-08-26 RX ORDER — OXYCODONE HYDROCHLORIDE 5 MG/1
5 TABLET ORAL
Status: DISCONTINUED | OUTPATIENT
Start: 2025-08-26 | End: 2025-08-26 | Stop reason: HOSPADM

## 2025-08-26 RX ORDER — HYDRALAZINE HYDROCHLORIDE 20 MG/ML
2.5-5 INJECTION INTRAMUSCULAR; INTRAVENOUS EVERY 10 MIN PRN
Status: DISCONTINUED | OUTPATIENT
Start: 2025-08-26 | End: 2025-08-26 | Stop reason: HOSPADM

## 2025-08-26 RX ORDER — PROPOFOL 10 MG/ML
INJECTION, EMULSION INTRAVENOUS PRN
Status: DISCONTINUED | OUTPATIENT
Start: 2025-08-26 | End: 2025-08-26

## 2025-08-26 RX ORDER — PROPOFOL 10 MG/ML
INJECTION, EMULSION INTRAVENOUS CONTINUOUS PRN
Status: DISCONTINUED | OUTPATIENT
Start: 2025-08-26 | End: 2025-08-26

## 2025-08-26 RX ORDER — ONDANSETRON 2 MG/ML
INJECTION INTRAMUSCULAR; INTRAVENOUS PRN
Status: DISCONTINUED | OUTPATIENT
Start: 2025-08-26 | End: 2025-08-26

## 2025-08-26 RX ORDER — ACETAMINOPHEN 325 MG/1
975 TABLET ORAL ONCE
Status: DISCONTINUED | OUTPATIENT
Start: 2025-08-26 | End: 2025-08-26 | Stop reason: HOSPADM

## 2025-08-26 RX ORDER — FLUMAZENIL 0.1 MG/ML
0.2 INJECTION, SOLUTION INTRAVENOUS
Status: DISCONTINUED | OUTPATIENT
Start: 2025-08-26 | End: 2025-08-26 | Stop reason: HOSPADM

## 2025-08-26 RX ORDER — LIDOCAINE HYDROCHLORIDE 20 MG/ML
INJECTION, SOLUTION INFILTRATION; PERINEURAL PRN
Status: DISCONTINUED | OUTPATIENT
Start: 2025-08-26 | End: 2025-08-26

## 2025-08-26 RX ADMIN — PROPOFOL 20 MG: 10 INJECTION, EMULSION INTRAVENOUS at 10:45

## 2025-08-26 RX ADMIN — LIDOCAINE HYDROCHLORIDE 80 MG: 20 INJECTION, SOLUTION INFILTRATION; PERINEURAL at 10:42

## 2025-08-26 RX ADMIN — PROPOFOL 30 MG: 10 INJECTION, EMULSION INTRAVENOUS at 10:42

## 2025-08-26 RX ADMIN — PROPOFOL 100 MCG/KG/MIN: 10 INJECTION, EMULSION INTRAVENOUS at 10:42

## 2025-08-26 RX ADMIN — PROPOFOL 20 MG: 10 INJECTION, EMULSION INTRAVENOUS at 10:44

## 2025-08-26 RX ADMIN — ONDANSETRON 4 MG: 2 INJECTION INTRAMUSCULAR; INTRAVENOUS at 10:42

## 2025-08-26 RX ADMIN — SODIUM CHLORIDE, SODIUM LACTATE, POTASSIUM CHLORIDE, AND CALCIUM CHLORIDE: .6; .31; .03; .02 INJECTION, SOLUTION INTRAVENOUS at 10:42

## 2025-08-26 ASSESSMENT — ACTIVITIES OF DAILY LIVING (ADL)
ADLS_ACUITY_SCORE: 41

## 2025-08-27 LAB
PATH REPORT.COMMENTS IMP SPEC: NORMAL
PATH REPORT.COMMENTS IMP SPEC: NORMAL
PATH REPORT.FINAL DX SPEC: NORMAL
PATH REPORT.GROSS SPEC: NORMAL
PATH REPORT.MICROSCOPIC SPEC OTHER STN: NORMAL
PATH REPORT.RELEVANT HX SPEC: NORMAL
PHOTO IMAGE: NORMAL

## 2025-08-28 ENCOUNTER — TELEPHONE (OUTPATIENT)
Dept: GASTROENTEROLOGY | Facility: CLINIC | Age: 80
End: 2025-08-28
Payer: COMMERCIAL

## 2025-09-01 PROCEDURE — 99285 EMERGENCY DEPT VISIT HI MDM: CPT | Mod: 25 | Performed by: EMERGENCY MEDICINE

## 2025-09-02 ENCOUNTER — APPOINTMENT (OUTPATIENT)
Dept: CT IMAGING | Facility: CLINIC | Age: 80
End: 2025-09-02
Attending: EMERGENCY MEDICINE
Payer: COMMERCIAL

## 2025-09-02 ENCOUNTER — HOSPITAL ENCOUNTER (OUTPATIENT)
Facility: CLINIC | Age: 80
Setting detail: OBSERVATION
Discharge: HOME | End: 2025-09-02
Attending: EMERGENCY MEDICINE | Admitting: INTERNAL MEDICINE
Payer: COMMERCIAL

## 2025-09-02 ENCOUNTER — APPOINTMENT (OUTPATIENT)
Dept: MRI IMAGING | Facility: CLINIC | Age: 80
End: 2025-09-02
Attending: INTERNAL MEDICINE
Payer: COMMERCIAL

## 2025-09-02 VITALS
HEART RATE: 73 BPM | TEMPERATURE: 97.9 F | OXYGEN SATURATION: 98 % | BODY MASS INDEX: 22.98 KG/M2 | HEIGHT: 70 IN | RESPIRATION RATE: 18 BRPM | SYSTOLIC BLOOD PRESSURE: 136 MMHG | DIASTOLIC BLOOD PRESSURE: 79 MMHG | WEIGHT: 160.5 LBS

## 2025-09-02 DIAGNOSIS — R42 DIZZINESS: ICD-10-CM

## 2025-09-02 DIAGNOSIS — R51.9 ACUTE NONINTRACTABLE HEADACHE, UNSPECIFIED HEADACHE TYPE: Primary | ICD-10-CM

## 2025-09-02 LAB
ANION GAP SERPL CALCULATED.3IONS-SCNC: 12 MMOL/L (ref 7–15)
APTT PPP: 22 SECONDS (ref 22–38)
BASOPHILS # BLD AUTO: <0.03 10E3/UL (ref 0–0.2)
BASOPHILS NFR BLD AUTO: 0.3 %
BUN SERPL-MCNC: 13 MG/DL (ref 8–23)
CALCIUM SERPL-MCNC: 9.3 MG/DL (ref 8.8–10.4)
CHLORIDE SERPL-SCNC: 101 MMOL/L (ref 98–107)
CREAT BLD-MCNC: 1.3 MG/DL (ref 0.7–1.2)
CREAT SERPL-MCNC: 1.24 MG/DL (ref 0.67–1.17)
CRP SERPL-MCNC: <3 MG/L
EGFRCR SERPLBLD CKD-EPI 2021: 56 ML/MIN/1.73M2
EGFRCR SERPLBLD CKD-EPI 2021: 60 ML/MIN/1.73M2
EOSINOPHIL # BLD AUTO: 0.07 10E3/UL (ref 0–0.7)
EOSINOPHIL NFR BLD AUTO: 1.9 %
ERYTHROCYTE [DISTWIDTH] IN BLOOD BY AUTOMATED COUNT: 12.3 % (ref 10–15)
ERYTHROCYTE [SEDIMENTATION RATE] IN BLOOD BY WESTERGREN METHOD: 11 MM/HR (ref 0–20)
GLUCOSE BLDC GLUCOMTR-MCNC: 140 MG/DL (ref 70–99)
GLUCOSE SERPL-MCNC: 146 MG/DL (ref 70–99)
HCO3 SERPL-SCNC: 24 MMOL/L (ref 22–29)
HCT VFR BLD AUTO: 44.8 % (ref 40–53)
HGB BLD-MCNC: 16.2 G/DL (ref 13.3–17.7)
IMM GRANULOCYTES # BLD: <0.03 10E3/UL
IMM GRANULOCYTES NFR BLD: 0 %
INR PPP: 0.92 (ref 0.85–1.15)
LYMPHOCYTES # BLD AUTO: 1.93 10E3/UL (ref 0.8–5.3)
LYMPHOCYTES NFR BLD AUTO: 51.2 %
MCH RBC QN AUTO: 34 PG (ref 26.5–33)
MCHC RBC AUTO-ENTMCNC: 36.2 G/DL (ref 31.5–36.5)
MCV RBC AUTO: 94.1 FL (ref 78–100)
MONOCYTES # BLD AUTO: 0.45 10E3/UL (ref 0–1.3)
MONOCYTES NFR BLD AUTO: 11.9 %
NEUTROPHILS # BLD AUTO: 1.31 10E3/UL (ref 1.6–8.3)
NEUTROPHILS NFR BLD AUTO: 34.7 %
NRBC # BLD AUTO: <0.03 10E3/UL
NRBC BLD AUTO-RTO: 0 /100
PLATELET # BLD AUTO: 189 10E3/UL (ref 150–450)
POTASSIUM SERPL-SCNC: 4.3 MMOL/L (ref 3.4–5.3)
PROTHROMBIN TIME: 12.8 SECONDS (ref 11.8–14.8)
RBC # BLD AUTO: 4.76 10E6/UL (ref 4.4–5.9)
SODIUM SERPL-SCNC: 137 MMOL/L (ref 135–145)
TROPONIN T SERPL HS-MCNC: <6 NG/L
WBC # BLD AUTO: 3.77 10E3/UL (ref 4–11)

## 2025-09-02 PROCEDURE — 85025 COMPLETE CBC W/AUTO DIFF WBC: CPT | Performed by: EMERGENCY MEDICINE

## 2025-09-02 PROCEDURE — 255N000002 HC RX 255 OP 636: Performed by: INTERNAL MEDICINE

## 2025-09-02 PROCEDURE — 86140 C-REACTIVE PROTEIN: CPT | Performed by: EMERGENCY MEDICINE

## 2025-09-02 PROCEDURE — 70450 CT HEAD/BRAIN W/O DYE: CPT

## 2025-09-02 PROCEDURE — 70553 MRI BRAIN STEM W/O & W/DYE: CPT

## 2025-09-02 PROCEDURE — 70549 MR ANGIOGRAPH NECK W/O&W/DYE: CPT

## 2025-09-02 PROCEDURE — G0378 HOSPITAL OBSERVATION PER HR: HCPCS

## 2025-09-02 PROCEDURE — 99207 PR NO BILLABLE SERVICE THIS VISIT: CPT | Performed by: STUDENT IN AN ORGANIZED HEALTH CARE EDUCATION/TRAINING PROGRAM

## 2025-09-02 PROCEDURE — 85730 THROMBOPLASTIN TIME PARTIAL: CPT | Performed by: EMERGENCY MEDICINE

## 2025-09-02 PROCEDURE — A9585 GADOBUTROL INJECTION: HCPCS | Performed by: INTERNAL MEDICINE

## 2025-09-02 PROCEDURE — 70496 CT ANGIOGRAPHY HEAD: CPT

## 2025-09-02 PROCEDURE — 255N000002 HC RX 255 OP 636: Performed by: EMERGENCY MEDICINE

## 2025-09-02 PROCEDURE — 82565 ASSAY OF CREATININE: CPT

## 2025-09-02 PROCEDURE — 99222 1ST HOSP IP/OBS MODERATE 55: CPT

## 2025-09-02 PROCEDURE — 250N000009 HC RX 250: Performed by: EMERGENCY MEDICINE

## 2025-09-02 PROCEDURE — 80048 BASIC METABOLIC PNL TOTAL CA: CPT | Performed by: EMERGENCY MEDICINE

## 2025-09-02 PROCEDURE — 84484 ASSAY OF TROPONIN QUANT: CPT | Performed by: EMERGENCY MEDICINE

## 2025-09-02 PROCEDURE — 85652 RBC SED RATE AUTOMATED: CPT | Performed by: EMERGENCY MEDICINE

## 2025-09-02 PROCEDURE — 85610 PROTHROMBIN TIME: CPT | Performed by: EMERGENCY MEDICINE

## 2025-09-02 PROCEDURE — 250N000013 HC RX MED GY IP 250 OP 250 PS 637: Performed by: EMERGENCY MEDICINE

## 2025-09-02 PROCEDURE — 70544 MR ANGIOGRAPHY HEAD W/O DYE: CPT

## 2025-09-02 PROCEDURE — 36415 COLL VENOUS BLD VENIPUNCTURE: CPT | Performed by: EMERGENCY MEDICINE

## 2025-09-02 PROCEDURE — 99207 PR NO BILLABLE SERVICE THIS VISIT: CPT | Performed by: INTERNAL MEDICINE

## 2025-09-02 PROCEDURE — 82962 GLUCOSE BLOOD TEST: CPT

## 2025-09-02 RX ORDER — AMOXICILLIN 250 MG
1 CAPSULE ORAL 2 TIMES DAILY PRN
Status: DISCONTINUED | OUTPATIENT
Start: 2025-09-02 | End: 2025-09-02 | Stop reason: HOSPADM

## 2025-09-02 RX ORDER — PANTOPRAZOLE SODIUM 40 MG/1
40 TABLET, DELAYED RELEASE ORAL
Status: DISCONTINUED | OUTPATIENT
Start: 2025-09-02 | End: 2025-09-02 | Stop reason: HOSPADM

## 2025-09-02 RX ORDER — ACETAMINOPHEN 325 MG/1
650 TABLET ORAL EVERY 4 HOURS PRN
Status: DISCONTINUED | OUTPATIENT
Start: 2025-09-02 | End: 2025-09-02 | Stop reason: HOSPADM

## 2025-09-02 RX ORDER — ASPIRIN 325 MG
325 TABLET ORAL ONCE
Status: COMPLETED | OUTPATIENT
Start: 2025-09-02 | End: 2025-09-02

## 2025-09-02 RX ORDER — GADOBUTROL 604.72 MG/ML
7.2 INJECTION INTRAVENOUS ONCE
Status: COMPLETED | OUTPATIENT
Start: 2025-09-02 | End: 2025-09-02

## 2025-09-02 RX ORDER — AMLODIPINE BESYLATE 2.5 MG/1
2.5 TABLET ORAL DAILY
Status: DISCONTINUED | OUTPATIENT
Start: 2025-09-02 | End: 2025-09-02 | Stop reason: HOSPADM

## 2025-09-02 RX ORDER — ONDANSETRON 2 MG/ML
4 INJECTION INTRAMUSCULAR; INTRAVENOUS EVERY 6 HOURS PRN
Status: DISCONTINUED | OUTPATIENT
Start: 2025-09-02 | End: 2025-09-02 | Stop reason: HOSPADM

## 2025-09-02 RX ORDER — PROCHLORPERAZINE MALEATE 5 MG/1
5 TABLET ORAL EVERY 6 HOURS PRN
Status: DISCONTINUED | OUTPATIENT
Start: 2025-09-02 | End: 2025-09-02 | Stop reason: HOSPADM

## 2025-09-02 RX ORDER — CALCIUM CARBONATE 500 MG/1
500 TABLET, CHEWABLE ORAL 2 TIMES DAILY
Status: DISCONTINUED | OUTPATIENT
Start: 2025-09-02 | End: 2025-09-02 | Stop reason: HOSPADM

## 2025-09-02 RX ORDER — ONDANSETRON 4 MG/1
4 TABLET, ORALLY DISINTEGRATING ORAL EVERY 6 HOURS PRN
Status: DISCONTINUED | OUTPATIENT
Start: 2025-09-02 | End: 2025-09-02 | Stop reason: HOSPADM

## 2025-09-02 RX ORDER — TAMSULOSIN HYDROCHLORIDE 0.4 MG/1
0.4 CAPSULE ORAL DAILY
Status: DISCONTINUED | OUTPATIENT
Start: 2025-09-02 | End: 2025-09-02 | Stop reason: HOSPADM

## 2025-09-02 RX ORDER — FAMOTIDINE 20 MG/1
20 TABLET, FILM COATED ORAL 2 TIMES DAILY
Status: DISCONTINUED | OUTPATIENT
Start: 2025-09-02 | End: 2025-09-02 | Stop reason: HOSPADM

## 2025-09-02 RX ORDER — ACETAMINOPHEN 650 MG/1
650 SUPPOSITORY RECTAL EVERY 4 HOURS PRN
Status: DISCONTINUED | OUTPATIENT
Start: 2025-09-02 | End: 2025-09-02 | Stop reason: HOSPADM

## 2025-09-02 RX ORDER — AMOXICILLIN 250 MG
2 CAPSULE ORAL 2 TIMES DAILY PRN
Status: DISCONTINUED | OUTPATIENT
Start: 2025-09-02 | End: 2025-09-02 | Stop reason: HOSPADM

## 2025-09-02 RX ADMIN — ASPIRIN 325 MG ORAL TABLET 325 MG: 325 PILL ORAL at 01:34

## 2025-09-02 RX ADMIN — IOHEXOL 71 ML: 350 INJECTION, SOLUTION INTRAVENOUS at 00:52

## 2025-09-02 RX ADMIN — SODIUM CHLORIDE 80 ML: 9 INJECTION, SOLUTION INTRAVENOUS at 00:53

## 2025-09-02 RX ADMIN — GADOBUTROL 7.2 ML: 604.72 INJECTION INTRAVENOUS at 03:21

## 2025-09-02 ASSESSMENT — ACTIVITIES OF DAILY LIVING (ADL)
ADLS_ACUITY_SCORE: 41
ADLS_ACUITY_SCORE: 38
ADLS_ACUITY_SCORE: 41
ADLS_ACUITY_SCORE: 41
ADLS_ACUITY_SCORE: 22
ADLS_ACUITY_SCORE: 41
ADLS_ACUITY_SCORE: 22
ADLS_ACUITY_SCORE: 22
ADLS_ACUITY_SCORE: 38

## 2025-09-02 ASSESSMENT — COLUMBIA-SUICIDE SEVERITY RATING SCALE - C-SSRS
2. HAVE YOU ACTUALLY HAD ANY THOUGHTS OF KILLING YOURSELF IN THE PAST MONTH?: NO
1. IN THE PAST MONTH, HAVE YOU WISHED YOU WERE DEAD OR WISHED YOU COULD GO TO SLEEP AND NOT WAKE UP?: NO
6. HAVE YOU EVER DONE ANYTHING, STARTED TO DO ANYTHING, OR PREPARED TO DO ANYTHING TO END YOUR LIFE?: NO

## 2025-09-03 ENCOUNTER — PATIENT OUTREACH (OUTPATIENT)
Dept: CARE COORDINATION | Facility: CLINIC | Age: 80
End: 2025-09-03
Payer: COMMERCIAL

## (undated) RX ORDER — FENTANYL CITRATE 50 UG/ML
INJECTION, SOLUTION INTRAMUSCULAR; INTRAVENOUS
Status: DISPENSED
Start: 2017-03-10

## (undated) RX ORDER — VERAPAMIL HYDROCHLORIDE 2.5 MG/ML
INJECTION, SOLUTION INTRAVENOUS
Status: DISPENSED
Start: 2017-03-10

## (undated) RX ORDER — FUROSEMIDE 10 MG/ML
INJECTION INTRAMUSCULAR; INTRAVENOUS
Status: DISPENSED
Start: 2024-02-09

## (undated) RX ORDER — HEPARIN SODIUM 1000 [USP'U]/ML
INJECTION, SOLUTION INTRAVENOUS; SUBCUTANEOUS
Status: DISPENSED
Start: 2017-03-10

## (undated) RX ORDER — LIDOCAINE HYDROCHLORIDE 10 MG/ML
INJECTION, SOLUTION EPIDURAL; INFILTRATION; INTRACAUDAL; PERINEURAL
Status: DISPENSED
Start: 2025-08-26